# Patient Record
Sex: MALE | URBAN - METROPOLITAN AREA
[De-identification: names, ages, dates, MRNs, and addresses within clinical notes are randomized per-mention and may not be internally consistent; named-entity substitution may affect disease eponyms.]

---

## 2020-09-28 ENCOUNTER — APPOINTMENT (OUTPATIENT)
Dept: URBAN - METROPOLITAN AREA CLINIC 260 | Age: 60
Setting detail: DERMATOLOGY
End: 2020-09-28

## 2020-09-28 VITALS — WEIGHT: 170 LBS | HEIGHT: 68 IN

## 2020-09-28 DIAGNOSIS — L82.1 OTHER SEBORRHEIC KERATOSIS: ICD-10-CM

## 2020-09-28 DIAGNOSIS — L57.0 ACTINIC KERATOSIS: ICD-10-CM

## 2020-09-28 DIAGNOSIS — L82.0 INFLAMED SEBORRHEIC KERATOSIS: ICD-10-CM

## 2020-09-28 PROCEDURE — OTHER EDUCATIONAL RESOURCES PROVIDED: OTHER

## 2020-09-28 PROCEDURE — 99202 OFFICE O/P NEW SF 15 MIN: CPT | Mod: 25

## 2020-09-28 PROCEDURE — OTHER COUNSELING: OTHER

## 2020-09-28 PROCEDURE — 17000 DESTRUCT PREMALG LESION: CPT | Mod: 59

## 2020-09-28 PROCEDURE — 17110 DESTRUCT B9 LESION 1-14: CPT

## 2020-09-28 PROCEDURE — OTHER LIQUID NITROGEN: OTHER

## 2020-09-28 ASSESSMENT — LOCATION ZONE DERM
LOCATION ZONE: NOSE
LOCATION ZONE: FACE

## 2020-09-28 ASSESSMENT — LOCATION DETAILED DESCRIPTION DERM
LOCATION DETAILED: NASAL ROOT
LOCATION DETAILED: LEFT INFERIOR LATERAL FOREHEAD

## 2020-09-28 ASSESSMENT — LOCATION SIMPLE DESCRIPTION DERM
LOCATION SIMPLE: NOSE
LOCATION SIMPLE: LEFT FOREHEAD

## 2020-09-28 NOTE — PROCEDURE: LIQUID NITROGEN
Medical Necessity Information: It is in your best interest to select a reason for this procedure from the list below. All of these items fulfill various CMS LCD requirements except the new and changing color options.
Post-Care Instructions: I reviewed with the patient in detail post-care instructions. Patient is to wear sunprotection, and avoid picking at any of the treated lesions. Pt may apply Vaseline to crusted or scabbing areas.
Render Post-Care Instructions In Note?: yes
Render Post-Care Instructions In Note?: no
Medical Necessity Clause: This procedure was medically necessary because the lesions that were treated were:
Duration Of Freeze Thaw-Cycle (Seconds): 5
Number Of Freeze-Thaw Cycles: 2 freeze-thaw cycles
Detail Level: Simple
Consent: The patient's consent was obtained including but not limited to risks of crusting, scabbing, blistering, scarring, darker or lighter pigmentary change, recurrence, incomplete removal and infection.
Duration Of Freeze Thaw-Cycle (Seconds): 3

## 2021-08-22 ENCOUNTER — HEALTH MAINTENANCE LETTER (OUTPATIENT)
Age: 61
End: 2021-08-22

## 2021-10-16 ENCOUNTER — HEALTH MAINTENANCE LETTER (OUTPATIENT)
Age: 61
End: 2021-10-16

## 2021-12-10 ENCOUNTER — APPOINTMENT (OUTPATIENT)
Dept: URBAN - METROPOLITAN AREA CLINIC 260 | Age: 61
Setting detail: DERMATOLOGY
End: 2021-12-11

## 2021-12-10 VITALS — HEIGHT: 68 IN | WEIGHT: 175 LBS

## 2021-12-10 DIAGNOSIS — D49.2 NEOPLASM OF UNSPECIFIED BEHAVIOR OF BONE, SOFT TISSUE, AND SKIN: ICD-10-CM

## 2021-12-10 DIAGNOSIS — L81.4 OTHER MELANIN HYPERPIGMENTATION: ICD-10-CM

## 2021-12-10 PROCEDURE — OTHER MIPS QUALITY: OTHER

## 2021-12-10 PROCEDURE — 88305 TISSUE EXAM BY PATHOLOGIST: CPT

## 2021-12-10 PROCEDURE — 99212 OFFICE O/P EST SF 10 MIN: CPT | Mod: 25

## 2021-12-10 PROCEDURE — OTHER BIOPSY BY SHAVE METHOD: OTHER

## 2021-12-10 PROCEDURE — 11102 TANGNTL BX SKIN SINGLE LES: CPT

## 2021-12-10 PROCEDURE — OTHER PATHOLOGY BILLING: OTHER

## 2021-12-10 PROCEDURE — OTHER COUNSELING: OTHER

## 2021-12-10 ASSESSMENT — LOCATION SIMPLE DESCRIPTION DERM
LOCATION SIMPLE: LEFT CHEEK
LOCATION SIMPLE: RIGHT FOREHEAD

## 2021-12-10 ASSESSMENT — LOCATION DETAILED DESCRIPTION DERM
LOCATION DETAILED: LEFT CENTRAL MALAR CHEEK
LOCATION DETAILED: RIGHT INFERIOR LATERAL FOREHEAD

## 2021-12-10 ASSESSMENT — LOCATION ZONE DERM: LOCATION ZONE: FACE

## 2021-12-10 NOTE — PROCEDURE: PATHOLOGY BILLING
Immunohistochemistry (32036 and [id number]) billing is not performed here. Please use the Immunohistochemistry Stain Billing plan to accomplish this. Immunohistochemistry (87581 and [id number]) billing is not performed here. Please use the Immunohistochemistry Stain Billing plan to accomplish this.

## 2021-12-10 NOTE — PROCEDURE: BIOPSY BY SHAVE METHOD
Bill 17990 For Specimen Handling/Conveyance To Laboratory?: no
Additional Anesthesia Volume In Cc (Will Not Render If 0): 0
Biopsy Type: H and E
Depth Of Biopsy: dermis
Silver Nitrate Text: The wound bed was treated with silver nitrate after the biopsy was performed.
Hemostasis: Drysol
Electrodesiccation And Curettage Text: The wound bed was treated with electrodesiccation and curettage after the biopsy was performed.
Billing Type: Client Bill
Anesthesia Type: 1% lidocaine with epinephrine
Was A Bandage Applied: Yes
Electrodesiccation Text: The wound bed was treated with electrodesiccation after the biopsy was performed.
Wound Care: Petrolatum
Type Of Destruction Used: Curettage
Biopsy Method: Dermablade
Cryotherapy Text: The wound bed was treated with cryotherapy after the biopsy was performed.
Detail Level: Detailed
Consent: Written consent was obtained and risks were reviewed including but not limited to scarring, infection, bleeding, scabbing, incomplete removal, nerve damage and allergy to anesthesia.
Dressing: bandage
Curettage Text: The wound bed was treated with curettage after the biopsy was performed.
Notification Instructions: Patient will be notified of biopsy results. However, patient instructed to call the office if not contacted within 2 weeks.
Information: Selecting Yes will display possible errors in your note based on the variables you have selected. This validation is only offered as a suggestion for you. PLEASE NOTE THAT THE VALIDATION TEXT WILL BE REMOVED WHEN YOU FINALIZE YOUR NOTE. IF YOU WANT TO FAX A PRELIMINARY NOTE YOU WILL NEED TO TOGGLE THIS TO 'NO' IF YOU DO NOT WANT IT IN YOUR FAXED NOTE.
Anesthesia Volume In Cc (Will Not Render If 0): 0.5
Post-Care Instructions: I reviewed with the patient in detail post-care instructions. Patient is to keep the biopsy site dry overnight, and then apply bacitracin twice daily until healed. Patient may apply hydrogen peroxide soaks to remove any crusting.

## 2021-12-28 ENCOUNTER — APPOINTMENT (OUTPATIENT)
Dept: URBAN - METROPOLITAN AREA CLINIC 255 | Age: 61
Setting detail: DERMATOLOGY
End: 2021-12-29

## 2021-12-28 DIAGNOSIS — Q82.5 CONGENITAL NON-NEOPLASTIC NEVUS: ICD-10-CM

## 2021-12-28 DIAGNOSIS — L82.1 OTHER SEBORRHEIC KERATOSIS: ICD-10-CM

## 2021-12-28 PROBLEM — C44.319 BASAL CELL CARCINOMA OF SKIN OF OTHER PARTS OF FACE: Status: ACTIVE | Noted: 2021-12-28

## 2021-12-28 PROCEDURE — 17311 MOHS 1 STAGE H/N/HF/G: CPT

## 2021-12-28 PROCEDURE — 13132 CMPLX RPR F/C/C/M/N/AX/G/H/F: CPT

## 2021-12-28 PROCEDURE — 99212 OFFICE O/P EST SF 10 MIN: CPT | Mod: 25

## 2021-12-28 PROCEDURE — OTHER MOHS SURGERY: OTHER

## 2021-12-28 PROCEDURE — OTHER COUNSELING: OTHER

## 2021-12-28 PROCEDURE — OTHER MIPS QUALITY: OTHER

## 2021-12-28 ASSESSMENT — LOCATION SIMPLE DESCRIPTION DERM
LOCATION SIMPLE: RIGHT UPPER ARM
LOCATION SIMPLE: RIGHT FOREARM

## 2021-12-28 ASSESSMENT — LOCATION ZONE DERM: LOCATION ZONE: ARM

## 2021-12-28 ASSESSMENT — LOCATION DETAILED DESCRIPTION DERM
LOCATION DETAILED: RIGHT DISTAL POSTERIOR UPPER ARM
LOCATION DETAILED: RIGHT ANTERIOR DISTAL UPPER ARM
LOCATION DETAILED: RIGHT DISTAL RADIAL DORSAL FOREARM

## 2021-12-28 NOTE — PROCEDURE: MIPS QUALITY
Quality 143: Oncology: Medical And Radiation- Pain Intensity Quantified: Pain severity quantified, no pain present
Quality 431: Preventive Care And Screening: Unhealthy Alcohol Use - Screening: Patient identified as an unhealthy alcohol user when screened for unhealthy alcohol use using a systematic screening method and received brief counseling
Detail Level: Detailed
Quality 226: Preventive Care And Screening: Tobacco Use: Screening And Cessation Intervention: Patient screened for tobacco use and is an ex/non-smoker
Quality 130: Documentation Of Current Medications In The Medical Record: Current Medications Documented
Quality 110: Preventive Care And Screening: Influenza Immunization: Influenza Immunization Administered during Influenza season

## 2021-12-28 NOTE — PROCEDURE: MOHS SURGERY
Referring Physician (Optional): Kraie Freeman PA-C Referring Physician (Optional): Karie Freeman PA-C

## 2022-01-04 ENCOUNTER — APPOINTMENT (OUTPATIENT)
Dept: URBAN - METROPOLITAN AREA CLINIC 260 | Age: 62
Setting detail: DERMATOLOGY
End: 2022-01-05

## 2022-01-04 DIAGNOSIS — Z48.02 ENCOUNTER FOR REMOVAL OF SUTURES: ICD-10-CM

## 2022-01-04 PROCEDURE — OTHER PHOTO-DOCUMENTATION: OTHER

## 2022-01-04 PROCEDURE — OTHER SUTURE REMOVAL (GLOBAL PERIOD): OTHER

## 2022-01-04 ASSESSMENT — LOCATION ZONE DERM: LOCATION ZONE: FACE

## 2022-01-04 ASSESSMENT — LOCATION SIMPLE DESCRIPTION DERM: LOCATION SIMPLE: RIGHT FOREHEAD

## 2022-01-04 ASSESSMENT — LOCATION DETAILED DESCRIPTION DERM: LOCATION DETAILED: RIGHT INFERIOR LATERAL FOREHEAD

## 2022-01-04 NOTE — PROCEDURE: PHOTO-DOCUMENTATION
Detail Level: Zone
Photo Preface (Leave Blank If You Do Not Want): Photographs were obtained of affected skin today..

## 2022-01-04 NOTE — PROCEDURE: SUTURE REMOVAL (GLOBAL PERIOD)
Detail Level: Detailed
Add 37741 Cpt? (Important Note: In 2017 The Use Of 80276 Is Being Tracked By Cms To Determine Future Global Period Reimbursement For Global Periods): no

## 2022-01-05 ENCOUNTER — APPOINTMENT (OUTPATIENT)
Dept: URBAN - METROPOLITAN AREA CLINIC 260 | Age: 62
Setting detail: DERMATOLOGY
End: 2022-01-08

## 2022-01-05 VITALS — WEIGHT: 175 LBS | HEIGHT: 68 IN

## 2022-01-05 DIAGNOSIS — L73.8 OTHER SPECIFIED FOLLICULAR DISORDERS: ICD-10-CM

## 2022-01-05 DIAGNOSIS — Z71.89 OTHER SPECIFIED COUNSELING: ICD-10-CM

## 2022-01-05 DIAGNOSIS — L82.1 OTHER SEBORRHEIC KERATOSIS: ICD-10-CM

## 2022-01-05 PROCEDURE — OTHER ADDITIONAL NOTES: OTHER

## 2022-01-05 PROCEDURE — OTHER MIPS QUALITY: OTHER

## 2022-01-05 PROCEDURE — OTHER LIQUID NITROGEN (COSMETIC): OTHER

## 2022-01-05 PROCEDURE — OTHER COUNSELING: OTHER

## 2022-01-05 PROCEDURE — 99212 OFFICE O/P EST SF 10 MIN: CPT | Mod: 24

## 2022-01-05 ASSESSMENT — LOCATION SIMPLE DESCRIPTION DERM
LOCATION SIMPLE: LEFT CHEEK
LOCATION SIMPLE: LEFT FOREHEAD
LOCATION SIMPLE: RIGHT FOREHEAD
LOCATION SIMPLE: RIGHT FOREHEAD

## 2022-01-05 ASSESSMENT — LOCATION DETAILED DESCRIPTION DERM
LOCATION DETAILED: RIGHT FOREHEAD
LOCATION DETAILED: LEFT MEDIAL FOREHEAD
LOCATION DETAILED: RIGHT LATERAL FOREHEAD
LOCATION DETAILED: LEFT SUPERIOR CENTRAL MALAR CHEEK
LOCATION DETAILED: LEFT INFERIOR MEDIAL FOREHEAD
LOCATION DETAILED: RIGHT MEDIAL FOREHEAD
LOCATION DETAILED: RIGHT SUPERIOR MEDIAL FOREHEAD
LOCATION DETAILED: LEFT SUPERIOR FOREHEAD

## 2022-01-05 ASSESSMENT — LOCATION ZONE DERM
LOCATION ZONE: FACE
LOCATION ZONE: FACE

## 2022-01-05 NOTE — PROCEDURE: MIPS QUALITY
Quality 110: Preventive Care And Screening: Influenza Immunization: Influenza Immunization Administered during Influenza season
Quality 130: Documentation Of Current Medications In The Medical Record: Current Medications Documented
Detail Level: Detailed
Quality 143: Oncology: Medical And Radiation- Pain Intensity Quantified: Pain severity quantified, no pain present
Quality 226: Preventive Care And Screening: Tobacco Use: Screening And Cessation Intervention: Patient screened for tobacco use and is an ex/non-smoker
Quality 431: Preventive Care And Screening: Unhealthy Alcohol Use - Screening: Patient identified as an unhealthy alcohol user when screened for unhealthy alcohol use using a systematic screening method and received brief counseling

## 2022-01-05 NOTE — PROCEDURE: ADDITIONAL NOTES
Detail Level: Zone
Render Risk Assessment In Note?: no
Additional Notes: Recommend a skin check due to history of BCC.

## 2022-01-05 NOTE — PROCEDURE: MIPS QUALITY
Quality 143: Oncology: Medical And Radiation- Pain Intensity Quantified: Pain severity quantified, no pain present
Quality 431: Preventive Care And Screening: Unhealthy Alcohol Use - Screening: Patient identified as an unhealthy alcohol user when screened for unhealthy alcohol use using a systematic screening method and received brief counseling
Quality 110: Preventive Care And Screening: Influenza Immunization: Influenza Immunization Administered during Influenza season
Quality 226: Preventive Care And Screening: Tobacco Use: Screening And Cessation Intervention: Patient screened for tobacco use and is an ex/non-smoker
Detail Level: Detailed
Quality 130: Documentation Of Current Medications In The Medical Record: Current Medications Documented

## 2022-01-05 NOTE — PROCEDURE: ADDITIONAL NOTES
Render Risk Assessment In Note?: no
Detail Level: Zone
Additional Notes: Recommend a skin check due to history of BCC.

## 2022-01-05 NOTE — PROCEDURE: LIQUID NITROGEN (COSMETIC)
Detail Level: Detailed
Consent: The patient's consent was obtained including but not limited to risks of crusting, scabbing, blistering, scarring, darker or lighter pigmentary change, recurrence, incomplete removal and infection. The patient understands that the procedure is cosmetic in nature and is not covered by insurance.
Render Post-Care Instructions In Note?: no
Post-Care Instructions: I reviewed with the patient in detail post-care instructions. Patient is to wear sunprotection, and avoid picking at any of the treated lesions. Pt may apply Vaseline to crusted or scabbing areas.
Show Spray Paint Technique Variable?: Yes
Spray Paint Text: The liquid nitrogen was applied to the skin utilizing a spray paint frosting technique.
Billing Information: Bill by Static Price
Price (Use Numbers Only, No Special Characters Or $): 150

## 2022-07-28 ENCOUNTER — TRANSFERRED RECORDS (OUTPATIENT)
Dept: HEALTH INFORMATION MANAGEMENT | Facility: CLINIC | Age: 62
End: 2022-07-28

## 2022-10-01 ENCOUNTER — HEALTH MAINTENANCE LETTER (OUTPATIENT)
Age: 62
End: 2022-10-01

## 2022-10-19 ENCOUNTER — TRANSFERRED RECORDS (OUTPATIENT)
Dept: HEALTH INFORMATION MANAGEMENT | Facility: CLINIC | Age: 62
End: 2022-10-19

## 2022-11-01 ENCOUNTER — MEDICAL CORRESPONDENCE (OUTPATIENT)
Dept: HEALTH INFORMATION MANAGEMENT | Facility: CLINIC | Age: 62
End: 2022-11-01

## 2022-11-10 ENCOUNTER — REFERRAL (OUTPATIENT)
Dept: TRANSPLANT | Facility: CLINIC | Age: 62
End: 2022-11-10

## 2022-11-10 DIAGNOSIS — K70.30 ALCOHOLIC CIRRHOSIS (H): Primary | ICD-10-CM

## 2022-11-10 NOTE — LETTER
Cricket Mane  [address] [place] [facility] [address]                November 15, 2022          MEDICAL RECORDS REQUEST  [facility] liver transplant team is requesting records from Referring Providers Office for patients referred to the Liver Transplant Program                Images Needed to Process Intake of Patient:    CXR Images (most recent)    Chest CT Images (all within last 24 months or most recent)    Abdominal CT Report and Images  (all within last 24 months or most recent)    Abdominal MRI Report and Images  (all within last 24 months or most recent)    Bone Scan Images and Report (No DEXA Scans)    PET Images and Report  Records Needed to Process Intake of Patient:    Cardiac Catheterization Results (most recent on file)    Chest CT Report (all within last 24 months or most recent)    Chest X-Ray Report (all within last 24 months or most recent)    Culture Results (last 2 years on file)    ECHO Results (most recent on file)    Hospital Discharge Summaries (last 2 years on file)    Lab Results (most recent on file)    History and Physical (original on file)    Liver Pathology All Reports     Physicians Notes (last 3 reports on file)    Radiology Reports (not including Chest X-ray, last 2 years on file)    EGD Images and Report     Colonoscopy Report with Pathology    Requested imaging may be electronically sent to the [facility] system via [id number] DICOM connection.   When unable to send imaging electronically, an exported DICOM CD may be sent. Please indicate when images have been sent electronically on a return faxed cover sheet.    Requested pathology slides should be accompanied by the appropriate report from your institution.    When the patient is hand carrying requested records or the requested records are not at your facility, please indicate this information on a return faxed cover sheet.    Please fax all paper records to [phone] within 3-5 business days.     Please send all scans/slides to:   [facility]  Solid Organ Transplant Office  [address]  [place] [address]    Please call our office at [phone] if you have any questions or concerns.

## 2022-11-10 NOTE — LETTER
11/15/2022    Cricket Mane  [address] [facility] [address]          Dear Cricket,    Thank you for your interest in the Transplant Center at [facility]. We look forward to being a part of your care team and assisting you through the transplant process.    As we discussed, your transplant coordinator is Stefany Jean (Liver).  You may call your coordinator at any time with questions or concerns at [phone].    Please complete the following.    1. Fill out and return the enclosed forms    Authorization for Electronic Communication    Authorization to Discuss Protected Health Information    Authorization for Release of Protected Health Information    Authorization for Care Everywhere Release of Information    2. Sign up for:    [employer], access to your electronic medical record (see enclosed pamphlet)    [email or web address], a transplant education website    You can use these tools to learn more about your transplant, communicate with your care team, and track your medical details      Sincerely,      Solid Organ Transplant  [facility]    cc: Referring Physician and PCP

## 2022-11-15 VITALS — BODY MASS INDEX: 24.25 KG/M2 | HEIGHT: 68 IN | WEIGHT: 160 LBS

## 2022-11-15 NOTE — TELEPHONE ENCOUNTER
Patient was asked the following questions during liver intake call.     Referring Provider: Raissa Cardenas NP  Referring Diagnosis: Alcoholic Cirrhosis of the Liver  PCP: Dr. Bang Burnett     1)Do you know why you have liver disease: Yes       If Alcoholic Cirrhosis is present when was your last drink: 7/28/22       Have you ever been through treatment for alcohol: Yes, [facility]  2) Presence of Ascites: Yes Paracentesis: Yes  3) Presence of Hepatic Encephalopathy: Yes Medications: No  4) History of GI Bleeding: No  5) Oxygen Use: No  6) EGD: Yes @ [facility]   7) Colonoscopy: No   8) MELD Score: 23  9) Labs available for review from PCP/GI: Yes  10)HCC Diagnosis: No                11)Insurance information: [facility]                 Policy Low: Self              Subscriber/Policy/ID Number: [id number]             Group Number:     Referral intake process completed.  Patient is aware that after financial approval is received, medical records will be requested.   Patient confirmed for a callback from transplant coordinator on 11/17/22.  Tentative evaluation date TBD.    Confirmed coordinator will discuss evaluation process in more detail at the time of their call.   Patient is aware of the need to arrange age appropriate cancer screening, vaccinations, and dental care.  Reminded patient to complete questionnaire, complete medical records release, and review packet prior to evaluation visit .  Assessed patient for special needs (ie--wheelchair, assistance, guardian, and ):  None  Patient instructed to call [phone] with questions.     Patient gave verbal consent during intake call to obtain medical records and documents outside of [facility]: Yes     MEDARDO Crawley, LPN   Transplant

## 2022-11-17 ENCOUNTER — DOCUMENTATION ONLY (OUTPATIENT)
Dept: TRANSPLANT | Facility: CLINIC | Age: 62
End: 2022-11-17

## 2022-11-17 NOTE — PROGRESS NOTES
LIVER DISEASE ETOH   REFERRING PROVIDER Raissa Cardenas  [facility]  -----------------------------------------------------------------------------------------------------------------------------  MELD 23  ABO: O    HISTORY OF LIVER DISEASE  Started following with Raissa Cardenas after hospitalization in July 2022.  Wasn't feeling well 7/28/22, woke up at night vomiting blood x 2, son called 911- went to [facility] and additional hematemesis, hospitalized for 5 days.      Last ETOH was before that hospitalization, prior was consuming vodka and wine - at least several a day vodka, plus couple glasses of wine.  In IOP with Christy right now.      Told in past to limit etoh intake by MD approx early 2016 by Dr. Eladio Burnett in [place] (Cardiologist) when seen for AFib.  Started on Metoprolol.  On carvedilol now for AFib.      Ascites  Rehan Furosemide was on, took off by Theresa since fluid status improved.      [place] ultrasound approx every 3 weeks, nut not needed tap last few times.      TIPS no  HE no and not on meds  Kidney function no issues  Variceal screening 10/12/22 had EGD, has another scheduled in January 2018  HCC Screening Last imaging and location    Hospitalizations July 2022 per above  ---------------------------------------------------------------------------------------------------------------------------------  PMH  Cardiac- AFib  Pulm- denies, non smoker (never smoked)  Diabetes no   Abdominal surgery no; had hip replacement   CRC Screening- had cologuard? That was negative; will discuss getting done at  locally at    Vax: got flu already this year, had covid doses x 3.      SHX  Currently works from home, travels for work every few months to home office in [place], family is here.  Works in [employer], fund management    Currently lives at home, adult son lives there, daughter is local and is a nurse, another daughter in [place].     ---------------------------------------------------------------------------------------------------------------------------------  LDLT Discussed  Yes, info sent    PLAN  Eval 12/13 or 12/20- will check with daughter and let me know.       Current meds:  (off diuretics at present, also stopped antbx for SBP prophy)  Omeprazole  Carvedilol  MVI  Folic Acid

## 2022-12-08 DIAGNOSIS — K74.60 CIRRHOSIS (H): Primary | ICD-10-CM

## 2022-12-14 NOTE — PROGRESS NOTES
[facility] Hepatology    New Patient Visit    Referring provider:  Raissa Cardenas [facility], [facility]  Chief complaint:  LT Evaluation; ABO O+    Assessment  62 year old male with past medical history of decompensated alcohol-related cirrhosis complicated by ascites and variceal bleeding.  Past medical history also includes alcohol use disorder and atrial fibrillation (not on anticoagulation).    In terms of the patient's liver disease his ascites has resolved and he is no longer requiring any diuretic management.  He has a history of variceal bleeding requiring banding; he is due for repeat endoscopy for variceal screening.  He has no history of hepatic encephalopathy. 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  His liver disease may continue to improve in the setting of sobriety.    His last alcohol use was in July 2022.  Following his last alcohol use he has completed treatment programming at [facility] and has good insight into his alcohol use disorder.  He would benefit from ongoing support to maintain abstinence.    Pending cardiology evaluation he would be a reasonable candidate for transplantation.    Plan  - EGD for variceal surveillance  - Colonoscopy for colon cancer screening  - CT angiogram for coronary assessment + cardiology consultation given history of atrial fibrillation  - HCC surveillance due 6/2023  - Congratulated on alcohol abstinence and completion of treatment    Health Maintenance:  - COVID Vaccination: s/p 5 doses  - Influenza Vaccination: 9/2022  - Dental visits: last ~10/2022  - Colon Cancer Screening: planned for 1/2023    RTC: 3 months    Discussed with attending hepatologist, Dr. Luther Rodriguez MD  Transplant Hepatology Fellow  [phone]    HPI:  Alcohol related Cirrhosis  - dx ~ 7/2022  - no hx HE  - hx ascites  - hx variceal bleed (7/2022)  - last EGD 10/2022: Grade III varices s/p  banding. Mild portal HTN gastropathy. IGV1.   - HCC screening - US 12/2022 without hepatic lesions    He presents with his daughter Kell.    He was first diagnosed with liver disease related to alcohol in July 2022. He was admitted 7/28-8/2/22 for hematemesis. Noted to have decompensated cirrhosis with ascites. He was started on low dose diuretics. Ascites fluid (2601 WBC 25% neurophils, but 1304 RBC). Culture negative.  He was started on antibiotics at that time.    In terms of his ascites he has only undergone a paracentesis once which was at the time of his cirrhosis diagnosis.  1 L was removed at that time.  He previously was on a combination of furosemide and spironolactone but in the recent weeks he was weaned down to Lasix and he no longer takes this daily.  He denies any abdominal swelling or lower extremity edema.    Since his hospital stay in July he has not had any issues with melena, hematochezia or hematemesis.  He denies any history of blood transfusions.  Denies any history of encephalopathy, confusion or lethargy.    In terms of his alcohol use his last use was on July 28, 2022.  He previously drank wine and liquor (gin + vodka).  He started drinking in high school but his alcohol use was pretty minimal until after he finished grad school.  His alcohol use increased at the beginning of the pandemic to up to a bottle of wine per night plus liquor.  Prior to his diagnosis of liver disease he was already in the process of reducing his alcohol use.  He has gone through programming at [facility] for 4 months and has completed programming.    He has been diagnosed with atrial fibrillation in the past. Followed by [facility] cardiologist. Diagnosed around 2017 and status post failed DCCV. He has since been on a rate control strategy with carvedilol. Per notes - CHADS2 Vasc is 0, therefore he has not been on anticoagulation. No aspirin was prescribed given liver disease.    He reports a stable weight  "with good appetite.  He continues to work full-time.  He denies any tobacco use, intranasal drug use or IV drug use.    Medical hx Surgical hx   Past Medical History:   Diagnosis Date     Alcoholic cirrhosis of liver with ascites (H)      Atrial fibrillation (H)      History of alcohol use disorder      History of esophageal varices with bleeding       Past Surgical History:   Procedure Laterality Date     AS PARTIAL HIP REPLACEMENT     Abdominal surgery: denies  * Hip replacement on the left      Medications  Current Outpatient Medications   Medication Sig Dispense Refill     carvedilol (COREG) 3.125 MG tablet TAKE 1 TABLET (3.125 MG) BY MOUTH TWO TIMES A DAY WITH MEALS.       folic acid (FOLVITE) 1 MG tablet Take 1,000 mcg by mouth daily       Multiple Vitamins-Iron TABS Take 1 tablet by mouth daily       omeprazole 20 MG tablet        LORazepam (ATIVAN) 0.5 MG tablet [LORAZEPAM (ATIVAN) 0.5 MG TABLET] Take 1 tablet (0.5 mg total) by mouth every 8 (eight) hours as needed for anxiety. 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   - Lives with adult son. Daughter is local, RN. Another daughter in [place]  - Alcohol: Last drink 7/28/22; other history as above   - Tobacco: Never smoker  - Drugs: Denies ever       Review of systems  A 10-point review of systems was negative.    Examination  /82   Pulse 99   Ht 1.727 m (5' 8\")   Wt 74.8 kg (165 lb)   SpO2 98%   BMI 25.09 kg/m    Body mass index is 25.09 kg/m .    Gen-NAD  Eye- EOMI, conjunctival icterus  ENT- MMM, normal oropharynx  CVS- Irregularly irregular, no murmurs  RS- CTA bilaterally  Abd- soft, umbilical hernia (reducible) non-tender, non-distended  Extr- 2+ radial " pulses bilaterally, no lower extremity edema bilaterally  MS- hands without clubbing  Neuro- A+Ox3, no asterixis  Skin- no rash. + jaundice  Psych- normal mood    Laboratory  BMPRecent Labs   Lab Test 12/20/22  0804      POTASSIUM 4.7   CHLORIDE 101   PEDRO 9.7   CO2 26   BUN 17.8   CR 1.03   *     CBC  Recent Labs   Lab Test 12/20/22  0804   WBC 6.3   RBC 3.99*   HGB 12.8*   HCT 37.8*   MCV 95   MCH 32.1   MCHC 33.9   RDW 16.2*   PLT 79*     Liver Enzymes   Recent Labs   Lab Test 12/20/22  0804   PROTTOTAL 7.9   ALBUMIN 3.1*   BILITOTAL 5.9*   ALKPHOS 250*   AST 91*   ALT 54*      INR   INR   Date Value Ref Range Status   12/20/2022 1.91 (H) 0.85 - 1.15 Final      Hep B s Ag negative 2022  Hep B c Ab negative 2022  Hep B s Ab negative 2022  Hep C Ab negative 2022  PETH < 10 (12/2022)  PETH: 12 (9/2022)  PETH 116 (8/2022)  Ethyl Alcohol: 0.13 (7/2022)  AFP: 3.1 (8/2022)  FIT 9/2022: negative    Radiology  TTE 11/2022  1. The left ventricular size is normal.  Systolic function is difficult to assess.  The estimated ejection fraction is 65%.  Wall thickness is borderline increased.  Left ventricular segmental wall motion is normal.   2. The right ventricular size is normal.  Systolic function is normal.   3. The left atrium is moderately enlarged.   4. The right atrium is mildly enlarged.   5. There is trace aortic valve regurgitation.  6. There is trace to mild mitral valve regurgitation.   7. There is mild tricuspid valve regurgitation.  8. There is trace pulmonic regurgitation.   9. The proximal ascending aorta measures 3.30 cm with an index of 1.72 cm/m2.  10. There is no gross pericardial effusion.     TTE 12/2022  Global and regional left ventricular function is normal with an EF of 60-65%.  Global right ventricular function is normal.  Mild mitral and aortic annular calcification is present.  Pulmonary artery systolic pressure is normal.  The inferior vena cava is normal.  No pericardial effusion is  present.    Abdominal US 12/2022  1. Cirrhotic appearance of the liver without suspicious focal liver  lesion.  2. Simple hepatic cyst in the right lobe of the liver measuring up to  1.1 cm.  3. Cholelithiasis and layering biliary sludge. Asymmetric gallbladder  wall thickening, nonspecific in the setting of liver disease and  partially decompressed gallbladder. No pericholecystic fluid or  positive sonographic Dubon sign to suggest acute cholecystitis.  4. Evidence of portal hypertension including borderline decreased  velocity in the right portal vein and mild splenomegaly.    CXR 12/2022  No acute cardiopulmonary findings.    Endoscopy  EGD 10/2022: Grade III varices s/p banding. Mild portal HTN gastropathy. Non-bleeding gastric erosions. IGV1. Biopsies neg for H Pylori.    EGD 8/2022: Esophageal ulcer. Grade III varices s/p banding. Portal HTN gastropathy. IGV1.

## 2022-12-14 NOTE — TELEPHONE ENCOUNTER
RECORDS RECEIVED FROM:   DATE RECEIVED:   NOTES STATUS DETAILS   OFFICE NOTE from referring provider    Internal    OFFICE NOTE from other cardiologist    N/A    SUMMARY from hospital/ED   Care Everywhere    MEDICATION LIST   Internal    DIAGNOSTIC PROCEDURES     EKG   In process    Monitor Reports   N/A    IMAGING (DISC & REPORT)      Echo   In process 11-18-22   Stress Tests   N/A    Cath   N/A    MRI/MRA   N/A    CT/CTA   N/A      Action 12/14/22 [facility]  Fax #[phone]   Action Taken Requested echo 11-18-22    Resolved images in PACS 12/27     Action 12/14/22   Fax #[phone]   Action Taken Requested all 2022 EKGs

## 2022-12-16 ENCOUNTER — ANCILLARY PROCEDURE (OUTPATIENT)
Dept: GENERAL RADIOLOGY | Facility: CLINIC | Age: 62
End: 2022-12-16
Attending: INTERNAL MEDICINE
Payer: COMMERCIAL

## 2022-12-16 ENCOUNTER — ANCILLARY PROCEDURE (OUTPATIENT)
Dept: ULTRASOUND IMAGING | Facility: CLINIC | Age: 62
End: 2022-12-16
Attending: INTERNAL MEDICINE
Payer: COMMERCIAL

## 2022-12-16 DIAGNOSIS — K74.60 CIRRHOSIS (H): ICD-10-CM

## 2022-12-16 PROCEDURE — 71046 X-RAY EXAM CHEST 2 VIEWS: CPT | Mod: GC | Performed by: RADIOLOGY

## 2022-12-16 PROCEDURE — 93975 VASCULAR STUDY: CPT | Mod: GC | Performed by: RADIOLOGY

## 2022-12-19 ENCOUNTER — TELEPHONE (OUTPATIENT)
Dept: TRANSPLANT | Facility: CLINIC | Age: 62
End: 2022-12-19

## 2022-12-19 DIAGNOSIS — K70.30 ALCOHOLIC CIRRHOSIS (H): Primary | ICD-10-CM

## 2022-12-19 LAB
ABO/RH(D): NORMAL
ANTIBODY SCREEN: NEGATIVE
SPECIMEN EXPIRATION DATE: NORMAL

## 2022-12-19 NOTE — PROGRESS NOTES
[facility] Solid Organ Transplant  Outpatient MNT: Liver Transplant Evaluation    Current BMI: 25 (HT 68 in,  lbs/75 kg)  BMI is within recommendation of <45 for liver transplant    Fried Frailty-- Not Frail (1/5 points)- unintentional wt loss     FraiLT-- Pre frail  [email or web address]     Time Spent: 30 minutes  Visit Type: Initial   Referring Physician: Kavitha   Pt accompanied by: his daughter, Anthony    History of previous txp: none     Nutrition Assessment  H/o etoh liver dz. He reports watching Na content of foods as able.      - Appetite: good   - Food allergies/intolerances: no   - Meal prep & grocery shopping: pt does  - Previous RD education: yes  - Issues chewing or swallowing: no   - N/V/D/C: no   - Food access concerns: no     Vitamins, Supplements, Pertinent Meds: MVI, folic acid, calcium   Herbal Medicines/Supplements: none   Protein supplement: protein drink/bar a few times/week (since July)- Ensure 30 g protein, Bryce bars, Muscle Milk, whey protein powder    Edema: prev ascites, yet reports no current fluid retention    Weight hx:   - fluid fluctuations; prior  lbs   - some muscle loss, but thinks it has improved    Diet Recall  Breakfast Greek yogurt with fruit and/or cereal with skim milk + protein drink    Lunch Fruit, bakery muffin; take out meal- 4x/week (from grocery store or restaurant)   Dinner At home will make- fish/chicken/pork + veggie or will do take out meal per L   Snacks HS- ice cream, popsicle, chocolate covered pretzels    Beverages Water, OJ (0-10 oz/day), Gatorade (20 oz/day), coffee    Dining out 4x/week      Physical Activity  Improving strength; reports no routine activity   Does own ADLs     Anthropometrics   IBW/%IBW: 154/107  Dosing wt: 165 lbs/75 kg    Estimated Nutrition Needs  Protein: 75-90 grams/day (1-1.2 g/kg) for increased needs w/ liver disease    Nutrition Diagnosis  Predicted suboptimal nutrient intake (protein) r/t increased  needs with liver disease.    Nutrition Intervention  Nutrition education provided:  Discussed sodium intake (low sodium foods and drinks, seasoning food without salt and tips for low sodium diet).     Reviewed adequate protein intake. Encouraged receiving protein from both animal and plant based sources.     Reviewed post txp diet guidelines in brief (will review in further detail post txp):  (1) Review of proper food safety measures d/t immunosuppressant therapy post-op and increased risk for food-borne illness    (2) Avoid the following post txp d/t risk for rejection, unknown effects on the organs, and/or potential interactions with immunosuppressants:  - Herbal, Chinese, holistic, chiropractic, natural, alternative medicines and supplements  - Detoxes and cleanses  - Weight loss pills  - Protein powders or other products with extracts or herbs (ie green tea extract)    (3) Med regimen and possible side effects    Patient Understanding: Pt verbalized understanding of education provided.  Expected Engagement: Good  Follow-Up Plans: PRN     Nutrition Goals  Ideal minimum protein intake 75 g/day    Ynes Higgins, RD, LD, CCTD

## 2022-12-19 NOTE — TELEPHONE ENCOUNTER
Spoke with patient, reviewed upcoming eval appts and process    Pateint reported someone from [facility] called him and told him CTA would not be covered at our facility, only would be covered at [place]. Told him should be covered under branch of transplant evaluation that was pre-approved, message to PFR to double check.

## 2022-12-20 ENCOUNTER — OFFICE VISIT (OUTPATIENT)
Dept: TRANSPLANT | Facility: CLINIC | Age: 62
End: 2022-12-20
Attending: INTERNAL MEDICINE
Payer: COMMERCIAL

## 2022-12-20 ENCOUNTER — OFFICE VISIT (OUTPATIENT)
Dept: GASTROENTEROLOGY | Facility: CLINIC | Age: 62
End: 2022-12-20
Attending: INTERNAL MEDICINE
Payer: COMMERCIAL

## 2022-12-20 ENCOUNTER — ANCILLARY PROCEDURE (OUTPATIENT)
Dept: CARDIOLOGY | Facility: CLINIC | Age: 62
End: 2022-12-20
Attending: INTERNAL MEDICINE
Payer: COMMERCIAL

## 2022-12-20 ENCOUNTER — COMMITTEE REVIEW (OUTPATIENT)
Dept: TRANSPLANT | Facility: CLINIC | Age: 62
End: 2022-12-20

## 2022-12-20 ENCOUNTER — LAB (OUTPATIENT)
Dept: LAB | Facility: CLINIC | Age: 62
End: 2022-12-20
Attending: INTERNAL MEDICINE
Payer: COMMERCIAL

## 2022-12-20 VITALS
DIASTOLIC BLOOD PRESSURE: 82 MMHG | BODY MASS INDEX: 25.01 KG/M2 | OXYGEN SATURATION: 98 % | HEIGHT: 68 IN | WEIGHT: 165 LBS | HEART RATE: 99 BPM | SYSTOLIC BLOOD PRESSURE: 119 MMHG

## 2022-12-20 DIAGNOSIS — M81.0 OSTEOPOROSIS: ICD-10-CM

## 2022-12-20 DIAGNOSIS — F10.21 ALCOHOL USE DISORDER, SEVERE, IN EARLY REMISSION (H): ICD-10-CM

## 2022-12-20 DIAGNOSIS — Z12.11 COLON CANCER SCREENING: ICD-10-CM

## 2022-12-20 DIAGNOSIS — I48.91 ATRIAL FIBRILLATION AND FLUTTER (H): ICD-10-CM

## 2022-12-20 DIAGNOSIS — K74.60 CIRRHOSIS (H): ICD-10-CM

## 2022-12-20 DIAGNOSIS — K70.31 ALCOHOLIC CIRRHOSIS OF LIVER WITH ASCITES (H): Primary | ICD-10-CM

## 2022-12-20 DIAGNOSIS — K70.30 ALCOHOLIC CIRRHOSIS (H): Primary | ICD-10-CM

## 2022-12-20 DIAGNOSIS — Z76.82 ORGAN TRANSPLANT CANDIDATE: Primary | ICD-10-CM

## 2022-12-20 DIAGNOSIS — I85.11 SECONDARY ESOPHAGEAL VARICES WITH BLEEDING (H): ICD-10-CM

## 2022-12-20 DIAGNOSIS — I48.92 ATRIAL FIBRILLATION AND FLUTTER (H): ICD-10-CM

## 2022-12-20 LAB
A1 AB TITR SERPL: >256 {TITER}
A1 AB TITR SERPL: >256 {TITER}
ABO/RH(D): NORMAL
ALBUMIN MFR UR ELPH: 11.2 MG/DL
ALBUMIN SERPL BCG-MCNC: 3.1 G/DL (ref 3.5–5.2)
ALBUMIN UR-MCNC: NEGATIVE MG/DL
ALP SERPL-CCNC: 250 U/L (ref 40–129)
ALT SERPL W P-5'-P-CCNC: 54 U/L (ref 10–50)
ANION GAP SERPL CALCULATED.3IONS-SCNC: 10 MMOL/L (ref 7–15)
ANTIBODY TITER IGM SCREEN: NEGATIVE
APPEARANCE UR: CLEAR
AST SERPL W P-5'-P-CCNC: 91 U/L (ref 10–50)
B IGG TITR SERPL: >256 {TITER}
B IGM TITR SERPL: >256 {TITER}
BILIRUB DIRECT SERPL-MCNC: 2.1 MG/DL (ref 0–0.3)
BILIRUB SERPL-MCNC: 5.9 MG/DL
BILIRUB UR QL STRIP: NEGATIVE
BUN SERPL-MCNC: 17.8 MG/DL (ref 8–23)
CALCIUM SERPL-MCNC: 9.7 MG/DL (ref 8.8–10.2)
CHLORIDE SERPL-SCNC: 101 MMOL/L (ref 98–107)
CHOLEST SERPL-MCNC: 311 MG/DL
COLOR UR AUTO: YELLOW
CREAT SERPL-MCNC: 1.03 MG/DL (ref 0.67–1.17)
CREAT UR-MCNC: 130 MG/DL
DEPRECATED CALCIDIOL+CALCIFEROL SERPL-MC: 24 UG/L (ref 20–75)
DEPRECATED HCO3 PLAS-SCNC: 26 MMOL/L (ref 22–29)
ERYTHROCYTE [DISTWIDTH] IN BLOOD BY AUTOMATED COUNT: 16.2 % (ref 10–15)
GFR SERPL CREATININE-BSD FRML MDRD: 83 ML/MIN/1.73M2
GLUCOSE SERPL-MCNC: 106 MG/DL (ref 70–99)
GLUCOSE UR STRIP-MCNC: NEGATIVE MG/DL
HCT VFR BLD AUTO: 37.8 % (ref 40–53)
HDLC SERPL-MCNC: 74 MG/DL
HGB BLD-MCNC: 12.8 G/DL (ref 13.3–17.7)
HGB UR QL STRIP: NEGATIVE
INR PPP: 1.91 (ref 0.85–1.15)
IRON BINDING CAPACITY (ROCHE): ABNORMAL
IRON SATN MFR SERPL: ABNORMAL %
IRON SERPL-MCNC: 225 UG/DL (ref 61–157)
KETONES UR STRIP-MCNC: NEGATIVE MG/DL
LDLC SERPL CALC-MCNC: 218 MG/DL
LEUKOCYTE ESTERASE UR QL STRIP: NEGATIVE
LVEF ECHO: NORMAL
MCH RBC QN AUTO: 32.1 PG (ref 26.5–33)
MCHC RBC AUTO-ENTMCNC: 33.9 G/DL (ref 31.5–36.5)
MCV RBC AUTO: 95 FL (ref 78–100)
NITRATE UR QL: NEGATIVE
NONHDLC SERPL-MCNC: 237 MG/DL
PH UR STRIP: 6 [PH] (ref 5–7)
PHOSPHATE SERPL-MCNC: 3.8 MG/DL (ref 2.5–4.5)
PLATELET # BLD AUTO: 79 10E3/UL (ref 150–450)
POTASSIUM SERPL-SCNC: 4.7 MMOL/L (ref 3.4–5.3)
PROT SERPL-MCNC: 7.9 G/DL (ref 6.4–8.3)
PROT/CREAT 24H UR: 0.09 MG/MG CR (ref 0–0.2)
PSA SERPL-MCNC: 0.09 NG/ML (ref 0–4.5)
RBC # BLD AUTO: 3.99 10E6/UL (ref 4.4–5.9)
SODIUM SERPL-SCNC: 137 MMOL/L (ref 136–145)
SP GR UR STRIP: 1.02 (ref 1–1.03)
SPECIMEN EXPIRATION DATE: NORMAL
SPECIMEN EXPIRATION DATE: NORMAL
TRANSFERRIN SERPL-MCNC: 184 MG/DL (ref 200–360)
TRIGL SERPL-MCNC: 96 MG/DL
TSH SERPL DL<=0.005 MIU/L-ACNC: 3.43 UIU/ML (ref 0.3–4.2)
UROBILINOGEN UR STRIP-MCNC: 4 MG/DL
WBC # BLD AUTO: 6.3 10E3/UL (ref 4–11)

## 2022-12-20 PROCEDURE — 86850 RBC ANTIBODY SCREEN: CPT | Performed by: INTERNAL MEDICINE

## 2022-12-20 PROCEDURE — 80061 LIPID PANEL: CPT | Performed by: PATHOLOGY

## 2022-12-20 PROCEDURE — 83540 ASSAY OF IRON: CPT | Performed by: PATHOLOGY

## 2022-12-20 PROCEDURE — 86901 BLOOD TYPING SEROLOGIC RH(D): CPT | Performed by: INTERNAL MEDICINE

## 2022-12-20 PROCEDURE — 84100 ASSAY OF PHOSPHORUS: CPT | Performed by: PATHOLOGY

## 2022-12-20 PROCEDURE — 85027 COMPLETE CBC AUTOMATED: CPT | Performed by: PATHOLOGY

## 2022-12-20 PROCEDURE — 82306 VITAMIN D 25 HYDROXY: CPT | Performed by: INTERNAL MEDICINE

## 2022-12-20 PROCEDURE — 86780 TREPONEMA PALLIDUM: CPT | Performed by: INTERNAL MEDICINE

## 2022-12-20 PROCEDURE — 82728 ASSAY OF FERRITIN: CPT | Performed by: INTERNAL MEDICINE

## 2022-12-20 PROCEDURE — 99205 OFFICE O/P NEW HI 60 MIN: CPT | Performed by: TRANSPLANT SURGERY

## 2022-12-20 PROCEDURE — 86665 EPSTEIN-BARR CAPSID VCA: CPT | Performed by: INTERNAL MEDICINE

## 2022-12-20 PROCEDURE — G0103 PSA SCREENING: HCPCS | Performed by: PATHOLOGY

## 2022-12-20 PROCEDURE — 80053 COMPREHEN METABOLIC PANEL: CPT | Performed by: PATHOLOGY

## 2022-12-20 PROCEDURE — 86481 TB AG RESPONSE T-CELL SUSP: CPT | Performed by: PATHOLOGY

## 2022-12-20 PROCEDURE — 86886 COOMBS TEST INDIRECT TITER: CPT | Performed by: INTERNAL MEDICINE

## 2022-12-20 PROCEDURE — 36415 COLL VENOUS BLD VENIPUNCTURE: CPT | Performed by: PATHOLOGY

## 2022-12-20 PROCEDURE — 83550 IRON BINDING TEST: CPT | Performed by: PATHOLOGY

## 2022-12-20 PROCEDURE — 80321 ALCOHOLS BIOMARKERS 1OR 2: CPT | Mod: 90 | Performed by: PATHOLOGY

## 2022-12-20 PROCEDURE — 99000 SPECIMEN HANDLING OFFICE-LAB: CPT | Performed by: PATHOLOGY

## 2022-12-20 PROCEDURE — 86644 CMV ANTIBODY: CPT | Performed by: INTERNAL MEDICINE

## 2022-12-20 PROCEDURE — 84443 ASSAY THYROID STIM HORMONE: CPT | Performed by: PATHOLOGY

## 2022-12-20 PROCEDURE — 81003 URINALYSIS AUTO W/O SCOPE: CPT | Performed by: PATHOLOGY

## 2022-12-20 PROCEDURE — 85610 PROTHROMBIN TIME: CPT | Performed by: PATHOLOGY

## 2022-12-20 PROCEDURE — G0463 HOSPITAL OUTPT CLINIC VISIT: HCPCS | Performed by: INTERNAL MEDICINE

## 2022-12-20 PROCEDURE — 84466 ASSAY OF TRANSFERRIN: CPT | Performed by: INTERNAL MEDICINE

## 2022-12-20 PROCEDURE — 99205 OFFICE O/P NEW HI 60 MIN: CPT | Mod: GC | Performed by: INTERNAL MEDICINE

## 2022-12-20 PROCEDURE — 82248 BILIRUBIN DIRECT: CPT | Performed by: PATHOLOGY

## 2022-12-20 PROCEDURE — 93306 TTE W/DOPPLER COMPLETE: CPT | Performed by: INTERNAL MEDICINE

## 2022-12-20 PROCEDURE — 84156 ASSAY OF PROTEIN URINE: CPT | Performed by: PATHOLOGY

## 2022-12-20 PROCEDURE — 80307 DRUG TEST PRSMV CHEM ANLYZR: CPT | Mod: 90 | Performed by: PATHOLOGY

## 2022-12-20 RX ORDER — CARVEDILOL 3.12 MG/1
TABLET ORAL
COMMUNITY
Start: 2022-11-27 | End: 2023-01-31 | Stop reason: ALTCHOICE

## 2022-12-20 RX ORDER — PROMETHAZINE HYDROCHLORIDE 25 MG/1
1 TABLET ORAL DAILY
COMMUNITY
Start: 2022-08-08 | End: 2023-04-13

## 2022-12-20 RX ORDER — NICOTINE POLACRILEX 4 MG/1
20 GUM, CHEWING ORAL 2 TIMES DAILY
Status: ON HOLD | COMMUNITY
Start: 2022-08-01 | End: 2023-03-29

## 2022-12-20 RX ORDER — FOLIC ACID 1 MG/1
1000 TABLET ORAL DAILY
COMMUNITY
Start: 2022-11-03 | End: 2023-04-03

## 2022-12-20 NOTE — COMMITTEE REVIEW
Abdominal Committee Review Note     Evaluation Date: 12/20/2022  Committee Review Date: 12/20/2022    Organ being evaluated for: Liver    Transplant Phase: Evaluation  Transplant Status: Active    Transplant Coordinator: Stefany Jean  Transplant Surgeon:       Referring Physician: Raissa Cardenas    Primary Diagnosis:   Secondary Diagnosis:     Committee Review Members:  Nutrition Ynes Higgins, RD   Pharmacist Gustavo Gayle, [facility]    - Clinical Uday Gong, SRINIVASA, Mya Spicer, [facility]   Transplant Stefany Jean, RN, Jr Delon Johns, RN, Kelly Cagle, APRN CNP, Ar Montesinos, RN, Jose Lynch MD   Transplant Hepatology  Scarlett Michelle, RN, Renita Espinoza, IZABELA, Polly Mancini, RN, Harper Rodriguez MD, Pancho Sloan MD, Tianna Reddy MD, Hilaria Serna, RN, Jeanie Antonio MD, Zo Cowan MD, Gaudencio Ocasio MD, Thomas M. Leventhal, MD   Transplant Surgery Aniket Arzate MD, Esa Medley MD, Ahsan Plummer MD       Transplant Eligibility: Cirrhosis with MELD, ETOH    Committee Review Decision: Needs Re-presentation    Relative Contraindications: Other    Absolute Contraindications: None    Committee Chair Pancho Sloan MD verbally attested to the committee's decision.    Committee Discussion Details:      Will re-discuss pending completion of full evaluation, including but not limited to:    - full cardiac evaluation  - social work to obtain chemical dependency records  - re-discuss after above pending clinical course, if patient shows decline vs improvement

## 2022-12-20 NOTE — LETTER
12/20/2022         RE: Cricket Mane  [address] [place]  [facility] [address]        Dear Colleague,    Thank you for referring your patient, Cricket Mane, to the [facility] [place]. Please see a copy of my visit note below.    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  Past medical history also includes alcohol use disorder and atrial fibrillation (not on anticoagulation).    In terms of the patient's liver disease his ascites has resolved and he is no longer requiring any diuretic management.  He has a history of variceal bleeding requiring banding; he is due for repeat endoscopy for variceal screening.  He has no history of hepatic encephalopathy. 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  His liver disease may continue to improve in the setting of sobriety.    His last alcohol use was in July 2022.  Following his last alcohol use he has completed treatment programming at [facility] and has good insight into his alcohol use disorder.  He would benefit from ongoing support to maintain abstinence.    Pending cardiology evaluation he would be a reasonable candidate for transplantation.    Plan  - EGD for variceal surveillance  - Colonoscopy for colon cancer screening  - CT angiogram for coronary assessment + cardiology consultation given history of atrial fibrillation  - HCC surveillance due 6/2023  - Congratulated on alcohol abstinence and completion of treatment    Health Maintenance:  - COVID Vaccination: s/p 5 doses  - Influenza Vaccination: 9/2022  - Dental visits: last ~10/2022  - Colon Cancer Screening: planned for 1/2023    RTC: 3  months    Discussed with attending hepatologist, Dr. Luther Rodriguez MD  Transplant Hepatology Fellow  [phone]    HPI:  Alcohol related Cirrhosis  - dx ~ 7/2022  - no hx HE  - hx ascites  - hx variceal bleed (7/2022)  - last EGD 10/2022: Grade III varices s/p banding. Mild portal HTN gastropathy. IGV1.   - HCC screening - US 12/2022 without hepatic lesions    He presents with his daughter Kell.    He was first diagnosed with liver disease related to alcohol in July 2022. He was admitted 7/28-8/2/22 for hematemesis. Noted to have decompensated cirrhosis with ascites. He was started on low dose diuretics. Ascites fluid (2601 WBC 25% neurophils, but 1304 RBC). Culture negative.  He was started on antibiotics at that time.    In terms of his ascites he has only undergone a paracentesis once which was at the time of his cirrhosis diagnosis.  1 L was removed at that time.  He previously was on a combination of furosemide and spironolactone but in the recent weeks he was weaned down to Lasix and he no longer takes this daily.  He denies any abdominal swelling or lower extremity edema.    Since his hospital stay in July he has not had any issues with melena, hematochezia or hematemesis.  He denies any history of blood transfusions.  Denies any history of encephalopathy, confusion or lethargy.    In terms of his alcohol use his last use was on July 28, 2022.  He previously drank wine and liquor (gin + vodka).  He started drinking in high school but his alcohol use was pretty minimal until after he finished grad school.  His alcohol use increased at the beginning of the pandemic to up to a bottle of wine per night plus liquor.  Prior to his diagnosis of liver disease he was already in the process of reducing his alcohol use.  He has gone through programming at [employer] for 4 months and has completed programming.    He has been diagnosed with atrial fibrillation in the past. Followed by [facility]  "cardiologist. Diagnosed around 2017 and status post failed DCCV. He has since been on a rate control strategy with carvedilol. Per notes - CHADS2 Vasc is 0, therefore he has not been on anticoagulation. No aspirin was prescribed given liver disease.    He reports a stable weight with good appetite.  He continues to work full-time.  He denies any tobacco use, intranasal drug use or IV drug use.    Medical hx Surgical hx   Past Medical History:   Diagnosis Date     Alcoholic cirrhosis of liver with ascites (H)      Atrial fibrillation (H)      History of alcohol use disorder      History of esophageal varices with bleeding       Past Surgical History:   Procedure Laterality Date     AS PARTIAL HIP REPLACEMENT     Abdominal surgery: denies  * Hip replacement on the left      Medications  Current Outpatient Medications   Medication Sig Dispense Refill     carvedilol (COREG) 3.125 MG tablet TAKE 1 TABLET (3.125 MG) BY MOUTH TWO TIMES A DAY WITH MEALS.       folic acid (FOLVITE) 1 MG tablet Take 1,000 mcg by mouth daily       Multiple Vitamins-Iron TABS Take 1 tablet by mouth daily       omeprazole 20 MG tablet        LORazepam (ATIVAN) 0.5 MG tablet [LORAZEPAM (ATIVAN) 0.5 MG TABLET] Take 1 tablet (0.5 mg total) by mouth every 8 (eight) hours as needed for anxiety. 5 tablet 0     Allergies  No Known Allergies    Family hx Social hx   Family History   Problem Relation Age of Onset     Lung Cancer Mother      Hypertension Father    -Mother had lung cancer in the setting of tobacco use  -No family history of liver disease   - Employment: works in the [employer], bond fund management.   - Lives with adult son. Daughter is local, RN. Another daughter in [place]  - Alcohol: Last drink 7/28/22; other history as above   - Tobacco: Never smoker  - Drugs: Denies ever       Review of systems  A 10-point review of systems was negative.    Examination  /82   Pulse 99   Ht 1.727 m (5' 8\")   Wt 74.8 kg (165 lb)   " SpO2 98%   BMI 25.09 kg/m    Body mass index is 25.09 kg/m .    Gen-NAD  Eye- EOMI, conjunctival icterus  ENT- MMM, normal oropharynx  CVS- Irregularly irregular, no murmurs  RS- CTA bilaterally  Abd- soft, umbilical hernia (reducible) non-tender, non-distended  Extr- 2+ radial pulses bilaterally, no lower extremity edema bilaterally  MS- hands without clubbing  Neuro- A+Ox3, no asterixis  Skin- no rash. + jaundice  Psych- normal mood    Laboratory  BMPRecent Labs   Lab Test 12/20/22  0804      POTASSIUM 4.7   CHLORIDE 101   PEDRO 9.7   CO2 26   BUN 17.8   CR 1.03   *     CBC  Recent Labs   Lab Test 12/20/22  0804   WBC 6.3   RBC 3.99*   HGB 12.8*   HCT 37.8*   MCV 95   MCH 32.1   MCHC 33.9   RDW 16.2*   PLT 79*     Liver Enzymes   Recent Labs   Lab Test 12/20/22  0804   PROTTOTAL 7.9   ALBUMIN 3.1*   BILITOTAL 5.9*   ALKPHOS 250*   AST 91*   ALT 54*      INR   INR   Date Value Ref Range Status   12/20/2022 1.91 (H) 0.85 - 1.15 Final      Hep B s Ag negative 2022  Hep B c Ab negative 2022  Hep B s Ab negative 2022  Hep C Ab negative 2022  PETH < 10 (12/2022)  PETH: 12 (9/2022)  PETH 116 (8/2022)  Ethyl Alcohol: 0.13 (7/2022)  AFP: 3.1 (8/2022)  FIT 9/2022: negative    Radiology  TTE 11/2022  1. The left ventricular size is normal.  Systolic function is difficult to assess.  The estimated ejection fraction is 65%.  Wall thickness is borderline increased.  Left ventricular segmental wall motion is normal.   2. The right ventricular size is normal.  Systolic function is normal.   3. The left atrium is moderately enlarged.   4. The right atrium is mildly enlarged.   5. There is trace aortic valve regurgitation.  6. There is trace to mild mitral valve regurgitation.   7. There is mild tricuspid valve regurgitation.  8. There is trace pulmonic regurgitation.   9. The proximal ascending aorta measures 3.30 cm with an index of 1.72 cm/m2.  10. There is no gross pericardial effusion.     TTE 12/2022  Global and  regional left ventricular function is normal with an EF of 60-65%.  Global right ventricular function is normal.  Mild mitral and aortic annular calcification is present.  Pulmonary artery systolic pressure is normal.  The inferior vena cava is normal.  No pericardial effusion is present.    Abdominal US 12/2022  1. Cirrhotic appearance of the liver without suspicious focal liver  lesion.  2. Simple hepatic cyst in the right lobe of the liver measuring up to  1.1 cm.  3. Cholelithiasis and layering biliary sludge. Asymmetric gallbladder  wall thickening, nonspecific in the setting of liver disease and  partially decompressed gallbladder. No pericholecystic fluid or  positive sonographic Dubon sign to suggest acute cholecystitis.  4. Evidence of portal hypertension including borderline decreased  velocity in the right portal vein and mild splenomegaly.    CXR 12/2022  No acute cardiopulmonary findings.    Endoscopy  EGD 10/2022: Grade III varices s/p banding. Mild portal HTN gastropathy. Non-bleeding gastric erosions. IGV1. Biopsies neg for H Pylori.    EGD 8/2022: Esophageal ulcer. Grade III varices s/p banding. Portal HTN gastropathy. IGV1.           Attestation signed by Pancho Sloan MD at 12/22/2022  9:59 AM:  The patient was seen and examined.  The above assessment and plan was developed jointly with the fellow.       Thank you very much for allowing me to participate in the care of this patient.  If you have any questions regarding recommendations, please do not hesitate to contact me.         Pancho Sloan MD      Professor of Medicine  [employer]      Executive Medical Director, Solid Organ Transplant Program  [facility]

## 2022-12-20 NOTE — PROGRESS NOTES
"\"Much or all of the text in this note was generated through the use of Dragon Dictate voice to text software. Errors in spelling or words which appear to be out of context are unintentional, may be present due having escaped editing\"    Assessment and Plan:  1. liver transplant evaluation - patient is a good candidate overall. Benefits and surgical risks of a liver transplantation were discussed.  2.  End stage liver disease due to Laennec's    Surgical evaluation:  1. Portal Vein:Patent  2. Hepatic Artery: Open  3. TIPS: absent  4. Previous Abdominal Surgery: No  5. Hepatocellular Carcinoma: None  6. Ascites: Present - minimal  7. Costal Angle: narrow  8. Portopulmonary Hypertension: absent  9. Hepatopulmonary Syndrome: absent  10. Cardiac Evaluation: needs stress echocardiogram; has a fib  11. Nutritional Status: Good  12. Diabetes: no  13.Hypertension no  14. Smoker:no  14: Fraility index:no  15. Meets guidelines to receive Living Donor  Yes -  ..  16. Potential Living donors No    Recommendations:   Complete evaluation.  He needs a full cardiac evaluation in view of his atrial fibrillation.      Patients overall evaluation will be discussed at the Liver Transplant selection committee meeting with a final recommendation on the patients suitability for transplant to be made at that time.    Consult Full  Details:  Cricket Mane was seen in consultation at the request of Dr. Raissa Cardenas for evaluation as a potential liver transplant recipient.    Reason for Visit:  Cricket Mane is a 62 year old year old male with Laennec's, who presents for liver transplant evaluation.    HPI:  Presenting complaint: Jaundice    Patient has been diagnosed to have Laënnec cirrhosis.  He has decompensated in the form of ascites as well as esophageal varices and bleeding.  No history of spontaneous bacterial peritonitis.  He does have some easy fatigability.  He says that he is feeling well at the moment.  He he completed " alcohol evaluation program.    Present with him was his daughter who was very supportive.      Past Medical History:   Diagnosis Date     Alcoholic cirrhosis of liver with ascites (H)      Atrial fibrillation (H)      History of alcohol use disorder      History of esophageal varices with bleeding      Past Surgical History:   Procedure Laterality Date     AS PARTIAL HIP REPLACEMENT       Past Surgical History:   Procedure Laterality Date     AS PARTIAL HIP REPLACEMENT       No family history on file.  No Known Allergies  Prior to Admission medications    Medication Sig Start Date End Date Taking? Authorizing Provider   carvedilol (COREG) 3.125 MG tablet TAKE 1 TABLET (3.125 MG) BY MOUTH TWO TIMES A DAY WITH MEALS. 11/27/22   Reported, Patient   folic acid (FOLVITE) 1 MG tablet Take 1,000 mcg by mouth daily 11/3/22   Reported, Patient   LORazepam (ATIVAN) 0.5 MG tablet [LORAZEPAM (ATIVAN) 0.5 MG TABLET] Take 1 tablet (0.5 mg total) by mouth every 8 (eight) hours as needed for anxiety. 12/25/15   Cheri Moreno MD   Multiple Vitamins-Iron TABS Take 1 tablet by mouth daily 8/8/22 8/8/23  Reported, Patient   omeprazole 20 MG tablet  8/1/22   Reported, Patient       Previous Transplant Hx: No    Cardiovascular Hx:       h/o Cardiac Issues: Yes -  Atrial fibrillation       Exercise Tolerance: no chest pain or shortness of breath with exertion.    Potential Donor(s): No    ROS:    REVIEW OF SYSTEMS (check box if normal)  [x]                GENERAL  [x]                  PULMONARY [x]                 GENITOURINARY  [x]                 CNS                 [x]                  CARDIAC  [x]                  ENDOCRINE  [x]                 EARS,NOSE,THROAT [x]                  GASTROINTESTINAL [x]                  NEUROLOGIC    [x]                 MUSCLOSKELTAL  [x]                   HEMATOLOGY    Examination:     Vitals:  There were no vitals taken for this visit.    GENERAL APPEARANCE: alert and no distress  EYES: PERRL  HENT:  mouth without ulcers or lesions  NECK: supple, no adenopathy  RESP: lungs clear to auscultation - no rales, rhonchi or wheezes  CV: regular rhythm, normal rate, no rub   ABDOMEN:  soft, nontender, ascites present.  Narrow costal angle.  MS: extremities normal- no gross deformities noted, no evidence of inflammation in joints, no muscle tenderness  SKIN: no rash  NEURO: Normal strength and tone, sensory exam grossly normal, mentation intact and speech normal  PSYCH: mentation appears normal. and affect normal/bright      Results:   Recent Results (from the past 168 hour(s))   ABO and Rh    Collection Time: 12/20/22  8:00 AM   Result Value Ref Range    ABO/RH(D) O POS     SPECIMEN EXPIRATION DATE [id number]    Lipid Profile    Collection Time: 12/20/22  8:04 AM   Result Value Ref Range    Cholesterol 311 (H) <200 mg/dL    Triglycerides 96 <150 mg/dL    Direct Measure HDL 74 >=40 mg/dL    LDL Cholesterol Calculated 218 (H) <=100 mg/dL    Non HDL Cholesterol 237 (H) <130 mg/dL   Basic metabolic panel    Collection Time: 12/20/22  8:04 AM   Result Value Ref Range    Sodium 137 136 - 145 mmol/L    Potassium 4.7 3.4 - 5.3 mmol/L    Chloride 101 98 - 107 mmol/L    Carbon Dioxide (CO2) 26 22 - 29 mmol/L    Anion Gap 10 7 - 15 mmol/L    Urea Nitrogen 17.8 8.0 - 23.0 mg/dL    Creatinine 1.03 0.67 - 1.17 mg/dL    Calcium 9.7 8.8 - 10.2 mg/dL    Glucose 106 (H) 70 - 99 mg/dL    GFR Estimate 83 >60 mL/min/1.73m2   Hepatic panel    Collection Time: 12/20/22  8:04 AM   Result Value Ref Range    Protein Total 7.9 6.4 - 8.3 g/dL    Albumin 3.1 (L) 3.5 - 5.2 g/dL    Bilirubin Total 5.9 (H) <=1.2 mg/dL    Alkaline Phosphatase 250 (H) 40 - 129 U/L    AST 91 (H) 10 - 50 U/L    ALT 54 (H) 10 - 50 U/L    Bilirubin Direct 2.10 (H) 0.00 - 0.30 mg/dL   Iron and iron binding capacity    Collection Time: 12/20/22  8:04 AM   Result Value Ref Range    Iron 225 (H) 61 - 157 ug/dL    Iron Sat Index      Iron Binding Capacity     Phosphorus     Collection Time: 12/20/22  8:04 AM   Result Value Ref Range    Phosphorus 3.8 2.5 - 4.5 mg/dL   Prostate spec antigen screen    Collection Time: 12/20/22  8:04 AM   Result Value Ref Range    Prostate Specific Antigen Screen 0.09 0.00 - 4.50 ng/mL   TSH with free T4 reflex    Collection Time: 12/20/22  8:04 AM   Result Value Ref Range    TSH 3.43 0.30 - 4.20 uIU/mL   Transferrin    Collection Time: 12/20/22  8:04 AM   Result Value Ref Range    Transferrin 184.0 (L) 200.0 - 360.0 mg/dL   INR    Collection Time: 12/20/22  8:04 AM   Result Value Ref Range    INR 1.91 (H) 0.85 - 1.15   CBC with platelets    Collection Time: 12/20/22  8:04 AM   Result Value Ref Range    WBC Count 6.3 4.0 - 11.0 10e3/uL    RBC Count 3.99 (L) 4.40 - 5.90 10e6/uL    Hemoglobin 12.8 (L) 13.3 - 17.7 g/dL    Hematocrit 37.8 (L) 40.0 - 53.0 %    MCV 95 78 - 100 fL    MCH 32.1 26.5 - 33.0 pg    MCHC 33.9 31.5 - 36.5 g/dL    RDW 16.2 (H) 10.0 - 15.0 %    Platelet Count 79 (L) 150 - 450 10e3/uL   Adult Type and Screen    Collection Time: 12/20/22  8:04 AM   Result Value Ref Range    ABO/RH(D) O POS     Antibody Screen Negative Negative    SPECIMEN EXPIRATION DATE [id number]    Protein  random urine    Collection Time: 12/20/22  9:23 AM   Result Value Ref Range    Total Protein Urine mg/dL 11.2 mg/dL    Total Protein UR MG/MG CR 0.09 0.00 - 0.20 mg/mg Cr    Creatinine Urine mg/dL 130.0 mg/dL   UA reflex to Microscopic and Culture    Collection Time: 12/20/22  9:23 AM    Specimen: Urine, Midstream   Result Value Ref Range    Color Urine Yellow Colorless, Straw, Light Yellow, Yellow    Appearance Urine Clear Clear    Glucose Urine Negative Negative mg/dL    Bilirubin Urine Negative Negative    Ketones Urine Negative Negative mg/dL    Specific Gravity Urine 1.018 1.003 - 1.035    Blood Urine Negative Negative    pH Urine 6.0 5.0 - 7.0    Protein Albumin Urine Negative Negative mg/dL    Urobilinogen Urine 4.0 (A) Normal, 2.0 mg/dL    Nitrite Urine  Negative Negative    Leukocyte Esterase Urine Negative Negative     I had a long discussion with the patient regarding liver transplantation which included but was not limited to  the following points:    1. Liver transplant selection committee process.  2. The federal rules for cadaveric waiting list, the size and blood type matching of the organ. The availability of living-related donor transplantation.  3. The types of donors: brain death donors, non-heart beating donors, partial liver grafts: splits and living donor grafts  4. Extended criteria  Donors (older age, steasosis) and the increased  risk of primary non-function using the extended criteria donors  5. The [facility] high risk donors,  Risk of donor transmitted infections and donor transmitted malignancy  6. The liver transplant operation and the associated risks and technical complications which can include intraoperative death, post operative death,  Primary non-function, bleeding requiring re-operations, arterial and biliary complications, bowel perforations, and intra abdominal abscess. Some of these complicaitons may require a second operation.  7. The postoperative course, the ICU stay and risk of postoperative complications which can include sepsis, MI, stroke, brain injury, pneumonia, pleural effusions, and renal dysfunction.  8. The current 1 year and 5 year graft and patient survivals.  9. The need for life long immunosuppressive therapy and the side effects of these medications, including the possibility of toxicity, opportunistic infections, risk of cancer including lymphoma, and the possibility of rejection even if the patient is taking the medication exactly as prescribed.  10. The need for compliance with medications and follow-up visits in the clinic and thereafter.  11. The patient and family understand these risks and wish to proceed to transplantation       Review of prior external note(s) from - Outside records from epic  60 minutes spent on  the date of the encounter doing chart review, history and exam, documentation and further activities per the note      ROS      Physical Exam

## 2022-12-20 NOTE — NURSING NOTE
"Chief Complaint   Patient presents with     Transplant Evaluation     Liver      Blood pressure 119/82, pulse 99, height 1.727 m (5' 8\"), weight 74.8 kg (165 lb), SpO2 98 %.    Casi Kraus, CL    "

## 2022-12-20 NOTE — PROGRESS NOTES
Psychosocial Assessment  Cricket Mane was seen in the Transplant Center as part of his evaluation as a potential liver transplant recipient.  His daughter, Kell accompanied him to the appointment.   Living Situation: Torin lives in a single family home with his adult son, Rene. He has resided in the home since . No concerns related to his living environment.   Education/Employment:  Torin completes his masters degree. 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 He's worked for the same company since . He is not a .   Financial /Income: Torin receives income from his employment. He does not have any financial concerns.   Health Insurance:  Torin has [employer] through his employer. He believes his out of pocket max is $3,000 and does not have any concerns meeting his out of pocket max. This writer talked with Cricket about the financial risks of transplant, particularly about the high cost of transplant related medications and the importance of maintaining adequate health insurance coverage.  Family/Social Support: Torin is not  and has three adult children, Kell ([place], [place]), Rene ([place], [place]), and Page ([place]). He has four siblings, and is particularly close with Mireya ([place], [place]). His parents are .   His children will be his primary caregivers post transplantation.   This writer stressed the importance of having a stable and involved support network before and after transplant.  Provided Cricket  with education about the relationship between a stable support system and better surgical and post-transplant outcomes compared to patients with a limited support system.    Functional Status: No functional concerns related to transplantation. He continues to drive and manage his own medications.   Chemical Dependency:  Torin reports a history of misuse/overuse of alcohol, with his last use in 2022. Prior to that he consuming alcohol 5-6 days of the  week 2-5 drinks per day. He reports that this level of consumption have been occurring for the past several years. Prior to July 2022, he reports being told once by a provider to cut back on his alcohol use. His daughter also voiced concerns that the family had in the past.   He recently completed [facility] treatment. This is his first treatment program. He does not have any legal history related to transplantation.   Mental Health: No mental health concerns. No history of psychotherapy, psychiatric hospitalizations or suicidal thoughts.   Adjustment to Illness:  This writer provided Cricket  with supportive counseling throughout this interview.  This writer also encouraged Cricket to attend the liver transplant support group for additional support and encouragement.   Impression/Recommendations:   Torin verbalizes understanding the psychosocial risks of transplant and teaching provided during this evaluation.  Torin is about five months sober. Per his report, he completed an intensive outpatient treatment program through [facility]. He signed a release of information for this writer to obtain medical records. I reviewed relapse prevention resources with him and encouraged him to engage in some sort of relapse prevention.   PEth 8/16/2022 - 116  9/1/2022 - 12  9/29/2022 - Negative  10/20/2022 - Negative   12/9/2022 - Negative   I sent FOZIA to [facility] and requested records today.   This writer will confirm the of completion of program (discharge summary). Torin would also benefit from a longer period of sobriety and ongoing substance use support.  Torin has adequate finances and health insurance for transplant, and an intact support system.    This writer will remain available to assist patient throughout the evaluation process and will follow patient through transplant if he is listed.  It was a pleasure to evaluate this patient for liver transplant.   Teaching completed during  assessment:  1.     Housing and relocation needs post transplant.  2.     Caregiver needs post transplant.  3.     Financial issues related to transplant.  4.     Risks of alcohol use post transplant.  5.     Common psychosocial stressors pre/post transplant.        6.     Liver Transplant support group availability.        7.     [employer] Directive - I provided a copy of a health care directive. He indicated he will complete and return to me.              Psychosocial Risks of Transplant Reviewed:  1.     Increased stress related to your emotional, family, social, employment, or   financial situation.  2.     Affect on work and/or disability benefits.  3.     Affect on future health and life insurance.  4.     Transplant outcome expectations may not be met.  5.     Mental Health risks: anxiety, depression, PTSD, guilt, grief and chronic fatigue.     SRINIVASA Wagoner, LICSW

## 2022-12-20 NOTE — LETTER
"    12/20/2022         RE: Cricket Mane  [address] [place] [facility] [address]        Dear Colleague,    Thank you for referring your patient, Cricket Mane, to the [facility]. Please see a copy of my visit note below.    \"Much or all of the text in this note was generated through the use of Dragon Dictate voice to text software. Errors in spelling or words which appear to be out of context are unintentional, may be present due having escaped editing\"    Assessment and Plan:  1. liver transplant evaluation - patient is a good candidate overall. Benefits and surgical risks of a liver transplantation were discussed.  2.  End stage liver disease due to Laennec's    Surgical evaluation:  1. Portal Vein:Patent  2. Hepatic Artery: Open  3. TIPS: absent  4. Previous Abdominal Surgery: No  5. Hepatocellular Carcinoma: None  6. Ascites: Present - minimal  7. Costal Angle: narrow  8. Portopulmonary Hypertension: absent  9. Hepatopulmonary Syndrome: absent  10. Cardiac Evaluation: needs stress echocardiogram; has a fib  11. Nutritional Status: Good  12. Diabetes: no  13.Hypertension no  14. Smoker:no  14: Fraility index:no  15. Meets guidelines to receive Living Donor  Yes -  ..  16. Potential Living donors No    Recommendations:   Complete evaluation.  He needs a full cardiac evaluation in view of his atrial fibrillation.      Patients overall evaluation will be discussed at the Liver Transplant selection committee meeting with a final recommendation on the patients suitability for transplant to be made at that time.    Consult Full  Details:  Cricket Mane was seen in consultation at the request of Dr. Raissa Cardenas for evaluation as a potential liver transplant recipient.    Reason for Visit:  Cricket Mane is a 62 year old year old male with Laennec's, who presents for liver transplant evaluation.    HPI:  Presenting complaint: Jaundice    Patient has been diagnosed to have Laënnec " cirrhosis.  He has decompensated in the form of ascites as well as esophageal varices and bleeding.  No history of spontaneous bacterial peritonitis.  He does have some easy fatigability.  He says that he is feeling well at the moment.  He he completed alcohol evaluation program.    Present with him was his daughter who was very supportive.      Past Medical History:   Diagnosis Date     Alcoholic cirrhosis of liver with ascites (H)      Atrial fibrillation (H)      History of alcohol use disorder      History of esophageal varices with bleeding      Past Surgical History:   Procedure Laterality Date     AS PARTIAL HIP REPLACEMENT       Past Surgical History:   Procedure Laterality Date     AS PARTIAL HIP REPLACEMENT       No family history on file.  No Known Allergies  Prior to Admission medications    Medication Sig Start Date End Date Taking? Authorizing Provider   carvedilol (COREG) 3.125 MG tablet TAKE 1 TABLET (3.125 MG) BY MOUTH TWO TIMES A DAY WITH MEALS. 11/27/22   Reported, Patient   folic acid (FOLVITE) 1 MG tablet Take 1,000 mcg by mouth daily 11/3/22   Reported, Patient   LORazepam (ATIVAN) 0.5 MG tablet [LORAZEPAM (ATIVAN) 0.5 MG TABLET] Take 1 tablet (0.5 mg total) by mouth every 8 (eight) hours as needed for anxiety. 12/25/15   Cheri Moreno MD   Multiple Vitamins-Iron TABS Take 1 tablet by mouth daily 8/8/22 8/8/23  Reported, Patient   omeprazole 20 MG tablet  8/1/22   Reported, Patient       Previous Transplant Hx: No    Cardiovascular Hx:       h/o Cardiac Issues: Yes -  Atrial fibrillation       Exercise Tolerance: no chest pain or shortness of breath with exertion.    Potential Donor(s): No    ROS:    REVIEW OF SYSTEMS (check box if normal)  [x]                GENERAL  [x]                  PULMONARY [x]                 GENITOURINARY  [x]                 CNS                 [x]                  CARDIAC  [x]                  ENDOCRINE  [x]                 EARS,NOSE,THROAT [x]                   GASTROINTESTINAL [x]                  NEUROLOGIC    [x]                 MUSCLOSKELTAL  [x]                   HEMATOLOGY    Examination:     Vitals:  There were no vitals taken for this visit.    GENERAL APPEARANCE: alert and no distress  EYES: PERRL  HENT: mouth without ulcers or lesions  NECK: supple, no adenopathy  RESP: lungs clear to auscultation - no rales, rhonchi or wheezes  CV: regular rhythm, normal rate, no rub   ABDOMEN:  soft, nontender, ascites present.  Narrow costal angle.  MS: extremities normal- no gross deformities noted, no evidence of inflammation in joints, no muscle tenderness  SKIN: no rash  NEURO: Normal strength and tone, sensory exam grossly normal, mentation intact and speech normal  PSYCH: mentation appears normal. and affect normal/bright      Results:   Recent Results (from the past 168 hour(s))   ABO and Rh    Collection Time: 12/20/22  8:00 AM   Result Value Ref Range    ABO/RH(D) O POS     SPECIMEN EXPIRATION DATE [id number]    Lipid Profile    Collection Time: 12/20/22  8:04 AM   Result Value Ref Range    Cholesterol 311 (H) <200 mg/dL    Triglycerides 96 <150 mg/dL    Direct Measure HDL 74 >=40 mg/dL    LDL Cholesterol Calculated 218 (H) <=100 mg/dL    Non HDL Cholesterol 237 (H) <130 mg/dL   Basic metabolic panel    Collection Time: 12/20/22  8:04 AM   Result Value Ref Range    Sodium 137 136 - 145 mmol/L    Potassium 4.7 3.4 - 5.3 mmol/L    Chloride 101 98 - 107 mmol/L    Carbon Dioxide (CO2) 26 22 - 29 mmol/L    Anion Gap 10 7 - 15 mmol/L    Urea Nitrogen 17.8 8.0 - 23.0 mg/dL    Creatinine 1.03 0.67 - 1.17 mg/dL    Calcium 9.7 8.8 - 10.2 mg/dL    Glucose 106 (H) 70 - 99 mg/dL    GFR Estimate 83 >60 mL/min/1.73m2   Hepatic panel    Collection Time: 12/20/22  8:04 AM   Result Value Ref Range    Protein Total 7.9 6.4 - 8.3 g/dL    Albumin 3.1 (L) 3.5 - 5.2 g/dL    Bilirubin Total 5.9 (H) <=1.2 mg/dL    Alkaline Phosphatase 250 (H) 40 - 129 U/L    AST 91 (H) 10 - 50 U/L    ALT  54 (H) 10 - 50 U/L    Bilirubin Direct 2.10 (H) 0.00 - 0.30 mg/dL   Iron and iron binding capacity    Collection Time: 12/20/22  8:04 AM   Result Value Ref Range    Iron 225 (H) 61 - 157 ug/dL    Iron Sat Index      Iron Binding Capacity     Phosphorus    Collection Time: 12/20/22  8:04 AM   Result Value Ref Range    Phosphorus 3.8 2.5 - 4.5 mg/dL   Prostate spec antigen screen    Collection Time: 12/20/22  8:04 AM   Result Value Ref Range    Prostate Specific Antigen Screen 0.09 0.00 - 4.50 ng/mL   TSH with free T4 reflex    Collection Time: 12/20/22  8:04 AM   Result Value Ref Range    TSH 3.43 0.30 - 4.20 uIU/mL   Transferrin    Collection Time: 12/20/22  8:04 AM   Result Value Ref Range    Transferrin 184.0 (L) 200.0 - 360.0 mg/dL   INR    Collection Time: 12/20/22  8:04 AM   Result Value Ref Range    INR 1.91 (H) 0.85 - 1.15   CBC with platelets    Collection Time: 12/20/22  8:04 AM   Result Value Ref Range    WBC Count 6.3 4.0 - 11.0 10e3/uL    RBC Count 3.99 (L) 4.40 - 5.90 10e6/uL    Hemoglobin 12.8 (L) 13.3 - 17.7 g/dL    Hematocrit 37.8 (L) 40.0 - 53.0 %    MCV 95 78 - 100 fL    MCH 32.1 26.5 - 33.0 pg    MCHC 33.9 31.5 - 36.5 g/dL    RDW 16.2 (H) 10.0 - 15.0 %    Platelet Count 79 (L) 150 - 450 10e3/uL   Adult Type and Screen    Collection Time: 12/20/22  8:04 AM   Result Value Ref Range    ABO/RH(D) O POS     Antibody Screen Negative Negative    SPECIMEN EXPIRATION DATE [id number]    Protein  random urine    Collection Time: 12/20/22  9:23 AM   Result Value Ref Range    Total Protein Urine mg/dL 11.2 mg/dL    Total Protein UR MG/MG CR 0.09 0.00 - 0.20 mg/mg Cr    Creatinine Urine mg/dL 130.0 mg/dL   UA reflex to Microscopic and Culture    Collection Time: 12/20/22  9:23 AM    Specimen: Urine, Midstream   Result Value Ref Range    Color Urine Yellow Colorless, Straw, Light Yellow, Yellow    Appearance Urine Clear Clear    Glucose Urine Negative Negative mg/dL    Bilirubin Urine Negative Negative     Ketones Urine Negative Negative mg/dL    Specific Gravity Urine 1.018 1.003 - 1.035    Blood Urine Negative Negative    pH Urine 6.0 5.0 - 7.0    Protein Albumin Urine Negative Negative mg/dL    Urobilinogen Urine 4.0 (A) Normal, 2.0 mg/dL    Nitrite Urine Negative Negative    Leukocyte Esterase Urine Negative Negative     I had a long discussion with the patient regarding liver transplantation which included but was not limited to  the following points:    1. Liver transplant selection committee process.  2. The federal rules for cadaveric waiting list, the size and blood type matching of the organ. The availability of living-related donor transplantation.  3. The types of donors: brain death donors, non-heart beating donors, partial liver grafts: splits and living donor grafts  4. Extended criteria  Donors (older age, steasosis) and the increased  risk of primary non-function using the extended criteria donors  5. The [facility] high risk donors,  Risk of donor transmitted infections and donor transmitted malignancy  6. The liver transplant operation and the associated risks and technical complications which can include intraoperative death, post operative death,  Primary non-function, bleeding requiring re-operations, arterial and biliary complications, bowel perforations, and intra abdominal abscess. Some of these complicaitons may require a second operation.  7. The postoperative course, the ICU stay and risk of postoperative complications which can include sepsis, MI, stroke, brain injury, pneumonia, pleural effusions, and renal dysfunction.  8. The current 1 year and 5 year graft and patient survivals.  9. The need for life long immunosuppressive therapy and the side effects of these medications, including the possibility of toxicity, opportunistic infections, risk of cancer including lymphoma, and the possibility of rejection even if the patient is taking the medication exactly as prescribed.  10. The need for  compliance with medications and follow-up visits in the clinic and thereafter.  11. The patient and family understand these risks and wish to proceed to transplantation       Review of prior external note(s) from - Outside records from epic  60 minutes spent on the date of the encounter doing chart review, history and exam, documentation and further activities per the note            Again, thank you for allowing me to participate in the care of your patient.        Sincerely,        Ahsan Plummer MD

## 2022-12-21 ENCOUNTER — HOSPITAL ENCOUNTER (OUTPATIENT)
Dept: CT IMAGING | Facility: CLINIC | Age: 62
Discharge: HOME | End: 2022-12-21
Attending: INTERNAL MEDICINE
Payer: COMMERCIAL

## 2022-12-21 VITALS — RESPIRATION RATE: 16 BRPM | DIASTOLIC BLOOD PRESSURE: 61 MMHG | HEART RATE: 71 BPM | SYSTOLIC BLOOD PRESSURE: 92 MMHG

## 2022-12-21 DIAGNOSIS — K74.60 CIRRHOSIS (H): ICD-10-CM

## 2022-12-21 LAB
ETHYL GLUCURONIDE UR QL SCN: NEGATIVE NG/ML
FERRITIN SERPL-MCNC: 904 NG/ML (ref 31–409)
GAMMA INTERFERON BACKGROUND BLD IA-ACNC: 0.07 IU/ML
M TB IFN-G BLD-IMP: NEGATIVE
M TB IFN-G CD4+ BCKGRND COR BLD-ACNC: 3.91 IU/ML
MITOGEN IGNF BCKGRD COR BLD-ACNC: 0.01 IU/ML
MITOGEN IGNF BCKGRD COR BLD-ACNC: 0.02 IU/ML
QUANTIFERON MITOGEN: 3.98 IU/ML
QUANTIFERON NIL TUBE: 0.07 IU/ML
QUANTIFERON TB1 TUBE: 0.08 IU/ML
QUANTIFERON TB2 TUBE: 0.09
T PALLIDUM AB SER QL: NONREACTIVE

## 2022-12-21 PROCEDURE — 250N000013 HC RX MED GY IP 250 OP 250 PS 637: Performed by: INTERNAL MEDICINE

## 2022-12-21 PROCEDURE — 75574 CT ANGIO HRT W/3D IMAGE: CPT

## 2022-12-21 PROCEDURE — 75574 CT ANGIO HRT W/3D IMAGE: CPT | Mod: 26 | Performed by: INTERNAL MEDICINE

## 2022-12-21 PROCEDURE — 250N000009 HC RX 250: Performed by: INTERNAL MEDICINE

## 2022-12-21 PROCEDURE — 250N000011 HC RX IP 250 OP 636: Performed by: INTERNAL MEDICINE

## 2022-12-21 RX ORDER — ACYCLOVIR 200 MG/1
0-1 CAPSULE ORAL
Status: DISCONTINUED | OUTPATIENT
Start: 2022-12-21 | End: 2022-12-22 | Stop reason: HOSPADM

## 2022-12-21 RX ORDER — IVABRADINE 5 MG/1
5-15 TABLET, FILM COATED ORAL
Status: COMPLETED | OUTPATIENT
Start: 2022-12-21 | End: 2022-12-21

## 2022-12-21 RX ORDER — NITROGLYCERIN 0.4 MG/1
.4-.8 TABLET SUBLINGUAL
Status: DISCONTINUED | OUTPATIENT
Start: 2022-12-21 | End: 2022-12-22 | Stop reason: HOSPADM

## 2022-12-21 RX ORDER — DIPHENHYDRAMINE HCL 25 MG
25 CAPSULE ORAL
Status: DISCONTINUED | OUTPATIENT
Start: 2022-12-21 | End: 2022-12-22 | Stop reason: HOSPADM

## 2022-12-21 RX ORDER — IOPAMIDOL 755 MG/ML
120 INJECTION, SOLUTION INTRAVASCULAR ONCE
Status: COMPLETED | OUTPATIENT
Start: 2022-12-21 | End: 2022-12-21

## 2022-12-21 RX ORDER — METHYLPREDNISOLONE SODIUM SUCCINATE 125 MG/2ML
125 INJECTION, POWDER, LYOPHILIZED, FOR SOLUTION INTRAMUSCULAR; INTRAVENOUS
Status: DISCONTINUED | OUTPATIENT
Start: 2022-12-21 | End: 2022-12-22 | Stop reason: HOSPADM

## 2022-12-21 RX ORDER — METOPROLOL TARTRATE 25 MG/1
25-100 TABLET, FILM COATED ORAL
Status: COMPLETED | OUTPATIENT
Start: 2022-12-21 | End: 2022-12-21

## 2022-12-21 RX ORDER — DIPHENHYDRAMINE HYDROCHLORIDE 50 MG/ML
25-50 INJECTION INTRAMUSCULAR; INTRAVENOUS
Status: DISCONTINUED | OUTPATIENT
Start: 2022-12-21 | End: 2022-12-22 | Stop reason: HOSPADM

## 2022-12-21 RX ORDER — ONDANSETRON 2 MG/ML
4 INJECTION INTRAMUSCULAR; INTRAVENOUS
Status: DISCONTINUED | OUTPATIENT
Start: 2022-12-21 | End: 2022-12-22 | Stop reason: HOSPADM

## 2022-12-21 RX ORDER — METOPROLOL TARTRATE 1 MG/ML
5-15 INJECTION, SOLUTION INTRAVENOUS
Status: DISCONTINUED | OUTPATIENT
Start: 2022-12-21 | End: 2022-12-22 | Stop reason: HOSPADM

## 2022-12-21 RX ADMIN — METOPROLOL TARTRATE 100 MG: 100 TABLET, FILM COATED ORAL at 07:51

## 2022-12-21 RX ADMIN — IOPAMIDOL 120 ML: 755 INJECTION, SOLUTION INTRAVENOUS at 08:58

## 2022-12-21 RX ADMIN — NITROGLYCERIN 0.8 MG: 0.4 TABLET SUBLINGUAL at 09:02

## 2022-12-21 RX ADMIN — IVABRADINE 15 MG: 5 TABLET, FILM COATED ORAL at 07:51

## 2022-12-21 RX ADMIN — METOPROLOL TARTRATE 10 MG: 5 INJECTION INTRAVENOUS at 09:00

## 2022-12-21 NOTE — PROGRESS NOTES
Pt arrived for Coronary CT angiogram. Test, meds and side effects reviewed with pt.  Resting HR  96 bpm - afib. Given 100 mg PO Metoprolol + 15 mg PO Ivabradine per verbal order. Administered 0.8 mg SL nitro and 10 mg IV metoprolol on CTA table per order. CTA completed.  Patient tolerated procedure well and denies symptoms of allergic reaction.  Post monitoring completed and VSS.  D/C instructions reviewed with pt whom verbalized understanding of need to increase PO fluids today. D/C to [facility] waiting room accompanied by staff.

## 2022-12-22 LAB
AMPHETAMINES SERPL QL SCN: NEGATIVE NG/ML
ANNOTATION COMMENT IMP: NORMAL
BARBITURATES SERPL QL SCN: NEGATIVE NG/ML
BENZODIAZ SERPL QL SCN: NEGATIVE NG/ML
BUPRENORPHINE SERPL-MCNC: NEGATIVE NG/ML
CANNABINOIDS SERPL QL SCN: NEGATIVE NG/ML
CMV IGG SERPL IA-ACNC: >10 U/ML
CMV IGG SERPL IA-ACNC: ABNORMAL
COCAINE SERPL QL SCN: NEGATIVE NG/ML
EBV VCA IGG SER IA-ACNC: >750 U/ML
EBV VCA IGG SER IA-ACNC: POSITIVE
METHADONE SERPL QL SCN: NEGATIVE NG/ML
METHAMPHET SERPL QL: NEGATIVE NG/ML
OPIATES SERPL QL SCN: NEGATIVE NG/ML
OXYCODONE SERPL QL: NEGATIVE NG/ML
PCP SERPL QL SCN: NEGATIVE NG/ML
PLPETH BLD-MCNC: <10 NG/ML
POPETH BLD-MCNC: <10 NG/ML

## 2023-01-04 ENCOUNTER — ANCILLARY PROCEDURE (OUTPATIENT)
Dept: BONE DENSITY | Facility: CLINIC | Age: 63
End: 2023-01-04
Attending: INTERNAL MEDICINE
Payer: COMMERCIAL

## 2023-01-04 DIAGNOSIS — K74.60 CIRRHOSIS (H): ICD-10-CM

## 2023-01-04 PROCEDURE — 77080 DXA BONE DENSITY AXIAL: CPT | Performed by: INTERNAL MEDICINE

## 2023-01-06 ENCOUNTER — TELEPHONE (OUTPATIENT)
Dept: TRANSPLANT | Facility: CLINIC | Age: 63
End: 2023-01-06

## 2023-01-06 ENCOUNTER — DOCUMENTATION ONLY (OUTPATIENT)
Dept: OTHER | Facility: CLINIC | Age: 63
End: 2023-01-06

## 2023-01-06 NOTE — TELEPHONE ENCOUNTER
Transplant Social Work Services Phone Call    Data: Cricket is in evaluation for liver transplant   Intervention: I left a VM with [facility]. Per their message, it takes about 3-5 business days to send medical records. I sent intiial request 12/20/2022. I left message requested a call back re: status of medical record.   I received a call back from Cindy in medical records. I verified fax number for request. I request request today.   Torin sent this writer his HCD. It was forwarded to [facility]. [facility] validated the HCD and uploaded it into patient's chart. I notified patient that this was complete.     Assessment: Patient is an acceptable candidate from a psychosocial perspective. Patient discontinued drinking July 2022. He is about six months sober. He has completed recommended treatment programming through [facility], which is confirmed by his discharge summary. He has adequate social supports and finances for transplantation.   Education provided by SW: HCD and treatment records   Plan: Waiting for records.     SRINIVASA Wagoner, [facility]  Liver Transplant   [facility]  Phone: [phone]  Pager: [phone]    Addendum 1/12/2023 - I received Cricket's discharge summary from [facility], which confirmed his successful completion in treatment. Documentation sent to HIM. No formal treatment recommendations from discharge summary. Patient is encouraged to obtain a sponsor, attend AA and utilize [facility] support groups. I formed Cricket that I received the discharge summaru.   Date of admission: 9/7/2022  Date of discharge 12/14/2022

## 2023-01-25 ENCOUNTER — TRANSFERRED RECORDS (OUTPATIENT)
Dept: MULTI SPECIALTY CLINIC | Facility: CLINIC | Age: 63
End: 2023-01-25

## 2023-01-31 ENCOUNTER — OFFICE VISIT (OUTPATIENT)
Dept: CARDIOLOGY | Facility: CLINIC | Age: 63
End: 2023-01-31
Attending: INTERNAL MEDICINE
Payer: COMMERCIAL

## 2023-01-31 ENCOUNTER — PRE VISIT (OUTPATIENT)
Dept: CARDIOLOGY | Facility: CLINIC | Age: 63
End: 2023-01-31

## 2023-01-31 VITALS
DIASTOLIC BLOOD PRESSURE: 88 MMHG | BODY MASS INDEX: 25.85 KG/M2 | OXYGEN SATURATION: 99 % | HEIGHT: 68 IN | WEIGHT: 170.6 LBS | SYSTOLIC BLOOD PRESSURE: 135 MMHG | HEART RATE: 122 BPM

## 2023-01-31 DIAGNOSIS — Z01.810 PRE-OPERATIVE CARDIOVASCULAR EXAMINATION: ICD-10-CM

## 2023-01-31 DIAGNOSIS — K70.30 ALCOHOLIC CIRRHOSIS OF LIVER WITHOUT ASCITES (H): ICD-10-CM

## 2023-01-31 DIAGNOSIS — D64.9 ANEMIA, UNSPECIFIED TYPE: ICD-10-CM

## 2023-01-31 DIAGNOSIS — K70.30 ALCOHOLIC CIRRHOSIS (H): ICD-10-CM

## 2023-01-31 DIAGNOSIS — E78.2 MIXED HYPERLIPIDEMIA: ICD-10-CM

## 2023-01-31 DIAGNOSIS — D68.9 COAGULOPATHY (H): ICD-10-CM

## 2023-01-31 DIAGNOSIS — D69.6 THROMBOCYTOPENIA (H): ICD-10-CM

## 2023-01-31 DIAGNOSIS — Z94.9 TRANSPLANT: Primary | ICD-10-CM

## 2023-01-31 DIAGNOSIS — I48.20 CHRONIC ATRIAL FIBRILLATION WITH RAPID VENTRICULAR RESPONSE (H): ICD-10-CM

## 2023-01-31 PROBLEM — K74.60 CIRRHOSIS (H): Status: ACTIVE | Noted: 2023-01-31

## 2023-01-31 PROCEDURE — 99204 OFFICE O/P NEW MOD 45 MIN: CPT | Performed by: INTERNAL MEDICINE

## 2023-01-31 PROCEDURE — G0463 HOSPITAL OUTPT CLINIC VISIT: HCPCS | Mod: 25

## 2023-01-31 PROCEDURE — G0463 HOSPITAL OUTPT CLINIC VISIT: HCPCS | Mod: 25 | Performed by: INTERNAL MEDICINE

## 2023-01-31 PROCEDURE — 93005 ELECTROCARDIOGRAM TRACING: CPT

## 2023-01-31 RX ORDER — METOPROLOL TARTRATE 25 MG/1
25 TABLET, FILM COATED ORAL 2 TIMES DAILY
Qty: 120 TABLET | Refills: 3 | Status: ON HOLD | OUTPATIENT
Start: 2023-01-31 | End: 2023-03-29

## 2023-01-31 ASSESSMENT — PAIN SCALES - GENERAL: PAINLEVEL: NO PAIN (0)

## 2023-01-31 NOTE — PATIENT INSTRUCTIONS
You were seen by Dr. Casiano    Medication changes:   Stop taking carvedilol instead take metoprolol 25mg by mouth twice a day       Follow up:   As needed if symptoms don't improve or if they worsen       If you have any questions, call us at [phone].   [facility].   To schedule an appointment or to leave a message for your care team press #1   If you are a physician calling for another physician press #2  For billing press #3  For medical records press #4    We are encouraging the use of [employer] to communicate with your Healthcare provider        If you have any urgent needs after hours (8:30am to 4:30pm) please call [phone] and ask for the cardiology fellow on call

## 2023-01-31 NOTE — LETTER
1/31/2023      RE: Cricket Mane  [address] [place]  [facility] [address]       Dear Colleague,    Thank you for the opportunity to participate in the care of your patient, Cricket Mane, at the [facility] at . Please see a copy of my visit note below.    I am delighted to see Cricket Mane in consultation.The primary encounter diagnosis was Transplant. Diagnoses of Cirrhosis (H), Pre-operative cardiovascular examination, Chronic atrial fibrillation with rapid ventricular response (H), Mixed hyperlipidemia, Thrombocytopenia (H), Coagulopathy (H), and Anemia, unspecified type were also pertinent to this visit.   As you know, the patient is a 62 year old  male. He   has a past medical history of Alcoholic cirrhosis of liver with ascites (H), Atrial fibrillation (H), Coronary artery disease, History of alcohol use disorder, History of esophageal varices with bleeding, and Hyperlipidemia..    On this visit, the patient states that he has good exercise tolerance.  The patient denies chest pressure/discomfort, near-syncope, syncope, orthopnea, paroxysmal nocturnal dyspnea and lower extermity edema.    The patient's cardiovascular risk factors include known cardiac disease and high cholesterol.    The following portions of the patient's history were reviewed and updated as appropriate: allergies, current medications, past family history, past medical history, past social history, past surgical history, and the problem list.    PMH: The patient's past medical history includes:    Past Medical History:   Diagnosis Date     Alcoholic cirrhosis of liver with ascites (H)      Atrial fibrillation (H)      Coronary artery disease      History of alcohol use disorder      History of esophageal varices with bleeding      Hyperlipidemia       Past Surgical History:   Procedure Laterality Date     AS PARTIAL HIP REPLACEMENT          The patient's medications as of the current encounter are:     Current Outpatient Medications   Medication Sig Dispense Refill     folic acid (FOLVITE) 1 MG tablet Take 1,000 mcg by mouth daily       metoprolol tartrate (LOPRESSOR) 25 MG tablet Take 1 tablet (25 mg) by mouth 2 times daily 120 tablet 3     Multiple Vitamins-Iron TABS Take 1 tablet by mouth daily       omeprazole 20 MG tablet          Labs:     Orders Only on 01/31/2023   Component Date Value Ref Range Status     Ventricular Rate 01/31/2023 111  BPM Incomplete     Atrial Rate 01/31/2023 396  BPM Incomplete     QRS Duration 01/31/2023 80  ms Incomplete     QT 01/31/2023 302  ms Incomplete     QTc 01/31/2023 410  ms Incomplete     R AXIS 01/31/2023 75  degrees Incomplete     T Axis 01/31/2023 -9  degrees Incomplete     Interpretation ECG 01/31/2023    Incomplete                    Value:Atrial flutter with variable A-V block  Abnormal QRS-T angle, consider primary T wave abnormality  Abnormal ECG  When compared with ECG of 24-DEC-2015 16:06,  Atrial flutter has replaced Atrial fibrillation         Allergies:  No Known Allergies    Family History:   Family History   Problem Relation Age of Onset     Lung Cancer Mother      Cancer Mother      Hypertension Father      Cancer Brother      No Known Problems Brother      No Known Problems Sister      Cancer Sister      No Known Problems Sister      No Known Problems Sister      Thyroid nodules Son      No Known Problems Daughter      No Known Problems Daughter        Psychosocial history:  reports that he has never smoked. He does not have any smokeless tobacco history on file. He reports that he does not currently use alcohol. He reports that he does not use drugs.    Review of systems: negative for, exertional chest pain or pressure, paroxysmal nocturnal dyspnea, dyspnea on exertion, orthopnea, lower extremity edema, syncope or near-syncope and claudication    In addition,   General: No change in  "weight, sleep or appetite.  Normal energy.  No fever or chills  Eyes: Negative for vision changes or eye problems  ENT: No problems with ears, nose or throat.  No difficulty swallowing.  Resp: No coughing, wheezing or shortness of breath  GI: No nausea, vomiting,  heartburn, abdominal pain, diarrhea, constipation or change in bowel habits, cirrhosis  : No urinary frequency or dysuria, bladder or kidney problems  Musculoskeletal: No significant muscle or joint pains  Neurologic: No headaches, numbness, tingling, weakness, problems with balance or coordination  Psychiatric: No problems with anxiety, depression or mental health  Heme/immune/allergy: anemia, thrombocytopenia, coagulopathy  Endocrine: No history of thyroid disease, diabetes or other endocrine disorders  Skin: No rashes,worrisome lesions or skin problems  Vascular:  No claudication, lifestyle limiting or otherwise; no ischemic rest pain; no non-healing ulcers. No weakness, No loss of sensation        Physical examination  Vitals: /88 (BP Location: Right arm, Patient Position: Sitting, Cuff Size: Adult Regular)   Pulse (!) 122   Ht 1.715 m (5' 7.52\")   Wt 77.4 kg (170 lb 9.6 oz)   SpO2 99%   BMI 26.31 kg/m    BMI= Body mass index is 26.31 kg/m .    In general, the patient is a pleasant male in no apparent distress.    HEENT: Normiocephalic and atraumatic.  PERRLA.  EOMI.  Sclerae icteric, not injected.  Nares clear.  Pharynx without erythema or exudate.  Dentition intact.    Neck: No adenopathy.  No thyromegaly. Carotids +2/2 bilaterally without bruits.  No jugular venous distension.   Heart:  The PMI is in the 5th ICS in the midclavicular line. There is no heave. Irregularly irregular. Normal S1, S2 splits physiologically. No murmur, rub, click, or gallop.    Lungs: Clear to asculation.  No ronchi, wheezes, rales.  No dullness to percussion.   Abdomen: Soft, nontender, nondistended. No organomegaly. No AAA.  No bruits.   Extremities: No " clubbing, cyanosis, or edema. The pulses were intact bilaterally.   Neurological: The neurological examination reveal a patient who was oriented to person, place, and time.  The remainder of the examination was nonfocal.    Cardiac tests include:    Echo - normal EF, no pulmonary HTN  CTA - nonobstructive CAD, high CAC  ECG - atrial fib RVR    Assessment and Plan    1. Preop - Based on the echo and CTA, the patient would appear to be at low risk for cardiovascular events during surgery.  2. CAD - defer ASA, consider starting a statin  3. Chronic atrial fibrillation - change carvedilol to metoprolol for rate control  - CHADSVASC = 1 will defer anticoagulation because of this and coagulopathy/thrombocytopenia  4. HL - consider stating a statin  5. Liver disease - transplant workup pending    The patient is to return  prn. The patient understood the treatment plan as outlined above.  There were no barriers to learning.      Constantin Casiano MD

## 2023-01-31 NOTE — NURSING NOTE
Chief Complaint   Patient presents with     New Patient     cardiac eval for liver transplant         Vitals were taken, medications reconciled and EKG performed.     David Brandt, EMT   9:31 AM\

## 2023-01-31 NOTE — PROGRESS NOTES
I am delighted to see Cricket Mane in consultation.The primary encounter diagnosis was Transplant. Diagnoses of Cirrhosis (H), Pre-operative cardiovascular examination, Chronic atrial fibrillation with rapid ventricular response (H), Mixed hyperlipidemia, Thrombocytopenia (H), Coagulopathy (H), and Anemia, unspecified type were also pertinent to this visit.   As you know, the patient is a 62 year old  male. He   has a past medical history of Alcoholic cirrhosis of liver with ascites (H), Atrial fibrillation (H), Coronary artery disease, History of alcohol use disorder, History of esophageal varices with bleeding, and Hyperlipidemia..    On this visit, the patient states that he has good exercise tolerance.  The patient denies chest pressure/discomfort, near-syncope, syncope, orthopnea, paroxysmal nocturnal dyspnea and lower extermity edema.    The patient's cardiovascular risk factors include known cardiac disease and high cholesterol.    The following portions of the patient's history were reviewed and updated as appropriate: allergies, current medications, past family history, past medical history, past social history, past surgical history, and the problem list.    PMH: The patient's past medical history includes:    Past Medical History:   Diagnosis Date     Alcoholic cirrhosis of liver with ascites (H)      Atrial fibrillation (H)      Coronary artery disease      History of alcohol use disorder      History of esophageal varices with bleeding      Hyperlipidemia       Past Surgical History:   Procedure Laterality Date     AS PARTIAL HIP REPLACEMENT         The patient's medications as of the current encounter are:     Current Outpatient Medications   Medication Sig Dispense Refill     folic acid (FOLVITE) 1 MG tablet Take 1,000 mcg by mouth daily       metoprolol tartrate (LOPRESSOR) 25 MG tablet Take 1 tablet (25 mg) by mouth 2 times daily 120 tablet 3     Multiple Vitamins-Iron TABS Take 1 tablet  by mouth daily       omeprazole 20 MG tablet          Labs:     Orders Only on 01/31/2023   Component Date Value Ref Range Status     Ventricular Rate 01/31/2023 111  BPM Incomplete     Atrial Rate 01/31/2023 396  BPM Incomplete     QRS Duration 01/31/2023 80  ms Incomplete     QT 01/31/2023 302  ms Incomplete     QTc 01/31/2023 410  ms Incomplete     R AXIS 01/31/2023 75  degrees Incomplete     T Axis 01/31/2023 -9  degrees Incomplete     Interpretation ECG 01/31/2023    Incomplete                    Value:Atrial flutter with variable A-V block  Abnormal QRS-T angle, consider primary T wave abnormality  Abnormal ECG  When compared with ECG of 24-DEC-2015 16:06,  Atrial flutter has replaced Atrial fibrillation         Allergies:  No Known Allergies    Family History:   Family History   Problem Relation Age of Onset     Lung Cancer Mother      Cancer Mother      Hypertension Father      Cancer Brother      No Known Problems Brother      No Known Problems Sister      Cancer Sister      No Known Problems Sister      No Known Problems Sister      Thyroid nodules Son      No Known Problems Daughter      No Known Problems Daughter        Psychosocial history:  reports that he has never smoked. He does not have any smokeless tobacco history on file. He reports that he does not currently use alcohol. He reports that he does not use drugs.    Review of systems: negative for, exertional chest pain or pressure, paroxysmal nocturnal dyspnea, dyspnea on exertion, orthopnea, lower extremity edema, syncope or near-syncope and claudication    In addition,   General: No change in weight, sleep or appetite.  Normal energy.  No fever or chills  Eyes: Negative for vision changes or eye problems  ENT: No problems with ears, nose or throat.  No difficulty swallowing.  Resp: No coughing, wheezing or shortness of breath  GI: No nausea, vomiting,  heartburn, abdominal pain, diarrhea, constipation or change in bowel habits, cirrhosis  : No  "urinary frequency or dysuria, bladder or kidney problems  Musculoskeletal: No significant muscle or joint pains  Neurologic: No headaches, numbness, tingling, weakness, problems with balance or coordination  Psychiatric: No problems with anxiety, depression or mental health  Heme/immune/allergy: anemia, thrombocytopenia, coagulopathy  Endocrine: No history of thyroid disease, diabetes or other endocrine disorders  Skin: No rashes,worrisome lesions or skin problems  Vascular:  No claudication, lifestyle limiting or otherwise; no ischemic rest pain; no non-healing ulcers. No weakness, No loss of sensation        Physical examination  Vitals: /88 (BP Location: Right arm, Patient Position: Sitting, Cuff Size: Adult Regular)   Pulse (!) 122   Ht 1.715 m (5' 7.52\")   Wt 77.4 kg (170 lb 9.6 oz)   SpO2 99%   BMI 26.31 kg/m    BMI= Body mass index is 26.31 kg/m .    In general, the patient is a pleasant male in no apparent distress.    HEENT: Normiocephalic and atraumatic.  PERRLA.  EOMI.  Sclerae icteric, not injected.  Nares clear.  Pharynx without erythema or exudate.  Dentition intact.    Neck: No adenopathy.  No thyromegaly. Carotids +2/2 bilaterally without bruits.  No jugular venous distension.   Heart:  The PMI is in the 5th ICS in the midclavicular line. There is no heave. Irregularly irregular. Normal S1, S2 splits physiologically. No murmur, rub, click, or gallop.    Lungs: Clear to asculation.  No ronchi, wheezes, rales.  No dullness to percussion.   Abdomen: Soft, nontender, nondistended. No organomegaly. No AAA.  No bruits.   Extremities: No clubbing, cyanosis, or edema. The pulses were intact bilaterally.   Neurological: The neurological examination reveal a patient who was oriented to person, place, and time.  The remainder of the examination was nonfocal.    Cardiac tests include:    Echo - normal EF, no pulmonary HTN  CTA - nonobstructive CAD, high CAC  ECG - atrial fib RVR    Assessment and " Plan    1. Preop - Based on the echo and CTA, the patient would appear to be at low risk for cardiovascular events during surgery.  2. CAD - defer ASA, consider starting a statin  3. Chronic atrial fibrillation - change carvedilol to metoprolol for rate control  - CHADSVASC = 1 will defer anticoagulation because of this and coagulopathy/thrombocytopenia  4. HL - consider stating a statin  5. Liver disease - transplant workup pending    The patient is to return  prn. The patient understood the treatment plan as outlined above.  There were no barriers to learning.      Constantin Casiano MD

## 2023-02-01 ENCOUNTER — TELEPHONE (OUTPATIENT)
Dept: TRANSPLANT | Facility: CLINIC | Age: 63
End: 2023-02-01
Payer: COMMERCIAL

## 2023-02-01 LAB
ATRIAL RATE - MUSE: 111 BPM
ATRIAL RATE - MUSE: 396 BPM
DIASTOLIC BLOOD PRESSURE - MUSE: NORMAL MMHG
DIASTOLIC BLOOD PRESSURE - MUSE: NORMAL MMHG
INTERPRETATION ECG - MUSE: NORMAL
INTERPRETATION ECG - MUSE: NORMAL
P AXIS - MUSE: NORMAL DEGREES
P AXIS - MUSE: NORMAL DEGREES
PR INTERVAL - MUSE: NORMAL MS
PR INTERVAL - MUSE: NORMAL MS
QRS DURATION - MUSE: 76 MS
QRS DURATION - MUSE: 80 MS
QT - MUSE: 302 MS
QT - MUSE: 306 MS
QTC - MUSE: 410 MS
QTC - MUSE: 438 MS
R AXIS - MUSE: 75 DEGREES
R AXIS - MUSE: 76 DEGREES
SYSTOLIC BLOOD PRESSURE - MUSE: NORMAL MMHG
SYSTOLIC BLOOD PRESSURE - MUSE: NORMAL MMHG
T AXIS - MUSE: -9 DEGREES
T AXIS - MUSE: 0 DEGREES
VENTRICULAR RATE- MUSE: 111 BPM
VENTRICULAR RATE- MUSE: 123 BPM

## 2023-02-01 NOTE — TELEPHONE ENCOUNTER
Spoke with Cricket    MELD up to 28 from labs in [place]    Out of town trip planned  2/13-2/20 for work    Was very surprised labs were worse.    Denied ETOH consumption, PEth at [facility] was negative    Was a little dehydrated from colonoscopy prep last week.      Poor po intake post due to some discomfort after EGD at , now back to baseline.      Seeing Raissa Cardenas and repeat labs today/tomorrow- orders sent to [facility]    Denies any illness sx, bleeding, or HE.  Denies any overt sx of ascites, has ultrasound scheduled with  on 2/20/23 to recheck for ascites.  Does note increased jaundice and scleral icterus per patient report.  Continues to work full time from home    Eval complete , re-discuss at selection next week    Discussed challenges with travel- if listed may miss out on offers, risk of getting hospitalized with higher MELD score if out of town.  Has financial resources to quickly return to [place], but still not w/o risk per above.

## 2023-02-01 NOTE — LETTER
PHYSICIAN ORDERS      DATE & TIME ISSUED: 2023 10:21 AM  PATIENT NAME: Cricket Mane   : 1960     formerly [facility] MR# : [id number]     DIAGNOSIS:  Cirrhosis  ICD-10 CODE: K70.3   Orders  1 year after issue.    Please have th following labs drawn weekly, per patient to schedule. These labs must be drawn all together on the same day when done:     INR     Basic Metabolic Panel     Hepatic Panel  Additionally, please have the following done monthly:     CBC with platelets          Phosphatidyethanol (PEth)      PLEASE FAX RESULTS AS SOON AS POSSIBLE TO [phone], ATTN:LabDE  FOR CLINICAL QUESTIONS, PLEASE CALL Stephanie Jean RN, at [phone]    .

## 2023-02-07 ENCOUNTER — COMMITTEE REVIEW (OUTPATIENT)
Dept: TRANSPLANT | Facility: CLINIC | Age: 63
End: 2023-02-07
Payer: COMMERCIAL

## 2023-02-07 NOTE — COMMITTEE REVIEW
Abdominal Committee Review Note     Evaluation Date: 12/20/2022  Committee Review Date: 2/7/2023    Organ being evaluated for: Liver    Transplant Phase: Evaluation  Transplant Status: Active    Transplant Coordinator: Stefany Jean  Transplant Surgeon:       Referring Physician: Raissa Cardenas    Primary Diagnosis:   Secondary Diagnosis:     Committee Review Members:  Nutrition Ynes Higgins, RD   Pharmacist Gustavo Gayle, [facility]    - Clinical Uday Gong, SRINIVASA, Mya Spicer, [facility]   Transplant Scarlett Michelle, RN, Ree Burleson LPN, Stefany Jean, RN, Renita Espinoza, RN, Polly Mancini, RN, Jr Delon Johns, IZABELA, Hilaria Serna, IZABELA, Kelly Cagle, APRN CNP, Ar Montesinos RN   Transplant Hepatology  Harper Rodriguez MD, Samuel Carrillo, Mook Mendenhall MD, Pancho Sloan MD, Tianna Reddy MD, Jeanie Antonio MD, Zo Cowan MD, Teofilo Hu MD, Gaudencio Ocasio MD, Thomas M. Leventhal, MD   Transplant Surgery Aniket Arzate MD, Vanesa Benoit NP, Esa Medley MD, Antoni Yi MD, Hilaria Arellano NP, Brayan Live MD, Ahsan Plummer MD, Maxi Li MD       Transplant Eligibility: Cirrhosis with MELD, ETOH    Committee Review Decision: Approved    Relative Contraindications: None    Absolute Contraindications: None    Committee Chair Gaudencio Salinas MD verbally attested to the committee's decision.    Committee Discussion Details:      Approved to list

## 2023-02-14 ENCOUNTER — TELEPHONE (OUTPATIENT)
Dept: TRANSPLANT | Facility: CLINIC | Age: 63
End: 2023-02-14
Payer: COMMERCIAL

## 2023-02-16 ENCOUNTER — DOCUMENTATION ONLY (OUTPATIENT)
Dept: TRANSPLANT | Facility: CLINIC | Age: 63
End: 2023-02-16
Payer: COMMERCIAL

## 2023-02-16 DIAGNOSIS — K70.30 ALCOHOLIC CIRRHOSIS (H): Primary | ICD-10-CM

## 2023-02-16 NOTE — TELEPHONE ENCOUNTER
RECORDS RECEIVED FROM: internal /ce    DATE RECEIVED: 5.10.23    NOTES (FOR ALL VISITS) STATUS DETAILS   OFFICE NOTES from referring provider internal  Dr Sloan     MEDICATION LIST internal     IMAGING      DEXASCAN internal  1.4.23      XR (Chest) internal /ce Internal 12.16.22    HP- 2.1.23, 7.30.22,       LABS     DIABETES: HBGA1C, CREATININE, FASTING LIPIDS, MICROALBUMIN URINE, POTASSIUM, TSH, T4    THYROID: TSH, T4, CBC, THYRODLONULIN, TOTAL T3, FREE T4, CALCITONIN, CEA internal /ce

## 2023-02-16 NOTE — PROGRESS NOTES
Pre-Liver Transplant Evaluation/Teaching    TEACHING TOPICS: Evaluation Process, Evaluation Items, Diagnostic Studies, Consultation, Chemical Dependency Policy, CD Eval, Donor Source, Liver Allocation, MELD System, UNOS, Waiting List, Follow up while on transplant list, Follow up after transplantation, Infection and Rejection, Immunosuppression , Medication Teaching, Lab Recording after transplant, Laboratory Frequency after transplant , Consent for evaluation and One year survival rates    INSTRUCTIONAL MATERIAL USED/GIVEN:   Liver Transplant Handbook, MELD Booklet, Donor Booklet, Web Sites Options, Verbal instructions, Multiple Listing Brochure , Consent for evaluation signed, One year survival rates and SRTR (Scientific Registry) Data    Person(s) involved in teaching: Patient   Asks Questions: YES  Eager to Learn: YES  Cooperative: YES  Receptive (willing/able to accept information): YES  Reason for the appointment, diagnosis and treatment plan: YES  Knowledge of proper use of medications and conditions for which they are ordered (with special attention to potential side effects or drug interactions): YES  Which situations necessitate calling provider and whom to contact: YES    Teaching Concerns Addressed   Comments:   Nutritional needs and diet plan: YES  How and/when to access community resources: YES  Patient is aware of and agrees to required commitment to post-op care and long term follow-up: YES    Patient open to all Extended Criteria organ offers (underutilized donors): Yes or no: Yes  Details:     Patient has name and phone number of transplant coordinator.   Time Spent face-to-face teachin minutes.

## 2023-02-17 ENCOUNTER — DOCUMENTATION ONLY (OUTPATIENT)
Dept: TRANSPLANT | Facility: CLINIC | Age: 63
End: 2023-02-17
Payer: COMMERCIAL

## 2023-02-17 NOTE — PROGRESS NOTES
February 17, 2023 4:50 PM - Ar Montesinos RN:     NEW LIVER LISTING    Listing MELD: 22  ABO: O  Diagnosis: alcoholic cirrhosis    Patient is Open to All Extended Criteria    This RN called patient and discussed his listing, no questions at this time.

## 2023-02-17 NOTE — LETTER
February 17, 2023    Cricket Mane  [address] [place] [facility] [address]    Dear Mr. Mane,    This letter is sent to confirm that you have completed your transplant work-up and you are a candidate in the liver transplant program at the [facility].  You were placed on the liver active waitlist on 2/17/2023.      When you are active on the waitlist and an organ becomes available, a coordinator will need to speak to you immediately.  You could be contacted at any time during the day or night as an organ could become available at any time.  Please make certain our office always has your current telephone numbers and address.      You are now listed for transplant. Now that the evaluation process is complete, the general hepatology team will take over the management of your cares. This team also works with your hepatologist Dr. Pancho Sloan, and will be your main contact for symptom management, appointments, medications, and any questions that are not specific to transplant. You can reach this team by calling [phone] or Loaded Pocket message your hepatology provider. The transplant team will continue to monitor MELD labs and notify you when MELD labs are due.     Items we will need from you:      We have received approval from your insurance company for the transplant procedure.  It is critical that you notify us if there is any change in your insurance.  It is also important that you familiarize yourself with the details of your specific insurance policy.  Our patient  is available to assist you if you should have any questions regarding your coverage.      You will need to have labs drawn frequently while you are listed. The frequency is based on your MELD score. Your current listing MELD is 22. Below is a chart to help you understand how often to have labs drawn.  If you are uncertain of your MELD score/how often you need labs, please  contact your Transplant Coordinator.  Additionally, if you would like to have labs drawn outside the [facility] system, please notify me to make arrangements to have labs ordered.  It will be your responsibility to remind your physician to forward your results to the Transplant Office.                 MELD greater than 25 - Weekly labs              MELD 18-24 - Monthly labs        MELD 17 or less- every 3 months      During this waiting period, we may request additional periodic laboratory tests with your primary physician.  It will be your responsibility to remind your physician to forward your results to the Transplant Office.      We need to be kept informed of any changes in your medical condition such as:    o changes in your medications,   o significant changes in your health  o significant infections (such as pneumonia or abscesses)  o blood transfusions  o any condition which requires hospitalization  o any surgery      Remember to complete any routine cancer screening tests required before your transplant.  This includes colonoscopy; prostate screening for men, and mammogram and gynecologic testing for women, as well as dental work.  Your primary care clinic can assist you with arranging for these exams.  Remind your caregivers to forward copies of the records and final reports.      We want you to know that our program has physician and surgeon coverage 24 hours a day, 365 days a year. In addition, our transplant surgeons and physicians will not be on call for two or more transplant programs more than 30 miles apart unless the circumstances have been reviewed and approved by the [employer] Organ Sharing (UNOS) Membership and Professional Standards Committee (MPSC). Finally, our primary physician and primary surgeons are not designated as the primary surgeon or primary physician at more than 1 transplant hospital. If this coverage changes or there are substantial program changes, you will be  notified in writing by letter.     Attached is a letter from UNOS that describes the services and information offered to patients by UNOS and the Organ Procurement and Transplantation Network (OPTN).    We appreciate having had the opportunity to participate in your care.  If you have questions, please feel free to call the Transplant Office at [phone] or [phone].    Sincerely,  Ar Montesinos RN  Pre-Liver Transplant Coordinator  [phone] (direct)  [phone] (fax)    Solid Organ Transplant  [facility], [facility]  Enclosures: OS Letter  cc: Care Team      The Organ Procurement and Transplantation Network  Toll-free patient services line:     Your resource for organ transplant information    If you have a question regarding your own medical care, you always should call your transplant hospital first. However, for general organ transplant-related information, you can call the Organ Procurement and Transplantation Network (OPTN) toll-free patient services line at [phone]. Anyone, including potential transplant candidates, candidates, recipients, family members, friends, living donors, and donor family members, can call this number to:          Talk about organ donation, living donation, the transplant process, the donation process, and transplant policies.    Get a free patient information kit with helpful booklets, waiting list and transplant information, and a list of all transplant hospitals.    Ask questions about the OPTN website (https://optn.transplant.hrsa.gov/), the United Network for Organ Sharing s (UNOS) website (https://unos.org/), or the UNOS website for living donors and transplant recipients. (https://www.transplantliving.org/).    Learn how the OPTN can help you.    Talk about any concerns that you may have with a transplant hospital.    The nation s transplant system, the OPTN,  is managed under federal contract by the [employer] Network for Organ Sharing (UNOS), which is a non-profit charitable organization. The OPTN helps create and define organ sharing policies that make the best use of donated organs. This process continuously evaluating new advances and discoveries so policies can be adapted to best serve patients waiting for transplants. To do so, the OPTN works closely with transplant professionals, transplant patients, transplant candidates, donor families, living donors, and the public. All transplant programs and organ procurement organizations throughout the country are OPTN members and are obligated to follow the policies the OPTN creates for allocating organs.    The OPTN also is responsible for:      Providing educational material for patients, the public, and professionals.    Raising awareness of the need for donated organs and tissue.    Coordinating organ procurement, matching, and placement.    Collecting information about every organ transplant and donation that occurs in the [place].    Remember, you should contact your transplant hospital directly if you have questions or concerns about your own medical care including medical records, work-up progress, and test results.    We are not your transplant hospital, and our staff will not be able to answer questions about your case, so please keep your transplant hospital s phone number handy.    However, while you research your transplant needs and learn as much as you can about transplantation and donation, we welcome your call to our toll-free patient services line at [phone].          Updated 4/1/2019

## 2023-02-23 DIAGNOSIS — I48.20 CHRONIC ATRIAL FIBRILLATION WITH RAPID VENTRICULAR RESPONSE (H): ICD-10-CM

## 2023-02-27 RX ORDER — METOPROLOL TARTRATE 25 MG/1
TABLET, FILM COATED ORAL
Qty: 60 TABLET | Refills: 7 | OUTPATIENT
Start: 2023-02-27

## 2023-03-05 ENCOUNTER — NURSE TRIAGE (OUTPATIENT)
Dept: NURSING | Facility: CLINIC | Age: 63
End: 2023-03-05
Payer: COMMERCIAL

## 2023-03-05 NOTE — TELEPHONE ENCOUNTER
Patient already called and spoke primary care provider and was told to be seen in the ED.    Doesn't have PCP with [facility].    Does has an appointment with [facility] on 3/6/23    99.2 is his current temp    Symptoms started 3/3/23    Home cares reviewed with the patient    Christelle Morrell RN  [place] Nurse Advisor  2:59 PM  3/5/2023        Reason for Disposition    [1] HIGH RISK for severe COVID complications (e.g., weak immune system, age > 64 years, obesity with BMI > 25, pregnant, chronic lung disease or other chronic medical condition) AND [2] COVID symptoms (e.g., cough, fever)  (Exceptions: Already seen by PCP and no new or worsening symptoms.)    Additional Information    Negative: SEVERE difficulty breathing (e.g., struggling for each breath, speaks in single words)    Negative: Difficult to awaken or acting confused (e.g., disoriented, slurred speech)    Negative: Bluish (or gray) lips or face now    Negative: Shock suspected (e.g., cold/pale/clammy skin, too weak to stand, low BP, rapid pulse)    Negative: Sounds like a life-threatening emergency to the triager    Negative: SEVERE or constant chest pain or pressure  (Exception: Mild central chest pain, present only when coughing.)    Negative: MODERATE difficulty breathing (e.g., speaks in phrases, SOB even at rest, pulse 100-120)    Negative: [1] Headache AND [2] stiff neck (can't touch chin to chest)    Negative: Oxygen level (e.g., pulse oximetry) 90 percent or lower    Negative: Chest pain or pressure    Negative: Patient sounds very sick or weak to the triager    Negative: MILD difficulty breathing (e.g., minimal/no SOB at rest, SOB with walking, pulse <100)    Negative: Fever > 103 F (39.4 C)    Negative: [1] Fever > 101 F (38.3 C) AND [2] age > 60 years    Negative: [1] Fever > 100.0 F (37.8 C) AND [2] bedridden (e.g., nursing home patient, CVA, chronic illness, recovering from surgery)    Protocols used: CORONAVIRUS (COVID-19)  DIAGNOSED OR [id number]

## 2023-03-07 ENCOUNTER — NURSE TRIAGE (OUTPATIENT)
Dept: NURSING | Facility: CLINIC | Age: 63
End: 2023-03-07
Payer: COMMERCIAL

## 2023-03-07 NOTE — TELEPHONE ENCOUNTER
Reason for Disposition    RN needs further essential information from caller in order to complete triage    Protocols used: INFORMATION ONLY CALL - NO TRIAGE-A-    Pt is calling asking if his transplant MD would recommend taking Paxlovid.  He has the Rx of Paxlovid from [facility]. He is not sure what the name of his transplant depart is.  He has a card from his transplant clinic and will call them directly.      Sxs started last Friday.      Dalila Stewart RN on 3/7/2023 at 3:38 PM

## 2023-03-22 ENCOUNTER — ORGAN (OUTPATIENT)
Dept: TRANSPLANT | Facility: CLINIC | Age: 63
End: 2023-03-22

## 2023-03-22 ENCOUNTER — APPOINTMENT (OUTPATIENT)
Dept: GENERAL RADIOLOGY | Facility: CLINIC | Age: 63
DRG: 006 | End: 2023-03-22
Attending: TRANSPLANT SURGERY
Payer: COMMERCIAL

## 2023-03-22 ENCOUNTER — HOSPITAL ENCOUNTER (INPATIENT)
Facility: CLINIC | Age: 63
Setting detail: SURGERY ADMIT
DRG: 006 | End: 2023-03-22
Attending: TRANSPLANT SURGERY | Admitting: TRANSPLANT SURGERY
Payer: COMMERCIAL

## 2023-03-22 ENCOUNTER — HOSPITAL ENCOUNTER (INPATIENT)
Facility: CLINIC | Age: 63
LOS: 6 days | Discharge: HOME HEALTH | DRG: 006 | End: 2023-03-29
Attending: TRANSPLANT SURGERY | Admitting: TRANSPLANT SURGERY
Payer: COMMERCIAL

## 2023-03-22 ENCOUNTER — ANESTHESIA EVENT (OUTPATIENT)
Dept: SURGERY | Facility: CLINIC | Age: 63
DRG: 006 | End: 2023-03-22
Payer: COMMERCIAL

## 2023-03-22 ENCOUNTER — APPOINTMENT (OUTPATIENT)
Dept: CT IMAGING | Facility: CLINIC | Age: 63
DRG: 006 | End: 2023-03-22
Attending: TRANSPLANT SURGERY
Payer: COMMERCIAL

## 2023-03-22 DIAGNOSIS — Z94.9 TRANSPLANT: Primary | ICD-10-CM

## 2023-03-22 DIAGNOSIS — Z94.4 S/P LIVER TRANSPLANT (H): ICD-10-CM

## 2023-03-22 DIAGNOSIS — I48.20 CHRONIC ATRIAL FIBRILLATION WITH RAPID VENTRICULAR RESPONSE (H): ICD-10-CM

## 2023-03-22 LAB
ABO/RH(D): NORMAL
ALBUMIN SERPL BCG-MCNC: 2.6 G/DL (ref 3.5–5.2)
ALP SERPL-CCNC: 223 U/L (ref 40–129)
ALT SERPL W P-5'-P-CCNC: 54 U/L (ref 10–50)
AMYLASE SERPL-CCNC: 113 U/L (ref 28–100)
ANION GAP SERPL CALCULATED.3IONS-SCNC: 11 MMOL/L (ref 7–15)
ANTIBODY SCREEN: NEGATIVE
APTT PPP: 41 SECONDS (ref 22–38)
AST SERPL W P-5'-P-CCNC: 92 U/L (ref 10–50)
BASOPHILS # BLD AUTO: 0.1 10E3/UL (ref 0–0.2)
BASOPHILS NFR BLD AUTO: 1 %
BILIRUB SERPL-MCNC: 7.7 MG/DL
BUN SERPL-MCNC: 19.2 MG/DL (ref 8–23)
CALCIUM SERPL-MCNC: 8.6 MG/DL (ref 8.8–10.2)
CHLORIDE SERPL-SCNC: 99 MMOL/L (ref 98–107)
CREAT SERPL-MCNC: 0.83 MG/DL (ref 0.67–1.17)
DEPRECATED HCO3 PLAS-SCNC: 19 MMOL/L (ref 22–29)
EOSINOPHIL # BLD AUTO: 0.3 10E3/UL (ref 0–0.7)
EOSINOPHIL NFR BLD AUTO: 3 %
ERYTHROCYTE [DISTWIDTH] IN BLOOD BY AUTOMATED COUNT: 15.7 % (ref 10–15)
FIBRINOGEN PPP-MCNC: 166 MG/DL (ref 170–490)
GFR SERPL CREATININE-BSD FRML MDRD: >90 ML/MIN/1.73M2
GLUCOSE SERPL-MCNC: 102 MG/DL (ref 70–99)
HCT VFR BLD AUTO: 34.1 % (ref 40–53)
HGB BLD-MCNC: 11.2 G/DL (ref 13.3–17.7)
IMM GRANULOCYTES # BLD: 0.1 10E3/UL
IMM GRANULOCYTES NFR BLD: 1 %
INR PPP: 2.22 (ref 0.85–1.15)
LYMPHOCYTES # BLD AUTO: 1.1 10E3/UL (ref 0.8–5.3)
LYMPHOCYTES NFR BLD AUTO: 11 %
MAGNESIUM SERPL-MCNC: 1.9 MG/DL (ref 1.7–2.3)
MCH RBC QN AUTO: 32.2 PG (ref 26.5–33)
MCHC RBC AUTO-ENTMCNC: 32.8 G/DL (ref 31.5–36.5)
MCV RBC AUTO: 98 FL (ref 78–100)
MONOCYTES # BLD AUTO: 1.5 10E3/UL (ref 0–1.3)
MONOCYTES NFR BLD AUTO: 14 %
NEUTROPHILS # BLD AUTO: 7.3 10E3/UL (ref 1.6–8.3)
NEUTROPHILS NFR BLD AUTO: 70 %
NRBC # BLD AUTO: 0 10E3/UL
NRBC BLD AUTO-RTO: 0 /100
PHOSPHATE SERPL-MCNC: 2.6 MG/DL (ref 2.5–4.5)
PLATELET # BLD AUTO: 56 10E3/UL (ref 150–450)
POTASSIUM SERPL-SCNC: 4.6 MMOL/L (ref 3.4–5.3)
PROT SERPL-MCNC: 7.3 G/DL (ref 6.4–8.3)
RBC # BLD AUTO: 3.48 10E6/UL (ref 4.4–5.9)
SARS-COV-2 RNA RESP QL NAA+PROBE: NEGATIVE
SODIUM SERPL-SCNC: 129 MMOL/L (ref 136–145)
SPECIMEN EXPIRATION DATE: NORMAL
WBC # BLD AUTO: 10.4 10E3/UL (ref 4–11)

## 2023-03-22 PROCEDURE — 85384 FIBRINOGEN ACTIVITY: CPT | Performed by: STUDENT IN AN ORGANIZED HEALTH CARE EDUCATION/TRAINING PROGRAM

## 2023-03-22 PROCEDURE — 86705 HEP B CORE ANTIBODY IGM: CPT | Performed by: TRANSPLANT SURGERY

## 2023-03-22 PROCEDURE — 86923 COMPATIBILITY TEST ELECTRIC: CPT

## 2023-03-22 PROCEDURE — 99223 1ST HOSP IP/OBS HIGH 75: CPT | Mod: GC | Performed by: STUDENT IN AN ORGANIZED HEALTH CARE EDUCATION/TRAINING PROGRAM

## 2023-03-22 PROCEDURE — 93010 ELECTROCARDIOGRAM REPORT: CPT | Mod: 59 | Performed by: INTERNAL MEDICINE

## 2023-03-22 PROCEDURE — 74176 CT ABD & PELVIS W/O CONTRAST: CPT | Mod: 26 | Performed by: RADIOLOGY

## 2023-03-22 PROCEDURE — 86665 EPSTEIN-BARR CAPSID VCA: CPT | Performed by: STUDENT IN AN ORGANIZED HEALTH CARE EDUCATION/TRAINING PROGRAM

## 2023-03-22 PROCEDURE — 85610 PROTHROMBIN TIME: CPT | Performed by: STUDENT IN AN ORGANIZED HEALTH CARE EDUCATION/TRAINING PROGRAM

## 2023-03-22 PROCEDURE — 87340 HEPATITIS B SURFACE AG IA: CPT | Performed by: STUDENT IN AN ORGANIZED HEALTH CARE EDUCATION/TRAINING PROGRAM

## 2023-03-22 PROCEDURE — 87081 CULTURE SCREEN ONLY: CPT | Performed by: STUDENT IN AN ORGANIZED HEALTH CARE EDUCATION/TRAINING PROGRAM

## 2023-03-22 PROCEDURE — 87521 HEPATITIS C PROBE&RVRS TRNSC: CPT | Performed by: STUDENT IN AN ORGANIZED HEALTH CARE EDUCATION/TRAINING PROGRAM

## 2023-03-22 PROCEDURE — 87535 HIV-1 PROBE&REVERSE TRNSCRPJ: CPT | Performed by: STUDENT IN AN ORGANIZED HEALTH CARE EDUCATION/TRAINING PROGRAM

## 2023-03-22 PROCEDURE — U0005 INFEC AGEN DETEC AMPLI PROBE: HCPCS | Performed by: STUDENT IN AN ORGANIZED HEALTH CARE EDUCATION/TRAINING PROGRAM

## 2023-03-22 PROCEDURE — 86850 RBC ANTIBODY SCREEN: CPT | Performed by: STUDENT IN AN ORGANIZED HEALTH CARE EDUCATION/TRAINING PROGRAM

## 2023-03-22 PROCEDURE — 71046 X-RAY EXAM CHEST 2 VIEWS: CPT | Mod: 26 | Performed by: RADIOLOGY

## 2023-03-22 PROCEDURE — 86644 CMV ANTIBODY: CPT | Performed by: STUDENT IN AN ORGANIZED HEALTH CARE EDUCATION/TRAINING PROGRAM

## 2023-03-22 PROCEDURE — 86803 HEPATITIS C AB TEST: CPT | Performed by: STUDENT IN AN ORGANIZED HEALTH CARE EDUCATION/TRAINING PROGRAM

## 2023-03-22 PROCEDURE — 86706 HEP B SURFACE ANTIBODY: CPT | Performed by: STUDENT IN AN ORGANIZED HEALTH CARE EDUCATION/TRAINING PROGRAM

## 2023-03-22 PROCEDURE — 250N000013 HC RX MED GY IP 250 OP 250 PS 637: Performed by: STUDENT IN AN ORGANIZED HEALTH CARE EDUCATION/TRAINING PROGRAM

## 2023-03-22 PROCEDURE — 85730 THROMBOPLASTIN TIME PARTIAL: CPT | Performed by: STUDENT IN AN ORGANIZED HEALTH CARE EDUCATION/TRAINING PROGRAM

## 2023-03-22 PROCEDURE — 74176 CT ABD & PELVIS W/O CONTRAST: CPT

## 2023-03-22 PROCEDURE — 85025 COMPLETE CBC W/AUTO DIFF WBC: CPT | Performed by: STUDENT IN AN ORGANIZED HEALTH CARE EDUCATION/TRAINING PROGRAM

## 2023-03-22 PROCEDURE — 80053 COMPREHEN METABOLIC PANEL: CPT | Performed by: STUDENT IN AN ORGANIZED HEALTH CARE EDUCATION/TRAINING PROGRAM

## 2023-03-22 PROCEDURE — 83735 ASSAY OF MAGNESIUM: CPT | Performed by: STUDENT IN AN ORGANIZED HEALTH CARE EDUCATION/TRAINING PROGRAM

## 2023-03-22 PROCEDURE — 82150 ASSAY OF AMYLASE: CPT | Performed by: STUDENT IN AN ORGANIZED HEALTH CARE EDUCATION/TRAINING PROGRAM

## 2023-03-22 PROCEDURE — 86645 CMV ANTIBODY IGM: CPT | Performed by: STUDENT IN AN ORGANIZED HEALTH CARE EDUCATION/TRAINING PROGRAM

## 2023-03-22 PROCEDURE — 87389 HIV-1 AG W/HIV-1&-2 AB AG IA: CPT | Performed by: STUDENT IN AN ORGANIZED HEALTH CARE EDUCATION/TRAINING PROGRAM

## 2023-03-22 PROCEDURE — 93005 ELECTROCARDIOGRAM TRACING: CPT

## 2023-03-22 PROCEDURE — 84100 ASSAY OF PHOSPHORUS: CPT | Performed by: STUDENT IN AN ORGANIZED HEALTH CARE EDUCATION/TRAINING PROGRAM

## 2023-03-22 PROCEDURE — 71046 X-RAY EXAM CHEST 2 VIEWS: CPT

## 2023-03-22 PROCEDURE — 36415 COLL VENOUS BLD VENIPUNCTURE: CPT | Performed by: STUDENT IN AN ORGANIZED HEALTH CARE EDUCATION/TRAINING PROGRAM

## 2023-03-22 PROCEDURE — 86704 HEP B CORE ANTIBODY TOTAL: CPT | Performed by: STUDENT IN AN ORGANIZED HEALTH CARE EDUCATION/TRAINING PROGRAM

## 2023-03-22 RX ORDER — FIBRINOGEN (HUMAN) 700-1300MG
1000 KIT INTRAVENOUS ONCE
Status: DISCONTINUED | OUTPATIENT
Start: 2023-03-23 | End: 2023-03-23

## 2023-03-22 RX ORDER — NOREPINEPHRINE BITARTRATE 0.06 MG/ML
.01-.6 INJECTION, SOLUTION INTRAVENOUS CONTINUOUS
Status: DISCONTINUED | OUTPATIENT
Start: 2023-03-23 | End: 2023-03-23 | Stop reason: HOSPADM

## 2023-03-22 RX ORDER — FENTANYL CITRATE 50 UG/ML
50-200 INJECTION, SOLUTION INTRAMUSCULAR; INTRAVENOUS CONTINUOUS
Status: DISCONTINUED | OUTPATIENT
Start: 2023-03-23 | End: 2023-03-23 | Stop reason: HOSPADM

## 2023-03-22 RX ORDER — LIDOCAINE 40 MG/G
CREAM TOPICAL
Status: DISCONTINUED | OUTPATIENT
Start: 2023-03-22 | End: 2023-03-23 | Stop reason: HOSPADM

## 2023-03-22 RX ORDER — METOPROLOL TARTRATE 25 MG/1
25 TABLET, FILM COATED ORAL 2 TIMES DAILY
Status: DISCONTINUED | OUTPATIENT
Start: 2023-03-22 | End: 2023-03-23

## 2023-03-22 RX ORDER — PIPERACILLIN SODIUM, TAZOBACTAM SODIUM 3; .375 G/15ML; G/15ML
3.38 INJECTION, POWDER, LYOPHILIZED, FOR SOLUTION INTRAVENOUS ONCE
Status: DISCONTINUED | OUTPATIENT
Start: 2023-03-22 | End: 2023-03-23 | Stop reason: HOSPADM

## 2023-03-22 RX ORDER — PIPERACILLIN SODIUM, TAZOBACTAM SODIUM 3; .375 G/15ML; G/15ML
3.38 INJECTION, POWDER, LYOPHILIZED, FOR SOLUTION INTRAVENOUS
Status: DISCONTINUED | OUTPATIENT
Start: 2023-03-22 | End: 2023-03-23 | Stop reason: HOSPADM

## 2023-03-22 RX ORDER — FLUCONAZOLE 2 MG/ML
400 INJECTION, SOLUTION INTRAVENOUS ONCE
Status: DISCONTINUED | OUTPATIENT
Start: 2023-03-22 | End: 2023-03-23 | Stop reason: HOSPADM

## 2023-03-22 RX ADMIN — METOPROLOL TARTRATE 25 MG: 25 TABLET, FILM COATED ORAL at 21:22

## 2023-03-22 ASSESSMENT — ACTIVITIES OF DAILY LIVING (ADL)
ADLS_ACUITY_SCORE: 35
ADLS_ACUITY_SCORE: 20

## 2023-03-22 ASSESSMENT — ENCOUNTER SYMPTOMS: DYSRHYTHMIAS: 1

## 2023-03-22 NOTE — LETTER
[facility] [address] [place]  [address] [facility] [address]  [phone]    FACSIMILE TRANSMITTAL SHEET    TO:[facility]    _____URGENT _____REVIEW ONLY _____PLEASE COMMENT____PLEASE REPLY    NOTES/COMMENTS: Attached please find final discharge orders for Cricket Mane    Xiomara Rincon, RN BSN, PHN, ACM-RN  7A RN Care Coordinator  Phone: [phone]  Pager [phone]    To contact the weekend RNCC  [place] ([phone]) Saturday and Sunday    Units: 4A, 4C, 4E, 5A and 5B- Pager 1: [phone]    Units: 6A, 6B, 6C, 6D- Pager 2: [phone]    Units: 7A, 7B, 7C, 7D, and 5C-Pager 3: [phone]    [facility] ([phone]) Saturday and Sunday    Units: 5 Ortho, 8A, 10 ICU, & Pediatric Units-Pager 4: [phone]    3/30/2023 9:33 AM                                        IF YOU DID NOT RECEIVE THE CORRECT NUMBER OF PAGES OR THE FAX DID NOT COME THROUGH CLEARLY, PLEASE CALL THE SENDER     CONFIDENTIALITY STATEMENT: Confidential information that may accompany this transmission contains protected health information under state and federal law and is legally privileged. This information is intended only for the use of the individual or entity named above and may be used only for carrying out treatment, payment or other healthcare operations. The recipient or person responsible for delivering this information is prohibited by law from disclosing this information without proper authorization to any other party, unless required to do so by law or regulation. If you are not the intended recipient, you are hereby notified that any review, dissemination, distribution, or copying of this message is strictly prohibited. If you have received this communication in error, please destroy the materials and contact us immediately by calling the number listed above. No response indicates that the information was received by the appropriate authorized party

## 2023-03-23 ENCOUNTER — DOCUMENTATION ONLY (OUTPATIENT)
Dept: TRANSPLANT | Facility: CLINIC | Age: 63
End: 2023-03-23

## 2023-03-23 ENCOUNTER — APPOINTMENT (OUTPATIENT)
Dept: GENERAL RADIOLOGY | Facility: CLINIC | Age: 63
DRG: 006 | End: 2023-03-23
Attending: TRANSPLANT SURGERY
Payer: COMMERCIAL

## 2023-03-23 ENCOUNTER — ANESTHESIA (OUTPATIENT)
Dept: SURGERY | Facility: CLINIC | Age: 63
DRG: 006 | End: 2023-03-23
Payer: COMMERCIAL

## 2023-03-23 ENCOUNTER — APPOINTMENT (OUTPATIENT)
Dept: GENERAL RADIOLOGY | Facility: CLINIC | Age: 63
DRG: 006 | End: 2023-03-23
Attending: SURGERY
Payer: COMMERCIAL

## 2023-03-23 ENCOUNTER — APPOINTMENT (OUTPATIENT)
Dept: ULTRASOUND IMAGING | Facility: CLINIC | Age: 63
DRG: 006 | End: 2023-03-23
Attending: SURGERY
Payer: COMMERCIAL

## 2023-03-23 LAB
ALBUMIN SERPL BCG-MCNC: 2.9 G/DL (ref 3.5–5.2)
ALBUMIN SERPL BCG-MCNC: 3 G/DL (ref 3.5–5.2)
ALBUMIN SERPL BCG-MCNC: 3 G/DL (ref 3.5–5.2)
ALLEN'S TEST: ABNORMAL
ALLEN'S TEST: NORMAL
ALP SERPL-CCNC: 110 U/L (ref 40–129)
ALP SERPL-CCNC: 84 U/L (ref 40–129)
ALP SERPL-CCNC: 95 U/L (ref 40–129)
ALT SERPL W P-5'-P-CCNC: 270 U/L (ref 10–50)
ALT SERPL W P-5'-P-CCNC: 302 U/L (ref 10–50)
ALT SERPL W P-5'-P-CCNC: 309 U/L (ref 10–50)
ANION GAP SERPL CALCULATED.3IONS-SCNC: 13 MMOL/L (ref 7–15)
APTT PPP: 34 SECONDS (ref 22–38)
APTT PPP: 34 SECONDS (ref 22–38)
APTT PPP: 42 SECONDS (ref 22–38)
APTT PPP: 58 SECONDS (ref 22–38)
AST SERPL W P-5'-P-CCNC: 691 U/L (ref 10–50)
AST SERPL W P-5'-P-CCNC: 891 U/L (ref 10–50)
AST SERPL W P-5'-P-CCNC: 926 U/L (ref 10–50)
ATRIAL RATE - MUSE: NORMAL BPM
BASE EXCESS BLDA CALC-SCNC: -0.4 MMOL/L (ref -9.6–2)
BASE EXCESS BLDA CALC-SCNC: -1.4 MMOL/L (ref -9.6–2)
BASE EXCESS BLDA CALC-SCNC: -1.7 MMOL/L (ref -9.6–2)
BASE EXCESS BLDA CALC-SCNC: -2.1 MMOL/L (ref -9.6–2)
BASE EXCESS BLDA CALC-SCNC: -2.5 MMOL/L (ref -9.6–2)
BASE EXCESS BLDA CALC-SCNC: -2.5 MMOL/L (ref -9.6–2)
BASE EXCESS BLDA CALC-SCNC: -4.3 MMOL/L (ref -9.6–2)
BASE EXCESS BLDA CALC-SCNC: 0.1 MMOL/L (ref -9.6–2)
BASE EXCESS BLDA CALC-SCNC: 0.7 MMOL/L (ref -9.6–2)
BASE EXCESS BLDA CALC-SCNC: 0.8 MMOL/L (ref -9–1.8)
BASE EXCESS BLDA CALC-SCNC: 1.3 MMOL/L (ref -9–1.8)
BASE EXCESS BLDA CALC-SCNC: 1.8 MMOL/L (ref -9–1.8)
BASE EXCESS BLDA CALC-SCNC: 2.3 MMOL/L (ref -9–1.8)
BASOPHILS # BLD AUTO: 0 10E3/UL (ref 0–0.2)
BASOPHILS # BLD AUTO: 0 10E3/UL (ref 0–0.2)
BASOPHILS NFR BLD AUTO: 0 %
BASOPHILS NFR BLD AUTO: 0 %
BILIRUB DIRECT SERPL-MCNC: 2.35 MG/DL (ref 0–0.3)
BILIRUB DIRECT SERPL-MCNC: 2.71 MG/DL (ref 0–0.3)
BILIRUB DIRECT SERPL-MCNC: 3.57 MG/DL (ref 0–0.3)
BILIRUB SERPL-MCNC: 4.4 MG/DL
BILIRUB SERPL-MCNC: 5.7 MG/DL
BILIRUB SERPL-MCNC: 7.2 MG/DL
BLD PROD TYP BPU: NORMAL
BLOOD COMPONENT TYPE: NORMAL
BUN SERPL-MCNC: 14.3 MG/DL (ref 8–23)
BUN SERPL-MCNC: 14.3 MG/DL (ref 8–23)
BUN SERPL-MCNC: 14.8 MG/DL (ref 8–23)
CA-I BLD-MCNC: 4.4 MG/DL (ref 4.4–5.2)
CA-I BLD-MCNC: 4.6 MG/DL (ref 4.4–5.2)
CA-I BLD-MCNC: 4.7 MG/DL (ref 4.4–5.2)
CA-I BLD-MCNC: 4.7 MG/DL (ref 4.4–5.2)
CA-I BLD-MCNC: 4.8 MG/DL (ref 4.4–5.2)
CA-I BLD-MCNC: 5.1 MG/DL (ref 4.4–5.2)
CA-I BLD-MCNC: 5.2 MG/DL (ref 4.4–5.2)
CALCIUM SERPL-MCNC: 8.9 MG/DL (ref 8.8–10.2)
CALCIUM SERPL-MCNC: 9.2 MG/DL (ref 8.8–10.2)
CALCIUM SERPL-MCNC: 9.2 MG/DL (ref 8.8–10.2)
CHLORIDE SERPL-SCNC: 98 MMOL/L (ref 98–107)
CHLORIDE SERPL-SCNC: 99 MMOL/L (ref 98–107)
CHLORIDE SERPL-SCNC: 99 MMOL/L (ref 98–107)
CLOT INIT KAOL IND TO POST HEP NEUT TRTO: 1 {RATIO}
CLOT INIT KAOL IND TO POST HEP NEUT TRTO: 1.1 {RATIO}
CLOT INIT KAOL IND TO POST HEP NEUT TRTO: 1.1 {RATIO}
CLOT INIT KAOLIN IND BLD US: 138 SEC (ref 113–166)
CLOT INIT KAOLIN IND BLD US: 151 SEC (ref 113–166)
CLOT INIT KAOLIN IND BLD US: 155 SEC (ref 113–166)
CLOT INIT KAOLIN IND BLD US: 176 SEC (ref 113–166)
CLOT INIT KAOLIN IND BLD US: 182 SEC (ref 113–166)
CLOT INIT KAOLIN IND P HEP NEUT BLD US: 139 SEC (ref 103–153)
CLOT INIT KAOLIN IND P HEP NEUT BLD US: 152 SEC (ref 103–153)
CLOT INIT KAOLIN IND P HEP NEUT BLD US: 152 SEC (ref 103–153)
CLOT INIT KAOLIN IND P HEP NEUT BLD US: 163 SEC (ref 103–153)
CLOT INIT KAOLIN IND P HEP NEUT BLD US: 165 SEC (ref 103–153)
CLOT STIFF PLT CONT BLD CALC: 10.1 HPA (ref 11.9–29.8)
CLOT STIFF PLT CONT BLD CALC: 10.8 HPA (ref 11.9–29.8)
CLOT STIFF PLT CONT BLD CALC: 11.5 HPA (ref 11.9–29.8)
CLOT STIFF PLT CONT BLD CALC: 8.4 HPA (ref 11.9–29.8)
CLOT STIFF PLT CONT BLD CALC: 9.2 HPA (ref 11.9–29.8)
CLOT STIFF TF IND P HEP NEUT BLD US: 10.3 HPA (ref 13–33.2)
CLOT STIFF TF IND P HEP NEUT BLD US: 11.4 HPA (ref 13–33.2)
CLOT STIFF TF IND P HEP NEUT BLD US: 12.1 HPA (ref 13–33.2)
CLOT STIFF TF IND P HEP NEUT BLD US: 12.9 HPA (ref 13–33.2)
CLOT STIFF TF IND P HEP NEUT BLD US: 9.8 HPA (ref 13–33.2)
CLOT STIFF TF IND+IIB-IIIA INH P HEP NEU: 1.1 HPA (ref 1–3.7)
CLOT STIFF TF IND+IIB-IIIA INH P HEP NEU: 1.3 HPA (ref 1–3.7)
CLOT STIFF TF IND+IIB-IIIA INH P HEP NEU: 1.3 HPA (ref 1–3.7)
CLOT STIFF TF IND+IIB-IIIA INH P HEP NEU: 1.4 HPA (ref 1–3.7)
CLOT STIFF TF IND+IIB-IIIA INH P HEP NEU: 1.4 HPA (ref 1–3.7)
CMV IGG SERPL IA-ACNC: >10 U/ML
CMV IGG SERPL IA-ACNC: ABNORMAL
CODING SYSTEM: NORMAL
CREAT SERPL-MCNC: 0.73 MG/DL (ref 0.67–1.17)
CREAT SERPL-MCNC: 0.73 MG/DL (ref 0.67–1.17)
CREAT SERPL-MCNC: 0.75 MG/DL (ref 0.67–1.17)
CROSSMATCH: NORMAL
DEPRECATED HCO3 PLAS-SCNC: 22 MMOL/L (ref 22–29)
DIASTOLIC BLOOD PRESSURE - MUSE: NORMAL MMHG
EBV VCA IGG SER IA-ACNC: >750 U/ML
EBV VCA IGG SER IA-ACNC: POSITIVE
EOSINOPHIL # BLD AUTO: 0 10E3/UL (ref 0–0.7)
EOSINOPHIL # BLD AUTO: 0 10E3/UL (ref 0–0.7)
EOSINOPHIL NFR BLD AUTO: 0 %
EOSINOPHIL NFR BLD AUTO: 0 %
ERYTHROCYTE [DISTWIDTH] IN BLOOD BY AUTOMATED COUNT: 15.8 % (ref 10–15)
ERYTHROCYTE [DISTWIDTH] IN BLOOD BY AUTOMATED COUNT: 15.9 % (ref 10–15)
FIBRINOGEN PPP-MCNC: 138 MG/DL (ref 170–490)
FIBRINOGEN PPP-MCNC: 151 MG/DL (ref 170–490)
FIBRINOGEN PPP-MCNC: 188 MG/DL (ref 170–490)
FIBRINOGEN PPP-MCNC: 214 MG/DL (ref 170–490)
GFR SERPL CREATININE-BSD FRML MDRD: >90 ML/MIN/1.73M2
GLUCOSE BLD-MCNC: 100 MG/DL (ref 70–99)
GLUCOSE BLD-MCNC: 102 MG/DL (ref 70–99)
GLUCOSE BLD-MCNC: 102 MG/DL (ref 70–99)
GLUCOSE BLD-MCNC: 110 MG/DL (ref 70–99)
GLUCOSE BLD-MCNC: 115 MG/DL (ref 70–99)
GLUCOSE BLD-MCNC: 162 MG/DL (ref 70–99)
GLUCOSE BLD-MCNC: 179 MG/DL (ref 70–99)
GLUCOSE BLD-MCNC: 188 MG/DL (ref 70–99)
GLUCOSE BLD-MCNC: 193 MG/DL (ref 70–99)
GLUCOSE BLDC GLUCOMTR-MCNC: 138 MG/DL (ref 70–99)
GLUCOSE BLDC GLUCOMTR-MCNC: 139 MG/DL (ref 70–99)
GLUCOSE BLDC GLUCOMTR-MCNC: 143 MG/DL (ref 70–99)
GLUCOSE BLDC GLUCOMTR-MCNC: 144 MG/DL (ref 70–99)
GLUCOSE BLDC GLUCOMTR-MCNC: 163 MG/DL (ref 70–99)
GLUCOSE BLDC GLUCOMTR-MCNC: 167 MG/DL (ref 70–99)
GLUCOSE BLDC GLUCOMTR-MCNC: 171 MG/DL (ref 70–99)
GLUCOSE BLDC GLUCOMTR-MCNC: 171 MG/DL (ref 70–99)
GLUCOSE BLDC GLUCOMTR-MCNC: 176 MG/DL (ref 70–99)
GLUCOSE BLDC GLUCOMTR-MCNC: 182 MG/DL (ref 70–99)
GLUCOSE BLDC GLUCOMTR-MCNC: 197 MG/DL (ref 70–99)
GLUCOSE SERPL-MCNC: 184 MG/DL (ref 70–99)
GLUCOSE SERPL-MCNC: 193 MG/DL (ref 70–99)
GLUCOSE SERPL-MCNC: 193 MG/DL (ref 70–99)
HBV CORE AB SERPL QL IA: REACTIVE
HBV SURFACE AB SERPL IA-ACNC: 0 M[IU]/ML
HBV SURFACE AB SERPL IA-ACNC: NONREACTIVE M[IU]/ML
HBV SURFACE AG SERPL QL IA: NONREACTIVE
HCO3 BLD-SCNC: 25 MMOL/L (ref 21–28)
HCO3 BLD-SCNC: 26 MMOL/L (ref 21–28)
HCO3 BLDA-SCNC: 21 MMOL/L (ref 21–28)
HCO3 BLDA-SCNC: 22 MMOL/L (ref 21–28)
HCO3 BLDA-SCNC: 22 MMOL/L (ref 21–28)
HCO3 BLDA-SCNC: 23 MMOL/L (ref 21–28)
HCO3 BLDA-SCNC: 24 MMOL/L (ref 21–28)
HCO3 BLDA-SCNC: 25 MMOL/L (ref 21–28)
HCO3 BLDA-SCNC: 25 MMOL/L (ref 21–28)
HCT VFR BLD AUTO: 30.4 % (ref 40–53)
HCT VFR BLD AUTO: 30.7 % (ref 40–53)
HCV AB SERPL QL IA: NONREACTIVE
HGB BLD-MCNC: 10 G/DL (ref 13.3–17.7)
HGB BLD-MCNC: 10.2 G/DL (ref 13.3–17.7)
HGB BLD-MCNC: 10.6 G/DL (ref 13.3–17.7)
HGB BLD-MCNC: 10.8 G/DL (ref 13.3–17.7)
HGB BLD-MCNC: 10.8 G/DL (ref 13.3–17.7)
HGB BLD-MCNC: 10.9 G/DL (ref 13.3–17.7)
HGB BLD-MCNC: 8.1 G/DL (ref 13.3–17.7)
HGB BLD-MCNC: 8.7 G/DL (ref 13.3–17.7)
HGB BLD-MCNC: 9.2 G/DL (ref 13.3–17.7)
HGB BLD-MCNC: 9.3 G/DL (ref 13.3–17.7)
HGB BLD-MCNC: 9.5 G/DL (ref 13.3–17.7)
HGB BLD-MCNC: 9.9 G/DL (ref 13.3–17.7)
HIV 1+2 AB+HIV1 P24 AG SERPL QL IA: NONREACTIVE
IMM GRANULOCYTES # BLD: 0.1 10E3/UL
IMM GRANULOCYTES # BLD: 0.2 10E3/UL
IMM GRANULOCYTES NFR BLD: 1 %
IMM GRANULOCYTES NFR BLD: 2 %
INR PPP: 1.59 (ref 0.85–1.15)
INR PPP: 2.25 (ref 0.85–1.15)
INR PPP: 2.53 (ref 0.85–1.15)
INTERPRETATION ECG - MUSE: NORMAL
ISSUE DATE AND TIME: NORMAL
LACTATE BLD-SCNC: 1.1 MMOL/L
LACTATE BLD-SCNC: 1.6 MMOL/L
LACTATE BLD-SCNC: 1.6 MMOL/L
LACTATE BLD-SCNC: 2.2 MMOL/L
LACTATE BLD-SCNC: 2.5 MMOL/L
LACTATE BLD-SCNC: 2.9 MMOL/L
LACTATE BLD-SCNC: 3 MMOL/L
LACTATE BLD-SCNC: 3.2 MMOL/L
LACTATE BLD-SCNC: 3.9 MMOL/L
LACTATE SERPL-SCNC: 0.6 MMOL/L (ref 0.7–2)
LACTATE SERPL-SCNC: 1.5 MMOL/L (ref 0.7–2)
LACTATE SERPL-SCNC: 1.7 MMOL/L (ref 0.7–2)
LACTATE SERPL-SCNC: 2.2 MMOL/L (ref 0.7–2)
LYMPHOCYTES # BLD AUTO: 0.3 10E3/UL (ref 0.8–5.3)
LYMPHOCYTES # BLD AUTO: 0.5 10E3/UL (ref 0.8–5.3)
LYMPHOCYTES NFR BLD AUTO: 3 %
LYMPHOCYTES NFR BLD AUTO: 5 %
MAGNESIUM SERPL-MCNC: 1.6 MG/DL (ref 1.7–2.3)
MCH RBC QN AUTO: 32 PG (ref 26.5–33)
MCH RBC QN AUTO: 33 PG (ref 26.5–33)
MCHC RBC AUTO-ENTMCNC: 33.6 G/DL (ref 31.5–36.5)
MCHC RBC AUTO-ENTMCNC: 34.5 G/DL (ref 31.5–36.5)
MCV RBC AUTO: 95 FL (ref 78–100)
MCV RBC AUTO: 96 FL (ref 78–100)
MONOCYTES # BLD AUTO: 0.6 10E3/UL (ref 0–1.3)
MONOCYTES # BLD AUTO: 1 10E3/UL (ref 0–1.3)
MONOCYTES NFR BLD AUTO: 10 %
MONOCYTES NFR BLD AUTO: 7 %
MRSA DNA SPEC QL NAA+PROBE: NEGATIVE
NEUTROPHILS # BLD AUTO: 8 10E3/UL (ref 1.6–8.3)
NEUTROPHILS # BLD AUTO: 8.1 10E3/UL (ref 1.6–8.3)
NEUTROPHILS NFR BLD AUTO: 83 %
NEUTROPHILS NFR BLD AUTO: 89 %
NRBC # BLD AUTO: 0 10E3/UL
NRBC # BLD AUTO: 0 10E3/UL
NRBC BLD AUTO-RTO: 0 /100
NRBC BLD AUTO-RTO: 0 /100
O2/TOTAL GAS SETTING VFR VENT: 2 %
O2/TOTAL GAS SETTING VFR VENT: 21 %
O2/TOTAL GAS SETTING VFR VENT: 21 %
O2/TOTAL GAS SETTING VFR VENT: 33 %
O2/TOTAL GAS SETTING VFR VENT: 35 %
O2/TOTAL GAS SETTING VFR VENT: 35 %
O2/TOTAL GAS SETTING VFR VENT: 39 %
O2/TOTAL GAS SETTING VFR VENT: 40 %
O2/TOTAL GAS SETTING VFR VENT: 40 %
O2/TOTAL GAS SETTING VFR VENT: 45 %
O2/TOTAL GAS SETTING VFR VENT: 45 %
O2/TOTAL GAS SETTING VFR VENT: 55 %
O2/TOTAL GAS SETTING VFR VENT: 6 %
OXYHGB MFR BLD: 97 % (ref 92–100)
P AXIS - MUSE: NORMAL DEGREES
PCO2 BLD: 37 MM HG (ref 35–45)
PCO2 BLD: 38 MM HG (ref 35–45)
PCO2 BLD: 39 MM HG (ref 35–45)
PCO2 BLD: 40 MM HG (ref 35–45)
PCO2 BLDA: 31 MM HG (ref 35–45)
PCO2 BLDA: 31 MM HG (ref 35–45)
PCO2 BLDA: 33 MM HG (ref 35–45)
PCO2 BLDA: 37 MM HG (ref 35–45)
PCO2 BLDA: 38 MM HG (ref 35–45)
PCO2 BLDA: 38 MM HG (ref 35–45)
PCO2 BLDA: 40 MM HG (ref 35–45)
PH BLD: 7.41 [PH] (ref 7.35–7.45)
PH BLD: 7.44 [PH] (ref 7.35–7.45)
PH BLD: 7.44 [PH] (ref 7.35–7.45)
PH BLD: 7.45 [PH] (ref 7.35–7.45)
PH BLDA: 7.35 [PH] (ref 7.35–7.45)
PH BLDA: 7.39 [PH] (ref 7.35–7.45)
PH BLDA: 7.4 [PH] (ref 7.35–7.45)
PH BLDA: 7.4 [PH] (ref 7.35–7.45)
PH BLDA: 7.41 [PH] (ref 7.35–7.45)
PH BLDA: 7.43 [PH] (ref 7.35–7.45)
PH BLDA: 7.43 [PH] (ref 7.35–7.45)
PH BLDA: 7.44 [PH] (ref 7.35–7.45)
PH BLDA: 7.45 [PH] (ref 7.35–7.45)
PHOSPHATE SERPL-MCNC: 3.1 MG/DL (ref 2.5–4.5)
PLATELET # BLD AUTO: 60 10E3/UL (ref 150–450)
PLATELET # BLD AUTO: 61 10E3/UL (ref 150–450)
PLATELET # BLD AUTO: 71 10E3/UL (ref 150–450)
PLATELET # BLD AUTO: 73 10E3/UL (ref 150–450)
PLATELET # BLD AUTO: 95 10E3/UL (ref 150–450)
PO2 BLD: 100 MM HG (ref 80–105)
PO2 BLD: 101 MM HG (ref 80–105)
PO2 BLD: 128 MM HG (ref 80–105)
PO2 BLD: 135 MM HG (ref 80–105)
PO2 BLDA: 111 MM HG (ref 80–105)
PO2 BLDA: 115 MM HG (ref 80–105)
PO2 BLDA: 136 MM HG (ref 80–105)
PO2 BLDA: 136 MM HG (ref 80–105)
PO2 BLDA: 144 MM HG (ref 80–105)
PO2 BLDA: 152 MM HG (ref 80–105)
PO2 BLDA: 174 MM HG (ref 80–105)
PO2 BLDA: 196 MM HG (ref 80–105)
PO2 BLDA: 87 MM HG (ref 80–105)
POTASSIUM BLD-SCNC: 3.9 MMOL/L (ref 3.5–5)
POTASSIUM BLD-SCNC: 3.9 MMOL/L (ref 3.5–5)
POTASSIUM BLD-SCNC: 4.1 MMOL/L (ref 3.5–5)
POTASSIUM BLD-SCNC: 4.1 MMOL/L (ref 3.5–5)
POTASSIUM BLD-SCNC: 4.2 MMOL/L (ref 3.5–5)
POTASSIUM BLD-SCNC: 4.4 MMOL/L (ref 3.5–5)
POTASSIUM BLD-SCNC: 4.4 MMOL/L (ref 3.5–5)
POTASSIUM SERPL-SCNC: 3.4 MMOL/L (ref 3.4–5.3)
POTASSIUM SERPL-SCNC: 3.6 MMOL/L (ref 3.4–5.3)
POTASSIUM SERPL-SCNC: 3.6 MMOL/L (ref 3.4–5.3)
PR INTERVAL - MUSE: NORMAL MS
PROT SERPL-MCNC: 5.9 G/DL (ref 6.4–8.3)
PROT SERPL-MCNC: 5.9 G/DL (ref 6.4–8.3)
PROT SERPL-MCNC: 6.1 G/DL (ref 6.4–8.3)
QRS DURATION - MUSE: 82 MS
QT - MUSE: 266 MS
QTC - MUSE: 363 MS
R AXIS - MUSE: 80 DEGREES
RADIOLOGIST FLAGS: ABNORMAL
RBC # BLD AUTO: 3.19 10E6/UL (ref 4.4–5.9)
RBC # BLD AUTO: 3.21 10E6/UL (ref 4.4–5.9)
SA TARGET DNA: POSITIVE
SODIUM BLD-SCNC: 130 MMOL/L (ref 133–144)
SODIUM BLD-SCNC: 131 MMOL/L (ref 133–144)
SODIUM BLD-SCNC: 132 MMOL/L (ref 133–144)
SODIUM BLD-SCNC: 133 MMOL/L (ref 133–144)
SODIUM BLD-SCNC: 134 MMOL/L (ref 133–144)
SODIUM SERPL-SCNC: 133 MMOL/L (ref 136–145)
SODIUM SERPL-SCNC: 134 MMOL/L (ref 136–145)
SODIUM SERPL-SCNC: 134 MMOL/L (ref 136–145)
SYSTOLIC BLOOD PRESSURE - MUSE: NORMAL MMHG
T AXIS - MUSE: 12 DEGREES
UNIT ABO/RH: NORMAL
UNIT NUMBER: NORMAL
UNIT STATUS: NORMAL
UNIT TYPE ISBT: 5100
UNIT TYPE ISBT: 6200
UNIT TYPE ISBT: 6200
UNIT TYPE ISBT: 7300
UNIT TYPE ISBT: 7300
UNIT TYPE ISBT: 9500
VENTRICULAR RATE- MUSE: 112 BPM
WBC # BLD AUTO: 9 10E3/UL (ref 4–11)
WBC # BLD AUTO: 9.8 10E3/UL (ref 4–11)

## 2023-03-23 PROCEDURE — 84155 ASSAY OF PROTEIN SERUM: CPT | Performed by: STUDENT IN AN ORGANIZED HEALTH CARE EDUCATION/TRAINING PROGRAM

## 2023-03-23 PROCEDURE — 85049 AUTOMATED PLATELET COUNT: CPT | Performed by: STUDENT IN AN ORGANIZED HEALTH CARE EDUCATION/TRAINING PROGRAM

## 2023-03-23 PROCEDURE — P9045 ALBUMIN (HUMAN), 5%, 250 ML: HCPCS

## 2023-03-23 PROCEDURE — 84100 ASSAY OF PHOSPHORUS: CPT | Performed by: STUDENT IN AN ORGANIZED HEALTH CARE EDUCATION/TRAINING PROGRAM

## 2023-03-23 PROCEDURE — 93010 ELECTROCARDIOGRAM REPORT: CPT | Performed by: INTERNAL MEDICINE

## 2023-03-23 PROCEDURE — 85396 CLOTTING ASSAY WHOLE BLOOD: CPT

## 2023-03-23 PROCEDURE — 258N000003 HC RX IP 258 OP 636: Performed by: TRANSPLANT SURGERY

## 2023-03-23 PROCEDURE — 360N000078 HC SURGERY LEVEL 5, PER MIN: Performed by: TRANSPLANT SURGERY

## 2023-03-23 PROCEDURE — 250N000012 HC RX MED GY IP 250 OP 636 PS 637: Performed by: SURGERY

## 2023-03-23 PROCEDURE — P9012 CRYOPRECIPITATE EACH UNIT: HCPCS | Performed by: STUDENT IN AN ORGANIZED HEALTH CARE EDUCATION/TRAINING PROGRAM

## 2023-03-23 PROCEDURE — 258N000001 HC RX 258

## 2023-03-23 PROCEDURE — 83735 ASSAY OF MAGNESIUM: CPT | Performed by: STUDENT IN AN ORGANIZED HEALTH CARE EDUCATION/TRAINING PROGRAM

## 2023-03-23 PROCEDURE — 82805 BLOOD GASES W/O2 SATURATION: CPT | Performed by: STUDENT IN AN ORGANIZED HEALTH CARE EDUCATION/TRAINING PROGRAM

## 2023-03-23 PROCEDURE — 0FY00Z0 TRANSPLANTATION OF LIVER, ALLOGENEIC, OPEN APPROACH: ICD-10-PCS | Performed by: TRANSPLANT SURGERY

## 2023-03-23 PROCEDURE — 85610 PROTHROMBIN TIME: CPT | Performed by: STUDENT IN AN ORGANIZED HEALTH CARE EDUCATION/TRAINING PROGRAM

## 2023-03-23 PROCEDURE — 812N000006 HC ACQUISITION LIVER CADAVER DONOR

## 2023-03-23 PROCEDURE — 88304 TISSUE EXAM BY PATHOLOGIST: CPT | Mod: 26 | Performed by: PATHOLOGY

## 2023-03-23 PROCEDURE — 258N000001 HC RX 258: Performed by: SURGERY

## 2023-03-23 PROCEDURE — P9045 ALBUMIN (HUMAN), 5%, 250 ML: HCPCS | Performed by: TRANSPLANT SURGERY

## 2023-03-23 PROCEDURE — 85384 FIBRINOGEN ACTIVITY: CPT | Performed by: SURGERY

## 2023-03-23 PROCEDURE — 85730 THROMBOPLASTIN TIME PARTIAL: CPT | Performed by: SURGERY

## 2023-03-23 PROCEDURE — 250N000011 HC RX IP 250 OP 636: Performed by: STUDENT IN AN ORGANIZED HEALTH CARE EDUCATION/TRAINING PROGRAM

## 2023-03-23 PROCEDURE — 85025 COMPLETE CBC W/AUTO DIFF WBC: CPT | Performed by: SURGERY

## 2023-03-23 PROCEDURE — 0WQF0ZZ REPAIR ABDOMINAL WALL, OPEN APPROACH: ICD-10-PCS | Performed by: TRANSPLANT SURGERY

## 2023-03-23 PROCEDURE — 93975 VASCULAR STUDY: CPT

## 2023-03-23 PROCEDURE — 82248 BILIRUBIN DIRECT: CPT | Performed by: SURGERY

## 2023-03-23 PROCEDURE — 85384 FIBRINOGEN ACTIVITY: CPT | Performed by: STUDENT IN AN ORGANIZED HEALTH CARE EDUCATION/TRAINING PROGRAM

## 2023-03-23 PROCEDURE — 85730 THROMBOPLASTIN TIME PARTIAL: CPT | Performed by: STUDENT IN AN ORGANIZED HEALTH CARE EDUCATION/TRAINING PROGRAM

## 2023-03-23 PROCEDURE — 85384 FIBRINOGEN ACTIVITY: CPT | Performed by: TRANSPLANT SURGERY

## 2023-03-23 PROCEDURE — 74018 RADEX ABDOMEN 1 VIEW: CPT | Mod: 26 | Performed by: RADIOLOGY

## 2023-03-23 PROCEDURE — 84132 ASSAY OF SERUM POTASSIUM: CPT

## 2023-03-23 PROCEDURE — 258N000003 HC RX IP 258 OP 636: Performed by: STUDENT IN AN ORGANIZED HEALTH CARE EDUCATION/TRAINING PROGRAM

## 2023-03-23 PROCEDURE — 85730 THROMBOPLASTIN TIME PARTIAL: CPT | Performed by: TRANSPLANT SURGERY

## 2023-03-23 PROCEDURE — P9059 PLASMA, FRZ BETWEEN 8-24HOUR: HCPCS | Performed by: STUDENT IN AN ORGANIZED HEALTH CARE EDUCATION/TRAINING PROGRAM

## 2023-03-23 PROCEDURE — 250N000011 HC RX IP 250 OP 636: Performed by: SURGERY

## 2023-03-23 PROCEDURE — 250N000009 HC RX 250: Performed by: SURGERY

## 2023-03-23 PROCEDURE — 93005 ELECTROCARDIOGRAM TRACING: CPT

## 2023-03-23 PROCEDURE — 250N000012 HC RX MED GY IP 250 OP 636 PS 637: Performed by: PHYSICIAN ASSISTANT

## 2023-03-23 PROCEDURE — 83036 HEMOGLOBIN GLYCOSYLATED A1C: CPT | Performed by: SURGERY

## 2023-03-23 PROCEDURE — 82248 BILIRUBIN DIRECT: CPT | Performed by: STUDENT IN AN ORGANIZED HEALTH CARE EDUCATION/TRAINING PROGRAM

## 2023-03-23 PROCEDURE — 250N000011 HC RX IP 250 OP 636

## 2023-03-23 PROCEDURE — 88309 TISSUE EXAM BY PATHOLOGIST: CPT | Mod: 26 | Performed by: PATHOLOGY

## 2023-03-23 PROCEDURE — 83605 ASSAY OF LACTIC ACID: CPT | Performed by: SURGERY

## 2023-03-23 PROCEDURE — 999N000127 HC STATISTIC PERIPHERAL IV START W US GUIDANCE

## 2023-03-23 PROCEDURE — 258N000003 HC RX IP 258 OP 636

## 2023-03-23 PROCEDURE — 85610 PROTHROMBIN TIME: CPT | Performed by: SURGERY

## 2023-03-23 PROCEDURE — 250N000011 HC RX IP 250 OP 636: Performed by: TRANSPLANT SURGERY

## 2023-03-23 PROCEDURE — 370N000017 HC ANESTHESIA TECHNICAL FEE, PER MIN: Performed by: TRANSPLANT SURGERY

## 2023-03-23 PROCEDURE — 85610 PROTHROMBIN TIME: CPT | Performed by: TRANSPLANT SURGERY

## 2023-03-23 PROCEDURE — 250N000013 HC RX MED GY IP 250 OP 250 PS 637: Performed by: SURGERY

## 2023-03-23 PROCEDURE — P9059 PLASMA, FRZ BETWEEN 8-24HOUR: HCPCS

## 2023-03-23 PROCEDURE — 999N000065 XR CHEST PORT 1 VIEW

## 2023-03-23 PROCEDURE — 71045 X-RAY EXAM CHEST 1 VIEW: CPT | Mod: 26 | Performed by: RADIOLOGY

## 2023-03-23 PROCEDURE — 85025 COMPLETE CBC W/AUTO DIFF WBC: CPT | Performed by: TRANSPLANT SURGERY

## 2023-03-23 PROCEDURE — 88313 SPECIAL STAINS GROUP 2: CPT | Mod: TC | Performed by: TRANSPLANT SURGERY

## 2023-03-23 PROCEDURE — 84132 ASSAY OF SERUM POTASSIUM: CPT | Performed by: SURGERY

## 2023-03-23 PROCEDURE — 93975 VASCULAR STUDY: CPT | Mod: 26 | Performed by: STUDENT IN AN ORGANIZED HEALTH CARE EDUCATION/TRAINING PROGRAM

## 2023-03-23 PROCEDURE — 272N000001 HC OR GENERAL SUPPLY STERILE: Performed by: TRANSPLANT SURGERY

## 2023-03-23 PROCEDURE — 272N000002 HC OR SUPPLY OTHER OPNP: Performed by: TRANSPLANT SURGERY

## 2023-03-23 PROCEDURE — 250N000009 HC RX 250

## 2023-03-23 PROCEDURE — P9016 RBC LEUKOCYTES REDUCED: HCPCS

## 2023-03-23 PROCEDURE — P9037 PLATE PHERES LEUKOREDU IRRAD: HCPCS

## 2023-03-23 PROCEDURE — 85018 HEMOGLOBIN: CPT | Performed by: SURGERY

## 2023-03-23 PROCEDURE — 84132 ASSAY OF SERUM POTASSIUM: CPT | Performed by: STUDENT IN AN ORGANIZED HEALTH CARE EDUCATION/TRAINING PROGRAM

## 2023-03-23 PROCEDURE — 99291 CRITICAL CARE FIRST HOUR: CPT | Performed by: STUDENT IN AN ORGANIZED HEALTH CARE EDUCATION/TRAINING PROGRAM

## 2023-03-23 PROCEDURE — 87641 MR-STAPH DNA AMP PROBE: CPT | Performed by: SURGERY

## 2023-03-23 PROCEDURE — 200N000002 HC R&B ICU UMMC

## 2023-03-23 PROCEDURE — 83605 ASSAY OF LACTIC ACID: CPT | Performed by: STUDENT IN AN ORGANIZED HEALTH CARE EDUCATION/TRAINING PROGRAM

## 2023-03-23 PROCEDURE — 258N000003 HC RX IP 258 OP 636: Performed by: SURGERY

## 2023-03-23 PROCEDURE — 82803 BLOOD GASES ANY COMBINATION: CPT | Performed by: SURGERY

## 2023-03-23 PROCEDURE — 83605 ASSAY OF LACTIC ACID: CPT

## 2023-03-23 PROCEDURE — 82330 ASSAY OF CALCIUM: CPT | Performed by: STUDENT IN AN ORGANIZED HEALTH CARE EDUCATION/TRAINING PROGRAM

## 2023-03-23 PROCEDURE — 88313 SPECIAL STAINS GROUP 2: CPT | Mod: 26 | Performed by: PATHOLOGY

## 2023-03-23 PROCEDURE — 250N000025 HC SEVOFLURANE, PER MIN: Performed by: TRANSPLANT SURGERY

## 2023-03-23 PROCEDURE — 999N000065 XR ABDOMEN PORT 1 VIEW

## 2023-03-23 RX ORDER — NALOXONE HYDROCHLORIDE 0.4 MG/ML
0.4 INJECTION, SOLUTION INTRAMUSCULAR; INTRAVENOUS; SUBCUTANEOUS
Status: DISCONTINUED | OUTPATIENT
Start: 2023-03-23 | End: 2023-03-29 | Stop reason: HOSPADM

## 2023-03-23 RX ORDER — VALGANCICLOVIR HYDROCHLORIDE 50 MG/ML
450 POWDER, FOR SOLUTION ORAL DAILY
Status: DISCONTINUED | OUTPATIENT
Start: 2023-03-23 | End: 2023-03-26

## 2023-03-23 RX ORDER — ONDANSETRON 2 MG/ML
INJECTION INTRAMUSCULAR; INTRAVENOUS PRN
Status: DISCONTINUED | OUTPATIENT
Start: 2023-03-23 | End: 2023-03-23

## 2023-03-23 RX ORDER — NALOXONE HYDROCHLORIDE 0.4 MG/ML
0.2 INJECTION, SOLUTION INTRAMUSCULAR; INTRAVENOUS; SUBCUTANEOUS
Status: DISCONTINUED | OUTPATIENT
Start: 2023-03-23 | End: 2023-03-29 | Stop reason: HOSPADM

## 2023-03-23 RX ORDER — FUROSEMIDE 10 MG/ML
INJECTION INTRAMUSCULAR; INTRAVENOUS PRN
Status: DISCONTINUED | OUTPATIENT
Start: 2023-03-23 | End: 2023-03-23

## 2023-03-23 RX ORDER — DEXTROSE MONOHYDRATE 25 G/50ML
25-50 INJECTION, SOLUTION INTRAVENOUS
Status: DISCONTINUED | OUTPATIENT
Start: 2023-03-23 | End: 2023-03-29 | Stop reason: HOSPADM

## 2023-03-23 RX ORDER — MYCOPHENOLATE MOFETIL 200 MG/ML
750 POWDER, FOR SUSPENSION ORAL
Status: DISCONTINUED | OUTPATIENT
Start: 2023-03-23 | End: 2023-03-27

## 2023-03-23 RX ORDER — DEXTROSE MONOHYDRATE 100 MG/ML
INJECTION, SOLUTION INTRAVENOUS CONTINUOUS PRN
Status: DISCONTINUED | OUTPATIENT
Start: 2023-03-23 | End: 2023-03-29 | Stop reason: HOSPADM

## 2023-03-23 RX ORDER — SULFAMETHOXAZOLE AND TRIMETHOPRIM 400; 80 MG/1; MG/1
1 TABLET ORAL DAILY
Status: DISCONTINUED | OUTPATIENT
Start: 2023-03-23 | End: 2023-03-29 | Stop reason: HOSPADM

## 2023-03-23 RX ORDER — PANTOPRAZOLE SODIUM 40 MG/1
40 TABLET, DELAYED RELEASE ORAL
Status: DISCONTINUED | OUTPATIENT
Start: 2023-03-24 | End: 2023-03-29 | Stop reason: HOSPADM

## 2023-03-23 RX ORDER — VALGANCICLOVIR 450 MG/1
450 TABLET, FILM COATED ORAL DAILY
Status: DISCONTINUED | OUTPATIENT
Start: 2023-03-23 | End: 2023-03-29 | Stop reason: HOSPADM

## 2023-03-23 RX ORDER — POTASSIUM CHLORIDE 29.8 MG/ML
20 INJECTION INTRAVENOUS ONCE
Status: COMPLETED | OUTPATIENT
Start: 2023-03-23 | End: 2023-03-23

## 2023-03-23 RX ORDER — DILTIAZEM HYDROCHLORIDE 5 MG/ML
INJECTION INTRAVENOUS PRN
Status: DISCONTINUED | OUTPATIENT
Start: 2023-03-23 | End: 2023-03-23

## 2023-03-23 RX ORDER — FENTANYL CITRATE 50 UG/ML
INJECTION, SOLUTION INTRAMUSCULAR; INTRAVENOUS PRN
Status: DISCONTINUED | OUTPATIENT
Start: 2023-03-23 | End: 2023-03-23

## 2023-03-23 RX ORDER — ASPIRIN 81 MG/1
81 TABLET ORAL DAILY
Status: DISCONTINUED | OUTPATIENT
Start: 2023-03-24 | End: 2023-03-23

## 2023-03-23 RX ORDER — METHYLPREDNISOLONE SODIUM SUCCINATE 125 MG/2ML
100 INJECTION, POWDER, LYOPHILIZED, FOR SOLUTION INTRAMUSCULAR; INTRAVENOUS ONCE
Status: COMPLETED | OUTPATIENT
Start: 2023-03-25 | End: 2023-03-25

## 2023-03-23 RX ORDER — OXYCODONE HYDROCHLORIDE 5 MG/1
5-10 TABLET ORAL EVERY 4 HOURS PRN
Status: DISCONTINUED | OUTPATIENT
Start: 2023-03-23 | End: 2023-03-26

## 2023-03-23 RX ORDER — ASPIRIN 81 MG/1
81 TABLET, CHEWABLE ORAL DAILY
Status: DISCONTINUED | OUTPATIENT
Start: 2023-03-24 | End: 2023-03-28

## 2023-03-23 RX ORDER — NICOTINE POLACRILEX 4 MG
15-30 LOZENGE BUCCAL
Status: DISCONTINUED | OUTPATIENT
Start: 2023-03-23 | End: 2023-03-29 | Stop reason: HOSPADM

## 2023-03-23 RX ORDER — MAGNESIUM SULFATE HEPTAHYDRATE 40 MG/ML
2 INJECTION, SOLUTION INTRAVENOUS ONCE
Status: DISCONTINUED | OUTPATIENT
Start: 2023-03-23 | End: 2023-03-23

## 2023-03-23 RX ORDER — METHYLPREDNISOLONE SODIUM SUCCINATE 125 MG/2ML
50 INJECTION, POWDER, LYOPHILIZED, FOR SOLUTION INTRAMUSCULAR; INTRAVENOUS ONCE
Status: COMPLETED | OUTPATIENT
Start: 2023-03-26 | End: 2023-03-26

## 2023-03-23 RX ORDER — MAGNESIUM SULFATE HEPTAHYDRATE 40 MG/ML
2 INJECTION, SOLUTION INTRAVENOUS ONCE
Status: COMPLETED | OUTPATIENT
Start: 2023-03-23 | End: 2023-03-23

## 2023-03-23 RX ORDER — CALCIUM CHLORIDE 100 MG/ML
INJECTION INTRAVENOUS; INTRAVENTRICULAR PRN
Status: DISCONTINUED | OUTPATIENT
Start: 2023-03-23 | End: 2023-03-23

## 2023-03-23 RX ORDER — PROPOFOL 10 MG/ML
INJECTION, EMULSION INTRAVENOUS PRN
Status: DISCONTINUED | OUTPATIENT
Start: 2023-03-23 | End: 2023-03-23

## 2023-03-23 RX ORDER — SODIUM CHLORIDE, SODIUM GLUCONATE, SODIUM ACETATE, POTASSIUM CHLORIDE AND MAGNESIUM CHLORIDE 526; 502; 368; 37; 30 MG/100ML; MG/100ML; MG/100ML; MG/100ML; MG/100ML
INJECTION, SOLUTION INTRAVENOUS CONTINUOUS PRN
Status: DISCONTINUED | OUTPATIENT
Start: 2023-03-23 | End: 2023-03-23

## 2023-03-23 RX ORDER — SODIUM CHLORIDE, SODIUM LACTATE, POTASSIUM CHLORIDE, CALCIUM CHLORIDE 600; 310; 30; 20 MG/100ML; MG/100ML; MG/100ML; MG/100ML
INJECTION, SOLUTION INTRAVENOUS CONTINUOUS
Status: DISCONTINUED | OUTPATIENT
Start: 2023-03-23 | End: 2023-03-24

## 2023-03-23 RX ORDER — VASOPRESSIN IN 0.9 % NACL 2 UNIT/2ML
SYRINGE (ML) INTRAVENOUS PRN
Status: DISCONTINUED | OUTPATIENT
Start: 2023-03-23 | End: 2023-03-23

## 2023-03-23 RX ORDER — HYDRALAZINE HYDROCHLORIDE 20 MG/ML
10 INJECTION INTRAMUSCULAR; INTRAVENOUS EVERY 6 HOURS PRN
Status: DISCONTINUED | OUTPATIENT
Start: 2023-03-23 | End: 2023-03-23

## 2023-03-23 RX ORDER — DEXTROSE MONOHYDRATE 25 G/50ML
INJECTION, SOLUTION INTRAVENOUS PRN
Status: DISCONTINUED | OUTPATIENT
Start: 2023-03-23 | End: 2023-03-23

## 2023-03-23 RX ORDER — HYDRALAZINE HYDROCHLORIDE 20 MG/ML
10 INJECTION INTRAMUSCULAR; INTRAVENOUS EVERY 6 HOURS PRN
Status: DISCONTINUED | OUTPATIENT
Start: 2023-03-23 | End: 2023-03-29 | Stop reason: HOSPADM

## 2023-03-23 RX ORDER — PIPERACILLIN SODIUM, TAZOBACTAM SODIUM 3; .375 G/15ML; G/15ML
3.38 INJECTION, POWDER, LYOPHILIZED, FOR SOLUTION INTRAVENOUS EVERY 6 HOURS
Status: COMPLETED | OUTPATIENT
Start: 2023-03-23 | End: 2023-03-25

## 2023-03-23 RX ORDER — FLUCONAZOLE 2 MG/ML
400 INJECTION, SOLUTION INTRAVENOUS EVERY 24 HOURS
Status: DISCONTINUED | OUTPATIENT
Start: 2023-03-24 | End: 2023-03-26

## 2023-03-23 RX ORDER — PREDNISONE 10 MG/1
10 TABLET ORAL ONCE
Status: COMPLETED | OUTPATIENT
Start: 2023-03-28 | End: 2023-03-28

## 2023-03-23 RX ORDER — ESMOLOL HYDROCHLORIDE 10 MG/ML
INJECTION INTRAVENOUS PRN
Status: DISCONTINUED | OUTPATIENT
Start: 2023-03-23 | End: 2023-03-23

## 2023-03-23 RX ORDER — TACROLIMUS 1 MG/1
3 CAPSULE ORAL
Status: DISCONTINUED | OUTPATIENT
Start: 2023-03-23 | End: 2023-03-23

## 2023-03-23 RX ORDER — HYDROMORPHONE HCL IN WATER/PF 6 MG/30 ML
.2-.4 PATIENT CONTROLLED ANALGESIA SYRINGE INTRAVENOUS
Status: DISCONTINUED | OUTPATIENT
Start: 2023-03-23 | End: 2023-03-25

## 2023-03-23 RX ORDER — MYCOPHENOLATE MOFETIL 250 MG/1
750 CAPSULE ORAL
Status: DISCONTINUED | OUTPATIENT
Start: 2023-03-23 | End: 2023-03-29 | Stop reason: HOSPADM

## 2023-03-23 RX ADMIN — NOREPINEPHRINE BITARTRATE 6.4 MCG: 1 INJECTION, SOLUTION, CONCENTRATE INTRAVENOUS at 10:35

## 2023-03-23 RX ADMIN — Medication 10 MG: at 11:09

## 2023-03-23 RX ADMIN — Medication 1 UNITS: at 09:14

## 2023-03-23 RX ADMIN — VASOPRESSIN 0.5 UNITS/HR: 20 INJECTION, SOLUTION INTRAMUSCULAR; SUBCUTANEOUS at 07:30

## 2023-03-23 RX ADMIN — DILTIAZEM HYDROCHLORIDE 1 MG: 5 INJECTION INTRAVENOUS at 09:03

## 2023-03-23 RX ADMIN — PIPERACILLIN AND TAZOBACTAM 3.38 G: 3; .375 INJECTION, POWDER, FOR SOLUTION INTRAVENOUS at 11:10

## 2023-03-23 RX ADMIN — CALCIUM CHLORIDE 0.5 G: 100 INJECTION INTRAVENOUS; INTRAVENTRICULAR at 11:17

## 2023-03-23 RX ADMIN — Medication 100 MCG/HR: at 07:32

## 2023-03-23 RX ADMIN — MAGNESIUM SULFATE IN WATER 2 G: 40 INJECTION, SOLUTION INTRAVENOUS at 14:45

## 2023-03-23 RX ADMIN — NOREPINEPHRINE BITARTRATE 6.4 MCG: 1 INJECTION, SOLUTION, CONCENTRATE INTRAVENOUS at 10:34

## 2023-03-23 RX ADMIN — HUMAN INSULIN 8 UNITS/HR: 100 INJECTION, SOLUTION SUBCUTANEOUS at 15:09

## 2023-03-23 RX ADMIN — Medication 2 UNITS: at 10:37

## 2023-03-23 RX ADMIN — TACROLIMUS 1.5 MG: 1 CAPSULE ORAL at 18:19

## 2023-03-23 RX ADMIN — NOREPINEPHRINE BITARTRATE 6.4 MCG: 1 INJECTION, SOLUTION, CONCENTRATE INTRAVENOUS at 10:45

## 2023-03-23 RX ADMIN — PIPERACILLIN AND TAZOBACTAM 3.38 G: 3; .375 INJECTION, POWDER, FOR SOLUTION INTRAVENOUS at 07:11

## 2023-03-23 RX ADMIN — NOREPINEPHRINE BITARTRATE 6.4 MCG: 1 INJECTION, SOLUTION, CONCENTRATE INTRAVENOUS at 10:31

## 2023-03-23 RX ADMIN — SULFAMETHOXAZOLE AND TRIMETHOPRIM 1 TABLET: 400; 80 TABLET ORAL at 18:19

## 2023-03-23 RX ADMIN — PHENYLEPHRINE HYDROCHLORIDE 100 MCG: 10 INJECTION INTRAVENOUS at 08:09

## 2023-03-23 RX ADMIN — HUMAN INSULIN 6 UNITS/HR: 100 INJECTION, SOLUTION SUBCUTANEOUS at 22:55

## 2023-03-23 RX ADMIN — SODIUM CHLORIDE, SODIUM GLUCONATE, SODIUM ACETATE, POTASSIUM CHLORIDE AND MAGNESIUM CHLORIDE: 526; 502; 368; 37; 30 INJECTION, SOLUTION INTRAVENOUS at 06:29

## 2023-03-23 RX ADMIN — Medication 1 UNITS: at 08:19

## 2023-03-23 RX ADMIN — MYCOPHENOLATE MOFETIL 750 MG: 250 CAPSULE ORAL at 18:18

## 2023-03-23 RX ADMIN — PROPOFOL 170 MG: 10 INJECTION, EMULSION INTRAVENOUS at 06:41

## 2023-03-23 RX ADMIN — PIPERACILLIN AND TAZOBACTAM 3.38 G: 3; .375 INJECTION, POWDER, FOR SOLUTION INTRAVENOUS at 09:15

## 2023-03-23 RX ADMIN — Medication 100 MG: at 06:44

## 2023-03-23 RX ADMIN — SUGAMMADEX 200 MG: 100 INJECTION, SOLUTION INTRAVENOUS at 12:22

## 2023-03-23 RX ADMIN — FENTANYL CITRATE 100 MCG: 50 INJECTION, SOLUTION INTRAMUSCULAR; INTRAVENOUS at 06:41

## 2023-03-23 RX ADMIN — PHENYLEPHRINE HYDROCHLORIDE 200 MCG: 10 INJECTION INTRAVENOUS at 08:11

## 2023-03-23 RX ADMIN — HUMAN INSULIN 7 UNITS/HR: 100 INJECTION, SOLUTION SUBCUTANEOUS at 14:09

## 2023-03-23 RX ADMIN — HUMAN INSULIN 1 UNITS/HR: 100 INJECTION, SOLUTION SUBCUTANEOUS at 07:45

## 2023-03-23 RX ADMIN — NOREPINEPHRINE BITARTRATE 6.4 MCG: 1 INJECTION, SOLUTION, CONCENTRATE INTRAVENOUS at 10:33

## 2023-03-23 RX ADMIN — AMIODARONE HYDROCHLORIDE 1 MG/MIN: 50 INJECTION, SOLUTION INTRAVENOUS at 19:33

## 2023-03-23 RX ADMIN — HYDROMORPHONE HYDROCHLORIDE 0.5 MG: 1 INJECTION, SOLUTION INTRAMUSCULAR; INTRAVENOUS; SUBCUTANEOUS at 12:27

## 2023-03-23 RX ADMIN — PIPERACILLIN AND TAZOBACTAM 3.38 G: 3; .375 INJECTION, POWDER, LYOPHILIZED, FOR SOLUTION INTRAVENOUS at 17:00

## 2023-03-23 RX ADMIN — AMIODARONE HYDROCHLORIDE 150 MG: 1.5 INJECTION, SOLUTION INTRAVENOUS at 13:24

## 2023-03-23 RX ADMIN — DILTIAZEM HYDROCHLORIDE 1 MG: 5 INJECTION INTRAVENOUS at 09:02

## 2023-03-23 RX ADMIN — VALGANCICLOVIR 450 MG: 450 TABLET, FILM COATED ORAL at 18:19

## 2023-03-23 RX ADMIN — DILTIAZEM HYDROCHLORIDE 1 MG: 5 INJECTION INTRAVENOUS at 12:55

## 2023-03-23 RX ADMIN — CALCIUM CHLORIDE 1 G/HR: 100 INJECTION, SOLUTION INTRAVENOUS at 07:45

## 2023-03-23 RX ADMIN — Medication 1 UNITS: at 08:06

## 2023-03-23 RX ADMIN — AMIODARONE HYDROCHLORIDE 1 MG/MIN: 50 INJECTION, SOLUTION INTRAVENOUS at 14:10

## 2023-03-23 RX ADMIN — POTASSIUM CHLORIDE 20 MEQ: 29.8 INJECTION, SOLUTION INTRAVENOUS at 18:23

## 2023-03-23 RX ADMIN — NOREPINEPHRINE BITARTRATE 6.4 MCG: 1 INJECTION, SOLUTION, CONCENTRATE INTRAVENOUS at 10:40

## 2023-03-23 RX ADMIN — DILTIAZEM HYDROCHLORIDE 1 MG: 5 INJECTION INTRAVENOUS at 12:57

## 2023-03-23 RX ADMIN — DEXTROSE AND SODIUM CHLORIDE: 5; 450 INJECTION, SOLUTION INTRAVENOUS at 13:43

## 2023-03-23 RX ADMIN — ALBUMIN (HUMAN): 12.5 SOLUTION INTRAVENOUS at 09:37

## 2023-03-23 RX ADMIN — HYDROMORPHONE HYDROCHLORIDE 0.2 MG: 0.2 INJECTION, SOLUTION INTRAMUSCULAR; INTRAVENOUS; SUBCUTANEOUS at 15:56

## 2023-03-23 RX ADMIN — DILTIAZEM HYDROCHLORIDE 1 MG: 5 INJECTION INTRAVENOUS at 08:58

## 2023-03-23 RX ADMIN — Medication 1 UNITS: at 08:12

## 2023-03-23 RX ADMIN — NOREPINEPHRINE BITARTRATE 6.4 MCG: 1 INJECTION, SOLUTION, CONCENTRATE INTRAVENOUS at 10:29

## 2023-03-23 RX ADMIN — Medication 0.5 UNITS: at 07:41

## 2023-03-23 RX ADMIN — CALCIUM CHLORIDE 0.5 G: 100 INJECTION INTRAVENOUS; INTRAVENTRICULAR at 10:18

## 2023-03-23 RX ADMIN — Medication 20 MG: at 09:43

## 2023-03-23 RX ADMIN — Medication 0.03 MCG/KG/MIN: at 08:02

## 2023-03-23 RX ADMIN — Medication 30 MG: at 08:31

## 2023-03-23 RX ADMIN — FUROSEMIDE 20 MG: 10 INJECTION, SOLUTION INTRAVENOUS at 12:37

## 2023-03-23 RX ADMIN — NOREPINEPHRINE BITARTRATE 6.4 MCG: 1 INJECTION, SOLUTION, CONCENTRATE INTRAVENOUS at 08:06

## 2023-03-23 RX ADMIN — NOREPINEPHRINE BITARTRATE 6.4 MCG: 1 INJECTION, SOLUTION, CONCENTRATE INTRAVENOUS at 10:36

## 2023-03-23 RX ADMIN — SODIUM CHLORIDE, POTASSIUM CHLORIDE, SODIUM LACTATE AND CALCIUM CHLORIDE: 600; 310; 30; 20 INJECTION, SOLUTION INTRAVENOUS at 14:10

## 2023-03-23 RX ADMIN — NOREPINEPHRINE BITARTRATE 6.4 MCG: 1 INJECTION, SOLUTION, CONCENTRATE INTRAVENOUS at 07:48

## 2023-03-23 RX ADMIN — DILTIAZEM HYDROCHLORIDE 1 MG: 5 INJECTION INTRAVENOUS at 09:12

## 2023-03-23 RX ADMIN — ALBUMIN (HUMAN): 12.5 SOLUTION INTRAVENOUS at 09:40

## 2023-03-23 RX ADMIN — HYDROMORPHONE HYDROCHLORIDE 0.5 MG: 1 INJECTION, SOLUTION INTRAMUSCULAR; INTRAVENOUS; SUBCUTANEOUS at 12:35

## 2023-03-23 RX ADMIN — DEXTROSE 50 % IN WATER (D50W) INTRAVENOUS SYRINGE 12.5 G: at 10:17

## 2023-03-23 RX ADMIN — NOREPINEPHRINE BITARTRATE 6.4 MCG: 1 INJECTION, SOLUTION, CONCENTRATE INTRAVENOUS at 10:38

## 2023-03-23 RX ADMIN — PHENYLEPHRINE HYDROCHLORIDE 200 MCG: 10 INJECTION INTRAVENOUS at 08:16

## 2023-03-23 RX ADMIN — Medication 10 MG: at 11:53

## 2023-03-23 RX ADMIN — PIPERACILLIN AND TAZOBACTAM 3.38 G: 3; .375 INJECTION, POWDER, LYOPHILIZED, FOR SOLUTION INTRAVENOUS at 22:56

## 2023-03-23 RX ADMIN — NOREPINEPHRINE BITARTRATE 6.4 MCG: 1 INJECTION, SOLUTION, CONCENTRATE INTRAVENOUS at 08:09

## 2023-03-23 RX ADMIN — Medication 1 UNITS: at 10:34

## 2023-03-23 RX ADMIN — OXYCODONE HYDROCHLORIDE 5 MG: 5 TABLET ORAL at 19:33

## 2023-03-23 RX ADMIN — ONDANSETRON 4 MG: 2 INJECTION INTRAMUSCULAR; INTRAVENOUS at 12:19

## 2023-03-23 RX ADMIN — FLUCONAZOLE IN SODIUM CHLORIDE 400 MG: 2 INJECTION, SOLUTION INTRAVENOUS at 07:11

## 2023-03-23 RX ADMIN — NOREPINEPHRINE BITARTRATE 6.4 MCG: 1 INJECTION, SOLUTION, CONCENTRATE INTRAVENOUS at 10:37

## 2023-03-23 RX ADMIN — Medication 0.5 UNITS: at 07:49

## 2023-03-23 RX ADMIN — Medication 0.5 UNITS: at 07:58

## 2023-03-23 RX ADMIN — NOREPINEPHRINE BITARTRATE 6.4 MCG: 1 INJECTION, SOLUTION, CONCENTRATE INTRAVENOUS at 08:04

## 2023-03-23 RX ADMIN — SODIUM CHLORIDE 1000 MG: 9 INJECTION, SOLUTION INTRAVENOUS at 11:45

## 2023-03-23 RX ADMIN — EPINEPHRINE 0.03 MCG/KG/MIN: 1 INJECTION INTRAMUSCULAR; INTRAVENOUS; SUBCUTANEOUS at 10:19

## 2023-03-23 RX ADMIN — ESMOLOL HYDROCHLORIDE 20 MG: 10 INJECTION, SOLUTION INTRAVENOUS at 08:07

## 2023-03-23 RX ADMIN — NOREPINEPHRINE BITARTRATE 6.4 MCG: 1 INJECTION, SOLUTION, CONCENTRATE INTRAVENOUS at 10:39

## 2023-03-23 RX ADMIN — Medication 1 UNITS: at 08:22

## 2023-03-23 RX ADMIN — NOREPINEPHRINE BITARTRATE 6.4 MCG: 1 INJECTION, SOLUTION, CONCENTRATE INTRAVENOUS at 10:32

## 2023-03-23 RX ADMIN — Medication 0.5 UNITS: at 08:00

## 2023-03-23 ASSESSMENT — ACTIVITIES OF DAILY LIVING (ADL)
ADLS_ACUITY_SCORE: 28
ADLS_ACUITY_SCORE: 20
ADLS_ACUITY_SCORE: 34
ADLS_ACUITY_SCORE: 20
ADLS_ACUITY_SCORE: 34
ADLS_ACUITY_SCORE: 20
ADLS_ACUITY_SCORE: 34

## 2023-03-23 NOTE — BRIEF OP NOTE
[facility]    Brief Operative Note    Pre-operative diagnosis: End stage liver disease (H) [K72.10]  Post-operative diagnosis Same as pre-operative diagnosis    Procedure: Procedure(s):  Transplant liver recipient  donor  Bench liver  Surgeon: Surgeon(s) and Role:     * Aniket Arzate MD - Primary     * Janene Alex MD - Resident - Assisting     * William Bautista MD - Fellow - Assisting     * Brayan Live MD - Fellow - Assisting  Anesthesia: General   Estimated Blood Loss: 1500 mL    Drains: Sergio-Pugh  Specimens:   ID Type Source Tests Collected by Time Destination   1 : native liver and gallbladder Tissue Liver SURGICAL PATHOLOGY EXAM Aniket Arzate MD 3/23/2023  9:53 AM    2 : donor gallbladder Tissue Gallbladder SURGICAL PATHOLOGY EXAM Aniket Arzate MD 3/23/2023 11:24 AM      Findings:   cirrhotic liver.  Complications: None.  Implants: * No implants in log *

## 2023-03-23 NOTE — PLAN OF CARE
Admitted/transferred from: OR  Reason for admission/transfer: liver transplant  2 RN skin assessment: completed by Emiliana  Result of skin assessment and interventions/actions: clamshell incision, 2 R JUSTINO  Height, weight, drug calc weight: Done  Patient belongings (see Flowsheet)  MDRO education added to care planNo  Neuro: Lethargic but oriented x4. Appears delirious at times. PERRLA. Moves all extremities, generalized weakness. 0.2 dilaudid given for 9/10 abdominal pain.   CV: A fib rvr on arrival. Amiodarone bolus given and currently on gtt @ 1. Currently rate controlled in 90's-110's. BP WDL. CVP 11-12. Afebrile.   Pulm: Weaned to 2L NC. LS clear/diminished.   GI/: Passed speech eval. NPO ex meds, ice chips. Morales in place w/ adequate OP.  Labs: K/mag replaced, recheck tomorrow. 1 cryo and 1 FFP given.

## 2023-03-23 NOTE — CONSULTS
SURGICAL ICU ADMISSION NOTE  3/23/2023    PRIMARY TEAM: Transplant   PRIMARY PHYSICIAN: Aniket Arzate MD     REASON FOR CRITICAL CARE ADMISSION: s/p liver transplant    CONSULTING PHYSICIAN: Ann     ASSESSMENT:  Cricket Mane is a 62 year old male with history of atrial fibrillation on metoprolol, CAD, decompensated cirrhosis secondary to alcohol with hx bleeding esophageal varices now s/p DDLT on 3/23/23 with Dr. Arzate.     Intra-op totals:  EBL--1500 mL  Crystalloid 3.5 L plasmalyte   Albumin 500   RBC 2 units   FFP 5 units   Plt 2 units   Cryo 0 units       PLAN:   Neuro/ pain/ sedation:    # Acute Post Op Pain   -Monitor neurological status. Notify the MD for any acute changes in exam.  -dilaudid/oxycodone for pain.  -no sedation.     Pulmonary care:   No acute needs. Pt extubated prior to coming to floor.   -Supplemental oxygen to keep saturation above 92 %.  -Incentive spirometer every 15- 30 minutes when awake.     Cardiovascular:    #Atrial Fibrillation  #Hx CAD  #Hx hyperlipidemia   Pt takes metoprolol at home. Pt was persistently in a fib throughout operation. Controlled with diltiazem.  Did not take metoprolol evening prior to operation.     Goals-- Maps >60, <85; Systolics >110, <160; CVP >8, <12    -Amiodarone drip   -Hold PTA Metoprolol 25 mg BID   -No pressors  -Monitor hemodynamic status.     GI care:  #Decompensated cirrhosis secondary to alcohol use  S/p DDLT on 3/23/23  #Hx alcohol use disorder   #Hx esophageal varices with bleeding     Goals--Maps >60, <85; Systolics >110, <160; CVP >8 <12    Transfusion goals-- Hg>8, INR<2, Fibrinogen>200, plt>50     -STAT liver ultrasound  -CXR  -q4h Hg, platelets, INR, Fibrinogen, ABG, LFTs, lactates   -q12 hr CMP, CBC     Immunosuppression-- MMF, tacrolimus, prednisone   Prophylaxis-- Valcyte, Micafungin, zosyn x48hr      Fluids/ Electrolytes/ Nutrition:   -dextrose 5% and 0.45% NaCl  for IV fluid hydration  -No indication for parenteral  nutrition.  -Nutrition consulted. Appreciate recs     Renal/ Fluid Balance:    -Urine output is adequate so far.  -Will continue to monitor intake and output.     Endocrine:    #Stress Induced Hyperglycemia  -Insulin gtt     ID/ Antibiotics:  #S/P DDLT on 3/23/23    Prophylaxis-- Valcyte, Micafungin, zosyn x48hr      Heme:     #Acute Blood Loss Anemia   #Acute on Chronic Anemia  #thrombocytopenia   #coagulopathy     Transfusion goals-- Hg>8, INR<2, Fibrinogen>200, plt>50   intraop- s/p pRBC 2 units, FFP 5 units, Plt 2 units   Postop-- s/p 1 unit cryo, 1 unit FFp  -Continue to follow labs       MSK:    -PT and OT consulted. Appreciate recs.    DVT Prophylaxis-- mechanical   GI-- none   Restraints-- none      Lines/ tubes/ drains:  -a line  -central line  -PIVx2   -carrera        Disposition:  -Surgical ICU.     Patient seen, findings and plan discussed with surgical ICU staff.    Ambika Agee MS4      Note taken over for medical student attestation. I personally discussed, managed and evaluated the patient in the ICU today. I personally ordered and reviewed all appropriate labs/imaging/products and spent 45 minutes of critical care time managing this patient outside of procedural time.     Andi Reddy PA-C  03/23/23  3:02 PM      - - - - - - - - - - - - - - - - - - - - - - - - - - - - - - - - - - - - - - - - - - - - - - - - - - - - - - - - - - - - - - - - - - - - - - - -     HISTORY PRESENTING ILLNESS:      Cricket Mane is a 62 year old male with history of atrial fibrillation on metoprolol, CAD, decompensated cirrhosis secondary to alcohol with hx bleeding esophageal varices now s/p DDLT on 3/23/23. Pt was transferred to the ICU extubated and off pressors.    Surgery was without complications. Pt was persistently in atrial fibrillation at beginning and throughout case. HR in 120s. Controlled with diltiazem.       REVIEW OF SYSTEMS: 10 point ROS neg other than the symptoms noted above in the HPI.      PAST  MEDICAL HISTORY:    has a past medical history of Alcoholic cirrhosis of liver with ascites (H), Atrial fibrillation (H), Coronary artery disease, History of alcohol use disorder, History of esophageal varices with bleeding, and Hyperlipidemia.    He has no past medical history of Asymptomatic human immunodeficiency virus (HIV) infection status (H), Cancer (H), Diabetes (H), Hepatitis, History of blood transfusion, Hypertension, Palpitations, or Syncope.    SURGICAL HISTORY:    has a past surgical history that includes PARTIAL HIP REPLACEMENT.    SOCIAL HISTORY:    reports that he has never smoked. He does not have any smokeless tobacco history on file. He reports that he does not currently use alcohol. He reports that he does not use drugs.    FAMILY HISTORY: No bleeding/clotting disorders nor problems with anesthesia.     ALLERGIES:    No Known Allergies    MEDICATIONS:  No current facility-administered medications on file prior to encounter.  folic acid (FOLVITE) 1 MG tablet, Take 1,000 mcg by mouth daily  metoprolol tartrate (LOPRESSOR) 25 MG tablet, Take 1 tablet (25 mg) by mouth 2 times daily  Multiple Vitamins-Iron TABS, Take 1 tablet by mouth daily  omeprazole 20 MG tablet,         PHYSICAL EXAMINATION:  Temp:  [97.6  F (36.4  C)-98.5  F (36.9  C)] 98.2  F (36.8  C)  Pulse:  [] 128  Resp:  [13-23] 23  BP: ()/(56-76) 126/76  MAP:  [89 mmHg-101 mmHg] 95 mmHg  Arterial Line BP: (126-146)/(64-74) 134/69  SpO2:  [98 %-100 %] 98 %      Gen: no acute distress, resting comfortably in bed  HEENT: Atraumatic, normocephalic  Neuro: Alert, awake, follows commands. No gross neurologic deficits   Pulm: nonlabored breathing on room air, no cough  CV: RRR by radial pulse, noncyanotic   ABD:soft, nontender, dressings with moderate strike through, two JUSTINO drains with thin serosanguinous output   MSK:  Normal active range of motion, no edema  Skin: Warm, dry, no rashes or abrasions on exposed skin  Psych:   [facility]      LABS: Reviewed.   Arterial Blood Gases   Recent Labs   Lab 03/23/23  1331 03/23/23  1232 03/23/23  1150 03/23/23  1114   PH 7.41 7.43 7.41 7.39   PCO2 40 37 38 37   PO2 135* 87 115* 144*   HCO3 26 25 24 22     Complete Blood Count   Recent Labs   Lab 03/23/23  1329 03/23/23  1232 03/23/23  1150 03/23/23  1114 03/23/23  1111 03/23/23  0737 03/22/23 2035   WBC 9.8  --   --   --   --   --  10.4   HGB 10.2* 8.7* 9.2* 8.1*  --    < > 11.2*   PLT 73*  --   --   --  95*  --  56*    < > = values in this interval not displayed.     Basic Metabolic Panel  Recent Labs   Lab 03/23/23  1418 03/23/23  1332 03/23/23  1329 03/23/23  1232 03/23/23  1150 03/23/23  1114 03/23/23  0737 03/22/23 2035   NA  --   --  134*  134* 134 132* 133   < > 129*   POTASSIUM  --   --  3.6  3.6 3.9 4.1 3.9   < > 4.6   CHLORIDE  --   --  99  99  --   --   --   --  99   CO2  --   --  22 22  --   --   --   --  19*   BUN  --   --  14.3  14.3  --   --   --   --  19.2   CR  --   --  0.73  0.73  --   --   --   --  0.83   * 176* 193*  193* 162* 179* 188*   < > 102*    < > = values in this interval not displayed.     Liver Function Tests  Recent Labs   Lab 03/23/23  1329 03/23/23  1111 03/22/23 2035   *  --  92*   *  --  54*   ALKPHOS 110  --  223*   BILITOTAL 7.2*  --  7.7*   ALBUMIN 3.0*  --  2.6*   INR 2.25* 2.53* 2.22*     Pancreatic Enzymes  Recent Labs   Lab 03/22/23 2035   AMYLASE 113*     Coagulation Profile  Recent Labs   Lab 03/23/23  1329 03/23/23  1111 03/22/23 2035   INR 2.25* 2.53* 2.22*   PTT 42* 58* 41*     Lactate  Invalid input(s): LACTATE    IMAGING:  Recent Results (from the past 24 hour(s))   XR Chest 2 Views    Narrative    EXAM:  XR CHEST 2 VIEWS    INDICATION: pre liver transplant    COMPARISON:  Chest x-ray 12/16/2022    FINDINGS:  PA and lateral views with repeat lateral of the chest.    Cardiomediastinal silhouette within normal limits.  No focal airspace  opacity.  No pneumothorax.   No pleural effusion.  Unremarkable upper  abdomen. No acute bony lesions.      Impression    IMPRESSION:  No acute airspace opacity.    I have personally reviewed the examination and initial interpretation  and I agree with the findings.    SUPRIYA ABBASI MD         SYSTEM ID:  [id number]   CT Abdomen Pelvis w/o Contrast    Narrative    EXAMINATION: CT ABDOMEN PELVIS W/O CONTRAST, 3/22/2023 9:14 PM    TECHNIQUE:  Helical CT images from the lung bases through the  symphysis pubis were obtained without IV contrast.     COMPARISON: Abdominal ultrasound 12/16/2022. No cross-sectional  abdominal imaging comparison.    HISTORY: Pre op liver    FINDINGS:    Abdomen and pelvis:   Cirrhotic morphology of the liver. Subcentimeter hypodensity in the  left lobe of the liver (series 5 image 37), too small accurately  characterize though likely corresponds to cyst seen on ultrasound  12/16/2022. No suspicious hepatic lesion on noncontrast imaging.  Cholelithiasis. There is mild nonspecific gallbladder wall thickening.  Mild splenomegaly measuring 13.6 cm in craniocaudal dimensions. There  may be some adrenal gland hyperplasia, left greater than right.  Unremarkable noncontrast appearance of the kidneys and pancreas. No  hydronephrosis or hydroureter. Urinary bladder is unremarkable.    The small and large bowel are normal in caliber. Small hiatal hernia.  Colonic diverticulosis. Small bowel containing periumbilical hernia.  No evidence of bowel obstruction. Portosystemic collaterals including  recanalized umbilical vein. Aortoiliac calcific atherosclerosis.  Mesenteric and omental edema. Small volume of abdominopelvic ascites.  No intra-abdominal free air. No lymphadenopathy.     Lung bases: Heart size is normal. No pericardial effusion. Left  basilar atelectasis. No pleural effusion.    Bones and soft tissues: Postoperative changes of left total hip  arthroplasty. Avascular necrosis of the right hip. Degenerative  changes of the  spine. Grade 1 anterolisthesis of L5 on S1.      Impression    IMPRESSION:   1. Cirrhosis with signs of portal hypertension including splenomegaly,  portosystemic collaterals and small volume of abdominopelvic ascites.  2. Cholelithiasis and mild nonspecific gallbladder wall thickening in  the setting of cirrhosis/ascites.  3. Small bowel containing periumbilical hernia. No evidence of bowel  obstruction.  4. Colonic diverticulosis.  5. Avascular necrosis in the right hip.  6. Additional chronic and incidental findings as detailed in the body  of report.    I have personally reviewed the examination and initial interpretation  and I agree with the findings.    KEAGAN PRESTON MD         SYSTEM ID:  [id number]   XR Abdomen Port 1 View    Narrative    Exam: TEMPORARY, 3/23/2023 12:39 PM    Indication: Incorrect count    Comparison: Abdomen/pelvis CT 3/22/2023    Findings:   Supine AP view of the abdomen. Surgical changes of liver  transplantation. Surgical clips project over the right upper quadrant.  No surgical needle is identified. Percutaneous drains project over the  right upper quadrant. No dilated bowel or pneumatosis visualized.  Left total hip arthroplasty. Degenerative changes of the right hip  joint, sacroiliac joints, and lumbar spine.       Impression    Impression:   No retained surgical needle is demonstrated.     Results reported by Dr. Pérez to OR staff on 3/23/2023 at 1243 hours.    I have personally reviewed the examination and initial interpretation  and I agree with the findings.    OCTAVIO LENNON MD         SYSTEM ID:  [id number]   XR Chest Port 1 View    Impression    RESIDENT PRELIMINARY INTERPRETATION  IMPRESSION:  1.  Right IJ central venous catheter terminates over the SVC.  2.  Diffuse interstitial and airspace opacities with low lung volumes,  likely representing edema and/or atelectasis.

## 2023-03-23 NOTE — PROGRESS NOTES
"CLINICAL NUTRITION SERVICES - ASSESSMENT NOTE     Nutrition Prescription    RECOMMENDATIONS FOR MDs/PROVIDERS TO ORDER:  Advance diet as tolerated  If EN planned per transplant, please consult RD for post pyloric feeding tube placement    Malnutrition Status:    Patient does not meet two of the established criteria necessary for diagnosing malnutrition    Recommendations already ordered by Registered Dietitian (RD):  None today    Future/Additional Recommendations:  Monitor plan for TF / need for ppFT.  If EN becomes plan of care:  Osmolite 1.5 George @ goal of  60ml/hr  (1440ml/day) + 2 pkts prosource TF 20  will provide: 2320 kcals (31 kcal/kg), 130 g PRO (1.7gm/kg), 1097 ml free H20, 293 g CHO, and 0 g fiber daily.    Monitor for PO diet advancement with ONS / calorie counts  Post transplant diet education as able     REASON FOR ASSESSMENT  Cricket Mane is a/an 62 year old male assessed by the dietitian for Provider Order - Registered Dietitian to Assess and Order TF per Medical Nutrition Therapy Protocol    Pt has a history of atrial fibrillation on metoprolol, CAD, alcoholic cirrhosis, hx of bleeding esophageal varices here for a liver transplant    NUTRITION HISTORY  Pt is POD 0 DDLT  Extubated and groggy  Per outpatient RD visit 12/20/22, Pt following low Na+ diet with protein supplements a few times per week since July 2022 - Ensure, maggy bars, muscle milk, whey protein powder.   Some fluid fluctuations with prior ascites, UBW of 165 lbs.      CURRENT NUTRITION ORDERS  Diet: NPO    LABS  Labs reviewed    MEDICATIONS  Medications reviewed    ANTHROPOMETRICS  Height:  172.7cm (5' 8\")  Most Recent Weight: 73.7 kg (162 lb 6.4 oz)    IBW: 70 kg  BMI: Overweight BMI 25-29.9  Weight History: Weight stable    Wt Readings from Last 20 Encounters:   03/23/23 73.7 kg (162 lb 6.4 oz)   01/31/23 77.4 kg (170 lb 9.6 oz)   12/20/22 74.8 kg (165 lb)   11/15/22 72.6 kg (160 lb)     Dosing Weight: 74 kg    ASSESSED NUTRITION " NEEDS  Estimated Energy Needs: 2220 - 2590 kcals/day (30 - 35 kcals/kg )  Justification: Increased needs and Post-op SOT  Estimated Protein Needs: 96 - 148 grams protein/day (1.3 - 2 grams of pro/kg)  Justification: Post-op SOT  Estimated Fluid Needs: 1 mL/kcal   Justification: Maintenance    PHYSICAL FINDINGS  See malnutrition section below.  Jaundice, dry flaky skin     MALNUTRITION  % Intake: Unable to assess  % Weight Loss: None noted  Subcutaneous Fat Loss: None observed  Muscle Loss: None observed  Fluid Accumulation/Edema: Does not meet criteria  Malnutrition Diagnosis: Patient does not meet two of the established criteria necessary for diagnosing malnutrition    NUTRITION DIAGNOSIS  Inadequate oral intake related to PO diet not yet initiated as evidenced by pt NPO post op      INTERVENTIONS  Implementation  Collaboration with other providers     Goals  Patient to consume % of nutritionally adequate meal trays TID, or the equivalent with supplements/snacks.     Monitoring/Evaluation  Progress toward goals will be monitored and evaluated per protocol.    Chioma Mayfield, RD, LD, CNSC  [address]A SICU RD pager: [phone]  Ascom: [phone]  Weekend/Holiday RD pager [phone]

## 2023-03-23 NOTE — OP NOTE
Transplant Center  Operative Note     Procedure date:  03/23/23    Preoperative diagnosis:  End Stage Liver Disease due to Laennec's    Postoperative diagnosis:  Same,     Procedure:  1. Donation after Brain Death Piggyback liver transplant   2. End-to-end Choledochocholedochostomy    3. Umbilical hernia repair    Surgeon  Surgeon(s) and Role:     * Aniket Arzate MD - Primary     * Janene Alex MD - Resident - Assisting     * William Bautista MD - Fellow - Assisting     * Brayan Live MD - Fellow - Assisting    Co-Surgeon:      Fellow/Assistant:  Dr William Bautista served as first assistant for the liver transplant as there was no qualified resident available to assist.  Dr Bautista assisted with the hepatectomy and performed the vascular and biliary anastomoses.     Anesthesia:  General    Specimen:  liver, donor gallbladder    Drains:  2    Urine output:  560 mls    Estimated blood loss:  1,500    Fluids administered:    Fluid Amount   Crystalloid (mL) 3,500   RBC (Units) 2   FFP (Units) 5   Platelets (Units) 2   Cell Saver (CCs) 520        Intraoperative Events: as dictated    Complications: None.    Findings: cirrhotic liver      None.     Indication: Cricket Mane with a history of End Stage Liver Disease due to Laennec's who presents for Donation after Brain Death Whole Liver liver transplant. A suitable donor offer has become available. After discussing the risks and benefits of proceeding, the patient agreed to proceed with surgery and provided informed consent.    Final ABO/Crossmatch verification: After the donor organ arrived to the operating room and prior to anastomosis, I participated in the transplant pre-verification upon organ receipt timeout by visually verifying the donor ID, organ and laterality, donor blood type, recipient unique identifier, recipient blood type, and that the donor and recipient are blood type compatible.    Donor Organ Information:   Donor UNOS ID:  [id number]    Donor ABO:   O    Donor arterial clamp on:  3/23/2023  7:14 AM    Preservation fluid:  UW     Vessels with organ:  Yes    Donor organ arrival to recipient room:  3/23/2023  8:18 AM    Total ischemic time:  196    Cold ischemic time:  152    Warm ischemic time:  44    Ex-vivo:  No    Time placed on ex-vivo perfusion:      Total time on ex-vivo perfusion:   min     Back Table Preparation:   Procedure:  Bench preparation of the liver allograft for transplantation    Preoperative diagnosis:  End Stage Liver Disease due to Laennec's    Postoperative diagnosis:  Same,     Surgeon:  Surgeon(s) and Role:     * Aniket Arzate MD - Primary     * Janene Alex MD - Resident - Assisting     * William Bautista MD - Fellow - Assisting     * Brayan Live MD - Fellow - Assisting    Faculty Co-Surgeon:      Fellow/Assistant:  Dr Brayan Live served as first assistant for the back table preparation of the liver allograft as no qualified resident was available .  He performed the back bench preparation under my supervision.     Anesthesia:  None    Graft biopsy:  Yes-      Macroscopic steatosis:  Mild    Back table reconstruction:  No Reconstruction    Intimal flap repair:  no    # of hepatic arteries:  1    # of portal veins:  1    Accessory arteries:  none    # of hepatic veins:  standard    # of bile ducts:  1    Graft weight:       Findings:  Liver laceration: No  Overall quality of liver: Good    Back Table Procedure: The liver allograft was received and inspected and the aforementioned findings were noted. It was flushed with UW. The donor liver was placed in fresh ice-cold preservation solution. The inferior vena cava was identified. Two stay sutures were placed on the supra-hepatic portion of the cava. Two stay sutures were placed on the infra-hepatic portion of the cava. The fibro-fatty tissue and adrenal gland was cleared of inferior vena cava. The phrenic vein was ligated. The adrenal vein was ligated. The IVC was tested for  leaks by using a bulb syringe. All the leaks identified were suture ligated. The portal vein was identified. All the fibro-fatty area or tissue around the portal vein was removed and the portal vein was dissected up to its bifurcation. An 8-Setswana cannula was placed in the portal vein and fixed with a stitch. The portal vein was tested for leaks. All the leaks identified were suture ligated. The cannula was left in place to be used for flushing the liver at the time of implantation. The hepatic artery anatomy was identified. The celiac axis  was traced all the way from the aortic patch to the level of the gastro-duodenal artery. Dissection was stopped at the level of the gastro-duodenal artery. All the leaks in the hepatic artery tributaries were suture ligated. The bile duct was inspected and flushed. No reconstruction was required. The liver was placed back in ice-cold preservation solution until ready for transplantation.     Findings: as dictated    Operative Procedure:   Arterial anastomosis start:  3/23/2023  9:46 AM    Recipient arterial unclamp:  3/23/2023 11:12 AM    Extra vessels used:  no    Extra vessels banked:  Yes    Previous upper abd surgery?  No    Previous cholecystectomy?  No    Portal vein:  Thrombus? No   Specify:    Patent? Yes-      On portal bypass?  no    Arterial flow:  Sufficient? Yes-     Specify:     Bile duct anastomosis:  To bowel? No   Specify:    To duct? Yes-     Specify:        Cricket aMne was brought to the operating room, placed in a supine position, and a time out was performed. Sequential compression devices were placed on both lower extremities and general endotracheal anesthesia was induced. The patient was given IV antibiotics, and  Solumedrol. A Morales catheter was placed. A central line was placed by Anesthesia service. The abdomen was then shaved, prepped, and draped in the usual sterile fashion. The backtable preparation occurred prior to implantation.    The abdomen  was opened through a bilateral subcostal incision. The falciform was taken down. We placed the retractors. The abdomen was examined. There were adhesions to the gallbladder which were taken down with electrocautery. The left lateral segment was mobilized off the diaphragm and the lesser omentum opened. The right lobe was mobilized from the inferior peritoneal reflections.     Retractors were set up. Exposure of the kimberly hepatis was made. The cystic duct was identified, ligated, and divided. The right lateral side of the kimberly hepatis was opened. The cystic artery was identified, ligated, and divided. Proper hepatic artery and right and small left hepatic artery were identified. The remaining portion of the kimberly hepatis was opened. No significant arteries were found to the right of the bile duct. The bile duct was then divided near the bifurcation. Patent portal vein was identified and dissected from below the GDA to the bifurcation. Due to ESLD, coagulopathy and bleeding was encountered.    At this juncture, we mobilized the remaining portion of the right lobe to the midline. The small accessory hepatic veins were isolated, ligated and divided as they entered into the inferior vena cava.     It was elected to devascularize the liver to more easily remove it. The right hepatic artery was ligated. The portal vein was clamped just below the duodenum and divided at the bifurcation. The remaining portion of the liver was taken off the anterior surface of the inferior vena cava. Eventually, the liver was only suspended on the right middle and left hepatic veins. A Kl[place]m clamp was placed across the hepatic veins.    An orifice was made on the cava incorporating these 3. At this juncture, the liver had been brought back, it had been backtabled and the liver was found to be suitable. Of note, on the back table, arterial supply of the liver was reconstructed. Completely replaced right and left hepatic arteries were  reconstructed by sewing right replaced hepatic artery to the splenic stump and ligating the left gastric artery distal to the take off of the replaced left hepatic artery.    The suprahepatic cava of the donor was anastomosed in end-to-end fashion on to the cava out with a running 3-0 Prolene, the liver was then flushed of its preservation solution with cold albumin 1000 cc. We ligated the donor infrahepatic cava. The portal vein of the donor and recipient was anastomosed in an end-to-end fashion with a running 6-0 Prolene with a growth knot the diameter of the recipient vein. Clamps were released. The patient tolerated the unclamping reasonably well.     During the anastomosis, any coagulopathic bleeding was corrected by anesthesia. Common arterial patch was anastomosed to recipient's proper hepatic artery. The flow was measured in the HA-300cc/min and PV 2.7L/min. When hemostasis was secured, the bile ducts were trimmed. The donor gallbladder was removed. The 2 ducts were anastomosed in an end-to-end fashion using running 6-0 PDS. After the posterior wall was run, the anterior wall was closed.     We irrigated the abdomen. Hemostasis again verified as being adequate. The liver had been making a good amount of bile. A liver biopsy was taken. Two Sarabjit drains were placed in RUQ. The umbilical defect was closed with figure of eight 0 PDS suture from below. The abdomen was irrigated again and then the fascia closed with a running looped PDS. The skin approximated with staples. All needle, sponge, and instrument counts were accurate. The patient  tolerated the procedure well without apparent complications and was transferred to the ICU in good condition.     Physician Attestation   I was present for the entire procedure between opening and closing.    Aniket Arzate MD  Date of Service (when I saw the patient): 3/23/2023

## 2023-03-23 NOTE — PROGRESS NOTES
Transplant Surgery  Inpatient Daily Progress Note  03/23/2023    Assessment & Plan: Cricket Mane is a 62 year old male with PMH significant for EtOH cirrhosis (MELD 22) complicated by bleeding esophageal varices, A fib on metoprolol, and CAD. He is now s/p DDOLT (no biliary stent) and umbilical hernia repair on 3/23/23 with Dr. Arzate requiring 2 units PRBC, 5 units FFP, 2 units PLTs.      Graft function:   Liver transplant: POD#1. LFTs trending down appropriately. LA WNL. INR normalizing. Post op US patent with mildly elevated RIs. JUSTINO x2 with serosang output.   - Explant pathology: Pending  - ASA 81 mg daily vascular ppx     Immunosuppression management:  Induction: Steroid taper per protocol.  Maintenance:   -  mg BID  - Tacrolimus 1.5 mg BID. Goal level 8-12. Monitor closely with Fluconazole, Diltiazem, and Amiodarone.      Neurology:  Acute postoperative pain: Continue PRN oxycodone, PRN IV dilaudid.    Hematology:   Acute blood loss/Anemia of chronic disease: Hgb 8.8 this AM from 10-11 pre-op. Monitor.  Thrombocytopenia: due to liver disease. Platelets 66 this AM after 1 pack PLT early morning for PLT <50.   Coagulopathy: INR 1.5.      Cardiorespiratory:   Postop ventilatory support: Extubated post operatively prior to transfer to SICU. Oxygen 2L via NC. Wean O2 as able.   A fib: In A fib throughout operation controlled intra-operatively with Diltiazem. Now on amiodarone gtt, restart PTA Metoprolol 25 mg BID.    CAD: ASA, as above.     GI/Nutrition:   At risk for malnutrition: Nutrition consulted. No plan to place FT at this time, monitor gabrielle counts. ADAT.   -Bowel regimen: miralax qdaily + senna BID     Endocrine:   Steroid induced hyperglycemia: HGBA1C 4.7%. Continue insulin gtt.     Fluid/Electrolytes: Electrolyte replacements per ICU protocol.  Hypovolemia: CVP 4 this AM, received albumin 5% 25g x1.      : Morales in place for strict I & O. Remove POD 1.      Infectious disease: Afebrile. WBC WNL.       Prophylaxis: DVT (SCDs), GI (PPI), fungal (Flucon x 7 days followed by nystatin), PCP (Vactrim), CMV (Valcyte x 3 months), Periop (Zosyn x48hrs).    Dispo: SICU, transfer to  today    AMY/Fellow/Resident Provider: Rachna Louis NP #[id number]    Faculty: Aniket Arzate MD    __________________________________________________________________  Transplant History: Admitted 3/22/2023 for liver transplant.   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    3/23/2023 (Liver), Postoperative day: 0     Interval History: History is obtained from the patient  Patient seen resting comfortably in bed. Pain is controlled. No complaints.     ROS:   A 10-point review of systems was negative except as noted above.    Curent Meds:    [START ON 3/24/2023] aspirin  81 mg Oral or Feeding Tube Daily     fibrinogen concentrate (Human)  1,000 mg Intravenous Once     [START ON 3/24/2023] fluconazole  400 mg Intravenous Q24H     magnesium sulfate  2 g Intravenous Once     [START ON 3/24/2023] methylPREDNISolone  200 mg Intravenous Once    Followed by     [START ON 3/25/2023] methylPREDNISolone  100 mg Intravenous Once    Followed by     [START ON 3/26/2023] methylPREDNISolone  50 mg Intravenous Once    Followed by     [START ON 3/27/2023] predniSONE  25 mg Oral Once    Followed by     [START ON 3/28/2023] predniSONE  10 mg Oral Once     mycophenolate  750 mg Oral BID IS    Or     mycophenolate  750 mg Oral or NG Tube BID IS     piperacillin-tazobactam  3.375 g Intravenous Q6H     sodium chloride (PF)  3 mL Intravenous Q8H     sulfamethoxazole-trimethoprim  1 tablet Oral Daily     tacrolimus  3 mg Oral BID IS    Or     tacrolimus  3 mg Oral or NG Tube BID IS     valGANciclovir  450 mg Oral Daily    Or     valGANciclovir  450 mg Oral or NG Tube Daily       Physical Exam:     Admit Weight: 73.5 kg (162 lb 1.6 oz)    Current Vitals:   /76   Pulse (!) 128   Temp 98.2  F (36.8  C)   Resp 23   Wt 73.7 kg (162 lb 6.4 oz)    SpO2 98%   BMI 25.05 kg/m      Vital sign ranges:    Temp:  [97.6  F (36.4  C)-98.5  F (36.9  C)] 98.2  F (36.8  C)  Pulse:  [] 128  Resp:  [13-23] 23  BP: ()/(56-76) 126/76  MAP:  [89 mmHg-101 mmHg] 95 mmHg  Arterial Line BP: (126-146)/(64-74) 134/69  SpO2:  [98 %-100 %] 98 %  Patient Vitals for the past 24 hrs:   BP Temp Temp src Pulse Resp SpO2 Weight   03/23/23 1500 126/76 98.2  F (36.8  C) -- (!) 128 23 98 % --   03/23/23 1445 -- -- -- 116 13 99 % --   03/23/23 1430 -- -- -- (!) 122 14 99 % --   03/23/23 1415 -- -- -- (!) 142 15 98 % --   03/23/23 1400 -- -- -- (!) 126 14 99 % --   03/23/23 1345 -- -- -- (!) 135 15 99 % --   03/23/23 1330 -- -- -- (!) 138 14 100 % --   03/23/23 1325 -- -- -- (!) 144 -- 100 % --   03/23/23 1320 -- 98.5  F (36.9  C) Oral 114 16 100 % --   03/23/23 0535 115/75 98  F (36.7  C) Oral 92 18 100 % 73.7 kg (162 lb 6.4 oz)   03/23/23 0155 98/56 98.1  F (36.7  C) Oral 108 18 98 % --   03/22/23 2300 -- -- -- 96 -- -- --   03/22/23 1938 118/67 97.6  F (36.4  C) Oral (!) 122 18 100 % 73.5 kg (162 lb 1.6 oz)     General Appearance: in no apparent distress.   Skin: normal, warm, dry, No rashes, induration, or jaundice  Heart: irregular rhythm  Lungs: unlabored on 2L NC  Abdomen: rounded, appropriately tender. Incision covered; C/D/I. JPx2 with serosanguinous output.   : Morales in place with clear yellow UOP.  Extremities: edema: present generalized.   Neurologic: awake, alert and oriented. Tremor absent.    Frailty Scores     Frailty Scores 12/20/2022 12/15/2022 12/15/2022    Final Score Not Frail Not Frail Not Frail    Final Score Number 1 1 1          Data:   CMP  Recent Labs   Lab 03/23/23  1505 03/23/23  1418 03/23/23  1332 03/23/23  1331 03/23/23  1329 03/23/23  1232 03/23/23  0737 03/22/23 2035   NA  --   --   --   --  134*  134* 134   < > 129*   POTASSIUM  --   --   --   --  3.6  3.6 3.9   < > 4.6   CHLORIDE  --   --   --   --  99  99  --   --  99   CO2  --   --   --    --  22 22  --   --  19*   * 197*   < >  --  193*  193* 162*   < > 102*   BUN  --   --   --   --  14.3  14.3  --   --  19.2   CR  --   --   --   --  0.73  0.73  --   --  0.83   GFRESTIMATED  --   --   --   --  >90  >90  --   --  >90   PEDRO  --   --   --   --  9.2  9.2  --   --  8.6*   ICAW  --   --   --  5.1  --  4.7   < >  --    MAG  --   --   --   --  1.6*  --   --  1.9   PHOS  --   --   --   --  3.1  --   --  2.6   AMYLASE  --   --   --   --   --   --   --  113*   ALBUMIN  --   --   --   --  3.0*  --   --  2.6*   BILITOTAL  --   --   --   --  7.2*  --   --  7.7*   ALKPHOS  --   --   --   --  110  --   --  223*   AST  --   --   --   --  891*  --   --  92*   ALT  --   --   --   --  309*  --   --  54*    < > = values in this interval not displayed.     CBC  Recent Labs   Lab 03/23/23  1329 03/23/23  1232 03/23/23  1114 03/23/23  1111 03/23/23  0737 03/22/23  2035   HGB 10.2* 8.7*   < >  --    < > 11.2*   WBC 9.8  --   --   --   --  10.4   PLT 73*  --   --  95*  --  56*    < > = values in this interval not displayed.     COAGS  Recent Labs   Lab 03/23/23  1329 03/23/23  1111   INR 2.25* 2.53*   PTT 42* 58*      Urinalysis  Recent Labs   Lab Test 12/20/22 0923   COLOR Yellow   APPEARANCE Clear   URINEGLC Negative   URINEBILI Negative   URINEKETONE Negative   SG 1.018   UBLD Negative   URINEPH 6.0   PROTEIN Negative   NITRITE Negative   LEUKEST Negative     Virology:  Hepatitis C Antibody   Date Value Ref Range Status   03/22/2023 Nonreactive Nonreactive Final

## 2023-03-23 NOTE — TELEPHONE ENCOUNTER
Organ Offer Encounter Information    Organ Offer Information  Organ offer date & time: 3/22/2023  5:29 PM  Coordinator/Fellow/Attending name: Lisa Chaparro RN   Organ(s):  Organ UNOS ID Match Run ID Comment Organ Laterality   Liver [id number] MNOP       Recent infections?: Yes (Comment: 3 weeks ago, possible covid. tested postive on home test. no symptoms) Recent IV antibiotics?: Neg   New medications?: No Recent pregnancy?: No   Angicoagulation medications?: No Recent vaccinations?: Yes (Comment: herpes zoster January)   Recent blood transfusions?: No Recent hospitalizations?: Yes   Has your insurance changed in the last 6-12 months?: Neg    Discussed organ offer with: Patient  Patient/Caregiver name: Cricket  Discussed risk category with Patient/Other: N/A  Patient/Other asked to speak to a surgeon?: No  Discussed program-specific outcomes: Asked questions regarding SRTR, verbalized understanding  Right to decline organ offer without penalty, Patient/Other: Aware of option to decline without penalty  Organ offer decision status Patient/Other: Accepted Offer  Organ disposition: Transplanted  Additional Comments: 3/22/2023 5:33 PM  Liver: primary local liver offer  MD: Huey  OPO Contact: Dinorah  Donor/Recip HCV Status: neg/neg  (HCV+ Donors - Discuss HCV genotyping/quant testing with MD & send Epic in basket message to [facility] HCV POOL - Include Donor UNOS ID)  Donor Nutritional Status: D5  Plan (NPO, Donor OR): Admit ASAP  - - -   COVID Screening  In the past month, have you:  Or anyone close to you had a positive COVID test or suspected to have COVID: suspected son, did not test  Had any COVID symptoms (Fever, Cough, Short of Breath, Loss of Taste/Smell, Rash): mild intermittent cough 3 weeks ago. Tested positive on home test. No follow up testing done.    Lisa Chaparro, IZABELA    Admissions: 1743, [facility]  Unit:1730, Marii on 7a. Bed available for immediate admit.   Update Provider Entering Orders (XM  Plan & COVID Testing):  1830, page sent to gen sx  Inpatient Lab (COVID Testing [phone], Option 2): RN to confirm STAT run  Book OR: 1902, Manuela. OR booked for 3/23 at 0600  Vessel Storage Confirmation (PA/DANNIELLE/INDERJIT): Yes, OK to bank  Research: ON HOLD  TransNet/ABO Verification: 1901, labels printed  Add Organ: 1904, organ added    Donor OR Time: 3/23, 0500  Procuring MD: Jasmyne  Contact in the OR:   Organs Being Procured: INDERJIT/JILL  Flush Solution: UW  Biopsy: pending visual  Pump: no  Special Requests (Special blood tubes, nodes, waivers): no  MD for Visualization: Huey/Michele  Transportation Details: donor at [facility]  Lisa Chaparro RN     Blood Bank: [id number]  Immunology:  2145, Aneta  Lisa Chaparro RN          Attestation I have discussed all of the above with the Patient/Legal Guardian/Caregiver regarding this organ offer.: Yes  Coordinator/Fellow/Attending name: Lisa Chaparro, IZABELA

## 2023-03-23 NOTE — PROGRESS NOTES
Patient removed from UNOS waitlist after  donor  Liver transplant. UNOS ID [id number]    Donor Has Risk Criteria for Transmission of HIV/HCV/HBV: no  Recipient Notified of Risk Criteria: no

## 2023-03-23 NOTE — ANESTHESIA POSTPROCEDURE EVALUATION
Patient: Cricket Mane    Procedure: Procedure(s):  Transplant liver recipient  donor  Bench liver       Anesthesia Type:  General    Note:  Disposition: ICU            ICU Sign Out: Anesthesiologist/ICU physician sign out WAS performed   Postop Pain Control: Uneventful            Sign Out: Well controlled pain   PONV: No   Neuro/Psych: Uneventful            Sign Out: Acceptable/Baseline neuro status   Airway/Respiratory: Uneventful            Sign Out: Acceptable/Baseline resp. status   CV/Hemodynamics: Uneventful            Sign Out: Acceptable CV status; No obvious hypovolemia; No obvious fluid overload   Other NRE: NONE   DID A NON-ROUTINE EVENT OCCUR? No    Event details/Postop Comments:  Extubated, no pressors. To ICU, report given. Patient still in a-fib but stable. ICU will start amiodarone drip           Last vitals:  Vitals:    23 2300 23 0155 23 0535   BP:  98/56 115/75   Pulse: 96 108 92   Resp:  18 18   Temp:  36.7  C (98.1  F) 36.7  C (98  F)   SpO2:  98% 100%       Electronically Signed By: Surendra Guzman DO  2023  1:32 PM

## 2023-03-23 NOTE — H&P
[facility]    History and Physical - Transplant Service       Date of Admission:  3/22/2023    Assessment & Plan: Surgery   Cricket Mane is a 62 year old male admitted on 3/22/2023. He has a history of atrial fibrillation on metoprolol, CAD, alcoholic cirrhosis, hx of bleeding esophageal varices here for a liver transplant.     -Admit to transplant  -Pre op labs and imaging  -NPO at [facility]      Diet: NPO per Anesthesia Guidelines for Procedure/Surgery Except for: Meds    DVT Prophylaxis: Pneumatic Compression Devices  Morales Catheter: Not present  Lines: None     Drains: None     Cardiac Monitoring: None  Code Status: Full Code      Clinically Significant Risk Factors Present on Admission                               Disposition Plan      Expected Discharge Date: 03/24/2023                 The patient's care was discussed with the Chief Resident/Fellow.    Charles Thrasher MD  [facility]  Non-urgent messages: Securely message with [employer] (more info)  Text page via [employer] Paging/Directory     ______________________________________________________________________    Chief Complaint   Liver Transplant Candidate    History is obtained from the patient    History of Present Illness   Cricket Mane is a 62 year old male who has a history of a fib and alcoholic cirrhosis is being admitted for a liver transplant. He states he has been feeling well over the past few weeks. No issues with appetite, urination, or BMs. No N/V. Complains of some ascites build up but mainly annoyed by his umbilical hernia. Does get fatigued easier than before otherwise no complaints.       Past Medical History    Past Medical History:   Diagnosis Date     Alcoholic cirrhosis of liver with ascites (H)      Atrial fibrillation (H)      Coronary artery disease      History of alcohol use disorder      History of esophageal varices with bleeding       Hyperlipidemia        Past Surgical History   Past Surgical History:   Procedure Laterality Date     AS PARTIAL HIP REPLACEMENT         Prior to Admission Medications   Prior to Admission Medications   Prescriptions Last Dose Informant Patient Reported? Taking?   Multiple Vitamins-Iron TABS   Yes No   Sig: Take 1 tablet by mouth daily   folic acid (FOLVITE) 1 MG tablet   Yes No   Sig: Take 1,000 mcg by mouth daily   metoprolol tartrate (LOPRESSOR) 25 MG tablet   No No   Sig: Take 1 tablet (25 mg) by mouth 2 times daily   omeprazole 20 MG tablet   Yes No      Facility-Administered Medications: None        Review of Systems    The 10 point Review of Systems is negative other than noted in the HPI.  Physical Exam   Vital Signs: Temp: 97.6  F (36.4  C) Temp src: Oral BP: 118/67 Pulse: (!) 122   Resp: 18 SpO2: 100 % O2 Device: None (Room air)    Weight: 162 lbs 1.6 ozNo intake or output data in the 24 hours ending 03/22/23 2118  General Appearance: Jaundiced but NAD and grossly neuro intact   Respiratory: breathing comfortably on RA  Cardiovascular: a fib on arrival  GI: soft, slight distended, non tender with umbilical hernia that is easily reducible  Skin: jaundiced            Data     I have personally reviewed the following data over the past 24 hrs:    10.4  \   11.2 (L)   / 56 (L)     129 (L) 99 19.2 /  102 (H)   4.6 19 (L) 0.83 \       ALT: 54 (H) AST: 92 (H) AP: 223 (H) TBILI: 7.7 (H)   ALB: 2.6 (L) TOT PROTEIN: 7.3 LIPASE: N/A       INR:  2.22 (H) PTT:  41 (H)   D-dimer:  N/A Fibrinogen:  166 (L)       Imaging results reviewed over the past 24 hrs:   Recent Results (from the past 24 hour(s))   XR Chest 2 Views    Narrative    EXAM:  XR CHEST 2 VIEWS    INDICATION: pre liver transplant    COMPARISON:  Chest x-ray 12/16/2022    FINDINGS:  PA and lateral views with repeat lateral of the chest.    Cardiomediastinal silhouette within normal limits.  No focal airspace  opacity.  No pneumothorax.  No pleural effusion.   Unremarkable upper  abdomen. No acute bony lesions.      Impression    IMPRESSION:  No acute airspace opacity.    I have personally reviewed the examination and initial interpretation  and I agree with the findings.    SUPRIYA ABBASI MD         SYSTEM ID:  [id number]

## 2023-03-23 NOTE — PROGRESS NOTES
Admitted/transferred from: home  Time of arrival on unit 7:30 pm  2 RN full  skin assessment completed by Catia TOBAR and Casi NASCIMENTO  Skin assessment finding: issues found generalized dry skin, umbilical hernia thats nonpainful, R upper arm bruising   Interventions/actions: other none at this time     Will continue to monitor.

## 2023-03-23 NOTE — ANESTHESIA CARE TRANSFER NOTE
Patient: Cricket Mane    Procedure: Procedure(s):  Transplant liver recipient  donor  Bench liver       Diagnosis: End stage liver disease (H) [K72.10]  Diagnosis Additional Information: No value filed.    Anesthesia Type:   General     Note:    Oropharynx: oropharynx clear of all foreign objects and spontaneously breathing  Level of Consciousness: awake  Oxygen Supplementation: face mask  Level of Supplemental Oxygen (L/min / FiO2): 6  Independent Airway: airway patency satisfactory and stable  Dentition: dentition unchanged  Vital Signs Stable: post-procedure vital signs reviewed and stable  Report to RN Given: handoff report given  Patient transferred to: ICU    ICU Handoff: Call for PAUSE to initiate/utilize ICU HANDOFF, Identified Patient, Identified Responsible Provider, Reviewed the Pertinent Medical History, Discussed Surgical Course, Reviewed Intra-OP Anesthesia Management and Issues during Anesthesia, Set Expectations for Post Procedure Period and Allowed Opportunity for Questions and Acknowledgement of Understanding      Vitals:  Vitals Value Taken Time   /64    Temp 36.3    Pulse 138 23 1330   Resp 12    SpO2 100 % 23 1330   Vitals shown include unvalidated device data.    Electronically Signed By: Baldo Vera MD  2023  1:31 PM

## 2023-03-23 NOTE — ANESTHESIA PREPROCEDURE EVALUATION
Anesthesia Pre-Procedure Evaluation    Patient: Cricket Mane   MRN: [id number] : 1960        Procedure : Procedure(s):  Transplant liver recipient  donor          Past Medical History:   Diagnosis Date     Alcoholic cirrhosis of liver with ascites (H)      Atrial fibrillation (H)      Coronary artery disease      History of alcohol use disorder      History of esophageal varices with bleeding      Hyperlipidemia       Past Surgical History:   Procedure Laterality Date     AS PARTIAL HIP REPLACEMENT        No Known Allergies   Social History     Tobacco Use     Smoking status: Never     Smokeless tobacco: Not on file   Substance Use Topics     Alcohol use: Not Currently     Comment: last ETOH was 22.      Wt Readings from Last 1 Encounters:   23 73.5 kg (162 lb 1.6 oz)        Anesthesia Evaluation   Pt has had prior anesthetic. Type: General and MAC.        ROS/MED HX  ENT/Pulmonary:  - neg pulmonary ROS     Neurologic:  - neg neurologic ROS     Cardiovascular:     (+) --CAD ( Mild non-obstructive on CTA angiogram coronary) ---dysrhythmias, a-fib, Previous cardiac testing  (-) taking anticoagulants/antiplatelets   METS/Exercise Tolerance:     Hematologic: Comments: Thrombocytopenia    (+) anemia,     Musculoskeletal:  - neg musculoskeletal ROS     GI/Hepatic: Comment: Hx esophageal varices    MELD-Na score: 27 at 3/22/2023  8:35 PM  MELD score: 23 at 3/22/2023  8:35 PM  Calculated from:  Serum Creatinine: 0.83 mg/dL (Using min of 1 mg/dL) at 3/22/2023  8:35 PM  Serum Sodium: 129 mmol/L at 3/22/2023  8:35 PM  Total Bilirubin: 7.7 mg/dL at 3/22/2023  8:35 PM  INR(ratio): 2.22 at 3/22/2023  8:35 PM  Age: 62 years      (+) hepatitis type Alcoholic, liver disease,     Renal/Genitourinary:  - neg Renal ROS     Endo:  - neg endo ROS     Psychiatric/Substance Use:     (+) alcohol abuse     Infectious Disease:  - neg infectious disease ROS     Malignancy:  - neg malignancy ROS     Other:             Physical Exam    Airway        Mallampati: II   TM distance: > 3 FB   Neck ROM: full   Mouth opening: > 3 cm    Respiratory Devices and Support         Dental       (+) Completely normal teeth      Cardiovascular          Rhythm and rate: regular and normal     Pulmonary   pulmonary exam normal        breath sounds clear to auscultation           OUTSIDE LABS:  CBC:   Lab Results   Component Value Date    WBC 6.3 12/20/2022    HGB 12.8 (L) 12/20/2022    HCT 37.8 (L) 12/20/2022    PLT 79 (L) 12/20/2022     BMP:   Lab Results   Component Value Date     12/20/2022    POTASSIUM 4.7 12/20/2022    CHLORIDE 101 12/20/2022    CO2 26 12/20/2022    BUN 17.8 12/20/2022    CR 1.03 12/20/2022     (H) 12/20/2022     COAGS:   Lab Results   Component Value Date    INR 1.91 (H) 12/20/2022     POC: No results found for: BGM, HCG, HCGS  HEPATIC:   Lab Results   Component Value Date    ALBUMIN 3.1 (L) 12/20/2022    PROTTOTAL 7.9 12/20/2022    ALT 54 (H) 12/20/2022    AST 91 (H) 12/20/2022    ALKPHOS 250 (H) 12/20/2022    BILITOTAL 5.9 (H) 12/20/2022     OTHER:   Lab Results   Component Value Date    PEDRO 9.7 12/20/2022    PHOS 3.8 12/20/2022    TSH 3.43 12/20/2022       Anesthesia Plan    ASA Status:  4, emergent    NPO Status:  ELEVATED Aspiration Risk/Unknown    Anesthesia Type: General.     - Airway: ETT   Induction: Intravenous, RSI, Propofol.   Maintenance: Balanced.   Techniques and Equipment:     - Lines/Monitors: 2nd IV, Arterial Line, Central Line, PAC, CVP, BIS, NIRS, KYAW            KYAW Absolute Contra-indication: NONE            KYAW Relative Contra-indication: Coagulopathy, Thrombocytopenia, Esophageal Varices     - Blood: Blood in Room, PRBC, Cell Saver, FFP, PLT     - Drips/Meds: Fentanyl, Norepi, Vasopressin, Epinephrine (insulin gtt, calcium chloride gtt)     Consents    Anesthesia Plan(s) and associated risks, benefits, and realistic alternatives discussed. Questions answered and  patient/representative(s) expressed understanding.     - Discussed: Risks, Benefits and Alternatives for BOTH SEDATION and the PROCEDURE were discussed     - Discussed with:  Patient      - Extended Intubation/Ventilatory Support Discussed: Yes.      - Patient is DNR/DNI Status: No    Use of blood products discussed: Yes.     - Discussed with: Patient.     Postoperative Care    Pain management: IV analgesics.        Comments:                Baldo Vera MD

## 2023-03-23 NOTE — CONSULTS
Transplant Admission Psychosocial Assessment    Patient Name: Cricket Mane  : 1960  Age: 62 year old  MRN: [id number]  Date of Initial Social Work Evaluation: 2022    Patient underwent a  donor liver transplant on 3/23/2023.  Cricket was in the OR and I called his daughter Kell to update psychosocial assessment and provide education about SW role while inpatient, and to begin discussion of expectations/requirements, caregiver needs and follow up needs post-transplant.     Presenting Information   Living Situation: Torin lives in a single family home with his adult son, Rene. No concerns related to the living environment.   If not local, plans for short term stay:  Torin lives local   Previous Functional Status: No functional concerns related to transplantation   Cultural/Language/Spiritual Considerations: No spiritual considerations. His primary language is English    Support System  Primary Support Person Children  Other support:  In-laws  Plan for support in immediate post-transplant period: Harley Castorena.     Health Care Directive  Decision Maker: Pt when able  Alternate Decision Maker: Per health care directive - Harley Castorena   Health Care Directive: Copy in Chart    Mental Health/Coping:   History of Mental Health: No mental health concerns. No history of psychotherapy, psychiatric hospitalizations or suicidal thoughts.   History of Chemical Health: Last alcohol use was 2022. Prior to that he was consuming 5-6 days of the week. 2-5 drinks per day.   Current status: Unable to assess  Coping: Unable to assess  Services Needed/Recommended: liver transplant support group    Financial   Income: Employment  Impact of transplant on income: He is unable to work for a period of time.   Insurance and medication coverage: [employer]  Financial concerns: None at this time  Resources needed: None at this time    Education provided by SW: Social Work role  inpatient setting, availability of support groups, parking information, hospitalization and caregiver expectations.     Assessment and recommendations and plan:    I spoke with Kell via phone as patient was in OR. Kell did not report any changes to his living environment, finances, employment, mental health or chemical health. He continues to have adequate caregiver support for post transplantation.     Transplant  will continue to follow    SRINIVASA Wagoner, [facility]  Liver Transplant   [facility]  Phone: [phone]  Pager: [phone]

## 2023-03-24 ENCOUNTER — APPOINTMENT (OUTPATIENT)
Dept: OCCUPATIONAL THERAPY | Facility: CLINIC | Age: 63
DRG: 006 | End: 2023-03-24
Attending: SURGERY
Payer: COMMERCIAL

## 2023-03-24 ENCOUNTER — APPOINTMENT (OUTPATIENT)
Dept: GENERAL RADIOLOGY | Facility: CLINIC | Age: 63
DRG: 006 | End: 2023-03-24
Attending: TRANSPLANT SURGERY
Payer: COMMERCIAL

## 2023-03-24 DIAGNOSIS — K70.10 ALCOHOLIC HEPATITIS (H): ICD-10-CM

## 2023-03-24 DIAGNOSIS — Z94.4 LIVER REPLACED BY TRANSPLANT (H): Primary | ICD-10-CM

## 2023-03-24 LAB
ALBUMIN SERPL BCG-MCNC: 2.5 G/DL (ref 3.5–5.2)
ALBUMIN SERPL BCG-MCNC: 2.7 G/DL (ref 3.5–5.2)
ALBUMIN SERPL BCG-MCNC: 2.9 G/DL (ref 3.5–5.2)
ALBUMIN SERPL BCG-MCNC: 2.9 G/DL (ref 3.5–5.2)
ALBUMIN SERPL BCG-MCNC: 3.2 G/DL (ref 3.5–5.2)
ALLEN'S TEST: ABNORMAL
ALLEN'S TEST: ABNORMAL
ALP SERPL-CCNC: 63 U/L (ref 40–129)
ALP SERPL-CCNC: 64 U/L (ref 40–129)
ALP SERPL-CCNC: 66 U/L (ref 40–129)
ALP SERPL-CCNC: 68 U/L (ref 40–129)
ALP SERPL-CCNC: 69 U/L (ref 40–129)
ALT SERPL W P-5'-P-CCNC: 203 U/L (ref 10–50)
ALT SERPL W P-5'-P-CCNC: 204 U/L (ref 10–50)
ALT SERPL W P-5'-P-CCNC: 218 U/L (ref 10–50)
ALT SERPL W P-5'-P-CCNC: 222 U/L (ref 10–50)
ALT SERPL W P-5'-P-CCNC: 240 U/L (ref 10–50)
AMYLASE SERPL-CCNC: 379 U/L (ref 28–100)
ANION GAP SERPL CALCULATED.3IONS-SCNC: 15 MMOL/L (ref 7–15)
ANION GAP SERPL CALCULATED.3IONS-SCNC: 8 MMOL/L (ref 7–15)
APTT PPP: 27 SECONDS (ref 22–38)
APTT PPP: 28 SECONDS (ref 22–38)
APTT PPP: 30 SECONDS (ref 22–38)
APTT PPP: 32 SECONDS (ref 22–38)
APTT PPP: 33 SECONDS (ref 22–38)
AST SERPL W P-5'-P-CCNC: 318 U/L (ref 10–50)
AST SERPL W P-5'-P-CCNC: 323 U/L (ref 10–50)
AST SERPL W P-5'-P-CCNC: 348 U/L (ref 10–50)
AST SERPL W P-5'-P-CCNC: 435 U/L (ref 10–50)
AST SERPL W P-5'-P-CCNC: 523 U/L (ref 10–50)
ATRIAL RATE - MUSE: NORMAL BPM
BASE EXCESS BLDA CALC-SCNC: 3.7 MMOL/L (ref -9–1.8)
BASE EXCESS BLDA CALC-SCNC: 4.1 MMOL/L (ref -9–1.8)
BASOPHILS # BLD AUTO: 0 10E3/UL (ref 0–0.2)
BASOPHILS # BLD AUTO: 0 10E3/UL (ref 0–0.2)
BASOPHILS NFR BLD AUTO: 0 %
BASOPHILS NFR BLD AUTO: 0 %
BILIRUB DIRECT SERPL-MCNC: 1.45 MG/DL (ref 0–0.3)
BILIRUB DIRECT SERPL-MCNC: 1.48 MG/DL (ref 0–0.3)
BILIRUB DIRECT SERPL-MCNC: 1.53 MG/DL (ref 0–0.3)
BILIRUB DIRECT SERPL-MCNC: 1.7 MG/DL (ref 0–0.3)
BILIRUB DIRECT SERPL-MCNC: 1.91 MG/DL (ref 0–0.3)
BILIRUB SERPL-MCNC: 2.4 MG/DL
BILIRUB SERPL-MCNC: 2.5 MG/DL
BILIRUB SERPL-MCNC: 2.6 MG/DL
BILIRUB SERPL-MCNC: 2.7 MG/DL
BILIRUB SERPL-MCNC: 3.3 MG/DL
BLD PROD TYP BPU: NORMAL
BLOOD COMPONENT TYPE: NORMAL
BUN SERPL-MCNC: 16.4 MG/DL (ref 8–23)
BUN SERPL-MCNC: 24.8 MG/DL (ref 8–23)
CA-I BLD-MCNC: 4.8 MG/DL (ref 4.4–5.2)
CALCIUM SERPL-MCNC: 8.4 MG/DL (ref 8.8–10.2)
CALCIUM SERPL-MCNC: 8.8 MG/DL (ref 8.8–10.2)
CHLORIDE SERPL-SCNC: 101 MMOL/L (ref 98–107)
CHLORIDE SERPL-SCNC: 103 MMOL/L (ref 98–107)
CMV IGM SERPL IA-ACNC: <8 AU/ML
CMV IGM SERPL IA-ACNC: NEGATIVE
CODING SYSTEM: NORMAL
CREAT SERPL-MCNC: 0.89 MG/DL (ref 0.67–1.17)
CREAT SERPL-MCNC: 1.14 MG/DL (ref 0.67–1.17)
DEPRECATED HCO3 PLAS-SCNC: 20 MMOL/L (ref 22–29)
DEPRECATED HCO3 PLAS-SCNC: 26 MMOL/L (ref 22–29)
DIASTOLIC BLOOD PRESSURE - MUSE: NORMAL MMHG
EBV VCA IGM SER IA-ACNC: 20.8 U/ML
EBV VCA IGM SER IA-ACNC: NORMAL
EOSINOPHIL # BLD AUTO: 0 10E3/UL (ref 0–0.7)
EOSINOPHIL # BLD AUTO: 0 10E3/UL (ref 0–0.7)
EOSINOPHIL NFR BLD AUTO: 0 %
EOSINOPHIL NFR BLD AUTO: 0 %
ERYTHROCYTE [DISTWIDTH] IN BLOOD BY AUTOMATED COUNT: 15.9 % (ref 10–15)
ERYTHROCYTE [DISTWIDTH] IN BLOOD BY AUTOMATED COUNT: 16 % (ref 10–15)
FIBRINOGEN PPP-MCNC: 210 MG/DL (ref 170–490)
FIBRINOGEN PPP-MCNC: 218 MG/DL (ref 170–490)
FIBRINOGEN PPP-MCNC: 221 MG/DL (ref 170–490)
FIBRINOGEN PPP-MCNC: 223 MG/DL (ref 170–490)
FIBRINOGEN PPP-MCNC: 239 MG/DL (ref 170–490)
GFR SERPL CREATININE-BSD FRML MDRD: 74 ML/MIN/1.73M2
GFR SERPL CREATININE-BSD FRML MDRD: >90 ML/MIN/1.73M2
GLUCOSE BLDC GLUCOMTR-MCNC: 110 MG/DL (ref 70–99)
GLUCOSE BLDC GLUCOMTR-MCNC: 117 MG/DL (ref 70–99)
GLUCOSE BLDC GLUCOMTR-MCNC: 119 MG/DL (ref 70–99)
GLUCOSE BLDC GLUCOMTR-MCNC: 121 MG/DL (ref 70–99)
GLUCOSE BLDC GLUCOMTR-MCNC: 122 MG/DL (ref 70–99)
GLUCOSE BLDC GLUCOMTR-MCNC: 125 MG/DL (ref 70–99)
GLUCOSE BLDC GLUCOMTR-MCNC: 128 MG/DL (ref 70–99)
GLUCOSE BLDC GLUCOMTR-MCNC: 131 MG/DL (ref 70–99)
GLUCOSE BLDC GLUCOMTR-MCNC: 136 MG/DL (ref 70–99)
GLUCOSE BLDC GLUCOMTR-MCNC: 140 MG/DL (ref 70–99)
GLUCOSE BLDC GLUCOMTR-MCNC: 140 MG/DL (ref 70–99)
GLUCOSE BLDC GLUCOMTR-MCNC: 150 MG/DL (ref 70–99)
GLUCOSE BLDC GLUCOMTR-MCNC: 155 MG/DL (ref 70–99)
GLUCOSE BLDC GLUCOMTR-MCNC: 158 MG/DL (ref 70–99)
GLUCOSE SERPL-MCNC: 120 MG/DL (ref 70–99)
GLUCOSE SERPL-MCNC: 174 MG/DL (ref 70–99)
HBA1C MFR BLD: 4.7 %
HBV CORE IGM SERPL QL IA: NONREACTIVE
HCO3 BLD-SCNC: 28 MMOL/L (ref 21–28)
HCO3 BLD-SCNC: 29 MMOL/L (ref 21–28)
HCT VFR BLD AUTO: 25.6 % (ref 40–53)
HCT VFR BLD AUTO: 28.4 % (ref 40–53)
HGB BLD-MCNC: 8.8 G/DL (ref 13.3–17.7)
HGB BLD-MCNC: 9.2 G/DL (ref 13.3–17.7)
HGB BLD-MCNC: 9.5 G/DL (ref 13.3–17.7)
IMM GRANULOCYTES # BLD: 0.1 10E3/UL
IMM GRANULOCYTES # BLD: 0.1 10E3/UL
IMM GRANULOCYTES NFR BLD: 1 %
IMM GRANULOCYTES NFR BLD: 1 %
INR PPP: 1.41 (ref 0.85–1.15)
INR PPP: 1.44 (ref 0.85–1.15)
INR PPP: 1.49 (ref 0.85–1.15)
INR PPP: 1.51 (ref 0.85–1.15)
INR PPP: 1.52 (ref 0.85–1.15)
INTERPRETATION ECG - MUSE: NORMAL
ISSUE DATE AND TIME: NORMAL
LACTATE SERPL-SCNC: 0.5 MMOL/L (ref 0.7–2)
LACTATE SERPL-SCNC: 0.7 MMOL/L (ref 0.7–2)
LACTATE SERPL-SCNC: 0.8 MMOL/L (ref 0.7–2)
LACTATE SERPL-SCNC: 1.5 MMOL/L (ref 0.7–2)
LACTATE SERPL-SCNC: 2.3 MMOL/L (ref 0.7–2)
LACTATE SERPL-SCNC: 4 MMOL/L (ref 0.7–2)
LIPASE SERPL-CCNC: 11 U/L (ref 13–60)
LYMPHOCYTES # BLD AUTO: 0.4 10E3/UL (ref 0.8–5.3)
LYMPHOCYTES # BLD AUTO: 0.4 10E3/UL (ref 0.8–5.3)
LYMPHOCYTES NFR BLD AUTO: 4 %
LYMPHOCYTES NFR BLD AUTO: 4 %
MAGNESIUM SERPL-MCNC: 2.7 MG/DL (ref 1.7–2.3)
MCH RBC QN AUTO: 32.1 PG (ref 26.5–33)
MCH RBC QN AUTO: 32.5 PG (ref 26.5–33)
MCHC RBC AUTO-ENTMCNC: 33.5 G/DL (ref 31.5–36.5)
MCHC RBC AUTO-ENTMCNC: 34.4 G/DL (ref 31.5–36.5)
MCV RBC AUTO: 95 FL (ref 78–100)
MCV RBC AUTO: 96 FL (ref 78–100)
MONOCYTES # BLD AUTO: 0.3 10E3/UL (ref 0–1.3)
MONOCYTES # BLD AUTO: 0.4 10E3/UL (ref 0–1.3)
MONOCYTES NFR BLD AUTO: 3 %
MONOCYTES NFR BLD AUTO: 4 %
NEUTROPHILS # BLD AUTO: 11.8 10E3/UL (ref 1.6–8.3)
NEUTROPHILS # BLD AUTO: 8.4 10E3/UL (ref 1.6–8.3)
NEUTROPHILS NFR BLD AUTO: 91 %
NEUTROPHILS NFR BLD AUTO: 92 %
NRBC # BLD AUTO: 0 10E3/UL
NRBC # BLD AUTO: 0 10E3/UL
NRBC BLD AUTO-RTO: 0 /100
NRBC BLD AUTO-RTO: 0 /100
O2/TOTAL GAS SETTING VFR VENT: 28 %
O2/TOTAL GAS SETTING VFR VENT: 28 %
OXYHGB MFR BLD: 95 % (ref 92–100)
OXYHGB MFR BLD: 96 % (ref 92–100)
P AXIS - MUSE: NORMAL DEGREES
PCO2 BLD: 41 MM HG (ref 35–45)
PCO2 BLD: 46 MM HG (ref 35–45)
PH BLD: 7.41 [PH] (ref 7.35–7.45)
PH BLD: 7.44 [PH] (ref 7.35–7.45)
PHOSPHATE SERPL-MCNC: 5 MG/DL (ref 2.5–4.5)
PLATELET # BLD AUTO: 47 10E3/UL (ref 150–450)
PLATELET # BLD AUTO: 49 10E3/UL (ref 150–450)
PLATELET # BLD AUTO: 55 10E3/UL (ref 150–450)
PLATELET # BLD AUTO: 66 10E3/UL (ref 150–450)
PLATELET # BLD AUTO: 83 10E3/UL (ref 150–450)
PO2 BLD: 89 MM HG (ref 80–105)
PO2 BLD: 92 MM HG (ref 80–105)
POTASSIUM SERPL-SCNC: 4 MMOL/L (ref 3.4–5.3)
POTASSIUM SERPL-SCNC: 4.3 MMOL/L (ref 3.4–5.3)
PR INTERVAL - MUSE: NORMAL MS
PROT SERPL-MCNC: 5.4 G/DL (ref 6.4–8.3)
PROT SERPL-MCNC: 5.5 G/DL (ref 6.4–8.3)
PROT SERPL-MCNC: 5.6 G/DL (ref 6.4–8.3)
PROT SERPL-MCNC: 5.7 G/DL (ref 6.4–8.3)
PROT SERPL-MCNC: 6.3 G/DL (ref 6.4–8.3)
QRS DURATION - MUSE: 86 MS
QT - MUSE: 320 MS
QTC - MUSE: 430 MS
R AXIS - MUSE: 66 DEGREES
RBC # BLD AUTO: 2.71 10E6/UL (ref 4.4–5.9)
RBC # BLD AUTO: 2.96 10E6/UL (ref 4.4–5.9)
SODIUM SERPL-SCNC: 136 MMOL/L (ref 136–145)
SODIUM SERPL-SCNC: 137 MMOL/L (ref 136–145)
SYSTOLIC BLOOD PRESSURE - MUSE: NORMAL MMHG
T AXIS - MUSE: -23 DEGREES
TACROLIMUS BLD-MCNC: 4.4 UG/L (ref 5–15)
TME LAST DOSE: ABNORMAL H
TME LAST DOSE: ABNORMAL H
UNIT ABO/RH: NORMAL
UNIT NUMBER: NORMAL
UNIT STATUS: NORMAL
UNIT TYPE ISBT: 5100
VENTRICULAR RATE- MUSE: 109 BPM
WBC # BLD AUTO: 12.7 10E3/UL (ref 4–11)
WBC # BLD AUTO: 9.2 10E3/UL (ref 4–11)

## 2023-03-24 PROCEDURE — 83605 ASSAY OF LACTIC ACID: CPT | Performed by: STUDENT IN AN ORGANIZED HEALTH CARE EDUCATION/TRAINING PROGRAM

## 2023-03-24 PROCEDURE — 85384 FIBRINOGEN ACTIVITY: CPT | Performed by: STUDENT IN AN ORGANIZED HEALTH CARE EDUCATION/TRAINING PROGRAM

## 2023-03-24 PROCEDURE — 250N000012 HC RX MED GY IP 250 OP 636 PS 637: Performed by: PHYSICIAN ASSISTANT

## 2023-03-24 PROCEDURE — 85610 PROTHROMBIN TIME: CPT | Performed by: STUDENT IN AN ORGANIZED HEALTH CARE EDUCATION/TRAINING PROGRAM

## 2023-03-24 PROCEDURE — 250N000011 HC RX IP 250 OP 636: Performed by: STUDENT IN AN ORGANIZED HEALTH CARE EDUCATION/TRAINING PROGRAM

## 2023-03-24 PROCEDURE — 83735 ASSAY OF MAGNESIUM: CPT | Performed by: SURGERY

## 2023-03-24 PROCEDURE — 82805 BLOOD GASES W/O2 SATURATION: CPT | Performed by: STUDENT IN AN ORGANIZED HEALTH CARE EDUCATION/TRAINING PROGRAM

## 2023-03-24 PROCEDURE — 71045 X-RAY EXAM CHEST 1 VIEW: CPT | Mod: 26 | Performed by: RADIOLOGY

## 2023-03-24 PROCEDURE — 258N000003 HC RX IP 258 OP 636: Performed by: STUDENT IN AN ORGANIZED HEALTH CARE EDUCATION/TRAINING PROGRAM

## 2023-03-24 PROCEDURE — 250N000009 HC RX 250: Performed by: SURGERY

## 2023-03-24 PROCEDURE — 250N000013 HC RX MED GY IP 250 OP 250 PS 637: Performed by: TRANSPLANT SURGERY

## 2023-03-24 PROCEDURE — 120N000011 HC R&B TRANSPLANT UMMC

## 2023-03-24 PROCEDURE — 85025 COMPLETE CBC W/AUTO DIFF WBC: CPT | Performed by: STUDENT IN AN ORGANIZED HEALTH CARE EDUCATION/TRAINING PROGRAM

## 2023-03-24 PROCEDURE — 83605 ASSAY OF LACTIC ACID: CPT

## 2023-03-24 PROCEDURE — 83605 ASSAY OF LACTIC ACID: CPT | Performed by: TRANSPLANT SURGERY

## 2023-03-24 PROCEDURE — 82248 BILIRUBIN DIRECT: CPT | Performed by: STUDENT IN AN ORGANIZED HEALTH CARE EDUCATION/TRAINING PROGRAM

## 2023-03-24 PROCEDURE — 80053 COMPREHEN METABOLIC PANEL: CPT | Performed by: SURGERY

## 2023-03-24 PROCEDURE — 80053 COMPREHEN METABOLIC PANEL: CPT | Performed by: STUDENT IN AN ORGANIZED HEALTH CARE EDUCATION/TRAINING PROGRAM

## 2023-03-24 PROCEDURE — 97530 THERAPEUTIC ACTIVITIES: CPT | Mod: GO

## 2023-03-24 PROCEDURE — 84100 ASSAY OF PHOSPHORUS: CPT | Performed by: SURGERY

## 2023-03-24 PROCEDURE — 82310 ASSAY OF CALCIUM: CPT | Performed by: SURGERY

## 2023-03-24 PROCEDURE — 82330 ASSAY OF CALCIUM: CPT | Performed by: SURGERY

## 2023-03-24 PROCEDURE — 80197 ASSAY OF TACROLIMUS: CPT | Performed by: SURGERY

## 2023-03-24 PROCEDURE — 258N000003 HC RX IP 258 OP 636: Performed by: SURGERY

## 2023-03-24 PROCEDURE — 250N000013 HC RX MED GY IP 250 OP 250 PS 637

## 2023-03-24 PROCEDURE — 83690 ASSAY OF LIPASE: CPT | Performed by: SURGERY

## 2023-03-24 PROCEDURE — 250N000013 HC RX MED GY IP 250 OP 250 PS 637: Performed by: SURGERY

## 2023-03-24 PROCEDURE — 99233 SBSQ HOSP IP/OBS HIGH 50: CPT | Performed by: STUDENT IN AN ORGANIZED HEALTH CARE EDUCATION/TRAINING PROGRAM

## 2023-03-24 PROCEDURE — 99232 SBSQ HOSP IP/OBS MODERATE 35: CPT | Mod: FS | Performed by: TRANSPLANT SURGERY

## 2023-03-24 PROCEDURE — 85049 AUTOMATED PLATELET COUNT: CPT | Performed by: SURGERY

## 2023-03-24 PROCEDURE — 82150 ASSAY OF AMYLASE: CPT | Performed by: SURGERY

## 2023-03-24 PROCEDURE — P9045 ALBUMIN (HUMAN), 5%, 250 ML: HCPCS | Performed by: STUDENT IN AN ORGANIZED HEALTH CARE EDUCATION/TRAINING PROGRAM

## 2023-03-24 PROCEDURE — 999N000065 XR CHEST PORT 1 VIEW

## 2023-03-24 PROCEDURE — 250N000011 HC RX IP 250 OP 636: Performed by: SURGERY

## 2023-03-24 PROCEDURE — 97166 OT EVAL MOD COMPLEX 45 MIN: CPT | Mod: GO

## 2023-03-24 PROCEDURE — 85730 THROMBOPLASTIN TIME PARTIAL: CPT | Performed by: STUDENT IN AN ORGANIZED HEALTH CARE EDUCATION/TRAINING PROGRAM

## 2023-03-24 PROCEDURE — P9037 PLATE PHERES LEUKOREDU IRRAD: HCPCS | Performed by: STUDENT IN AN ORGANIZED HEALTH CARE EDUCATION/TRAINING PROGRAM

## 2023-03-24 PROCEDURE — 250N000012 HC RX MED GY IP 250 OP 636 PS 637: Performed by: SURGERY

## 2023-03-24 PROCEDURE — 250N000013 HC RX MED GY IP 250 OP 250 PS 637: Performed by: PHYSICIAN ASSISTANT

## 2023-03-24 RX ORDER — METOPROLOL TARTRATE 25 MG/1
25 TABLET, FILM COATED ORAL 2 TIMES DAILY
Status: DISCONTINUED | OUTPATIENT
Start: 2023-03-24 | End: 2023-03-26

## 2023-03-24 RX ORDER — FUROSEMIDE 20 MG
10 TABLET ORAL DAILY PRN
Status: ON HOLD | COMMUNITY
End: 2023-03-29

## 2023-03-24 RX ADMIN — METHYLPREDNISOLONE SODIUM SUCCINATE 200 MG: 500 INJECTION INTRAMUSCULAR; INTRAVENOUS at 08:21

## 2023-03-24 RX ADMIN — OXYCODONE HYDROCHLORIDE 5 MG: 5 TABLET ORAL at 00:03

## 2023-03-24 RX ADMIN — FLUCONAZOLE IN SODIUM CHLORIDE 400 MG: 2 INJECTION, SOLUTION INTRAVENOUS at 08:21

## 2023-03-24 RX ADMIN — SULFAMETHOXAZOLE AND TRIMETHOPRIM 1 TABLET: 400; 80 TABLET ORAL at 08:20

## 2023-03-24 RX ADMIN — HUMAN INSULIN 4 UNITS/HR: 100 INJECTION, SOLUTION SUBCUTANEOUS at 21:54

## 2023-03-24 RX ADMIN — PIPERACILLIN AND TAZOBACTAM 3.38 G: 3; .375 INJECTION, POWDER, LYOPHILIZED, FOR SOLUTION INTRAVENOUS at 15:26

## 2023-03-24 RX ADMIN — OXYCODONE HYDROCHLORIDE 5 MG: 5 TABLET ORAL at 15:25

## 2023-03-24 RX ADMIN — MYCOPHENOLATE MOFETIL 750 MG: 250 CAPSULE ORAL at 08:19

## 2023-03-24 RX ADMIN — OXYCODONE HYDROCHLORIDE 5 MG: 5 TABLET ORAL at 08:20

## 2023-03-24 RX ADMIN — OXYCODONE HYDROCHLORIDE 5 MG: 5 TABLET ORAL at 13:12

## 2023-03-24 RX ADMIN — OXYCODONE HYDROCHLORIDE 5 MG: 5 TABLET ORAL at 19:47

## 2023-03-24 RX ADMIN — SODIUM CHLORIDE, POTASSIUM CHLORIDE, SODIUM LACTATE AND CALCIUM CHLORIDE 500 ML: 600; 310; 30; 20 INJECTION, SOLUTION INTRAVENOUS at 15:38

## 2023-03-24 RX ADMIN — ASPIRIN 81 MG 81 MG: 81 TABLET ORAL at 08:18

## 2023-03-24 RX ADMIN — PANTOPRAZOLE SODIUM 40 MG: 40 TABLET, DELAYED RELEASE ORAL at 08:21

## 2023-03-24 RX ADMIN — MYCOPHENOLATE MOFETIL 750 MG: 250 CAPSULE ORAL at 19:49

## 2023-03-24 RX ADMIN — SODIUM CHLORIDE, POTASSIUM CHLORIDE, SODIUM LACTATE AND CALCIUM CHLORIDE: 600; 310; 30; 20 INJECTION, SOLUTION INTRAVENOUS at 08:16

## 2023-03-24 RX ADMIN — METOPROLOL TARTRATE 25 MG: 25 TABLET, FILM COATED ORAL at 09:50

## 2023-03-24 RX ADMIN — OXYCODONE HYDROCHLORIDE 5 MG: 5 TABLET ORAL at 17:31

## 2023-03-24 RX ADMIN — SODIUM CHLORIDE, POTASSIUM CHLORIDE, SODIUM LACTATE AND CALCIUM CHLORIDE 500 ML: 600; 310; 30; 20 INJECTION, SOLUTION INTRAVENOUS at 15:08

## 2023-03-24 RX ADMIN — HUMAN INSULIN 3.5 UNITS/HR: 100 INJECTION, SOLUTION SUBCUTANEOUS at 13:27

## 2023-03-24 RX ADMIN — VALGANCICLOVIR 450 MG: 450 TABLET, FILM COATED ORAL at 08:19

## 2023-03-24 RX ADMIN — HUMAN INSULIN 4 UNITS/HR: 100 INJECTION, SOLUTION SUBCUTANEOUS at 00:19

## 2023-03-24 RX ADMIN — OXYCODONE HYDROCHLORIDE 5 MG: 5 TABLET ORAL at 10:28

## 2023-03-24 RX ADMIN — METOPROLOL TARTRATE 25 MG: 25 TABLET, FILM COATED ORAL at 19:49

## 2023-03-24 RX ADMIN — PIPERACILLIN AND TAZOBACTAM 3.38 G: 3; .375 INJECTION, POWDER, LYOPHILIZED, FOR SOLUTION INTRAVENOUS at 09:50

## 2023-03-24 RX ADMIN — PIPERACILLIN AND TAZOBACTAM 3.38 G: 3; .375 INJECTION, POWDER, LYOPHILIZED, FOR SOLUTION INTRAVENOUS at 04:18

## 2023-03-24 RX ADMIN — PIPERACILLIN AND TAZOBACTAM 3.38 G: 3; .375 INJECTION, POWDER, LYOPHILIZED, FOR SOLUTION INTRAVENOUS at 22:18

## 2023-03-24 RX ADMIN — TACROLIMUS 1.5 MG: 1 CAPSULE ORAL at 19:48

## 2023-03-24 RX ADMIN — ALBUMIN HUMAN 25 G: 0.05 INJECTION, SOLUTION INTRAVENOUS at 04:18

## 2023-03-24 RX ADMIN — TACROLIMUS 1.5 MG: 1 CAPSULE ORAL at 08:19

## 2023-03-24 RX ADMIN — OXYCODONE HYDROCHLORIDE 10 MG: 5 TABLET ORAL at 23:52

## 2023-03-24 RX ADMIN — OXYCODONE HYDROCHLORIDE 5 MG: 5 TABLET ORAL at 04:18

## 2023-03-24 ASSESSMENT — ACTIVITIES OF DAILY LIVING (ADL)
ADLS_ACUITY_SCORE: 28

## 2023-03-24 NOTE — PLAN OF CARE
Transferred to: 7A at   [facility]: Sent up with pt  Morales removed? Yes per SICU order  Central line removed? No  Chart and medications sent with patient? Yes  Family notified: Yes, son at bedside      Neuros intact, pt up to chair with pivot. PRN oxy given for pain. A-fib 90-120s, VSS. CVP 12-18. 2L NC. LS clear. Morales pulled at 1700 per order, DTV. Low UO, SICU aware & 1L LR bolus ordered. No BM. Clear, liquid diet. Insulin gtt, checking q2h.

## 2023-03-24 NOTE — PROCEDURES
[facility]    Central line    Date/Time: 3/24/2023 12:17 PM  Performed by: Rio Hatfield MD  Authorized by: Rio Hatfield MD   Indications: vascular access and central pressure monitoring      UNIVERSAL PROTOCOL   Site Marked: NA  Prior Images Obtained and Reviewed:  Yes  Required items: Required blood products, implants, devices and special equipment available    Patient identity confirmed:  Verbally with patient  NA - No sedation, light sedation, or local anesthesia  Confirmation Checklist:  Patient's identity using two indicators, relevant allergies, procedure was appropriate and matched the consent or emergent situation and correct equipment/implants were available  Time out: Immediately prior to the procedure a time out was called    Universal Protocol: the Joint Commission Universal Protocol was followed    Preparation: Patient was prepped and draped in usual sterile fashion    ESBL (mL):  15    SEDATION    Patient Sedated: No      Preparation: skin prepped with ChloraPrep and skin prepped with povidone-iodine  Skin prep agent dried: skin prep agent completely dried prior to procedure  Sterile barriers: all five maximum sterile barriers used - cap, mask, sterile gown, sterile gloves, and large sterile sheet  Hand hygiene: hand hygiene performed prior to central venous catheter insertion  Patient position: flat  Catheter type: triple lumen  Catheter size: 7 Fr  Pre-procedure: landmarks identified  Number of attempts: 1  Successful placement: yes  Post-procedure: line sutured and dressing applied  Assessment: blood return through all ports,  free fluid flow and placement verified by x-ray      PROCEDURE    Patient Tolerance:  Patient tolerated the procedure well with no immediate complications  Length of time physician/provider present for 1:1 monitoring during sedation: 15

## 2023-03-24 NOTE — PROGRESS NOTES
SURGICAL ICU PROGRESS NOTE  03/24/2023      PRIMARY TEAM: Transplant   PRIMARY PHYSICIAN: Aniket Arzate MD      REASON FOR CRITICAL CARE ADMISSION: s/p liver transplant ; close hemodynamic monitoring   CONSULTING PHYSICIAN: Ceci Juarez MD      ASSESSMENT:  Cricket Mane is a 62 year old male with history of atrial fibrillation on metoprolol, CAD, decompensated cirrhosis secondary to alcohol with hx bleeding esophageal varices now s/p DDLT on 3/23/23 with Dr. Arzate.     Overnight-  CVP low at 4; s/p albumin  Platelts<50; s/p 1 unit     Changes Today:  - Restart home metoprolol  -discontinue amiodarone  -advance diet  -pull carrera/ switch central line    -continue insulin gtt   - Transfer to floor     Neurological:  # Acute Post Op Pain   -Monitor neurological status. Notify the MD for any acute changes in exam.  -dilaudid/oxycodone for pain.  -no sedation.     Pulmonary care:   No acute needs. Pt extubated prior to coming to floor.   -Supplemental oxygen to keep saturation above 92 %.  -Incentive spirometer every 15- 30 minutes when awake.      Cardiovascular:    #Atrial Fibrillation  #Hx CAD  #Hx hyperlipidemia   Pt takes metoprolol at home. Pt was persistently in a fib throughout operation. Controlled with diltiazem.  Did not take metoprolol evening prior to operation.      Goals-- Maps >60, <85; Systolics >110, <160; CVP >8, <12     -discontinue Amiodarone drip   -Restart PTA Metoprolol 25 mg BID   -No pressors  -Monitor hemodynamic status.      GI care:  #Decompensated cirrhosis secondary to alcohol use  S/p DDLT on 3/23/23  #Hx alcohol use disorder   #Hx esophageal varices with bleeding    Liver enzymes downtrending. U/S with elevated resistive index with suggestion of diastolic reversal in the hepatic arteries. Transplant aware.     Goals--Maps >60, <85; Systolics >110, <160; CVP >8 <12      Transfusion goals-- Hg>8, INR<2, Fibrinogen>200, plt>50      -trend labs      Immunosuppression-- MMF, tacrolimus,  prednisone   Prophylaxis-- Valcyte, fluconazole, zosyn x48hr       Fluids/ Electrolytes/ Nutrition:   -Regular diet  -discontinue dextrose 5% and 0.45% NaCl  for IV fluid hydration  -No indication for parenteral nutrition.  -Nutrition consulted. Appreciate recs     Renal/ Fluid Balance:    -Urine output is adequate so far.  -Will continue to monitor intake and output.      Endocrine:    #Stress Induced Hyperglycemia  Required 80 units yesterday. Regular diet today  -continue Insulin gtt      ID/ Antibiotics:  #S/P DDLT on 3/23/23     Prophylaxis-- Valcyte, Micafungin, zosyn x48hr       Heme:     #Acute Blood Loss Anemia   #Acute on Chronic Anemia  #thrombocytopenia   #coagulopathy    Stable.     Transfusion goals-- Hg>8, INR<2, Fibrinogen>200, plt>50   intraop- s/p pRBC 2 units, FFP 5 units, Plt 2 units   Postop-- s/p 1 unit cryo, 1 unit FFp, 1 unit platelets   -Continue to follow labs         MSK:    -PT and OT consulted. Appreciate recs.     DVT Prophylaxis-- mechanical   GI-- none   Restraints-- none       Lines/ tubes/ drains:  -a line  -central line  -PIVx2     Disposition:  - transfer to floor     Patient seen and discussed with staff.    Ambika Agee MS4    Note taken over for medical student attestation. I personally discussed, managed and evaluated the patient in the ICU today. I personally ordered and reviewed all appropriate labs/imaging/products and spent 45 minutes of critical care time managing this patient outside of procedural time.     Andi Reddy PA-C  03/24/23  12:33 PM      ====================================    TODAY'S SUBJECTIVE/INTERVAL HISTORY:   Reports he has significant pain this AM. Resolves with oxy. Slept decently. Has not had bowel movement. Morales in place.     OBJECTIVE:     Temp:  [98.1  F (36.7  C)-98.8  F (37.1  C)] 98.2  F (36.8  C)  Pulse:  [] 104  Resp:  [10-27] 12  BP: (126)/(76) 126/76  MAP:  [76 mmHg-272 mmHg] 93 mmHg  Arterial Line BP: (114-301)/()  132/65  SpO2:  [94 %-100 %] 96 %  Resp: 12      I/O last 3 completed shifts:  In: 9324.05 [I.V.:5115.05; Other:520]  Out: 6571 [Urine:3530; Drains:841; Blood:2200]    General/Neuro: awake, alert, orientedx4  CV: tachycardic, a fib  Pulm: nasal cannula. Clear breath sounds   Abd: soft; dressings with moderate strike through. 2xJP drains-- inferior with serosanguinous output, superior with sanguinous   Extremities: no edema, moving all extremities spontaneously   Skin: warm and well-perfused.     LABS:   Arterial Blood Gases   Recent Labs   Lab 03/24/23  0549 03/24/23  0159 03/23/23  2208 03/23/23  1829   PH 7.44 7.41 7.44 7.45   PCO2 41 46* 37 38   PO2 92 89 100 101   HCO3 28 29* 25 26     Complete Blood Count   Recent Labs   Lab 03/24/23  0548 03/24/23  0407 03/24/23  0200 03/23/23  2353 03/23/23  1939 03/23/23  1830 03/23/23  1545 03/23/23  1329 03/23/23  0737 03/22/23 2035   WBC  --  9.2  --   --   --   --  9.0 9.8  --  10.4   HGB  --  8.8*  --  9.2* 10.0*  --  10.6* 10.2*   < > 11.2*   PLT 55* 47* 49*  --  61*   < > 71* 73*   < > 56*    < > = values in this interval not displayed.     Basic Metabolic Panel  Recent Labs   Lab 03/24/23  0547 03/24/23  0407 03/24/23  0158 03/23/23  1550 03/23/23  1545 03/23/23  1332 03/23/23  1329 03/23/23  1232 03/23/23  0737 03/22/23 2035   NA  --  137  --   --  133*  --  134*  134* 134   < > 129*   POTASSIUM  --  4.3  --   --  3.4  --  3.6  3.6 3.9   < > 4.6   CHLORIDE  --  103  --   --  98  --  99  99  --   --  99   CO2  --  26  --   --  22  --  22  22  --   --  19*   BUN  --  16.4  --   --  14.8  --  14.3  14.3  --   --  19.2   CR  --  0.89  --   --  0.75  --  0.73  0.73  --   --  0.83   * 119*  120* 136*   < > 184*   < > 193*  193* 162*   < > 102*    < > = values in this interval not displayed.     Liver Function Tests  Recent Labs   Lab 03/24/23  0407 03/23/23  2353 03/23/23  1939 03/23/23  1545 03/23/23  1329   * 523* 691* 926* 891*   * 240*  270* 302* 309*   ALKPHOS 66 69 84 95 110   BILITOTAL 2.7* 3.3* 4.4* 5.7* 7.2*   ALBUMIN 2.5* 2.7* 2.9* 3.0* 3.0*   INR 1.44* 1.51* 1.59*  --  2.25*     Pancreatic Enzymes  Recent Labs   Lab 23  0407 23  2035   LIPASE 11*  --    AMYLASE 379* 113*     Coagulation Profile  Recent Labs   Lab 23  0407 23  2353 23  1939 23  1545 23  1329   INR 1.44* 1.51* 1.59*  --  2.25*   PTT 32 33 34 34 42*         IMAGING:   Recent Results (from the past 24 hour(s))   XR Abdomen Port 1 View    Narrative    Exam: TEMPORARY, 3/23/2023 12:39 PM    Indication: Incorrect count    Comparison: Abdomen/pelvis CT 3/22/2023    Findings:   Supine AP view of the abdomen. Surgical changes of liver  transplantation. Surgical clips project over the right upper quadrant.  No surgical needle is identified. Percutaneous drains project over the  right upper quadrant. No dilated bowel or pneumatosis visualized.  Left total hip arthroplasty. Degenerative changes of the right hip  joint, sacroiliac joints, and lumbar spine.       Impression    Impression:   No retained surgical needle is demonstrated.     Results reported by Dr. Pérez to OR staff on 3/23/2023 at 1243 hours.    I have personally reviewed the examination and initial interpretation  and I agree with the findings.    OCTAVIO LENNON MD         SYSTEM ID:  [id number]   US Liver Transplant   Result Value    Radiologist flags Elevated hepatic artery resistive indices and (Urgent)    Narrative    EXAMINATION: US LIVER TRANSPLANT, 3/23/2023 2:02 PM     COMPARISON: CT abdomen, 3/22/2023    HISTORY: Transplant liver,  donor transplant performed on  3/23/2023.    TECHNIQUE:  Gray-scale, color Doppler and spectral flow analysis.    FINDINGS:     There is no ascites.    Liver:   The liver demonstrates normal homogeneous echotexture. No  evidence of a focal hepatic mass. No perihepatic fluid collection.     Bile Ducts: No intrahepatic biliary duct  dilatation. The common bile  duct is not visualized on the current study.      Gallbladder: The gallbladder is surgically absent.    Kidneys:   Right kidney:  The right kidney demonstrates normal echotexture with  no evidence of a shadowing stone, focal mass or hydronephrosis.   12.5  cm in long axis dimension.  Left kidney:  The left kidney demonstrates normal echotexture with no  evidence of a shadowing stone, focal mass or hydronephrosis. 10.7 cm  in long axis dimension.    Pancreas: The pancreas is not visualized current examination.    Spleen:  The spleen is unremarkable in appearance, measuring 12.7 cm.    Visualized portions of the aorta are unremarkable.    LIVER DOPPLER:  Splenic vein: The splenic vein is visualized on the current exam  Extrahepatic portal vein:  Patent continuous antegrade flow, 102  cm/sec.  Portal vein at anastomosis: Patent continuous antegrade flow, 113  cm/sec.  Intrahepatic portal vein:  Patent continuous antegrade flow, 100  cm/sec.  Right portal vein flow is antegrade, measuring 101 cm/sec.  Left portal vein flow is antegrade, measuring 62 cm/sec.    Inferior vena cava: patent with flow toward the heart throughout..  IVC above anastomosis:  24 cm/sec.  IVC at anastomosis:  81 cm/sec.  Intrahepatic IVC:  22 cm/sec.  Extrahepatic IVC:  20 cm/sec.    Right, mid, left hepatic veins: Patent with flow towards the inferior  vena cava.    Hepatic artery: Low resistance waveform with flow towards the liver.  74 cm/sec with resistive index 1 with diastolic reversal  Right hepatic artery: 28 cm/sec with resistive index 1.0, with  possible diastolic flow reversal.  Left hepatic artery: 43 cm/sec with resistive index 1.0, with  diastolic flow reversal      Impression    Impression:     1. Patent  transplant hepatic vasculature by Doppler evaluation. Note  made of elevated resistive index with suggestion of diastolic reversal  in the hepatic arteries, possibly secondary to early  postoperative  edema. Recommend attention on short-term follow-up.  2. No perihepatic collection demonstrated.      [Urgent Result: Elevated hepatic artery resistive indices and  diastolic flow reversal ]    Finding was identified on 3/23/2023 2:00 PM.     Dr. William Bautista  was contacted by Dr. Demond Beyer at 3/23/2023 3:09  PM and verbalized understanding of the urgent finding.      I have personally reviewed the examination and initial interpretation  and I agree with the findings.    CHERYL CRONIN MD         SYSTEM ID:  [id number]   XR Chest Port 1 View    Narrative    EXAM:  XR CHEST PORT 1 VIEW    INDICATION: central line    COMPARISON:  Chest x-ray 3/22/2023    FINDINGS:  Single AP view of the chest. Right IJ central venous catheter  terminates over the SVC.    Cardiomediastinal silhouette within normal limits.  Diffuse  interstitial and airspace opacities. Low lung volumes. No  pneumothorax.  No pleural effusion.  Surgical changes in the upper  abdomen. . No acute bony lesions.      Impression    IMPRESSION:  1.  Right IJ central venous catheter terminates over the SVC.  2.  Diffuse interstitial and airspace opacities with low lung volumes,  likely representing edema and/or atelectasis.    I have personally reviewed the examination and initial interpretation  and I agree with the findings.    RACHEL SWAN MD         SYSTEM ID:  [id number]

## 2023-03-24 NOTE — PROGRESS NOTES
Transplant Social Work Services Progress Note    Date of Initial Social Work Evaluation: 2022  Collaborated with: Patient at bedside    Data: Cricket underwent a  donor liver transplant on 3/23/2023  Intervention: I met with Cricket at bedside. Patient is A&Ox4. Torin inquired if I received his HCD and indicated that he has not had a chance to come to our liver transplant support group d/t work, but has not forgotten about it. He reported that his children are getting ready for a few international trips and that his sister from [place] can also assist post transplant. He recalls that he will need someone with him for at least a month. Torin inquired how long until people start working again. I reported that it varies on the person, recovery and line of work.   Assessment: No new assessment   Education provided by SW: Post transplant cares/expectations   Plan:    Discharge Plans in Progress: TBD - Pt will likely go home when med ready     Barriers to d/c plan: Medical stability    Follow up Plan: Transplant  will continue to follow for psychosocial needs. I am off all next week (3/27 - 3/31). Uday Gong [facility] will cover in my absence.     SRINIVASA Wagoner, [facility] CareSW  Liver Transplant   [facility]  Phone: [phone]  Pager: [phone]

## 2023-03-24 NOTE — PHARMACY-ADMISSION MEDICATION HISTORY
Admission Medication History Completed by Pharmacy    See [facility] Admission Navigator for allergy information, preferred outpatient pharmacy, prior to admission medications and immunization status.     Medication History Sources:     Dispense history    Patient interview    Changes made to PTA medication list (reason):    Added: furosemide    Deleted: None    Changed: None    Additional Information:    Cricket recently finished a course of Paxlovid 3/6/23-3/11/23    During his pre-operative evaluation, Cricket mentions he was given some recommendations on future preventative care, including  o Statin therapy (LDL December 2022 was 218 mg/dL)  o Osteoporosis therapy (had held off on starting a bisphosphonate due to varices but may benefit from calcium + vitamin D3 supplementation    Had previously sought medical advice for a sleep aid. Had previously tried melatonin about one year ago, but does not recall it having much benefit. Would be willing to re-trial this or other sleep aid.    Prior to Admission medications    Medication Sig Last Dose Taking? Auth Provider Long Term End Date   folic acid (FOLVITE) 1 MG tablet Take 1,000 mcg by mouth daily Past Week Yes Reported, Patient     furosemide (LASIX) 20 MG tablet Take 10 mg by mouth daily as needed (swelling, ascites) Past Week Yes Unknown, Entered By History     metoprolol tartrate (LOPRESSOR) 25 MG tablet Take 1 tablet (25 mg) by mouth 2 times daily Past Week Yes Constantin Casiano MD Yes    Multiple Vitamins-Iron TABS Take 1 tablet by mouth daily Past Week Yes Reported, Patient  8/8/23   omeprazole 20 MG tablet Take 20 mg by mouth 2 times daily Past Week Yes Reported, Patient         Date completed: 03/24/23    Medication history completed by: Mayra Calderon [facility]

## 2023-03-24 NOTE — PROGRESS NOTES
03/24/23 1513   Appointment Info   Signing Clinician's Name / Credentials (OT) Radha Contreras OTR/L   Rehab Comments (OT) abdominal precautions   Living Environment   People in Home child(addie), adult   Current Living Arrangements house   Home Accessibility stairs within home;stairs to enter home   Number of Stairs, Main Entrance 2   Number of Stairs, Within Home, Primary other (see comments)  (15 to upper level, 15 to lower level)   Stair Railings, Within Home, Primary railings safe and in good condition   Transportation Anticipated car, drives self;family or friend will provide   Living Environment Comments Pt lives with adult son in a house 2 FA[address], bedroom located on upper level. All needs able to be met on main level (aside from shower). Pt has both a walk in and tub shower, no shower chair or grab bars.   Self-Care   Usual Activity Tolerance excellent   Current Activity Tolerance fair   Equipment Currently Used at Home none   Fall history within last six months no   Activity/Exercise/Self-Care Comment Pt reports being IND w/ ADLs at baseline, no AD use   Instrumental Activities of Daily Living (IADL)   IADL Comments Pt reports being IND w/ IADLs at baseline, works full time in finance. Family report ability to assist PRN upon discharge.   General Information   Onset of Illness/Injury or Date of Surgery 03/23/23   Referring Physician William Bautista MD   Patient/Family Therapy Goal Statement (OT) Return to PLOF   Additional Occupational Profile Info/Pertinent History of Current Problem Cricket Mane is a 62 year old male with PMH significant for EtOH cirrhosis (MELD 22) complicated by bleeding esophageal varices, A fib on metoprolol, and CAD. He is now s/p DDOLT (no biliary stent) and umbilical hernia repair on 3/23/23 with Dr. Arzate requiring 2 units PRBC, 5 units FFP, 2 units PLTs.   Existing Precautions/Restrictions fall;abdominal   Left Upper Extremity (Weight-bearing Status) partial weight-bearing  (PWB)  (10#)   Right Upper Extremity (Weight-bearing Status) partial weight-bearing (PWB)  (10#)   Cognitive Status Examination   Orientation Status orientation to person, place and time   Affect/Mental Status (Cognitive) anxious   Cognitive Status Comments Pt appears mildly confused, anxious, requiring increased processing time.   Visual Perception   Visual Impairment/Limitations corrective lenses for reading   Sensory   Sensory Quick Adds sensation intact   Pain Assessment   Patient Currently in Pain Yes, see Vital Sign flowsheet   Posture   Posture not impaired   Range of Motion Comprehensive   General Range of Motion bilateral upper extremity ROM WFL   Strength Comprehensive (MMT)   General Manual Muscle Testing (MMT) Assessment no strength deficits identified   Coordination   Upper Extremity Coordination No deficits were identified   Bed Mobility   Bed Mobility supine-sit   Supine-Sit [place] (Bed Mobility) moderate assist (50% patient effort);2 person assist   Transfers   Transfers bed-chair transfer   Transfer Skill: Bed to Chair/Chair to Bed   Bed-Chair [place] (Transfers) minimum assist (75% patient effort);1 person to manage equipment   Activities of Daily Living   BADL Assessment/Intervention bathing;lower body dressing;grooming;toileting   Bathing Assessment/Intervention   [place] Level (Bathing) moderate assist (50% patient effort)   Comment, (Bathing) Per clinical judgment   Lower Body Dressing Assessment/Training   Comment, (Lower Body Dressing) Per clinical judgment   [place] Level (Lower Body Dressing) moderate assist (50% patient effort)   Grooming Assessment/Training   [place] Level (Grooming) set up;minimum assist (75% patient effort)   Comment, (Grooming) Per clinical judgment   Toileting   Comment, (Toileting) Per clinical judgment   [place] Level (Toileting) moderate assist (50% patient effort)   Clinical Impression   Criteria for Skilled Therapeutic Interventions  Met (OT) Yes, treatment indicated   OT Diagnosis Decreased ADL/IADL I   OT Problem List-Impairments impacting ADL problems related to;activity tolerance impaired;cognition;fear & anxiety;strength;pain;post-surgical precautions;mobility   Assessment of Occupational Performance 5 or more Performance Deficits   Identified Performance Deficits Dressing, bathing, toileting, g/h, home mgmt   Planned Therapy Interventions (OT) cognition;ADL retraining;IADL retraining;strengthening;transfer training;home program guidelines;progressive activity/exercise;risk factor education   Clinical Decision Making Complexity (OT) moderate complexity   Anticipated Equipment Needs Upon Discharge (OT) other (see comments)  (TBD)   Risk & Benefits of therapy have been explained evaluation/treatment results reviewed;care plan/treatment goals reviewed;risks/benefits reviewed;current/potential barriers reviewed;participants included;participants voiced agreement with care plan;patient;daughter;son   Clinical Impression Comments Pt will benefit from skilled OT services to progress IND w/ ADL/IADL and facilitate return to PLOF   OT Total Evaluation Time   OT Eval, Moderate Complexity Minutes (12806) 10   OT Goals   Therapy Frequency (OT) 6 times/wk   OT Predicted Duration/Target Date for Goal Attainment 04/21/23   OT Goals Hygiene/Grooming;Lower Body Dressing;Lower Body Bathing;Toilet Transfer/Toileting;Home Management;Cognition   OT: Hygiene/Grooming modified independent;using adaptive equipment;within precautions;while standing   OT: Lower Body Dressing Modified independent;using adaptive equipment;within precautions;including set-up/clothing retrieval   OT: Lower Body Bathing Modified independent;using adaptive equipment;with precautions   OT: Toilet Transfer/Toileting Supervision/stand-by assist;toilet transfer;cleaning and garment management;using adaptive equipment;within precautions   OT: Home Management Supervision/stand-by assist;with  light demand household tasks;within precautions;ambulatory level;using adaptive equipment   OT: Cognitive Patient/caregiver will verbalize understanding of cognitive assessment results/recommendations as needed for safe discharge planning   Interventions   Interventions Quick Adds Therapeutic Activity   Therapeutic Activities   Therapeutic Activity Minutes (75052) 38   Symptoms noted during/after treatment fatigue;shortness of breath;increased pain   Treatment Detail/Skilled Intervention    OT Discharge Planning   OT Plan OT: Review precautions, commode tx, standing tolerance, seated/standing ADLs, monitor cog   OT Discharge Recommendation (DC Rec) [facility]-Motivated patient will benefit from intensive, interdisciplinary therapy.  Anticipate will be able to tolerate 3 hours of therapy per day   OT Rationale for DC Rec Pt presents significantly below baseline, limited by post-op pain, precautions and deconditioning. Currently would recommend ARU to progress IND w/ ADLs/IADLs and mobility. Pt may progress to home pending LOS, will continue to monitor and update recs.   OT Brief overview of current status Ax1-2 with lines   Total Session Time   Timed Code Treatment Minutes 38   Total Session Time (sum of timed and untimed services) 48

## 2023-03-24 NOTE — PHARMACY-TRANSPLANT NOTE
Adult Liver Transplant Post Operative Note    62 year old male s/p  donor liver transplant on 3/22/2023 for Alcoholic liver disease.      Planned immunosuppression regimen to include:   INDUCTION with: methylprednisolone/prednisone taper through POD #6.  MAINTENANCE to include mycophenolate and tacrolimus with initial goal trough levels of 8-12 (closer to 10) mcg/L for 0-3 months post-transplant.  Patient may continue prednisone at 5 mg PO daily until tacrolimus level is therapeutic.     Surgical prophylaxis includes: piperacillin-tazobactam IV for 48 hours and fluconazole IV/PO for 7 days .      Opportunistic pathogen prophylaxis includes: trimethoprim/sulfamethoxazole, valganciclovir, and nystatin (topical antifungal coverage to begin once systemic antifungal therapy is complete).    Patient is not enrolled in medication study.    Pharmacy will monitor for medication interactions and immunosuppression levels in conjunction with the team. Medication therapy needs for discharge planning will continue to be addressed throughout the current admission via multidisciplinary rounds and order review.  Pharmacy will make recommendations as appropriate.    Mayra Calderon RPH on 3/24/2023 at 2:55 PM

## 2023-03-24 NOTE — PLAN OF CARE
Neuros: Neurologically intact. Lethargic but arouses easily. PERRLA. PRN oxy given q4h. Able to fall asleep between cares.  CV: Afebrile. Rate-controlled a-fib with rates 90s-110s. Amio gtt infusing. 500 ml albumin given for CVP of 4. 1 U platelets also given.  RR: 2 L NC. Can be on RA during the day. Clear dim LS.  GI/: LBM 3/22. Morales patent with adequate UOP. Insulin gtt and LR infusing.  Drains: 2 Rt abd JUSTINO drains    Plan: Place floor orders. Continue with cares as ordered.    For vital signs and complete assessments, please see documentation flowsheets.

## 2023-03-25 ENCOUNTER — APPOINTMENT (OUTPATIENT)
Dept: PHYSICAL THERAPY | Facility: CLINIC | Age: 63
DRG: 006 | End: 2023-03-25
Attending: SURGERY
Payer: COMMERCIAL

## 2023-03-25 ENCOUNTER — APPOINTMENT (OUTPATIENT)
Dept: OCCUPATIONAL THERAPY | Facility: CLINIC | Age: 63
DRG: 006 | End: 2023-03-25
Attending: TRANSPLANT SURGERY
Payer: COMMERCIAL

## 2023-03-25 LAB
ALBUMIN SERPL BCG-MCNC: 2.8 G/DL (ref 3.5–5.2)
ALP SERPL-CCNC: 55 U/L (ref 40–129)
ALT SERPL W P-5'-P-CCNC: 155 U/L (ref 10–50)
ANION GAP SERPL CALCULATED.3IONS-SCNC: 11 MMOL/L (ref 7–15)
AST SERPL W P-5'-P-CCNC: 151 U/L (ref 10–50)
BACTERIA SPEC CULT: NORMAL
BILIRUB DIRECT SERPL-MCNC: 1.16 MG/DL (ref 0–0.3)
BILIRUB SERPL-MCNC: 1.9 MG/DL
BUN SERPL-MCNC: 39.4 MG/DL (ref 8–23)
CALCIUM SERPL-MCNC: 8.4 MG/DL (ref 8.8–10.2)
CHLORIDE SERPL-SCNC: 102 MMOL/L (ref 98–107)
CREAT SERPL-MCNC: 1.55 MG/DL (ref 0.67–1.17)
DEPRECATED HCO3 PLAS-SCNC: 24 MMOL/L (ref 22–29)
ERYTHROCYTE [DISTWIDTH] IN BLOOD BY AUTOMATED COUNT: 16 % (ref 10–15)
GFR SERPL CREATININE-BSD FRML MDRD: 51 ML/MIN/1.73M2
GLUCOSE BLDC GLUCOMTR-MCNC: 117 MG/DL (ref 70–99)
GLUCOSE BLDC GLUCOMTR-MCNC: 119 MG/DL (ref 70–99)
GLUCOSE BLDC GLUCOMTR-MCNC: 122 MG/DL (ref 70–99)
GLUCOSE BLDC GLUCOMTR-MCNC: 128 MG/DL (ref 70–99)
GLUCOSE BLDC GLUCOMTR-MCNC: 129 MG/DL (ref 70–99)
GLUCOSE BLDC GLUCOMTR-MCNC: 131 MG/DL (ref 70–99)
GLUCOSE BLDC GLUCOMTR-MCNC: 133 MG/DL (ref 70–99)
GLUCOSE BLDC GLUCOMTR-MCNC: 134 MG/DL (ref 70–99)
GLUCOSE BLDC GLUCOMTR-MCNC: 134 MG/DL (ref 70–99)
GLUCOSE BLDC GLUCOMTR-MCNC: 135 MG/DL (ref 70–99)
GLUCOSE BLDC GLUCOMTR-MCNC: 136 MG/DL (ref 70–99)
GLUCOSE BLDC GLUCOMTR-MCNC: 136 MG/DL (ref 70–99)
GLUCOSE BLDC GLUCOMTR-MCNC: 139 MG/DL (ref 70–99)
GLUCOSE BLDC GLUCOMTR-MCNC: 141 MG/DL (ref 70–99)
GLUCOSE BLDC GLUCOMTR-MCNC: 143 MG/DL (ref 70–99)
GLUCOSE BLDC GLUCOMTR-MCNC: 154 MG/DL (ref 70–99)
GLUCOSE BLDC GLUCOMTR-MCNC: 157 MG/DL (ref 70–99)
GLUCOSE BLDC GLUCOMTR-MCNC: 93 MG/DL (ref 70–99)
GLUCOSE SERPL-MCNC: 108 MG/DL (ref 70–99)
HCT VFR BLD AUTO: 26.4 % (ref 40–53)
HGB BLD-MCNC: 8.8 G/DL (ref 13.3–17.7)
INR PPP: 1.27 (ref 0.85–1.15)
LACTATE SERPL-SCNC: 0.6 MMOL/L (ref 0.7–2)
LACTATE SERPL-SCNC: 0.7 MMOL/L (ref 0.7–2)
MAGNESIUM SERPL-MCNC: 1.8 MG/DL (ref 1.7–2.3)
MCH RBC QN AUTO: 32.6 PG (ref 26.5–33)
MCHC RBC AUTO-ENTMCNC: 33.3 G/DL (ref 31.5–36.5)
MCV RBC AUTO: 98 FL (ref 78–100)
PHOSPHATE SERPL-MCNC: 5.3 MG/DL (ref 2.5–4.5)
PLATELET # BLD AUTO: 80 10E3/UL (ref 150–450)
POTASSIUM SERPL-SCNC: 4.2 MMOL/L (ref 3.4–5.3)
PROT SERPL-MCNC: 5.5 G/DL (ref 6.4–8.3)
RBC # BLD AUTO: 2.7 10E6/UL (ref 4.4–5.9)
SODIUM SERPL-SCNC: 137 MMOL/L (ref 136–145)
TACROLIMUS BLD-MCNC: 5.7 UG/L (ref 5–15)
TME LAST DOSE: NORMAL H
TME LAST DOSE: NORMAL H
WBC # BLD AUTO: 10 10E3/UL (ref 4–11)

## 2023-03-25 PROCEDURE — 250N000013 HC RX MED GY IP 250 OP 250 PS 637: Performed by: NURSE PRACTITIONER

## 2023-03-25 PROCEDURE — 120N000011 HC R&B TRANSPLANT UMMC

## 2023-03-25 PROCEDURE — 83605 ASSAY OF LACTIC ACID: CPT | Performed by: TRANSPLANT SURGERY

## 2023-03-25 PROCEDURE — 83605 ASSAY OF LACTIC ACID: CPT | Performed by: STUDENT IN AN ORGANIZED HEALTH CARE EDUCATION/TRAINING PROGRAM

## 2023-03-25 PROCEDURE — 250N000012 HC RX MED GY IP 250 OP 636 PS 637: Performed by: PHYSICIAN ASSISTANT

## 2023-03-25 PROCEDURE — 83735 ASSAY OF MAGNESIUM: CPT | Performed by: NURSE PRACTITIONER

## 2023-03-25 PROCEDURE — 80053 COMPREHEN METABOLIC PANEL: CPT | Performed by: NURSE PRACTITIONER

## 2023-03-25 PROCEDURE — 250N000012 HC RX MED GY IP 250 OP 636 PS 637: Performed by: NURSE PRACTITIONER

## 2023-03-25 PROCEDURE — 84100 ASSAY OF PHOSPHORUS: CPT | Performed by: NURSE PRACTITIONER

## 2023-03-25 PROCEDURE — 250N000013 HC RX MED GY IP 250 OP 250 PS 637: Performed by: PHYSICIAN ASSISTANT

## 2023-03-25 PROCEDURE — 250N000012 HC RX MED GY IP 250 OP 636 PS 637: Performed by: SURGERY

## 2023-03-25 PROCEDURE — 250N000009 HC RX 250: Performed by: SURGERY

## 2023-03-25 PROCEDURE — 250N000013 HC RX MED GY IP 250 OP 250 PS 637: Performed by: SURGERY

## 2023-03-25 PROCEDURE — 36415 COLL VENOUS BLD VENIPUNCTURE: CPT | Performed by: NURSE PRACTITIONER

## 2023-03-25 PROCEDURE — 85027 COMPLETE CBC AUTOMATED: CPT | Performed by: NURSE PRACTITIONER

## 2023-03-25 PROCEDURE — 80197 ASSAY OF TACROLIMUS: CPT | Performed by: NURSE PRACTITIONER

## 2023-03-25 PROCEDURE — 82248 BILIRUBIN DIRECT: CPT | Performed by: NURSE PRACTITIONER

## 2023-03-25 PROCEDURE — 99232 SBSQ HOSP IP/OBS MODERATE 35: CPT | Mod: FS | Performed by: TRANSPLANT SURGERY

## 2023-03-25 PROCEDURE — 250N000013 HC RX MED GY IP 250 OP 250 PS 637: Performed by: TRANSPLANT SURGERY

## 2023-03-25 PROCEDURE — 97530 THERAPEUTIC ACTIVITIES: CPT | Mod: GP

## 2023-03-25 PROCEDURE — 36415 COLL VENOUS BLD VENIPUNCTURE: CPT | Performed by: TRANSPLANT SURGERY

## 2023-03-25 PROCEDURE — 97162 PT EVAL MOD COMPLEX 30 MIN: CPT | Mod: GP

## 2023-03-25 PROCEDURE — 250N000013 HC RX MED GY IP 250 OP 250 PS 637

## 2023-03-25 PROCEDURE — 250N000011 HC RX IP 250 OP 636: Performed by: SURGERY

## 2023-03-25 PROCEDURE — 85610 PROTHROMBIN TIME: CPT | Performed by: NURSE PRACTITIONER

## 2023-03-25 PROCEDURE — 97530 THERAPEUTIC ACTIVITIES: CPT | Mod: GO | Performed by: OCCUPATIONAL THERAPIST

## 2023-03-25 RX ORDER — METHOCARBAMOL 500 MG/1
500 TABLET, FILM COATED ORAL 4 TIMES DAILY PRN
Status: DISCONTINUED | OUTPATIENT
Start: 2023-03-25 | End: 2023-03-27

## 2023-03-25 RX ORDER — BISACODYL 10 MG
10 SUPPOSITORY, RECTAL RECTAL ONCE
Status: COMPLETED | OUTPATIENT
Start: 2023-03-25 | End: 2023-03-25

## 2023-03-25 RX ORDER — HYDROXYZINE HYDROCHLORIDE 50 MG/1
50 TABLET, FILM COATED ORAL EVERY 6 HOURS PRN
Status: DISCONTINUED | OUTPATIENT
Start: 2023-03-25 | End: 2023-03-29 | Stop reason: HOSPADM

## 2023-03-25 RX ORDER — HYDROXYZINE HYDROCHLORIDE 25 MG/1
25 TABLET, FILM COATED ORAL EVERY 6 HOURS PRN
Status: DISCONTINUED | OUTPATIENT
Start: 2023-03-25 | End: 2023-03-29 | Stop reason: HOSPADM

## 2023-03-25 RX ADMIN — METHYLPREDNISOLONE SODIUM SUCCINATE 100 MG: 125 INJECTION, POWDER, FOR SOLUTION INTRAMUSCULAR; INTRAVENOUS at 08:35

## 2023-03-25 RX ADMIN — OXYCODONE HYDROCHLORIDE 10 MG: 5 TABLET ORAL at 18:45

## 2023-03-25 RX ADMIN — OXYCODONE HYDROCHLORIDE 10 MG: 5 TABLET ORAL at 09:05

## 2023-03-25 RX ADMIN — HUMAN INSULIN 2 UNITS/HR: 100 INJECTION, SOLUTION SUBCUTANEOUS at 20:09

## 2023-03-25 RX ADMIN — METHOCARBAMOL 500 MG: 500 TABLET ORAL at 22:21

## 2023-03-25 RX ADMIN — HYDROXYZINE HYDROCHLORIDE 25 MG: 25 TABLET, FILM COATED ORAL at 16:03

## 2023-03-25 RX ADMIN — PIPERACILLIN AND TAZOBACTAM 3.38 G: 3; .375 INJECTION, POWDER, LYOPHILIZED, FOR SOLUTION INTRAVENOUS at 11:47

## 2023-03-25 RX ADMIN — TACROLIMUS 1.5 MG: 1 CAPSULE ORAL at 18:45

## 2023-03-25 RX ADMIN — OXYCODONE HYDROCHLORIDE 10 MG: 5 TABLET ORAL at 04:38

## 2023-03-25 RX ADMIN — OXYCODONE HYDROCHLORIDE 10 MG: 5 TABLET ORAL at 13:13

## 2023-03-25 RX ADMIN — TACROLIMUS 1.5 MG: 1 CAPSULE ORAL at 08:33

## 2023-03-25 RX ADMIN — PIPERACILLIN AND TAZOBACTAM 3.38 G: 3; .375 INJECTION, POWDER, LYOPHILIZED, FOR SOLUTION INTRAVENOUS at 04:14

## 2023-03-25 RX ADMIN — VALGANCICLOVIR 450 MG: 450 TABLET, FILM COATED ORAL at 08:34

## 2023-03-25 RX ADMIN — METOPROLOL TARTRATE 25 MG: 25 TABLET, FILM COATED ORAL at 20:09

## 2023-03-25 RX ADMIN — SULFAMETHOXAZOLE AND TRIMETHOPRIM 1 TABLET: 400; 80 TABLET ORAL at 08:33

## 2023-03-25 RX ADMIN — MYCOPHENOLATE MOFETIL 750 MG: 250 CAPSULE ORAL at 08:33

## 2023-03-25 RX ADMIN — HYDROXYZINE HYDROCHLORIDE 25 MG: 25 TABLET, FILM COATED ORAL at 22:21

## 2023-03-25 RX ADMIN — FLUCONAZOLE IN SODIUM CHLORIDE 400 MG: 2 INJECTION, SOLUTION INTRAVENOUS at 08:33

## 2023-03-25 RX ADMIN — METHOCARBAMOL 500 MG: 500 TABLET ORAL at 16:03

## 2023-03-25 RX ADMIN — PANTOPRAZOLE SODIUM 40 MG: 40 TABLET, DELAYED RELEASE ORAL at 08:34

## 2023-03-25 RX ADMIN — MYCOPHENOLATE MOFETIL 750 MG: 250 CAPSULE ORAL at 20:09

## 2023-03-25 RX ADMIN — ASPIRIN 81 MG 81 MG: 81 TABLET ORAL at 08:34

## 2023-03-25 RX ADMIN — METHOCARBAMOL 500 MG: 500 TABLET ORAL at 10:04

## 2023-03-25 RX ADMIN — METOPROLOL TARTRATE 25 MG: 25 TABLET, FILM COATED ORAL at 08:35

## 2023-03-25 ASSESSMENT — ACTIVITIES OF DAILY LIVING (ADL)
ADLS_ACUITY_SCORE: 28

## 2023-03-25 NOTE — PROGRESS NOTES
Transplant Surgery  Inpatient Daily Progress Note  03/25/2023    Assessment & Plan: Cricket Mane is a 62 year old male with PMH significant for EtOH cirrhosis (MELD 22) complicated by bleeding esophageal varices, A fib on metoprolol, and CAD. He is now s/p DDOLT (no biliary stent) and umbilical hernia repair on 3/23/23 with Dr. Arzate.      Graft function:   Liver transplant: POD#2. LFTs trending down appropriately. LA WNL. INR normalizing. Post op US patent with mildly elevated RIs. JUSTINO x2 with serosang output.   - Explant pathology: Pending  - ASA 81 mg daily vascular ppx     Immunosuppression management:  Induction: Steroid taper per protocol.  Maintenance:   -  mg BID  - Tacrolimus 1.5 mg BID. Goal level 8-12. Monitor closely with Fluconazole, Diltiazem, and Amiodarone.      Neurology:  Acute postoperative pain: Continue PRN oxycodone, PRN IV dilaudid.    Hematology:   Acute blood loss/Anemia of chronic disease: Hgb 8-9, stable.   Thrombocytopenia: due to liver disease. Platelets 80, improving.    Coagulopathy: INR 1.17, improving.      Cardiorespiratory:   Hypoxia: Encourage CDB/IS. Wean O2 as able.   A fib: In A fib throughout operation controlled intra-operatively with Diltiazem. Controlled with amiodarone gtt in SICU. Now stable on PTA Metoprolol 25 mg BID.  [id number] Score: 1 point, no need for anticoagulation.   CAD: ASA, as above.     GI/Nutrition:   At risk for malnutrition: Nutrition consulted. No plan to place FT at this time. Advance diet to regular today and add supplements.   -Bowel regimen: miralax qdaily + senna BID     Endocrine:   Steroid induced hyperglycemia: HGBA1C 4.7%. Continue insulin gtt.     Fluid/Electrolytes/Neph:  FERNANDA: likely r/t hypotension intra-op. Cr 1.6 (up from 0.7-0.8 pre-op). Good UOP. Monitor.      : No issues.      Infectious disease: Afebrile. WBC WNL.      Prophylaxis: DVT (SCDs), GI (PPI), fungal (Flucon x 7 days followed by nystatin), PCP (Vactrim), CMV  (Valcyte x 3 months), Periop (Zosyn x48hrs).    Dispo: 7A; PT/OT recommending ARU.    AMY/Fellow/Resident Provider: Rachna Louis, NP #[id number]    Faculty: Aniket Arzate MD    __________________________________________________________________  Transplant History: Admitted 3/22/2023 for liver transplant.   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    3/23/2023 (Liver), Postoperative day: 2     Interval History: History is obtained from the patient  Patient anxious on morning rounds. Not very hungry, no BM since surgery.     ROS:   A 10-point review of systems was negative except as noted above.    Curent Meds:    aspirin  81 mg Oral or Feeding Tube Daily     bisacodyl  10 mg Rectal Once     fluconazole  400 mg Intravenous Q24H     [START ON 3/26/2023] methylPREDNISolone  50 mg Intravenous Once    Followed by     [START ON 3/27/2023] predniSONE  25 mg Oral Once    Followed by     [START ON 3/28/2023] predniSONE  10 mg Oral Once     metoprolol tartrate  25 mg Oral BID     mycophenolate  750 mg Oral BID IS    Or     mycophenolate  750 mg Oral or NG Tube BID IS     pantoprazole  40 mg Oral QAM AC     sodium chloride (PF)  3 mL Intravenous Q8H     sulfamethoxazole-trimethoprim  1 tablet Oral Daily     tacrolimus  1.5 mg Oral BID IS    Or     tacrolimus  1.5 mg Oral or NG Tube BID IS     valGANciclovir  450 mg Oral Daily    Or     valGANciclovir  450 mg Oral or NG Tube Daily       Physical Exam:     Admit Weight: 73.5 kg (162 lb 1.6 oz)    Current Vitals:   /83 (BP Location: Left arm)   Pulse 95   Temp 98.6  F (37  C) (Oral)   Resp 18   Wt 78.2 kg (172 lb 6.4 oz)   SpO2 95%   BMI 26.59 kg/m      Vital sign ranges:    Temp:  [97.4  F (36.3  C)-98.6  F (37  C)] 98.6  F (37  C)  Pulse:  [] 95  Resp:  [15-20] 18  BP: (134-152)/() 137/83  SpO2:  [94 %-98 %] 95 %  Patient Vitals for the past 24 hrs:   BP Temp Temp src Pulse Resp SpO2   03/25/23 1500 137/83 98.6  F (37  C) Oral -- 18 95 %    03/25/23 1155 (!) 145/102 98.1  F (36.7  C) Oral 95 18 95 %   03/25/23 0705 (!) 137/90 97.6  F (36.4  C) Oral 107 18 95 %   03/25/23 0135 (!) 147/91 97.4  F (36.3  C) Oral (!) 121 18 94 %   03/24/23 2352 (!) 152/96 -- -- -- -- --   03/24/23 2205 (!) 149/85 97.5  F (36.4  C) Oral 95 18 97 %   03/24/23 1953 (!) 141/94 97.8  F (36.6  C) Oral 109 18 98 %   03/24/23 1943 -- -- -- 116 -- --   03/24/23 1900 -- -- -- 116 20 96 %   03/24/23 1800 134/89 -- -- 93 15 97 %     General Appearance: in no apparent distress.   Skin: normal, warm, dry, No rashes, induration, or jaundice  Heart: perfused  Lungs: unlabored on 2L NC  Abdomen: rounded, appropriately tender. Incision covered; C/D/I. JPx2 with serosanguinous output.   : Morales removed  Extremities: edema: present generalized.   Neurologic: awake, alert and oriented. Tremor absent.    Frailty Scores     Frailty Scores 12/20/2022 12/15/2022 12/15/2022    Final Score Not Frail Not Frail Not Frail    Final Score Number 1 1 1          Data:   CMP  Recent Labs   Lab 03/25/23  1717 03/25/23  1613 03/25/23  1256 03/25/23  1117 03/24/23  1834 03/24/23  1513 03/24/23  0547 03/24/23  0407 03/23/23  1332 03/23/23  1331 03/23/23  0737 03/22/23 2035   NA  --   --   --  137  --  136  --  137   < >  --    < > 129*   POTASSIUM  --   --   --  4.2  --  4.0  --  4.3   < >  --    < > 4.6   CHLORIDE  --   --   --  102  --  101  --  103   < >  --    < > 99   CO2  --   --   --  24  --  20*  --  26   < >  --    < > 19*   * 131*   < > 108*   < > 158*  174*   < > 119*  120*   < >  --    < > 102*   BUN  --   --   --  39.4*  --  24.8*  --  16.4   < >  --    < > 19.2   CR  --   --   --  1.55*  --  1.14  --  0.89   < >  --    < > 0.83   GFRESTIMATED  --   --   --  51*  --  74  --  >90   < >  --    < > >90   PEDRO  --   --   --  8.4*  --  8.8  --  8.4*   < >  --    < > 8.6*   ICAW  --   --   --   --   --   --   --  4.8  --  5.1   < >  --    MAG  --   --   --  1.8  --   --   --  2.7*  --   --     < > 1.9   PHOS  --   --   --  5.3*  --   --   --  5.0*  --   --    < > 2.6   AMYLASE  --   --   --   --   --   --   --  379*  --   --   --  113*   LIPASE  --   --   --   --   --   --   --  11*  --   --   --   --    ALBUMIN  --   --   --  2.8*  --  3.2*   < > 2.5*   < >  --    < > 2.6*   BILITOTAL  --   --   --  1.9*  --  2.4*   < > 2.7*   < >  --    < > 7.7*   ALKPHOS  --   --   --  55  --  68   < > 66   < >  --    < > 223*   AST  --   --   --  151*  --  323*   < > 435*   < >  --    < > 92*   ALT  --   --   --  155*  --  218*   < > 222*   < >  --    < > 54*    < > = values in this interval not displayed.     CBC  Recent Labs   Lab 03/25/23  1117 03/24/23  1513 03/23/23  1545 03/23/23  1329   HGB 8.8* 9.5*   < > 10.2*   WBC 10.0 12.7*   < > 9.8   PLT 80* 83*   < > 73*   A1C  --   --   --  4.7    < > = values in this interval not displayed.     COAGS  Recent Labs   Lab 03/25/23  1117 03/24/23  1513 03/24/23  1220   INR 1.27* 1.41* 1.49*   PTT  --  27 28      Urinalysis  Recent Labs   Lab Test 12/20/22  0923   COLOR Yellow   APPEARANCE Clear   URINEGLC Negative   URINEBILI Negative   URINEKETONE Negative   SG 1.018   UBLD Negative   URINEPH 6.0   PROTEIN Negative   NITRITE Negative   LEUKEST Negative     Virology:  Hepatitis C Antibody   Date Value Ref Range Status   03/22/2023 Nonreactive Nonreactive Final

## 2023-03-25 NOTE — PROGRESS NOTES
"   03/25/23 1106   Appointment Info   Signing Clinician's Name / Credentials (PT) Domitila Hudson, PT, DPT   Rehab Comments (PT) abd. precautions, anxious, monitor HR/O2, requires increased processing time and simple instructions   Living Environment   People in Home child(addie), adult   Current Living Arrangements house   Home Accessibility stairs to enter home;stairs within home   Number of Stairs, Main Entrance 2   Stair Railings, Main Entrance none   Number of Stairs, Within Home, Primary   (1 flight)   Stair Railings, Within Home, Primary railings safe and in good condition;railings on both sides of stairs   Transportation Anticipated car, drives self;family or friend will provide   Living Environment Comments Pt reports he lives in a three-level home with his adult son who is present to assist. 2 FAITH without handrails. Must negotiate 1 flight with handrails to access bedroom. (+) driving but family can provide.   Self-Care   Usual Activity Tolerance good   Current Activity Tolerance poor   Regular Exercise Yes   Activity/Exercise Type walking   Equipment Currently Used at Home none   Fall history within last six months no   Activity/Exercise/Self-Care Comment Reports IND with ADL's and mobility. No DME. Walk-in shower with chair. Enjoys walking for exercise. No falls reported. No home O2 use.   General Information   Onset of Illness/Injury or Date of Surgery 03/23/23   Referring Physician William Bautista MD   Patient/Family Therapy Goals Statement (PT) none stated   Pertinent History of Current Problem (include personal factors and/or comorbidities that impact the POC) per EMR: \"Cricket Mane is a 62 year old male with PMH significant for EtOH cirrhosis (MELD 22) complicated by bleeding esophageal varices, A fib on metoprolol, and CAD. He is now s/p DDOLT (no biliary stent) and umbilical hernia repair on 3/23/23 with Dr. Arzate requiring 2 units PRBC, 5 units FFP, 2 units PLTs.\"   Existing Precautions/Restrictions " fall;abdominal;oxygen therapy device and L/min   Weight-Bearing Status - LUE partial weight-bearing (% in comments)  (no lifting >10lbs)   Weight-Bearing Status - RUE partial weight-bearing (% in comments)  (no lifting >10lbs)   Weight-Bearing Status - LLE full weight-bearing   Weight-Bearing Status - RLE full weight-bearing   Cognition   Orientation Status (Cognition) oriented x 4   Pain Assessment   Patient Currently in Pain Yes, see Vital Sign flowsheet   Integumentary/Edema   Integumentary/Edema other (describe)   Integumentary/Edema Comments slight edema noted to B LE and hands   Posture    Posture Forward head position;Protracted shoulders   Range of Motion (ROM)   Range of Motion ROM deficits secondary to pain   Strength (Manual Muscle Testing)   Strength (Manual Muscle Testing) Deficits observed during functional mobility   Strength Comments grossly 3/5 in B LE; limited by pain   Bed Mobility   Comment, (Bed Mobility) supine > sit with max A x2   Transfers   Comment, (Transfers) sit > stand with min/mod A x2 and HHA   Gait/Stairs (Locomotion)   Comment, (Gait/Stairs) gait impaired   Balance   Balance other (describe)   Balance Comments fair sitting balance; fair(-)/fair standing balance   Sensory Examination   Sensory Perception patient reports no sensory changes   Coordination   Coordination no deficits were identified   Muscle Tone   Muscle Tone no deficits were identified   Clinical Impression   Criteria for Skilled Therapeutic Intervention Yes, treatment indicated   PT Diagnosis (PT) impaired functional mobility   Influenced by the following impairments decreased balance, strength, and endurance; increased pain   Functional limitations due to impairments difficulty with bed mobility, transfers, walking, and stairs   Clinical Presentation (PT Evaluation Complexity) Stable/Uncomplicated   Clinical Presentation Rationale per clinical judgment   Clinical Decision Making (Complexity) moderate complexity    Planned Therapy Interventions (PT) balance training;bed mobility training;gait training;home exercise program;motor coordination training;neuromuscular re-education;patient/family education;postural re-education;ROM (range of motion);stair training;strengthening;stretching;transfer training;progressive activity/exercise;risk factor education;home program guidelines   Anticipated Equipment Needs at Discharge (PT)   (tbd)   Risk & Benefits of therapy have been explained evaluation/treatment results reviewed;care plan/treatment goals reviewed;risks/benefits reviewed;current/potential barriers reviewed;participants voiced agreement with care plan;participants included;patient   PT Total Evaluation Time   PT Eval, Moderate Complexity Minutes (84185) 10   Physical Therapy Goals   PT Frequency 6x/week   PT Predicted Duration/Target Date for Goal Attainment 04/08/23   PT: Bed Mobility Independent;Supine to/from sit;Rolling;Bridging;Within precautions  (with HOB flat)   PT: Transfers Independent;Sit to/from stand;Bed to/from chair;Within precautions  (with AD vs. without)   PT: Gait Independent;150 feet;Within precautions  (with AD vs. without)   PT: Stairs Modified independent;Greater than 10 stairs;Rail on both sides   PT Discharge Planning   PT Plan flat bed mobility within precautions, transfers, pre-gait as able   PT Discharge Recommendation (DC Rec) [facility] patient will benefit from intensive, interdisciplinary therapy.  Anticipate will be able to tolerate 3 hours of therapy per day   PT Rationale for DC Rec Pt is demonstrating functional mobility significantly below baseline. Previously IND. Currently Ax2 for mobility. Recommend ARU to improve safety and IND prior to returning home. Pt is anticipated to tolerate 3 hours of therapy per day as pain improves. Will continue to assess and update as appropriate.   PT Brief overview of current status Ax2 with gait belt for stand pivot bed <> recliner;  encourage up to chair 3x/day

## 2023-03-25 NOTE — PLAN OF CARE
BP (!) 147/91 (BP Location: Left arm)   Pulse (!) 121   Temp 97.4  F (36.3  C) (Oral)   Resp 18   Wt 78.2 kg (172 lb 6.4 oz)   SpO2 94%   BMI 26.59 kg/m      Shift: 6531-2824  VS: Hypertensive 150s/90s  Neuro: Aox4  BG: Insulin drip Q1; 121-155  Respiratory: 3L NC  Cardiac: Low Tachy . Sinus Tachy and irregular.   Pain/Nausea: Oxycodone x2   Diet: Clear Liquid   IV Access: 2 PIVs. CVC SL  Lines/Drains: JUSTINO small to moderate output. Site leaking. Dressing changed x2.   GI/: Voiding in urinal. No BM.   Skin: Flaky dry skin. Meplix on sacrum. UTV abdominal incision. OP dressing with moderate drainage. Extended from [place].   Mobility: Assist x2.   Plan: Continue with POC.

## 2023-03-25 NOTE — PROVIDER NOTIFICATION
7208-01 Cricket Mane is having low urine output. So far only 130cc throughout shift. Patient was bladder scanned before void with 136cc.

## 2023-03-25 NOTE — PROGRESS NOTES
7208-01 Cricket Mane patient had A-fib before arriving on unit. Is currently low tachy. Requesting an order for tele monitoring.

## 2023-03-25 NOTE — PROGRESS NOTES
Admitted/transferred from: 4A  Time of arrival on unit 1900  2 RN full  skin assessment completed by Aruna LOPEZ RN and Babatunde VALDEZ RN  Skin assessment finding: issues found Dried skin with flakes present. Few bruises present throughout.    Interventions/actions: skin interventions Meplix placed on saccrum.      Will continue to monitor.

## 2023-03-26 ENCOUNTER — APPOINTMENT (OUTPATIENT)
Dept: GENERAL RADIOLOGY | Facility: CLINIC | Age: 63
DRG: 006 | End: 2023-03-26
Attending: NURSE PRACTITIONER
Payer: COMMERCIAL

## 2023-03-26 ENCOUNTER — APPOINTMENT (OUTPATIENT)
Dept: OCCUPATIONAL THERAPY | Facility: CLINIC | Age: 63
DRG: 006 | End: 2023-03-26
Attending: TRANSPLANT SURGERY
Payer: COMMERCIAL

## 2023-03-26 ENCOUNTER — APPOINTMENT (OUTPATIENT)
Dept: PHYSICAL THERAPY | Facility: CLINIC | Age: 63
DRG: 006 | End: 2023-03-26
Attending: TRANSPLANT SURGERY
Payer: COMMERCIAL

## 2023-03-26 LAB
ALBUMIN SERPL BCG-MCNC: 3 G/DL (ref 3.5–5.2)
ALP SERPL-CCNC: 55 U/L (ref 40–129)
ALT SERPL W P-5'-P-CCNC: 152 U/L (ref 10–50)
ANION GAP SERPL CALCULATED.3IONS-SCNC: 10 MMOL/L (ref 7–15)
AST SERPL W P-5'-P-CCNC: 109 U/L (ref 10–50)
BILIRUB DIRECT SERPL-MCNC: 1.26 MG/DL (ref 0–0.3)
BILIRUB SERPL-MCNC: 2 MG/DL
BUN SERPL-MCNC: 50.6 MG/DL (ref 8–23)
CALCIUM SERPL-MCNC: 9.1 MG/DL (ref 8.8–10.2)
CHLORIDE SERPL-SCNC: 97 MMOL/L (ref 98–107)
CREAT SERPL-MCNC: 1.67 MG/DL (ref 0.67–1.17)
DEPRECATED HCO3 PLAS-SCNC: 26 MMOL/L (ref 22–29)
ERYTHROCYTE [DISTWIDTH] IN BLOOD BY AUTOMATED COUNT: 15.9 % (ref 10–15)
GFR SERPL CREATININE-BSD FRML MDRD: 47 ML/MIN/1.73M2
GLUCOSE BLDC GLUCOMTR-MCNC: 111 MG/DL (ref 70–99)
GLUCOSE BLDC GLUCOMTR-MCNC: 113 MG/DL (ref 70–99)
GLUCOSE BLDC GLUCOMTR-MCNC: 116 MG/DL (ref 70–99)
GLUCOSE BLDC GLUCOMTR-MCNC: 121 MG/DL (ref 70–99)
GLUCOSE BLDC GLUCOMTR-MCNC: 122 MG/DL (ref 70–99)
GLUCOSE BLDC GLUCOMTR-MCNC: 123 MG/DL (ref 70–99)
GLUCOSE BLDC GLUCOMTR-MCNC: 127 MG/DL (ref 70–99)
GLUCOSE BLDC GLUCOMTR-MCNC: 128 MG/DL (ref 70–99)
GLUCOSE BLDC GLUCOMTR-MCNC: 130 MG/DL (ref 70–99)
GLUCOSE BLDC GLUCOMTR-MCNC: 133 MG/DL (ref 70–99)
GLUCOSE BLDC GLUCOMTR-MCNC: 161 MG/DL (ref 70–99)
GLUCOSE BLDC GLUCOMTR-MCNC: 184 MG/DL (ref 70–99)
GLUCOSE SERPL-MCNC: 129 MG/DL (ref 70–99)
HCT VFR BLD AUTO: 28.7 % (ref 40–53)
HGB BLD-MCNC: 9.7 G/DL (ref 13.3–17.7)
INR PPP: 1.12 (ref 0.85–1.15)
MAGNESIUM SERPL-MCNC: 2 MG/DL (ref 1.7–2.3)
MCH RBC QN AUTO: 32.2 PG (ref 26.5–33)
MCHC RBC AUTO-ENTMCNC: 33.8 G/DL (ref 31.5–36.5)
MCV RBC AUTO: 95 FL (ref 78–100)
PHOSPHATE SERPL-MCNC: 5.9 MG/DL (ref 2.5–4.5)
PLATELET # BLD AUTO: 94 10E3/UL (ref 150–450)
POTASSIUM SERPL-SCNC: 4.9 MMOL/L (ref 3.4–5.3)
PROT SERPL-MCNC: 6.1 G/DL (ref 6.4–8.3)
RBC # BLD AUTO: 3.01 10E6/UL (ref 4.4–5.9)
SODIUM SERPL-SCNC: 133 MMOL/L (ref 136–145)
TACROLIMUS BLD-MCNC: 5.7 UG/L (ref 5–15)
TME LAST DOSE: NORMAL H
TME LAST DOSE: NORMAL H
WBC # BLD AUTO: 10 10E3/UL (ref 4–11)

## 2023-03-26 PROCEDURE — 250N000013 HC RX MED GY IP 250 OP 250 PS 637: Performed by: SURGERY

## 2023-03-26 PROCEDURE — 36592 COLLECT BLOOD FROM PICC: CPT | Performed by: NURSE PRACTITIONER

## 2023-03-26 PROCEDURE — 250N000011 HC RX IP 250 OP 636: Performed by: NURSE PRACTITIONER

## 2023-03-26 PROCEDURE — 97535 SELF CARE MNGMENT TRAINING: CPT | Mod: GO | Performed by: OCCUPATIONAL THERAPIST

## 2023-03-26 PROCEDURE — 85610 PROTHROMBIN TIME: CPT | Performed by: NURSE PRACTITIONER

## 2023-03-26 PROCEDURE — 82248 BILIRUBIN DIRECT: CPT | Performed by: NURSE PRACTITIONER

## 2023-03-26 PROCEDURE — 250N000013 HC RX MED GY IP 250 OP 250 PS 637: Performed by: NURSE PRACTITIONER

## 2023-03-26 PROCEDURE — 93005 ELECTROCARDIOGRAM TRACING: CPT

## 2023-03-26 PROCEDURE — 83735 ASSAY OF MAGNESIUM: CPT

## 2023-03-26 PROCEDURE — 97116 GAIT TRAINING THERAPY: CPT | Mod: GP

## 2023-03-26 PROCEDURE — 250N000011 HC RX IP 250 OP 636: Performed by: SURGERY

## 2023-03-26 PROCEDURE — 99221 1ST HOSP IP/OBS SF/LOW 40: CPT | Mod: GC | Performed by: INTERNAL MEDICINE

## 2023-03-26 PROCEDURE — 74018 RADEX ABDOMEN 1 VIEW: CPT | Mod: 26 | Performed by: RADIOLOGY

## 2023-03-26 PROCEDURE — 84100 ASSAY OF PHOSPHORUS: CPT

## 2023-03-26 PROCEDURE — 99232 SBSQ HOSP IP/OBS MODERATE 35: CPT | Mod: FS | Performed by: TRANSPLANT SURGERY

## 2023-03-26 PROCEDURE — 80197 ASSAY OF TACROLIMUS: CPT | Performed by: NURSE PRACTITIONER

## 2023-03-26 PROCEDURE — 93010 ELECTROCARDIOGRAM REPORT: CPT | Performed by: INTERNAL MEDICINE

## 2023-03-26 PROCEDURE — 97110 THERAPEUTIC EXERCISES: CPT | Mod: GO | Performed by: OCCUPATIONAL THERAPIST

## 2023-03-26 PROCEDURE — 250N000012 HC RX MED GY IP 250 OP 636 PS 637: Performed by: NURSE PRACTITIONER

## 2023-03-26 PROCEDURE — 250N000012 HC RX MED GY IP 250 OP 636 PS 637: Performed by: SURGERY

## 2023-03-26 PROCEDURE — 250N000013 HC RX MED GY IP 250 OP 250 PS 637: Performed by: PHYSICIAN ASSISTANT

## 2023-03-26 PROCEDURE — 85027 COMPLETE CBC AUTOMATED: CPT | Performed by: NURSE PRACTITIONER

## 2023-03-26 PROCEDURE — 74018 RADEX ABDOMEN 1 VIEW: CPT

## 2023-03-26 PROCEDURE — 250N000013 HC RX MED GY IP 250 OP 250 PS 637: Performed by: TRANSPLANT SURGERY

## 2023-03-26 PROCEDURE — 97530 THERAPEUTIC ACTIVITIES: CPT | Mod: GP

## 2023-03-26 PROCEDURE — 120N000011 HC R&B TRANSPLANT UMMC

## 2023-03-26 PROCEDURE — 250N000013 HC RX MED GY IP 250 OP 250 PS 637

## 2023-03-26 RX ORDER — BISACODYL 10 MG
10 SUPPOSITORY, RECTAL RECTAL ONCE
Status: COMPLETED | OUTPATIENT
Start: 2023-03-26 | End: 2023-03-26

## 2023-03-26 RX ORDER — FUROSEMIDE 10 MG/ML
40 INJECTION INTRAMUSCULAR; INTRAVENOUS ONCE
Status: COMPLETED | OUTPATIENT
Start: 2023-03-26 | End: 2023-03-26

## 2023-03-26 RX ORDER — PROCHLORPERAZINE 25 MG
25 SUPPOSITORY, RECTAL RECTAL EVERY 12 HOURS PRN
Status: DISCONTINUED | OUTPATIENT
Start: 2023-03-26 | End: 2023-03-29 | Stop reason: HOSPADM

## 2023-03-26 RX ORDER — FLUCONAZOLE 200 MG/1
400 TABLET ORAL DAILY
Status: DISCONTINUED | OUTPATIENT
Start: 2023-03-27 | End: 2023-03-29 | Stop reason: HOSPADM

## 2023-03-26 RX ORDER — PROCHLORPERAZINE MALEATE 10 MG
10 TABLET ORAL EVERY 6 HOURS PRN
Status: DISCONTINUED | OUTPATIENT
Start: 2023-03-26 | End: 2023-03-29 | Stop reason: HOSPADM

## 2023-03-26 RX ORDER — LIDOCAINE 4 G/G
1-2 PATCH TOPICAL
Status: DISCONTINUED | OUTPATIENT
Start: 2023-03-26 | End: 2023-03-29 | Stop reason: HOSPADM

## 2023-03-26 RX ORDER — OXYCODONE HYDROCHLORIDE 5 MG/1
5-10 TABLET ORAL EVERY 6 HOURS PRN
Status: DISCONTINUED | OUTPATIENT
Start: 2023-03-26 | End: 2023-03-28

## 2023-03-26 RX ORDER — POLYETHYLENE GLYCOL 3350 17 G/17G
17 POWDER, FOR SOLUTION ORAL DAILY
Status: DISCONTINUED | OUTPATIENT
Start: 2023-03-26 | End: 2023-03-29 | Stop reason: HOSPADM

## 2023-03-26 RX ORDER — ONDANSETRON 4 MG/1
4 TABLET, ORALLY DISINTEGRATING ORAL EVERY 6 HOURS PRN
Status: DISCONTINUED | OUTPATIENT
Start: 2023-03-26 | End: 2023-03-29 | Stop reason: HOSPADM

## 2023-03-26 RX ORDER — ONDANSETRON 2 MG/ML
4 INJECTION INTRAMUSCULAR; INTRAVENOUS EVERY 6 HOURS PRN
Status: DISCONTINUED | OUTPATIENT
Start: 2023-03-26 | End: 2023-03-29 | Stop reason: HOSPADM

## 2023-03-26 RX ORDER — METOPROLOL TARTRATE 1 MG/ML
5 INJECTION, SOLUTION INTRAVENOUS EVERY 5 MIN PRN
Status: DISCONTINUED | OUTPATIENT
Start: 2023-03-26 | End: 2023-03-27

## 2023-03-26 RX ORDER — METOPROLOL TARTRATE 50 MG
50 TABLET ORAL 2 TIMES DAILY
Status: DISCONTINUED | OUTPATIENT
Start: 2023-03-26 | End: 2023-03-29 | Stop reason: HOSPADM

## 2023-03-26 RX ORDER — AMOXICILLIN 250 MG
1-2 CAPSULE ORAL 2 TIMES DAILY
Status: DISCONTINUED | OUTPATIENT
Start: 2023-03-26 | End: 2023-03-29 | Stop reason: HOSPADM

## 2023-03-26 RX ADMIN — TACROLIMUS 1.5 MG: 1 CAPSULE ORAL at 08:25

## 2023-03-26 RX ADMIN — ASPIRIN 81 MG 81 MG: 81 TABLET ORAL at 08:25

## 2023-03-26 RX ADMIN — SENNOSIDES AND DOCUSATE SODIUM 2 TABLET: 8.6; 5 TABLET ORAL at 09:23

## 2023-03-26 RX ADMIN — HYDROXYZINE HYDROCHLORIDE 25 MG: 25 TABLET, FILM COATED ORAL at 15:36

## 2023-03-26 RX ADMIN — POLYETHYLENE GLYCOL 3350 17 G: 17 POWDER, FOR SOLUTION ORAL at 09:23

## 2023-03-26 RX ADMIN — MYCOPHENOLATE MOFETIL 750 MG: 250 CAPSULE ORAL at 08:24

## 2023-03-26 RX ADMIN — METHYLPREDNISOLONE SODIUM SUCCINATE 50 MG: 125 INJECTION, POWDER, FOR SOLUTION INTRAMUSCULAR; INTRAVENOUS at 08:24

## 2023-03-26 RX ADMIN — FUROSEMIDE 40 MG: 10 INJECTION, SOLUTION INTRAVENOUS at 09:23

## 2023-03-26 RX ADMIN — METOPROLOL TARTRATE 50 MG: 50 TABLET, FILM COATED ORAL at 20:12

## 2023-03-26 RX ADMIN — METHOCARBAMOL 500 MG: 500 TABLET ORAL at 11:23

## 2023-03-26 RX ADMIN — LIDOCAINE PATCH 4% 2 PATCH: 40 PATCH TOPICAL at 08:24

## 2023-03-26 RX ADMIN — OXYCODONE HYDROCHLORIDE 5 MG: 5 TABLET ORAL at 15:35

## 2023-03-26 RX ADMIN — PANTOPRAZOLE SODIUM 40 MG: 40 TABLET, DELAYED RELEASE ORAL at 08:25

## 2023-03-26 RX ADMIN — FLUCONAZOLE IN SODIUM CHLORIDE 400 MG: 2 INJECTION, SOLUTION INTRAVENOUS at 08:24

## 2023-03-26 RX ADMIN — HYDROXYZINE HYDROCHLORIDE 50 MG: 50 TABLET ORAL at 21:59

## 2023-03-26 RX ADMIN — OXYCODONE HYDROCHLORIDE 10 MG: 5 TABLET ORAL at 01:11

## 2023-03-26 RX ADMIN — OXYCODONE HYDROCHLORIDE 5 MG: 5 TABLET ORAL at 21:59

## 2023-03-26 RX ADMIN — MYCOPHENOLATE MOFETIL 750 MG: 250 CAPSULE ORAL at 20:12

## 2023-03-26 RX ADMIN — OXYCODONE HYDROCHLORIDE 10 MG: 5 TABLET ORAL at 07:33

## 2023-03-26 RX ADMIN — SENNOSIDES AND DOCUSATE SODIUM 2 TABLET: 8.6; 5 TABLET ORAL at 20:12

## 2023-03-26 RX ADMIN — ONDANSETRON 4 MG: 2 INJECTION INTRAMUSCULAR; INTRAVENOUS at 09:23

## 2023-03-26 RX ADMIN — METOPROLOL TARTRATE 25 MG: 25 TABLET, FILM COATED ORAL at 08:25

## 2023-03-26 RX ADMIN — SULFAMETHOXAZOLE AND TRIMETHOPRIM 1 TABLET: 400; 80 TABLET ORAL at 08:25

## 2023-03-26 RX ADMIN — HYDROXYZINE HYDROCHLORIDE 25 MG: 25 TABLET, FILM COATED ORAL at 08:43

## 2023-03-26 RX ADMIN — TACROLIMUS 1.5 MG: 1 CAPSULE ORAL at 18:05

## 2023-03-26 RX ADMIN — VALGANCICLOVIR 450 MG: 450 TABLET, FILM COATED ORAL at 08:25

## 2023-03-26 RX ADMIN — INSULIN ASPART 1 UNITS: 100 INJECTION, SOLUTION INTRAVENOUS; SUBCUTANEOUS at 18:05

## 2023-03-26 ASSESSMENT — ACTIVITIES OF DAILY LIVING (ADL)
ADLS_ACUITY_SCORE: 28

## 2023-03-26 NOTE — CODE/RAPID RESPONSE
Rapid Response Team Note    Assessment   A rapid response was called on Cricket Mane due to possible new arrythmia. Tele strip showing flat line and monitor read V-fib. Patient awaken by nurse and he denies new symptoms.   Denies chest pain or shortness of breath. EKG reads atrial fibrillation with rate of 110.   Rhythm was evaluated in smart disclosure and appears that leads were off. Tele tech confirms leads off at the time.    Primary team/cross cover ordered metoprolol and hydralazine PRN      Plan   -  No additional interventions by RRT    -  Disposition: Remain on 7A  -  Reassessment and plan follow-up will be performed by the primary team      TRENA Quintana CNP  [facility] RRT [facility] Job Code Contact #[phone]  [facility] Paging/Directory    Hospital Course   Brief Summary of events leading to rapid response:   S/p DDOLT and umbilical hernia repair on 3/23/23     Admission Diagnosis:   Liver transplant candidate [Z76.82]  Transplant [Z94.9]    Physical Exam   Temp: 97  F (36.1  C) Temp  Min: 97  F (36.1  C)  Max: 98.6  F (37  C)  Resp: 18 Resp  Min: 16  Max: 18  SpO2: 95 % SpO2  Min: 95 %  Max: 97 %  Pulse: 101 Pulse  Min: 95  Max: 107    No data recorded  BP: (!) 144/104 Systolic (24hrs), Av , Min:137 , Max:151   Diastolic (24hrs), Av, Min:83, Max:107     I/Os: I/O last 3 completed shifts:  In: 600 [P.O.:600]  Out: 1525 [Urine:920; Drains:605]     Exam:   General: chronically ill appearing  Mental Status: AAOx4.  CV: Rate of 110, irregular     Significant Results and Procedures   Lactic Acid:   Recent Labs   Lab Test 23  1715 23  1117 23  1737   LACT 0.7 0.6* 1.5     CBC:   Recent Labs   Lab Test 23  1117 23  1513 23  0833 23  0548 23  0407   WBC 10.0 12.7*  --   --  9.2   HGB 8.8* 9.5*  --   --  8.8*   HCT 26.4* 28.4*  --   --  25.6*   PLT 80* 83* 66*   < > 47*    < > = values in this interval not displayed.

## 2023-03-26 NOTE — PROGRESS NOTES
Cross-cover note: 2:38 AM 3/26/2023     03/26/2023    General Surgery Cross Cover Note    Rapid response called. Initially, pt tele showed a flatline and then was read as vfib w/ HR in 160s. EKG obtained which showed afib.     Per patient feels well. Denies any palpitations, chest pain, SOB, nausea, or emesis. Says that he takes metoprolol at home and has been told before that he has afib.     B/P: 144/104, T: 97.3, P: 107, R: 18    PE:  Laying in bed, no acute distress   Breathing non-labored on 3 L NC   HR in 100-110s on exam, irregularly irregular.     Plan:  Pt appears to have briefly developed afib w/ RVR and now has ongoing afib but no longer has RVR as HR is in the 100-110s. EKG notable for absent p waves. Similar changes are apparent on prior EKGfrom 3/23. No new ST changes on this EKG. Ordered 5 mg IV metoprolol for sustained HR above 130. Hydralazine remains available for control of HTN, but should not be given while the pt is tachycardic. This was communicated to bedside RN, hold parameters added to hydralazine order. Please notify resident on call if IV metoprolol is given.      Please page if questions,     Stephan Lira MD  Surgical Resident  #[id number]

## 2023-03-26 NOTE — PLAN OF CARE
Goal Outcome Evaluation:  9623-3403  BP (!) 144/104 (BP Location: Left arm)   Pulse 101   Temp 97  F (36.1  C) (Oral)   Resp 18   Wt 78.2 kg (172 lb 6.4 oz)   SpO2 95%   BMI 26.59 kg/m      Pt. A&O x4, stable on 3 L NC, afebrile. c/o abd pain managed with PRN Oxy. Up with assist x2. Tolerating reg without nausea, calorie counts starting today.  No BM this shift, voiding via bedside urinal. PIV infusing insulin gtt/tko. On q2h bg checked (116,128,121), Alg 2. TL CVC, SL. (R) marjorie with serosanguinous output. Abd incision - staple, dressing on L side c/d/i   Plan: Pt. Monitor shows v fib, Rapid response team called and all protocol followed. Continue w/poc

## 2023-03-26 NOTE — PROGRESS NOTES
Cross-cover note: 7:19 PM 3/25/2023     03/25/2023    General Surgery Cross Cover Note    Asked by daytime team to pull most lateral drain on pt R side.     Per patient feels well and has no concerns.     B/P: 141/97, T: 97.7, P: 107, R: 16    PE:  Two drains visible, lateral most drain removed off of suction after sutures were cut.     Plan:  Continue current plan of care.     Please page if questions,     Stephan Lira MD  Surgical Resident  #[id number]

## 2023-03-26 NOTE — PLAN OF CARE
VS: BP (!) 151/107 (BP Location: Left arm)   Pulse 107   Temp 97.3  F (36.3  C) (Oral)   Resp 18   Wt 78.2 kg (172 lb 6.4 oz)   SpO2 95%   BMI 26.59 kg/m      Cares: 8514-4559    Current condition: Stable on 3 L O2  Neuro: A&O x4 - slow to respond but clear and logical  Cardio: Hypertensive (151/107), tachy (107), on tele (A fib - team aware)  Respiratory: Lung sounds diminished at bases on 3 L O2  GI/: Voids in urinal (team aware of output), no BM - pt requested suppository be given tomorrow AM  Skin: Clamshell incision stapled - dressing on L side, 1 R JUSTINO leaky - dressing changed  Diet: Regular - fair appetite    Labs: Creatinine 1.55, ,   BG: On insulin drip - Q2 - Alg 2 (143, 128)  LDA: 1 JUSTINO pulled, 1 JUSTINO leaky (put out 50), internal jugular SL, PIV infusing TKO with insulin  Mobility: A+2   Pain: Moderate to severe managed with PRN meds  PRN medications: Atarax x1, robaxin x1  Plan of Care: Continue to monitor

## 2023-03-26 NOTE — PROVIDER NOTIFICATION
03/26/23 0200   Call Information   Date of Call 03/26/23   Time of Call 0211   Name of person requesting the team Arelis   Title of person requesting team RN   RRT Arrival time 0213   Time RRT ended 0250   Reason for call   Type of RRT Adult   Primary reason for call Other  (Monitor showed a straight line & said vfib)   Other reason   (Vfib)   Was patient transferred from the ED, ICU, or PACU within last 24 hours prior to RRT call? No   SBAR   Situation Vfib on monitor   Background a 62 year old male admitted on 3/22/2023. He has a history of atrial fibrillation on metoprolol, CAD, alcoholic cirrhosis, hx of bleeding esophageal varices here for a liver transplant.  per MD H/P note   Notable History/Conditions Cardiac;Organ failure;Transplant   Assessment Alert, confused at time,  (afib), denies shortness of breath or chest pain, HR 100s-130s, BP high 151/107 & 144/104   Interventions ECG;Labs  (Metoprolol 5 mg IV prn to be given if rate consistently over 130s)   Patient Outcome   Patient Outcome Stabilized on unit   RRT Team   Attending/Primary/Covering Physician Stephan Lira MD   Physician(s) Ana Brandt, ERNESTINE   Lead RN Gwen Infante   RT Devora   Post RRT Intervention Assessment   Post RRT Assessment Stable/Improved   Date Follow Up Done 03/26/23   Time Follow Up Done 0450   Comments Continues in afib with rate in the 110s

## 2023-03-26 NOTE — PLAN OF CARE
BP (!) 141/97 (BP Location: Right arm)   Pulse 107   Temp 97.7  F (36.5  C) (Oral)   Resp 16   Wt 78.2 kg (172 lb 6.4 oz)   SpO2 97%   BMI 26.59 kg/m       Patient alert and oriented. Slow to respond when speaking. Tachycardic. Intermittently hypertensive. All other VS stable. Patient on 3 L NC. Patient complains of pain. PRN oxy given (See MAR). PRN Robaxin given (See MAR). Patient denies nausea. PRN Atarax given for anxiety (See MAR). BG - insulin gtt. Algorithm 2. Urine Output - Urinal at bedside. Bowel Function - No BM during shift. Patient passing gas. Nutrition - Regular diet. PIV - TKO with insulin gtt. CVC - saline locked. Clamshell incision - approximated, stapled. JUSTINO Drain - leaking at site. Serosanguinous output. Activity - Assist of 2 with gait belt and IV pole. Patient ambulated around unit x 3 during shift. Up to chair for meals. Education - Lab book up to date. Plan of Care - Will continue to monitor and notify care team of any changes.

## 2023-03-26 NOTE — PLAN OF CARE
BP (!) 133/95 (BP Location: Right arm)   Pulse 97   Temp 98.3  F (36.8  C) (Oral)   Resp 18   Wt 76.7 kg (169 lb 1.6 oz)   SpO2 91%   BMI 26.08 kg/m       Patient alert and oriented. Slow to respond when speaking. Tachycardic. Intermittently hypertensive. All other VS stable. Patient on 3 L NC. Patient complains of pain. PRN oxy given (See MAR). PRN Robaxin given (See MAR). Patient complains of intermittent nausea. PRN Zofran given (See MAR). PRN Atarax given for anxiety (See MAR). BG - ACHS. Urine Output - Urinal at bedside. Bowel Function - No BM during shift. Nutrition - Regular diet. PIV - saline . CVC - saline locked. Clamshell incision - approximated, stapled. JUSTINO Drain - leaking at site. Serous output with clots. Activity - Assist of 1 with gait belt and IV pole. Patient ambulated around 7th floor. Up to chair for meals. Education - Lab book up to date. Med card up to date. Plan of Care - Will continue to monitor and notify care team of any changes.

## 2023-03-26 NOTE — CONSULTS
Cardiology Inpatient Consultation  March 26, 2023    Reason for Consult:  A cardiology consult was requested by Dr. Arzate from the Transplant service to provide clinical guidance regarding Afib.    Assessment and Recommendation:  Cricket Mane is a 62 year old male with PMH significant for EtOH cirrhosis (MELD 22) complicated by bleeding esophageal varices, A fib on metoprolol, and non-obstructive CAD. He is now s/p DDOLT (no biliary stent) and umbilical hernia repair on 3/23/23.     #Permanent Afib (CHADSVASC 1) on rate control prior to admission  #Non-obstructive CAD  Pt with known afib went into RVR which improved without any medical intervention. Currently on Metoprolol tartrate 25mg BID. Not on anticoagulation given low CHADSVASC score. Can increase scheduled Metoprolol for better rate control. If patient goes back into RVR can try IV Metoprolol pushes (5mg IV).  -Increase Metoprolol tartrate to 50mg BID  -Can try IV Metoprolol 5mg PRN for sustained heart rate >130s    Plan of care discussed with Dr. Golden, who agrees with above plan.    Cardiology will sign off. Please do not hesitate to contact us with any questions.    Ruben Root MD  Cardiology Fellow        HPI:   Cricket Mane is a 62 year old male with PMH significant for EtOH cirrhosis (MELD 22) complicated by bleeding esophageal varices, A fib on metoprolol, and non-obstructive CAD. He is now s/p DDOLT (no biliary stent) and umbilical hernia repair on 3/23/23. Pt went into afib with RVR last night with rates up to the 160s. Interestingly tele actually just showed a flatline and read it as Vfib. RN was immediately able to wake up the patient who was asymptomatic. ECG revealed afib. Rates improved to 110s without any medications. He was in afib throughout his transplant operation and was treated intraop with Diltiazem. He was on an amio drip post op up until 3/23.  He has had afib for around 5-6 years and has never had any symptoms from it.    At  the time of interview, the patient denies chest pain, dyspnea at rest or with exertion, orthopnea, PND, palpitations, lightheadedness, or syncope.     Review of Systems:    Complete review of systems was performed and negative except per HPI.    PMH:  Past Medical History:   Diagnosis Date    Alcoholic cirrhosis of liver with ascites (H)     Atrial fibrillation (H)     Coronary artery disease     History of alcohol use disorder     History of esophageal varices with bleeding     Hyperlipidemia      Active Problems:  Patient Active Problem List    Diagnosis Date Noted    Cirrhosis (H) 01/31/2023     Priority: Medium    Transplant 01/31/2023     Priority: Medium    Pre-operative cardiovascular examination 01/31/2023     Priority: Medium    Mixed hyperlipidemia 01/31/2023     Priority: Medium    Thrombocytopenia (H) 01/31/2023     Priority: Medium    Coagulopathy (H) 01/31/2023     Priority: Medium    Anemia, unspecified type 01/31/2023     Priority: Medium    New onset a-fib (H) 12/25/2015     Priority: Medium    Atrial fibrillation and flutter (H) 12/24/2015     Priority: Medium    Alcoholic hepatitis 12/24/2015     Priority: Medium     Social History:  Social History     Tobacco Use    Smoking status: Never   Substance Use Topics    Alcohol use: Not Currently     Comment: last ETOH was 7/28/22.    Drug use: No     Family History:  Family History   Problem Relation Age of Onset    Lung Cancer Mother     Cancer Mother     Hypertension Father     Cancer Brother     No Known Problems Brother     No Known Problems Sister     Cancer Sister     No Known Problems Sister     No Known Problems Sister     Thyroid nodules Son     No Known Problems Daughter     No Known Problems Daughter        Medications:   aspirin  81 mg Oral or Feeding Tube Daily    bisacodyl  10 mg Rectal Once    fluconazole  400 mg Intravenous Q24H    furosemide  40 mg Intravenous Once    lidocaine  1-2 patch Transdermal Q24H    lidocaine   Transdermal Q8H  TALIB    metoprolol tartrate  25 mg Oral BID    mycophenolate  750 mg Oral BID IS    Or    mycophenolate  750 mg Oral or NG Tube BID IS    pantoprazole  40 mg Oral QAM AC    polyethylene glycol  17 g Oral Daily    [START ON 3/27/2023] predniSONE  25 mg Oral Once    Followed by    [START ON 3/28/2023] predniSONE  10 mg Oral Once    senna-docusate  1-2 tablet Oral BID    sodium chloride (PF)  3 mL Intravenous Q8H    sulfamethoxazole-trimethoprim  1 tablet Oral Daily    tacrolimus  1.5 mg Oral BID IS    Or    tacrolimus  1.5 mg Oral or NG Tube BID IS    valGANciclovir  450 mg Oral Daily    Or    valGANciclovir  450 mg Oral or NG Tube Daily        dextrose      insulin regular 2 Units/hr (03/26/23 0906)       Physical Exam:  Temp:  [97  F (36.1  C)-98.6  F (37  C)] 98.3  F (36.8  C)  Pulse:  [] 96  Resp:  [16-18] 18  BP: (137-151)/() 149/93  SpO2:  [95 %-97 %] 97 %    Intake/Output Summary (Last 24 hours) at 3/26/2023 0910  Last data filed at 3/26/2023 0735  Gross per 24 hour   Intake 370 ml   Output 1720 ml   Net -1350 ml     GEN: pleasant, no acute distress  HEENT: No discharge  EYES: no icterus  CV: Irregularly irregular rhythm, normal s1/s2, no murmurs/rubs/s3/s4, no heave.  CHEST: clear to ausculation bilaterally, no rales or wheezing  ABD: soft, non-tender, normal active bowel sounds  : no flank/suprapubic tenderness  EXTR: No clubbing, cyanosis or edema.   NEURO: alert oriented, speech fluent/appropriate, motor grossly nonfocal  PSYCH: cooperative, affect appropriate, pleasant      Diagnostics:  All labs and imaging were reviewed, of note:    CMP  Recent Labs   Lab 03/26/23  0802 03/26/23  0654 03/26/23  0511 03/26/23  0338 03/25/23  1256 03/25/23  1117 03/24/23  1834 03/24/23  1513 03/24/23  1232 03/24/23  1220 03/24/23  0547 03/24/23  0407 03/23/23  1332 03/23/23  1329   NA  --   --   --  133*  --  137  --  136  --   --   --  137   < > 134*  134*   POTASSIUM  --   --   --  4.9  --  4.2  --  4.0  --    --   --  4.3   < > 3.6  3.6   CHLORIDE  --   --   --  97*  --  102  --  101  --   --   --  103   < > 99  99   CO2  --   --   --  26  --  24  --  20*  --   --   --  26   < > 22  22   ANIONGAP  --   --   --  10  --  11  --  15  --   --   --  8   < > 13  13   * 123* 121* 129*   < > 108*   < > 158*  174*   < >  --    < > 119*  120*   < > 193*  193*   BUN  --   --   --  50.6*  --  39.4*  --  24.8*  --   --   --  16.4   < > 14.3  14.3   CR  --   --   --  1.67*  --  1.55*  --  1.14  --   --   --  0.89   < > 0.73  0.73   GFRESTIMATED  --   --   --  47*  --  51*  --  74  --   --   --  >90   < > >90  >90   PEDRO  --   --   --  9.1  --  8.4*  --  8.8  --   --   --  8.4*   < > 9.2  9.2   MAG  --   --   --  2.0  --  1.8  --   --   --   --   --  2.7*  --  1.6*   PHOS  --   --   --  5.9*  --  5.3*  --   --   --   --   --  5.0*  --  3.1   PROTTOTAL  --   --   --  6.1*  --  5.5*  --  6.3*  --  5.7*   < > 5.4*   < > 5.9*   ALBUMIN  --   --   --  3.0*  --  2.8*  --  3.2*  --  2.9*   < > 2.5*   < > 3.0*   BILITOTAL  --   --   --  2.0*  --  1.9*  --  2.4*  --  2.5*   < > 2.7*   < > 7.2*   ALKPHOS  --   --   --  55  --  55  --  68  --  64   < > 66   < > 110   AST  --   --   --  109*  --  151*  --  323*  --  318*   < > 435*   < > 891*   ALT  --   --   --  152*  --  155*  --  218*  --  203*   < > 222*   < > 309*    < > = values in this interval not displayed.     CBC  Recent Labs   Lab 03/26/23 0338 03/25/23 1117 03/24/23  1513 03/24/23  0833 03/24/23  0548 03/24/23  0407   WBC 10.0 10.0 12.7*  --   --  9.2   RBC 3.01* 2.70* 2.96*  --   --  2.71*   HGB 9.7* 8.8* 9.5*  --   --  8.8*   HCT 28.7* 26.4* 28.4*  --   --  25.6*   MCV 95 98 96  --   --  95   MCH 32.2 32.6 32.1  --   --  32.5   MCHC 33.8 33.3 33.5  --   --  34.4   RDW 15.9* 16.0* 16.0*  --   --  15.9*   PLT 94* 80* 83* 66*   < > 47*    < > = values in this interval not displayed.     INR  Recent Labs   Lab 03/26/23 0338 03/25/23 1117 03/24/23  1513  03/24/23  1220   INR 1.12 1.27* 1.41* 1.49*     Arterial Blood Gas  Recent Labs   Lab 03/24/23  0549 03/24/23  0159 03/23/23  2208 03/23/23  1829   PH 7.44 7.41 7.44 7.45   PCO2 41 46* 37 38   PO2 92 89 100 101   HCO3 28 29* 25 26   O2PER 28 28 21 2       No results found for: TROPI, TROPONIN, TROPR, TROPN

## 2023-03-27 ENCOUNTER — APPOINTMENT (OUTPATIENT)
Dept: GENERAL RADIOLOGY | Facility: CLINIC | Age: 63
DRG: 006 | End: 2023-03-27
Attending: INTERNAL MEDICINE
Payer: COMMERCIAL

## 2023-03-27 ENCOUNTER — APPOINTMENT (OUTPATIENT)
Dept: PHYSICAL THERAPY | Facility: CLINIC | Age: 63
DRG: 006 | End: 2023-03-27
Attending: TRANSPLANT SURGERY
Payer: COMMERCIAL

## 2023-03-27 PROBLEM — D62 ANEMIA DUE TO BLOOD LOSS, ACUTE: Status: ACTIVE | Noted: 2023-03-27

## 2023-03-27 PROBLEM — Z94.4 S/P LIVER TRANSPLANT (H): Status: ACTIVE | Noted: 2023-03-27

## 2023-03-27 PROBLEM — N17.9 AKI (ACUTE KIDNEY INJURY) (H): Status: ACTIVE | Noted: 2023-03-27

## 2023-03-27 PROBLEM — Z91.89 AT RISK FOR MALNUTRITION: Status: ACTIVE | Noted: 2023-03-27

## 2023-03-27 PROBLEM — R73.9 STEROID-INDUCED HYPERGLYCEMIA: Status: ACTIVE | Noted: 2023-03-27

## 2023-03-27 PROBLEM — D84.9 IMMUNOSUPPRESSION (H): Status: ACTIVE | Noted: 2023-03-27

## 2023-03-27 PROBLEM — F41.9 ANXIETY: Status: ACTIVE | Noted: 2023-03-27

## 2023-03-27 PROBLEM — T38.0X5A STEROID-INDUCED HYPERGLYCEMIA: Status: ACTIVE | Noted: 2023-03-27

## 2023-03-27 PROBLEM — G89.18 ACUTE POST-OPERATIVE PAIN: Status: ACTIVE | Noted: 2023-03-27

## 2023-03-27 LAB
ALBUMIN SERPL BCG-MCNC: 2.7 G/DL (ref 3.5–5.2)
ALP SERPL-CCNC: 56 U/L (ref 40–129)
ALT SERPL W P-5'-P-CCNC: 129 U/L (ref 10–50)
ANION GAP SERPL CALCULATED.3IONS-SCNC: 9 MMOL/L (ref 7–15)
AST SERPL W P-5'-P-CCNC: 75 U/L (ref 10–50)
BILIRUB DIRECT SERPL-MCNC: 1.04 MG/DL (ref 0–0.3)
BILIRUB SERPL-MCNC: 1.6 MG/DL
BUN SERPL-MCNC: 53.6 MG/DL (ref 8–23)
CALCIUM SERPL-MCNC: 8.5 MG/DL (ref 8.8–10.2)
CHLORIDE SERPL-SCNC: 99 MMOL/L (ref 98–107)
CREAT SERPL-MCNC: 1.57 MG/DL (ref 0.67–1.17)
DEPRECATED HCO3 PLAS-SCNC: 25 MMOL/L (ref 22–29)
ERYTHROCYTE [DISTWIDTH] IN BLOOD BY AUTOMATED COUNT: 15.4 % (ref 10–15)
GFR SERPL CREATININE-BSD FRML MDRD: 50 ML/MIN/1.73M2
GLUCOSE BLDC GLUCOMTR-MCNC: 115 MG/DL (ref 70–99)
GLUCOSE BLDC GLUCOMTR-MCNC: 131 MG/DL (ref 70–99)
GLUCOSE BLDC GLUCOMTR-MCNC: 141 MG/DL (ref 70–99)
GLUCOSE BLDC GLUCOMTR-MCNC: 146 MG/DL (ref 70–99)
GLUCOSE BLDC GLUCOMTR-MCNC: 172 MG/DL (ref 70–99)
GLUCOSE SERPL-MCNC: 135 MG/DL (ref 70–99)
HBV DNA SERPL QL NAA+PROBE: NORMAL
HCT VFR BLD AUTO: 27.2 % (ref 40–53)
HCV RNA SERPL QL NAA+PROBE: NORMAL
HGB BLD-MCNC: 9.1 G/DL (ref 13.3–17.7)
HIV1+2 RNA SERPL QL NAA+PROBE: NORMAL
MAGNESIUM SERPL-MCNC: 1.7 MG/DL (ref 1.7–2.3)
MCH RBC QN AUTO: 32.3 PG (ref 26.5–33)
MCHC RBC AUTO-ENTMCNC: 33.5 G/DL (ref 31.5–36.5)
MCV RBC AUTO: 97 FL (ref 78–100)
NT-PROBNP SERPL-MCNC: 2691 PG/ML (ref 0–900)
PHOSPHATE SERPL-MCNC: 4.1 MG/DL (ref 2.5–4.5)
PLATELET # BLD AUTO: 81 10E3/UL (ref 150–450)
POTASSIUM SERPL-SCNC: 5.1 MMOL/L (ref 3.4–5.3)
PROT SERPL-MCNC: 5.4 G/DL (ref 6.4–8.3)
RBC # BLD AUTO: 2.82 10E6/UL (ref 4.4–5.9)
SODIUM SERPL-SCNC: 133 MMOL/L (ref 136–145)
TACROLIMUS BLD-MCNC: 7.8 UG/L (ref 5–15)
TME LAST DOSE: NORMAL H
TME LAST DOSE: NORMAL H
WBC # BLD AUTO: 7.5 10E3/UL (ref 4–11)

## 2023-03-27 PROCEDURE — 36592 COLLECT BLOOD FROM PICC: CPT | Performed by: NURSE PRACTITIONER

## 2023-03-27 PROCEDURE — 250N000012 HC RX MED GY IP 250 OP 636 PS 637: Performed by: SURGERY

## 2023-03-27 PROCEDURE — 250N000012 HC RX MED GY IP 250 OP 636 PS 637: Performed by: NURSE PRACTITIONER

## 2023-03-27 PROCEDURE — 71045 X-RAY EXAM CHEST 1 VIEW: CPT

## 2023-03-27 PROCEDURE — 250N000013 HC RX MED GY IP 250 OP 250 PS 637: Performed by: SURGERY

## 2023-03-27 PROCEDURE — 250N000011 HC RX IP 250 OP 636: Performed by: INTERNAL MEDICINE

## 2023-03-27 PROCEDURE — 99232 SBSQ HOSP IP/OBS MODERATE 35: CPT | Mod: GC | Performed by: INTERNAL MEDICINE

## 2023-03-27 PROCEDURE — 84100 ASSAY OF PHOSPHORUS: CPT | Performed by: NURSE PRACTITIONER

## 2023-03-27 PROCEDURE — 71045 X-RAY EXAM CHEST 1 VIEW: CPT | Mod: 26 | Performed by: RADIOLOGY

## 2023-03-27 PROCEDURE — 80053 COMPREHEN METABOLIC PANEL: CPT | Performed by: NURSE PRACTITIONER

## 2023-03-27 PROCEDURE — 82248 BILIRUBIN DIRECT: CPT | Performed by: NURSE PRACTITIONER

## 2023-03-27 PROCEDURE — 97530 THERAPEUTIC ACTIVITIES: CPT | Mod: GP

## 2023-03-27 PROCEDURE — 97116 GAIT TRAINING THERAPY: CPT | Mod: GP

## 2023-03-27 PROCEDURE — 120N000011 HC R&B TRANSPLANT UMMC

## 2023-03-27 PROCEDURE — 80197 ASSAY OF TACROLIMUS: CPT | Performed by: NURSE PRACTITIONER

## 2023-03-27 PROCEDURE — 250N000013 HC RX MED GY IP 250 OP 250 PS 637: Performed by: PHYSICIAN ASSISTANT

## 2023-03-27 PROCEDURE — 250N000013 HC RX MED GY IP 250 OP 250 PS 637: Performed by: NURSE PRACTITIONER

## 2023-03-27 PROCEDURE — 250N000013 HC RX MED GY IP 250 OP 250 PS 637: Performed by: INTERNAL MEDICINE

## 2023-03-27 PROCEDURE — 85014 HEMATOCRIT: CPT | Performed by: NURSE PRACTITIONER

## 2023-03-27 PROCEDURE — 83735 ASSAY OF MAGNESIUM: CPT | Performed by: NURSE PRACTITIONER

## 2023-03-27 PROCEDURE — 250N000013 HC RX MED GY IP 250 OP 250 PS 637: Performed by: TRANSPLANT SURGERY

## 2023-03-27 PROCEDURE — 83880 ASSAY OF NATRIURETIC PEPTIDE: CPT | Performed by: INTERNAL MEDICINE

## 2023-03-27 RX ORDER — FUROSEMIDE 10 MG/ML
40 INJECTION INTRAMUSCULAR; INTRAVENOUS ONCE
Status: COMPLETED | OUTPATIENT
Start: 2023-03-27 | End: 2023-03-27

## 2023-03-27 RX ORDER — BISACODYL 10 MG
10 SUPPOSITORY, RECTAL RECTAL ONCE
Status: COMPLETED | OUTPATIENT
Start: 2023-03-27 | End: 2023-03-27

## 2023-03-27 RX ORDER — METOPROLOL TARTRATE 1 MG/ML
5 INJECTION, SOLUTION INTRAVENOUS EVERY 5 MIN PRN
Status: DISCONTINUED | OUTPATIENT
Start: 2023-03-27 | End: 2023-03-29 | Stop reason: HOSPADM

## 2023-03-27 RX ORDER — FUROSEMIDE 10 MG/ML
20 INJECTION INTRAMUSCULAR; INTRAVENOUS ONCE
Status: DISCONTINUED | OUTPATIENT
Start: 2023-03-27 | End: 2023-03-27

## 2023-03-27 RX ORDER — METHOCARBAMOL 500 MG/1
500 TABLET, FILM COATED ORAL 3 TIMES DAILY
Status: DISCONTINUED | OUTPATIENT
Start: 2023-03-27 | End: 2023-03-29 | Stop reason: HOSPADM

## 2023-03-27 RX ADMIN — METHOCARBAMOL 500 MG: 500 TABLET ORAL at 16:00

## 2023-03-27 RX ADMIN — MYCOPHENOLATE MOFETIL 750 MG: 250 CAPSULE ORAL at 08:53

## 2023-03-27 RX ADMIN — OXYCODONE HYDROCHLORIDE 5 MG: 5 TABLET ORAL at 09:27

## 2023-03-27 RX ADMIN — METHOCARBAMOL 500 MG: 500 TABLET ORAL at 20:29

## 2023-03-27 RX ADMIN — FUROSEMIDE 40 MG: 10 INJECTION, SOLUTION INTRAVENOUS at 16:37

## 2023-03-27 RX ADMIN — POLYETHYLENE GLYCOL 3350 17 G: 17 POWDER, FOR SOLUTION ORAL at 08:55

## 2023-03-27 RX ADMIN — METOPROLOL TARTRATE 50 MG: 50 TABLET, FILM COATED ORAL at 08:53

## 2023-03-27 RX ADMIN — ASPIRIN 81 MG 81 MG: 81 TABLET ORAL at 08:53

## 2023-03-27 RX ADMIN — SENNOSIDES AND DOCUSATE SODIUM 1 TABLET: 8.6; 5 TABLET ORAL at 08:51

## 2023-03-27 RX ADMIN — BISACODYL 10 MG RECTAL SUPPOSITORY 10 MG: at 20:54

## 2023-03-27 RX ADMIN — TACROLIMUS 1.5 MG: 1 CAPSULE ORAL at 08:57

## 2023-03-27 RX ADMIN — TACROLIMUS 1.5 MG: 1 CAPSULE ORAL at 17:52

## 2023-03-27 RX ADMIN — SULFAMETHOXAZOLE AND TRIMETHOPRIM 1 TABLET: 400; 80 TABLET ORAL at 08:53

## 2023-03-27 RX ADMIN — PREDNISONE 25 MG: 20 TABLET ORAL at 08:52

## 2023-03-27 RX ADMIN — FLUCONAZOLE 400 MG: 200 TABLET ORAL at 08:59

## 2023-03-27 RX ADMIN — HYDROXYZINE HYDROCHLORIDE 50 MG: 50 TABLET ORAL at 22:12

## 2023-03-27 RX ADMIN — VALGANCICLOVIR 450 MG: 450 TABLET, FILM COATED ORAL at 08:51

## 2023-03-27 RX ADMIN — METHOCARBAMOL 500 MG: 500 TABLET ORAL at 09:28

## 2023-03-27 RX ADMIN — PANTOPRAZOLE SODIUM 40 MG: 40 TABLET, DELAYED RELEASE ORAL at 08:54

## 2023-03-27 RX ADMIN — HYDROXYZINE HYDROCHLORIDE 50 MG: 50 TABLET ORAL at 16:00

## 2023-03-27 RX ADMIN — METOPROLOL TARTRATE 50 MG: 50 TABLET, FILM COATED ORAL at 20:29

## 2023-03-27 RX ADMIN — MYCOPHENOLATE MOFETIL 750 MG: 250 CAPSULE ORAL at 20:28

## 2023-03-27 RX ADMIN — LIDOCAINE PATCH 4% 2 PATCH: 40 PATCH TOPICAL at 09:14

## 2023-03-27 RX ADMIN — SENNOSIDES AND DOCUSATE SODIUM 2 TABLET: 8.6; 5 TABLET ORAL at 20:29

## 2023-03-27 RX ADMIN — OXYCODONE HYDROCHLORIDE 5 MG: 5 TABLET ORAL at 20:29

## 2023-03-27 ASSESSMENT — ACTIVITIES OF DAILY LIVING (ADL)
ADLS_ACUITY_SCORE: 28
ADLS_ACUITY_SCORE: 28
ADLS_ACUITY_SCORE: 30
ADLS_ACUITY_SCORE: 28

## 2023-03-27 NOTE — PROGRESS NOTES
Calorie Count  Intake recorded for: 3/26  Total Kcals: 1172 Total Protein: 59g  Kcals from Hospital Food: 382   Protein: 12g  Kcals from Outside Food (average):790 Protein: 47g  # Meals Ordered from Kitchen: 3 meals ordered  # Meals Recorded: 3 meals recorded (First- 100% [employer] strawberry yogurt*, oatmeal*)       (Second- 100% dinner roll, chicken noodle soup)       (Third- 100% orange, orange sherbert, 75% mac & cheese*)  # Supplements Recorded: 0 supplements recorded  *from outside hospital

## 2023-03-27 NOTE — PROVIDER NOTIFICATION
[id number] AlhajiCricket has blood coming out of his penis (small amount of blood). It's not causing the patient discomfort or pain. However, the patient states it's a recent development.

## 2023-03-27 NOTE — PROGRESS NOTES
Transplant Surgery  Inpatient Daily Progress Note  03/27/2023    Assessment & Plan: 62 year old male with PMHx significant for alcohol related cirrhosis (MELD 22) complicated by bleeding esophageal varices, A fib on metoprolol, and CAD.     s/p DBD DDLT (no biliary stent) and umbilical hernia repair on 3/23/23 with Dr. Arzate.      Graft function:   Liver transplant: POD#4. Liver studies continue to downtrend - total bilirubin down to 1.6, AST and ALT 3-4x ULN but improving. Alk phos wnl.     - Abdominal US w/doppler 3/23/2023: patent hepatic vasculature, elevated resistive indices   - JUSTINO drain x 1: 650 ml in the last 24 hours  - Continue ASA 81 mg daily for vascular ppx.      Immunosuppression management:  Induction: Steroid taper per protocol.  Maintenance:   -  mg BID  - Tacrolimus 1.5 mg BID. Goal level 8-12. Monitor closely with Fluconazole, Diltiazem, and Amiodarone.      Neurology/Psych:  Acute postoperative pain:   * Switch to methocarbamol 500mg TID standing   * Oxycodone 5-10mg q6h PRN  * Lidocaine patches  Anxiety: PRN Hydoxyzine. Health psychology consulted.   Alcohol use disorder: Recommend ongoing engagement in alcohol support programming.    Hematology:   Acute blood loss/Anemia of chronic disease: Hgb 9-10, stable.   Thrombocytopenia: due to liver disease. Platelets 94, improving.       Cardiorespiratory:   Hypoxia: Likely related to volume overload. CXR today with improving aeration. Ongoing 2L NC requirement. BNP elevated to 2,691 (3/27). Plan for lasix 40mg IV again. Encourage CDB/IS. Wean O2 as able.  A fib: A fib throughout operation controlled intra-operatively with Diltiazem --> controlled with amiodarone gtt in SICU. [id number] Score: 1 point, no need for anticoagulation. Cardiology consulted; metoprolol increased from 25mg to 50 mg BID.   CAD: ASA 81mg, as above.     GI/Nutrition:   At risk for malnutrition: Nutrition consulted. No plan to place FT at this time. Advance diet to  regular today and add supplements. Calorie counts  - Bowel regimen: miralax qdaily + senna BID. Bisacodyl suppositories PRN.      Endocrine:   Steroid induced hyperglycemia: HGBA1C 4.7%. Sliding scale novolog.     Fluid/Electrolytes/Neph:  FERNANDA: likely related to hypotension intra-op. Serum creatinine slowly improving, down to 1.57 today (up from 0.7-0.8 pre-op).   Hyponatremia: Stable 133    : No issues.      Infectious disease: Afebrile. WBC WNL.      Prophylaxis: DVT (SCDs), GI (PPI), fungal (Flucon x 7 days followed by nystatin), PCP (Vactrim), CMV (Valcyte x 3 months), Periop (s/p Zosyn x48hrs).    Dispo: 7A; PT/OT recommending ARU.    AMY/Fellow/Resident Provider: Harper Rodriguez    Faculty: Aniket Arzate MD    __________________________________________________________________  Transplant History: Admitted 3/22/2023 for liver transplant.   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 Afebrile    Continues to require 3L NC. Intake in the last 24 hours: 1172 kcal. Pain medications in the last 24 hours: Oxycodone 10mg x 4. Walking with assist of one w/gait belt + walker. No bowel movement in the last 24 hours.     ROS:   A 10-point review of systems was negative except as noted above.    Curent Meds:    aspirin  81 mg Oral or Feeding Tube Daily     bisacodyl  10 mg Rectal Once     fluconazole  400 mg Oral Daily     insulin aspart  1-10 Units Subcutaneous TID AC     insulin aspart  1-7 Units Subcutaneous At Bedtime     lidocaine  1-2 patch Transdermal Q24H     lidocaine   Transdermal Q8H TALIB     metoprolol tartrate  50 mg Oral BID     mycophenolate  750 mg Oral BID IS     pantoprazole  40 mg Oral QAM AC     polyethylene glycol  17 g Oral Daily     [START ON 3/28/2023] predniSONE  10 mg Oral Once     senna-docusate  1-2 tablet Oral BID     sodium chloride (PF)  3  mL Intravenous Q8H     sulfamethoxazole-trimethoprim  1 tablet Oral Daily     tacrolimus  1.5 mg Oral BID IS     valGANciclovir  450 mg Oral Daily       Physical Exam:     Admit Weight: 73.5 kg (162 lb 1.6 oz)    Current Vitals:   /89 (BP Location: Left arm)   Pulse 89   Temp 98.5  F (36.9  C) (Oral)   Resp 16   Wt 76.7 kg (169 lb 1.6 oz)   SpO2 99%   BMI 26.08 kg/m      Vital sign ranges:    Temp:  [97.4  F (36.3  C)-98.6  F (37  C)] 98.5  F (36.9  C)  Pulse:  [] 89  Resp:  [16-20] 16  BP: (118-148)/(71-99) 123/89  SpO2:  [90 %-99 %] 99 %  Patient Vitals for the past 24 hrs:   BP Temp Temp src Pulse Resp SpO2   03/27/23 1032 123/89 98.5  F (36.9  C) Oral 89 16 99 %   03/27/23 0536 124/89 98.6  F (37  C) Oral 97 16 99 %   03/27/23 0136 (!) 133/90 98.2  F (36.8  C) Oral 103 16 97 %   03/26/23 2228 118/71 97.7  F (36.5  C) Oral 100 16 90 %   03/26/23 1900 (!) 145/99 97.4  F (36.3  C) Oral 99 20 --   03/26/23 1738 (!) 133/95 98.3  F (36.8  C) Oral 97 18 91 %   03/26/23 1456 (!) 148/90 98.5  F (36.9  C) Axillary 99 18 98 %     General Appearance: in no apparent distress.   Skin: normal, warm, dry, No rashes, induration, or jaundice  Heart: Tachycardic, 1+ Carmela edema  Lungs: requires 3L NC  Abdomen:appropriately tender. Incision C/D/I. JUSTINO with serosanguinous output.   Extremities: RIJ line in place  Neurologic: awake, alert and oriented.     Data:   CMP  Recent Labs   Lab 03/27/23  1233 03/27/23  0921 03/27/23  0635 03/26/23  0511 03/26/23  0338 03/24/23  0547 03/24/23  0407 03/23/23  1332 03/23/23  1331 03/23/23  0737 03/22/23 2035   NA  --   --  133*  --  133*   < > 137   < >  --    < > 129*   POTASSIUM  --   --  5.1  --  4.9   < > 4.3   < >  --    < > 4.6   CHLORIDE  --   --  99  --  97*   < > 103   < >  --    < > 99   CO2  --   --  25  --  26   < > 26   < >  --    < > 19*   * 115* 135*   < > 129*   < > 119*  120*   < >  --    < > 102*   BUN  --   --  53.6*  --  50.6*   < > 16.4   < >  --     < > 19.2   CR  --   --  1.57*  --  1.67*   < > 0.89   < >  --    < > 0.83   GFRESTIMATED  --   --  50*  --  47*   < > >90   < >  --    < > >90   PEDRO  --   --  8.5*  --  9.1   < > 8.4*   < >  --    < > 8.6*   ICAW  --   --   --   --   --   --  4.8  --  5.1   < >  --    MAG  --   --  1.7  --  2.0   < > 2.7*  --   --    < > 1.9   PHOS  --   --  4.1  --  5.9*   < > 5.0*  --   --    < > 2.6   AMYLASE  --   --   --   --   --   --  379*  --   --   --  113*   LIPASE  --   --   --   --   --   --  11*  --   --   --   --    ALBUMIN  --   --  2.7*  --  3.0*   < > 2.5*   < >  --    < > 2.6*   BILITOTAL  --   --  1.6*  --  2.0*   < > 2.7*   < >  --    < > 7.7*   ALKPHOS  --   --  56  --  55   < > 66   < >  --    < > 223*   AST  --   --  75*  --  109*   < > 435*   < >  --    < > 92*   ALT  --   --  129*  --  152*   < > 222*   < >  --    < > 54*    < > = values in this interval not displayed.     CBC  Recent Labs   Lab 03/27/23  0635 03/26/23  0338 03/23/23  1545 03/23/23  1329   HGB 9.1* 9.7*   < > 10.2*   WBC 7.5 10.0   < > 9.8   PLT 81* 94*   < > 73*   A1C  --   --   --  4.7    < > = values in this interval not displayed.     COAGS  Recent Labs   Lab 03/26/23  0338 03/25/23  1117 03/24/23  1513 03/24/23  1220   INR 1.12 1.27* 1.41* 1.49*   PTT  --   --  27 28      CXR 3/27/2023  Overall improved aeration of the lungs with perhaps more confluent atelectasis or consolidation in the right midlung. Recommend follow-up to clearing.

## 2023-03-27 NOTE — DISCHARGE SUMMARY
[facility]    Discharge Summary  Transplant Surgery    Date of Admission:  3/22/2023  Date of Discharge:  3/29/2023  Discharging Provider: Esmer Bhakta PA-C / Dr. Li    Discharge Diagnoses   Principal Problem:    S/P liver transplant (H)  Active Problems:    Atrial fibrillation with RVR (H)    Transplant    Thrombocytopenia (H)    Immunosuppression (H)    Acute post-operative pain    Anxiety    Anemia due to blood loss, acute    At risk for malnutrition    FERNANDA (acute kidney injury) (H)    Steroid-induced hyperglycemia    Procedure/Surgery Information    Procedure date:  03/23/23    Preoperative diagnosis:  End Stage Liver Disease due to Laennec's    Postoperative diagnosis:  Same,     Procedure:  1. Donation after Brain Death Piggyback liver transplant   2. End-to-end Choledochocholedochostomy    3. Umbilical hernia repair    Surgeon  Surgeon(s) and Role:     * Aniket Arzate MD - Primary     * Janene Alex MD - Resident - Assisting     * William Bautista MD - Fellow - Assisting     * Brayan Live MD - Fellow - Assisting    Co-Surgeon:      Fellow/Assistant:  Dr William Bautista served as first assistant for the liver transplant as there was no qualified resident available to assist.  Dr Bautista assisted with the hepatectomy and performed the vascular and biliary anastomoses.     Anesthesia:  General    Specimen:  liver, donor gallbladder    Drains:  2     Non-operative procedures 3/24/23 CVC Placement     History of Present Illness   Cricket Mane is a 62 year old male PMH significant for EtOH cirrhosis (MELD 22) complicated by bleeding esophageal varices, A fib on metoprolol, and CAD. He is now s/p DDOLT (no biliary stent) and umbilical hernia repair on 3/23/23 with Dr. Arzate.     Hospital Course   s/p DDLT 3/23/23: LFTs trended down as expected after transplant. Post op US patent with mildly elevated RIs. Continue ASA 81 mg daily for vascular  ppx.     Immunosuppression:    Induction: Steroid taper per protocol.  Maintenance:   -  mg BID  - Tacrolimus, will increase to 4 mg BID on discharge due to impending discontinuation of fluconazole. Goal level 8-12. Monitor closely with Fluconazole, Diltiazem, and Amiodarone.   Infection prophylaxis: viral (Valcyte x 3 months), PCP (Bactrim x 6 months)    Transplant coordinator: Polly Mancini [phone].  Biliary stent:  No  Donor status:  DBD  CMV D + / R +  EBV D + / R +  Anticoagulation plan: ASA 81 mg x 6 months    Neurology/Psych:  Acute postoperative pain: Continue PRN tramadol on discharge, Lidoderm patches, Robaxin PRN. Continue bowel regimen with opioids and recent surgery.   Anxiety: PRN hydroxyzine as needed    Hematology:   Acute blood loss/Anemia of chronic disease: Hgb 9-10, stable.   Thrombocytopenia: due to liver disease. Platelets 101, improving.       Cardiorespiratory:   Non-obstructive CAD; A fib: In A fib throughout operation controlled intra-operatively with Diltiazem. Controlled with amiodarone gtt in SICU. Cardiology consulted d/t multiple episodes of AF with RVR on floor. Plan: Metoprolol increased to 50 mg BID for longer term management.   -[id number] Score: 1 point, no need for anticoagulation.      GI/Nutrition:   Moderate malnutrition in the context of acute on chronic illness: Nutrition consulted. Advance diet to regular today and add supplements. Calorie counts demonstrating good PO intake.      Endocrine:   Steroid induced hyperglycemia: HGBA1C 4.7%. Required insulin post-operatively with high dose steroids. Now resolved.     Fluid/Electrolytes/Neph:  FERNANDA; non-oliguric: likely r/t hypotension intra-op. Improving prior to discharge, Cr 1.2 (up from 0.7-0.8 pre-op).     Discharge Disposition   Discharged to home   Condition at discharge: Stable    Pending Results   These results will be followed up by transplant team  Unresulted Labs Ordered in the Past 30 Days of this  Admission       Date and Time Order Name Status Description    3/23/2023  5:23 AM Prepare plasma (unit) Preliminary     3/23/2023  5:23 AM Prepare plasma (unit) Preliminary     3/23/2023  5:23 AM Prepare plasma (unit) Preliminary     3/23/2023  5:23 AM Prepare plasma (unit) Preliminary     3/23/2023  5:23 AM Prepare plasma (unit) Preliminary     3/23/2023  3:25 AM Prepare red blood cells (unit) Preliminary     3/23/2023  3:25 AM Prepare red blood cells (unit) Preliminary     3/23/2023  3:25 AM Prepare red blood cells (unit) Preliminary     3/23/2023  3:25 AM Prepare red blood cells (unit) Preliminary     3/23/2023  3:25 AM Prepare red blood cells (unit) Preliminary     3/23/2023  3:25 AM Prepare red blood cells (unit) Preliminary     3/23/2023  3:25 AM Prepare red blood cells (unit) Preliminary           Final pathology results:   A. LIVER (1154 gm)/ GALLBLADDER; EXPLANTED AT TRANSPLANTATION:  Cirrhosis, advanced (Laennec fibrosis stage 4C); inactive:   -Compatible with alcohol etiology now quiescent/inactive   -Negative for hepatocellular or other type of neoplasm   -The iron stain is positive (2+ iron on a scale of 0-4)   -PAS and PAS-D stains show no abnormal cytoplasmic accumulations  Gallbladder: cholelithiases; otherwise no significant morphologic abnormality     B. GALLBLADDER, DONOR CHOLECYSTECTOMY:   Cholesterolosis, otherwise no significant morphologic abnormality   -Piece of unremarkable hepatic parenchyma and capsule   -No lymph node identified    Primary Care Physician   CALLUM ORANTES    Physical Exam   Temp: 97.7  F (36.5  C) Temp src: Oral BP: 118/75 Pulse: 74   Resp: 18 SpO2: 97 % O2 Device: None (Room air)    Vitals:    03/26/23 0754 03/28/23 0607 03/29/23 0156   Weight: 76.7 kg (169 lb 1.6 oz) 70 kg (154 lb 6.4 oz) 68.8 kg (151 lb 9.6 oz)     Vital Signs with Ranges  Temp:  [97  F (36.1  C)-99  F (37.2  C)] 97.7  F (36.5  C)  Pulse:  [] 74  Resp:  [16-18] 18  BP: (104-131)/(70-78)  118/75  SpO2:  [97 %-99 %] 97 %  I/O last 3 completed shifts:  In: 480 [P.O.:480]  Out: 300 [Urine:300]    Constitutional: Awake, alert, cooperative, no apparent distress, and appears stated age.  Eyes: Lids and lashes normal, pupils equal, round, extra ocular muscles intact, sclera clear, conjunctiva normal.  ENT: Normocephalic, without obvious abnormality, atraumatic  Respiratory: Nonlabored resps on RA  Cardiovascular: RRR  GI: Soft, non-distended, non-tender, incision stapled, c/d/i, drain removed  Genitourinary:  voiding  Skin: No bruising or bleeding, normal skin color, texture  Musculoskeletal: There is no redness, warmth, or swelling of the joints.  Full range of motion noted.  Neurologic: Awake, alert, oriented to name, place and time.   Neuropsychiatric: Calm, normal eye contact, alert, normal affect, oriented to self, place, time and situation, memory for past and recent events intact and thought process normal.    Consultations This Hospital Stay   SOCIAL WORK IP CONSULT  NUTRITION SERVICES ADULT IP CONSULT  PHYSICAL THERAPY ADULT IP CONSULT  OCCUPATIONAL THERAPY ADULT IP CONSULT  NURSING TO CONSULT FOR VASCULAR ACCESS CARE IP CONSULT  CARE MANAGEMENT / SOCIAL WORK IP CONSULT  NUTRITION SERVICES ADULT IP CONSULT  OCCUPATIONAL THERAPY ADULT IP CONSULT  PHYSICAL THERAPY ADULT IP CONSULT  PHARMACY IP CONSULT  SOT MEDICATION HISTORY IP PHARMACY CONSULT  CARE MANAGEMENT / SOCIAL WORK IP CONSULT  CARDIOLOGY GENERAL ADULT IP CONSULT  PSYCHOLOGY ADULT IP CONSULT  LYMPHEDEMA THERAPY IP CONSULT    Time Spent on this Encounter   I have spent greater than 30 minutes on this discharge.    Discharge Orders   Discharge Medications   Current Discharge Medication List        START taking these medications    Details   aspirin (ASA) 325 MG tablet Take 1 tablet (325 mg) by mouth daily  Qty: 30 tablet, Refills: 11    Associated Diagnoses: S/P liver transplant (H)      calcium carbonate-vitamin D (CALTRATE) 600-10 MG-MCG per  tablet Take 1 tablet by mouth 2 times daily (with meals)  Qty: 60 tablet, Refills: 11    Associated Diagnoses: S/P liver transplant (H)      fluconazole (DIFLUCAN) 200 MG tablet Take 2 tablets (400 mg) by mouth daily (For one dose on 3/30)  Qty: 2 tablet, Refills: 0    Associated Diagnoses: S/P liver transplant (H)      hydrOXYzine (ATARAX) 25 MG tablet Take 1-2 tablets (25-50 mg) by mouth every 6 hours as needed for other or anxiety (adjuvant pain)  Qty: 30 tablet, Refills: 0    Associated Diagnoses: S/P liver transplant (H)      Lidocaine (LIDOCARE) 4 % Patch Place 1-2 patches onto the skin every 24 hours To prevent lidocaine toxicity, patient should be patch free for 12 hrs daily.  Qty: 10 patch, Refills: 0    Associated Diagnoses: S/P liver transplant (H)      magnesium oxide (MAG-OX) 400 MG tablet Take 1 tablet (400 mg) by mouth daily  Qty: 30 tablet, Refills: 11    Associated Diagnoses: S/P liver transplant (H)      methocarbamol (ROBAXIN) 500 MG tablet Take 1 tablet (500 mg) by mouth 3 times daily  Qty: 90 tablet, Refills: 11    Associated Diagnoses: S/P liver transplant (H)      mycophenolate (GENERIC EQUIVALENT) 250 MG capsule Take 3 capsules (750 mg) by mouth 2 times daily  Qty: 180 capsule, Refills: 6    Associated Diagnoses: S/P liver transplant (H)      phosphorus tablet 250 mg (PHOSPHA 250 NEUTRAL) 250 MG per tablet Take 1 tablet (250 mg) by mouth 2 times daily  Qty: 30 tablet, Refills: 0    Associated Diagnoses: S/P liver transplant (H)      predniSONE (DELTASONE) 5 MG tablet Take 1 tablet (5 mg) by mouth daily  Qty: 30 tablet, Refills: 0    Associated Diagnoses: S/P liver transplant (H)      senna-docusate (SENOKOT-S/PERICOLACE) 8.6-50 MG tablet Take 1 tablet by mouth 2 times daily as needed for constipation  Qty: 100 tablet, Refills: 0    Associated Diagnoses: S/P liver transplant (H)      sulfamethoxazole-trimethoprim (BACTRIM) 400-80 MG tablet Take 1 tablet by mouth daily  Qty: 30 tablet, Refills:  11    Associated Diagnoses: S/P liver transplant (H)      tacrolimus (GENERIC EQUIVALENT) 0.5 MG capsule Take 1 capsule (0.5 mg) by mouth 2 times daily as needed (For dose titration) As directed by your transplant team  Qty: 60 capsule, Refills: 1    Associated Diagnoses: S/P liver transplant (H)      tacrolimus (GENERIC EQUIVALENT) 1 MG capsule Take 4 capsules (4 mg) by mouth 2 times daily  Qty: 240 capsule, Refills: 11    Associated Diagnoses: S/P liver transplant (H)      traMADol (ULTRAM) 50 MG tablet Take 1 tablet (50 mg) by mouth every 6 hours as needed for severe pain (7-10)  Qty: 10 tablet, Refills: 0    Associated Diagnoses: S/P liver transplant (H)      valGANciclovir (VALCYTE) 450 MG tablet Take 1 tablet (450 mg) by mouth daily  Qty: 30 tablet, Refills: 2    Associated Diagnoses: S/P liver transplant (H)           CONTINUE these medications which have CHANGED    Details   metoprolol tartrate (LOPRESSOR) 25 MG tablet Take 2 tablets (50 mg) by mouth 2 times daily  Qty: 120 tablet, Refills: 11    Associated Diagnoses: Chronic atrial fibrillation with rapid ventricular response (H)      omeprazole 20 MG tablet Take 1 tablet (20 mg) by mouth 2 times daily  Qty: 60 tablet, Refills: 11    Associated Diagnoses: S/P liver transplant (H)           CONTINUE these medications which have NOT CHANGED    Details   folic acid (FOLVITE) 1 MG tablet Take 1,000 mcg by mouth daily      Multiple Vitamins-Iron TABS Take 1 tablet by mouth daily           STOP taking these medications       furosemide (LASIX) 20 MG tablet Comments:   Reason for Stopping:                  PAC Visit Referral (For [facility] Only)      Home Care Referral      Reason for your hospital stay    Patient underwent a  donor liver transplant on 3/23/23 with Dr. Arzate.     Activity    Your activity upon discharge: Walk at least four times a day, lift no greater than 10 pounds for 8 weeks from the time of surgery.  No driving while taking narcotics or 3  weeks after surgery.     Adult [facility] Follow-up and recommended labs and tests    [facility] FOLLOW UP:     1. Follow up in Transplant Clinic weekly for the next 5 weeks with Dr. Arzate (or any available liver transplant surgeon).     2. Follow up with Transplant Hepatology in 3 months.     3. NO biliary stent was placed.    Call your Transplant Coordinator ([phone]) with questions about Transplant Center appointment scheduling.     LABS:     CBC, BMP, magnesium, phosphorus, hepatic panel, tacrolimus level every Monday and Thursday by home health care nurse if arranged, or at an outpatient lab.     When to contact your care team    WHEN TO CONTACT YOUR  COORDINATOR:     Transplant Coordinator [phone]     Notify your coordinator if you have pain over your liver, increased redness or drainage from your incision, fever greater than 100F, or yellowing of skin or eyes.     Notify your coordinator immediately if you are ever unable to take your immunosuppressive medications for any reason.     If you have URGENT concerns after office hours, please call the hospital switchboard at [phone] and ask to have the organ transplant nurse on-call paged. If you have a life-threatening emergency, go to the nearest emergency room.     Wound care and dressings    Instructions to care for your wound at home: If you have staples in place, they will be removed in 3 weeks after operation. Wash incision daily with soap and water. Do not soak or scrub.     Diet    Follow this diet upon discharge: Diet recommendations post-transplant: Heart healthy dietary habits long term (low saturated/trans fat, low sodium). High protein diet x 8 weeks. Practice food safety precautions.         Data   Most Recent 2 LFT's:  Recent Labs   Lab Test 03/29/23  0700 03/28/23  0603   AST 48 73*   * 144*   ALKPHOS 63 67   BILITOTAL 1.8* 1.9*     Attestation:    The patient has been seen with team and evaluated by me.  <30 min  planning discharge.    Vital signs, labs, medications and orders were reviewed.   When obtained, diagnostic images were reviewed by me and interpreted as above.    The care plan was discussed with the multidisciplinary team and I agree with the findings and plan in this note, with any differences recorded in blue.    Immunosuppressive medication management was reviewed and adjusted as reflected in the note and orders.     .

## 2023-03-27 NOTE — PLAN OF CARE
/71 (BP Location: Right arm)   Pulse 100   Temp 97.7  F (36.5  C) (Oral)   Resp 16   Wt 76.7 kg (169 lb 1.6 oz)   SpO2 90%   BMI 26.08 kg/m      Shift: 9498-0223  VS: VSS  Neuro: Aox4  BG: ACHS; 161, 131  Respiratory: 3L NC   Cardiac: Tachy   Pain/Nausea: PRN Oxy x1   PRN: Atarax x1.   Diet: Regular   IV Access: PIV SL. CVC SL.   Lines/Drains: JUSTINO 150cc serosanguinous output.    GI/: Voiding using bedside urinal. No BM.   Skin: Clamshell incision stapled and VILLA.   Mobility: Assist x1 with gait belt and walker.   Plan: Continue with POC.

## 2023-03-27 NOTE — PROGRESS NOTES
CLINICAL NUTRITION SERVICES - BRIEF NOTE     Nutrition Prescription    Malnutrition Status:    Moderate malnutrition in the context of acute on chronic illness    Recommendations already ordered by Registered Dietitian (RD):  Nepro/Glucerna supplements TID + special K bar for HS snack    Calorie counts (3/27-3/29)    Future/Additional Recommendations:  Ideally patient will consistently onsume at least 1450 kcal and 60 grams protein daily (~65% of assessed needs) daily.      EVALUATION OF THE PROGRESS TOWARD GOALS   Diet: Regular + Glucerna (he hasn't received)  Intake: Pt reports his appetite is down, compared to his baseline.       NEW FINDINGS   Nutrition Hx: Pt reports he had a good appetite/intake on low sodium and high protein diet pre-operatively.  He was utilizing protein bars and high protein food sources.     MALNUTRITION  % Intake: </= 50% for >/= 5 days (severe)  % Weight Loss: None noted  Subcutaneous Fat Loss: Facial region:  mild and Thoracic/intercostal: mild  Muscle Loss: Thoracic region (clavicle, acromium bone, deltoid, trapezius, pectoral):  mild, Upper arm (bicep, tricep): mild and Dorsal hand: mild  Fluid Accumulation/Edema: None noted  Malnutrition Diagnosis: Moderate malnutrition in the context of acute on chronic illness    INTERVENTIONS  Discussed high protein needs, oral supplements and calorie counts.  Pt seemed to be slightly confused in conversation making non-sensical replies to questions at times.  Not an appropriate time for education, provided handouts to bedside for now.     Monitoring/Evaluation  Progress toward goals will be monitored and evaluated per protocol.     Rachna Keene, MS, RD, LD, CCTD, CNSC  7A/Obs unit pager [phone]  Weekend pager [phone]

## 2023-03-28 ENCOUNTER — APPOINTMENT (OUTPATIENT)
Dept: OCCUPATIONAL THERAPY | Facility: CLINIC | Age: 63
DRG: 006 | End: 2023-03-28
Attending: TRANSPLANT SURGERY
Payer: COMMERCIAL

## 2023-03-28 ENCOUNTER — APPOINTMENT (OUTPATIENT)
Dept: PHYSICAL THERAPY | Facility: CLINIC | Age: 63
DRG: 006 | End: 2023-03-28
Attending: TRANSPLANT SURGERY
Payer: COMMERCIAL

## 2023-03-28 ENCOUNTER — TELEPHONE (OUTPATIENT)
Dept: TRANSPLANT | Facility: CLINIC | Age: 63
End: 2023-03-28
Payer: COMMERCIAL

## 2023-03-28 ENCOUNTER — APPOINTMENT (OUTPATIENT)
Dept: OCCUPATIONAL THERAPY | Facility: CLINIC | Age: 63
DRG: 006 | End: 2023-03-28
Attending: INTERNAL MEDICINE
Payer: COMMERCIAL

## 2023-03-28 LAB
ALBUMIN SERPL BCG-MCNC: 3.2 G/DL (ref 3.5–5.2)
ALP SERPL-CCNC: 67 U/L (ref 40–129)
ALT SERPL W P-5'-P-CCNC: 144 U/L (ref 10–50)
ANION GAP SERPL CALCULATED.3IONS-SCNC: 13 MMOL/L (ref 7–15)
AST SERPL W P-5'-P-CCNC: 73 U/L (ref 10–50)
ATRIAL RATE - MUSE: 396 BPM
BILIRUB DIRECT SERPL-MCNC: 1.21 MG/DL (ref 0–0.3)
BILIRUB SERPL-MCNC: 1.9 MG/DL
BUN SERPL-MCNC: 55.6 MG/DL (ref 8–23)
CALCIUM SERPL-MCNC: 8.8 MG/DL (ref 8.8–10.2)
CHLORIDE SERPL-SCNC: 97 MMOL/L (ref 98–107)
CREAT SERPL-MCNC: 1.67 MG/DL (ref 0.67–1.17)
DEPRECATED HCO3 PLAS-SCNC: 23 MMOL/L (ref 22–29)
DIASTOLIC BLOOD PRESSURE - MUSE: NORMAL MMHG
ERYTHROCYTE [DISTWIDTH] IN BLOOD BY AUTOMATED COUNT: 15.4 % (ref 10–15)
GFR SERPL CREATININE-BSD FRML MDRD: 47 ML/MIN/1.73M2
GLUCOSE BLDC GLUCOMTR-MCNC: 113 MG/DL (ref 70–99)
GLUCOSE BLDC GLUCOMTR-MCNC: 115 MG/DL (ref 70–99)
GLUCOSE BLDC GLUCOMTR-MCNC: 123 MG/DL (ref 70–99)
GLUCOSE BLDC GLUCOMTR-MCNC: 129 MG/DL (ref 70–99)
GLUCOSE BLDC GLUCOMTR-MCNC: 133 MG/DL (ref 70–99)
GLUCOSE BLDC GLUCOMTR-MCNC: 143 MG/DL (ref 70–99)
GLUCOSE SERPL-MCNC: 115 MG/DL (ref 70–99)
HCT VFR BLD AUTO: 31.3 % (ref 40–53)
HGB BLD-MCNC: 10.9 G/DL (ref 13.3–17.7)
INTERPRETATION ECG - MUSE: NORMAL
MAGNESIUM SERPL-MCNC: 2.1 MG/DL (ref 1.7–2.3)
MCH RBC QN AUTO: 32.8 PG (ref 26.5–33)
MCHC RBC AUTO-ENTMCNC: 34.8 G/DL (ref 31.5–36.5)
MCV RBC AUTO: 94 FL (ref 78–100)
P AXIS - MUSE: NORMAL DEGREES
PATH REPORT.COMMENTS IMP SPEC: NORMAL
PATH REPORT.FINAL DX SPEC: NORMAL
PATH REPORT.GROSS SPEC: NORMAL
PATH REPORT.MICROSCOPIC SPEC OTHER STN: NORMAL
PATH REPORT.RELEVANT HX SPEC: NORMAL
PHOSPHATE SERPL-MCNC: 2.4 MG/DL (ref 2.5–4.5)
PHOTO IMAGE: NORMAL
PLATELET # BLD AUTO: 103 10E3/UL (ref 150–450)
POTASSIUM SERPL-SCNC: 4.5 MMOL/L (ref 3.4–5.3)
PR INTERVAL - MUSE: NORMAL MS
PROT SERPL-MCNC: 6.4 G/DL (ref 6.4–8.3)
QRS DURATION - MUSE: 82 MS
QT - MUSE: 266 MS
QTC - MUSE: 359 MS
R AXIS - MUSE: 37 DEGREES
RBC # BLD AUTO: 3.32 10E6/UL (ref 4.4–5.9)
SODIUM SERPL-SCNC: 133 MMOL/L (ref 136–145)
SYSTOLIC BLOOD PRESSURE - MUSE: NORMAL MMHG
T AXIS - MUSE: -11 DEGREES
TACROLIMUS BLD-MCNC: 4.7 UG/L (ref 5–15)
TME LAST DOSE: ABNORMAL H
TME LAST DOSE: ABNORMAL H
VENTRICULAR RATE- MUSE: 110 BPM
WBC # BLD AUTO: 9.5 10E3/UL (ref 4–11)

## 2023-03-28 PROCEDURE — 250N000013 HC RX MED GY IP 250 OP 250 PS 637: Performed by: PHYSICIAN ASSISTANT

## 2023-03-28 PROCEDURE — 250N000012 HC RX MED GY IP 250 OP 636 PS 637: Performed by: NURSE PRACTITIONER

## 2023-03-28 PROCEDURE — 84100 ASSAY OF PHOSPHORUS: CPT | Performed by: NURSE PRACTITIONER

## 2023-03-28 PROCEDURE — 99232 SBSQ HOSP IP/OBS MODERATE 35: CPT | Mod: GC | Performed by: TRANSPLANT SURGERY

## 2023-03-28 PROCEDURE — 36415 COLL VENOUS BLD VENIPUNCTURE: CPT | Performed by: NURSE PRACTITIONER

## 2023-03-28 PROCEDURE — 250N000013 HC RX MED GY IP 250 OP 250 PS 637: Performed by: INTERNAL MEDICINE

## 2023-03-28 PROCEDURE — 97530 THERAPEUTIC ACTIVITIES: CPT | Mod: GP

## 2023-03-28 PROCEDURE — 250N000012 HC RX MED GY IP 250 OP 636 PS 637: Performed by: SURGERY

## 2023-03-28 PROCEDURE — 97140 MANUAL THERAPY 1/> REGIONS: CPT | Mod: GO | Performed by: OCCUPATIONAL THERAPIST

## 2023-03-28 PROCEDURE — 83735 ASSAY OF MAGNESIUM: CPT | Performed by: NURSE PRACTITIONER

## 2023-03-28 PROCEDURE — 97116 GAIT TRAINING THERAPY: CPT | Mod: GP

## 2023-03-28 PROCEDURE — 250N000013 HC RX MED GY IP 250 OP 250 PS 637: Performed by: NURSE PRACTITIONER

## 2023-03-28 PROCEDURE — 120N000011 HC R&B TRANSPLANT UMMC

## 2023-03-28 PROCEDURE — 250N000013 HC RX MED GY IP 250 OP 250 PS 637: Performed by: TRANSPLANT SURGERY

## 2023-03-28 PROCEDURE — 80197 ASSAY OF TACROLIMUS: CPT | Performed by: NURSE PRACTITIONER

## 2023-03-28 PROCEDURE — 250N000013 HC RX MED GY IP 250 OP 250 PS 637: Performed by: SURGERY

## 2023-03-28 PROCEDURE — 82248 BILIRUBIN DIRECT: CPT | Performed by: NURSE PRACTITIONER

## 2023-03-28 PROCEDURE — 85027 COMPLETE CBC AUTOMATED: CPT | Performed by: NURSE PRACTITIONER

## 2023-03-28 PROCEDURE — 80053 COMPREHEN METABOLIC PANEL: CPT | Performed by: NURSE PRACTITIONER

## 2023-03-28 PROCEDURE — 250N000012 HC RX MED GY IP 250 OP 636 PS 637: Performed by: INTERNAL MEDICINE

## 2023-03-28 PROCEDURE — 97535 SELF CARE MNGMENT TRAINING: CPT | Mod: GO

## 2023-03-28 RX ORDER — OXYCODONE HYDROCHLORIDE 5 MG/1
5 TABLET ORAL EVERY 6 HOURS PRN
Status: DISCONTINUED | OUTPATIENT
Start: 2023-03-28 | End: 2023-03-29 | Stop reason: HOSPADM

## 2023-03-28 RX ORDER — ASPIRIN 325 MG
325 TABLET ORAL DAILY
Status: DISCONTINUED | OUTPATIENT
Start: 2023-03-29 | End: 2023-03-29 | Stop reason: HOSPADM

## 2023-03-28 RX ORDER — CALCIUM CARBONATE/VITAMIN D3 600 MG-10
1 TABLET ORAL 2 TIMES DAILY WITH MEALS
Status: DISCONTINUED | OUTPATIENT
Start: 2023-03-28 | End: 2023-03-29 | Stop reason: HOSPADM

## 2023-03-28 RX ADMIN — METHOCARBAMOL 500 MG: 500 TABLET ORAL at 09:10

## 2023-03-28 RX ADMIN — TACROLIMUS 1.5 MG: 1 CAPSULE ORAL at 09:10

## 2023-03-28 RX ADMIN — METHOCARBAMOL 500 MG: 500 TABLET ORAL at 13:32

## 2023-03-28 RX ADMIN — HYDROXYZINE HYDROCHLORIDE 25 MG: 25 TABLET, FILM COATED ORAL at 22:23

## 2023-03-28 RX ADMIN — OXYCODONE HYDROCHLORIDE 5 MG: 5 TABLET ORAL at 22:23

## 2023-03-28 RX ADMIN — LIDOCAINE PATCH 4% 2 PATCH: 40 PATCH TOPICAL at 09:17

## 2023-03-28 RX ADMIN — VALGANCICLOVIR 450 MG: 450 TABLET, FILM COATED ORAL at 09:09

## 2023-03-28 RX ADMIN — PREDNISONE 10 MG: 10 TABLET ORAL at 09:10

## 2023-03-28 RX ADMIN — METOPROLOL TARTRATE 50 MG: 50 TABLET, FILM COATED ORAL at 09:10

## 2023-03-28 RX ADMIN — CALCIUM CARBONATE 600 MG (1,500 MG)-VITAMIN D3 400 UNIT TABLET 1 TABLET: at 17:04

## 2023-03-28 RX ADMIN — ASPIRIN 81 MG 81 MG: 81 TABLET ORAL at 09:11

## 2023-03-28 RX ADMIN — SULFAMETHOXAZOLE AND TRIMETHOPRIM 1 TABLET: 400; 80 TABLET ORAL at 09:09

## 2023-03-28 RX ADMIN — MYCOPHENOLATE MOFETIL 750 MG: 250 CAPSULE ORAL at 09:09

## 2023-03-28 RX ADMIN — MYCOPHENOLATE MOFETIL 750 MG: 250 CAPSULE ORAL at 20:47

## 2023-03-28 RX ADMIN — PANTOPRAZOLE SODIUM 40 MG: 40 TABLET, DELAYED RELEASE ORAL at 09:10

## 2023-03-28 RX ADMIN — METHOCARBAMOL 500 MG: 500 TABLET ORAL at 20:47

## 2023-03-28 RX ADMIN — FLUCONAZOLE 400 MG: 200 TABLET ORAL at 09:10

## 2023-03-28 RX ADMIN — METOPROLOL TARTRATE 50 MG: 50 TABLET, FILM COATED ORAL at 20:47

## 2023-03-28 RX ADMIN — TACROLIMUS 2.5 MG: 1 CAPSULE ORAL at 17:04

## 2023-03-28 ASSESSMENT — ACTIVITIES OF DAILY LIVING (ADL)
ADLS_ACUITY_SCORE: 27
ADLS_ACUITY_SCORE: 29
ADLS_ACUITY_SCORE: 27
ADLS_ACUITY_SCORE: 27
ADLS_ACUITY_SCORE: 29
ADLS_ACUITY_SCORE: 27
ADLS_ACUITY_SCORE: 29
ADLS_ACUITY_SCORE: 27
ADLS_ACUITY_SCORE: 29
ADLS_ACUITY_SCORE: 27

## 2023-03-28 NOTE — PROGRESS NOTES
Calorie Count  Intake recorded for: 3/27  Total Kcals: 444 Total Protein: 27g  Kcals from Hospital Food: 444  Protein: 27g  Kcals from Outside Food (average):0 Protein: 0g  # Meals Ordered from Kitchen: 4 meals + food from nursing unit   # Meals Recorded: 2 meals (First - 100% banana, orange, Greek yogurt, 50% skim milk)      (Second - 100% cheerios w/ skim milk)   # Supplements Recorded: 0

## 2023-03-28 NOTE — PLAN OF CARE
VS: /77 (BP Location: Left arm)   Pulse 101   Temp 97.8  F (36.6  C) (Oral)   Resp 16   Wt 70 kg (154 lb 6.4 oz)   SpO2 100%   BMI 23.81 kg/m      Cares: 1900 - 0730     Neuro: Aox4   Cardio: intermittently tachycardic 100-110s - more elevated while moving around the room as high as 150s overnight.   Respiratory: WDL on RA, denies SOB.   GI/: voiding good amounts overnight, multiple stools overnight after suppository was given (incontinent)  Skin: clamshell abdominal incision stapled VILLA   Diet: Regular (Calorie count)  Labs: waiting on morning lab results   BG: ACHS (141, 123)  LDA: Left PIV SL, Right JUSTINO (leaky) - 50mL  Mobility: SBA w/ walker   Pain/Nausea: abdominal pain, denies nausea   PRN medications: oxy x1, atarax x1 for pain/anxiety  Plan of Care: continue with current POC and update MD with any changes

## 2023-03-28 NOTE — PROGRESS NOTES
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     s/p DBD DDLT (no biliary stent) and umbilical hernia repair on 3/23/23 with Dr. Arzate.      Graft function:   Liver transplant: POD#5. Total bilirubin remains elevated at 1.9 (from 1.6). AST and ALT remain elevated at 73 and 144 respectively; stable. Alk phos wnl.     - Abdominal US w/doppler 3/23/2023: patent hepatic vasculature, elevated resistive indices   - JUSTINO drain x 1: removed today  - ASA increased to 325 mg daily for vascular ppx (and atrial fibrillation as below)     Immunosuppression management:  Induction: Steroid taper per protocol.  Maintenance:   -  mg BID  - Tacrolimus: increase to 2.5 mg BID. Goal level 8-12. Monitor closely with Fluconazole. Received Diltiazem (last 3/23), and Amiodarone (last 3/24).   - If tacrolimus < 8, start prednisone 5mg on 3/29/2023     Neurology/Psych:  Acute postoperative pain:   * Methocarbamol 500mg TID standing   * Oxycodone 5mg q6h PRN  * Lidocaine patches  Anxiety: PRN Hydoxyzine. Health psychology consulted; pending visit.   Alcohol use disorder: Recommend ongoing engagement in alcohol support programming post discharge.    Hematology:   Acute blood loss/Anemia of chronic disease: Hgb 9-10, stable.   Thrombocytopenia: due to liver disease. Platelets 103, improving.       Cardiorespiratory:   A fib: A fib throughout operation controlled intra-operatively with Diltiazem --> controlled with amiodarone gtt in SICU. [id number] Score: 1 point, no need for anticoagulation. Cardiology consulted; metoprolol 50 mg BID.   CAD: ASA as above.     GI/Nutrition:   At risk for malnutrition: Nutrition consulted. Regular diet + supplements. Calorie counts  - Bowel regimen: miralax qdaily + senna BID. Bisacodyl suppositories PRN.       Endocrine:   Steroid induced hyperglycemia: HGBA1C 4.7%. Sliding scale novolog.     Fluid/Electrolytes/Neph:  FERNANDA: likely related to hypotension intra-op. Serum creatinine increased slightly to 1.67 today in the setting of diuresis; hold diuresis today. (up from 0.7-0.8 pre-op).   Hyponatremia: Stable 133.     Infectious disease: Afebrile. WBC wnl.     Prophylaxis: DVT (SCDs), GI (PPI), fungal (Flucon x 7 days followed by nystatin), PCP (Vactrim), CMV (Valcyte x 3 months), Periop (s/p Zosyn x48hrs).    Dispo: 7A    AMY/Fellow/Resident Provider: Harper Rodriguez    Faculty: Aniket Arzate MD    __________________________________________________________________  Transplant History: Admitted 3/22/2023 for liver transplant.   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 Breathing comfortably on ambient air. Afebrile.     Tolerating an oral diet, but low appetite. 444kcal in the last 24 hours. Pain medications in the last 24 hours: oxycodone 5mg x 2. 4 bowel movements in the last 24 hours. Stand by assist with walker. Denies any nausea. Atarax PRN for pain and anxiety.     ROS:   A 10-point review of systems was negative except as noted above.    Curent Meds:    aspirin  81 mg Oral or Feeding Tube Daily     fluconazole  400 mg Oral Daily     insulin aspart  1-10 Units Subcutaneous TID AC     insulin aspart  1-7 Units Subcutaneous At Bedtime     lidocaine  1-2 patch Transdermal Q24H     lidocaine   Transdermal Q8H TALIB     methocarbamol  500 mg Oral TID     metoprolol tartrate  50 mg Oral BID     mycophenolate  750 mg Oral BID IS     pantoprazole  40 mg Oral QAM AC     polyethylene glycol  17 g Oral Daily     senna-docusate  1-2 tablet Oral BID     sodium chloride (PF)  3 mL Intravenous Q8H     sulfamethoxazole-trimethoprim  1 tablet Oral Daily     tacrolimus   1.5 mg Oral BID IS     valGANciclovir  450 mg Oral Daily       Physical Exam:     Admit Weight: 73.5 kg (162 lb 1.6 oz)    Current Vitals:   BP (!) 127/92 (BP Location: Left arm)   Pulse 97   Temp 99.1  F (37.3  C) (Oral)   Resp 16   Wt 70 kg (154 lb 6.4 oz)   SpO2 99%   BMI 23.81 kg/m      Vital sign ranges:    Temp:  [97.5  F (36.4  C)-99.1  F (37.3  C)] 99.1  F (37.3  C)  Pulse:  [] 97  Resp:  [16] 16  BP: (111-137)/() 127/92  SpO2:  [95 %-100 %] 99 %  Patient Vitals for the past 24 hrs:   BP Temp Temp src Pulse Resp SpO2 Weight   03/28/23 0946 (!) 127/92 99.1  F (37.3  C) Oral 97 16 99 % --   03/28/23 0607 111/77 97.8  F (36.6  C) Oral 101 16 100 % 70 kg (154 lb 6.4 oz)   03/28/23 0139 131/86 97.6  F (36.4  C) Oral 97 16 100 % --   03/27/23 2140 134/85 97.5  F (36.4  C) Oral 112 16 95 % --   03/27/23 2032 (!) 136/100 -- -- 95 -- -- --   03/27/23 1738 137/83 97.7  F (36.5  C) Oral 81 16 99 % --   03/27/23 1448 (!) 124/90 98.6  F (37  C) Oral 99 16 100 % --     General Appearance: in no apparent distress. Sitting up in a chair.  Skin: no jaundice  Heart: Tachycardic, trace Carmela edema  Lungs: Breathing comfortably on ambient air   Abdomen:appropriately tender. Incision C/D/I. JUSTINO with serosanguinous output.   Neurologic: awake, alert and oriented.     Data:   CMP  Recent Labs   Lab 03/28/23  0932 03/28/23  0606 03/28/23  0603 03/27/23  0921 03/27/23  0635 03/24/23  0547 03/24/23  0407 03/23/23  1332 03/23/23  1331 03/23/23  0737 03/22/23 2035   NA  --   --  133*  --  133*   < > 137   < >  --    < > 129*   POTASSIUM  --   --  4.5  --  5.1   < > 4.3   < >  --    < > 4.6   CHLORIDE  --   --  97*  --  99   < > 103   < >  --    < > 99   CO2  --   --  23  --  25   < > 26   < >  --    < > 19*   * 113* 115*   < > 135*   < > 119*  120*   < >  --    < > 102*   BUN  --   --  55.6*  --  53.6*   < > 16.4   < >  --    < > 19.2   CR  --   --  1.67*  --  1.57*   < > 0.89   < >  --    < > 0.83    GFRESTIMATED  --   --  47*  --  50*   < > >90   < >  --    < > >90   PEDRO  --   --  8.8  --  8.5*   < > 8.4*   < >  --    < > 8.6*   ICAW  --   --   --   --   --   --  4.8  --  5.1   < >  --    MAG  --   --  2.1  --  1.7   < > 2.7*  --   --    < > 1.9   PHOS  --   --  2.4*  --  4.1   < > 5.0*  --   --    < > 2.6   AMYLASE  --   --   --   --   --   --  379*  --   --   --  113*   LIPASE  --   --   --   --   --   --  11*  --   --   --   --    ALBUMIN  --   --  3.2*  --  2.7*   < > 2.5*   < >  --    < > 2.6*   BILITOTAL  --   --  1.9*  --  1.6*   < > 2.7*   < >  --    < > 7.7*   ALKPHOS  --   --  67  --  56   < > 66   < >  --    < > 223*   AST  --   --  73*  --  75*   < > 435*   < >  --    < > 92*   ALT  --   --  144*  --  129*   < > 222*   < >  --    < > 54*    < > = values in this interval not displayed.     CBC  Recent Labs   Lab 03/28/23  0603 03/27/23  0635 03/23/23  1545 03/23/23  1329   HGB 10.9* 9.1*   < > 10.2*   WBC 9.5 7.5   < > 9.8   * 81*   < > 73*   A1C  --   --   --  4.7    < > = values in this interval not displayed.     COAGS  Recent Labs   Lab 03/26/23  0338 03/25/23  1117 03/24/23  1513 03/24/23  1220   INR 1.12 1.27* 1.41* 1.49*   PTT  --   --  27 28

## 2023-03-28 NOTE — TELEPHONE ENCOUNTER
A pharmacist spent 35 minutes providing medication teaching with Cricket Mane for discharge with a focus on new medications/dose changes.  The discharge medication list was reviewed with the patient/family and the following points were discussed, as applicable: Name, description, purpose, dose/strength, duration of medications, common side effects, food/medications to avoid, action to be taken if dose is missed and how to obtain refills.  Patient says his son and daughter will be able to help manage his medications. Additionally, the following transplant related education was covered: Purpose of medication card, Timing of medications and day of lab draw considerations , Emesis or missed dose, Prescription Insurance  and Discharge process for receiving meds   Patient will  transplant supplies including 7 day pill organizer, thermometer, and BP monitor at the discharge pharmacy along with medications.  Patient chooses to receive medications from  specialty pharmacy.   Clinical Pharmacy Consult:                                                      Transplant Specific:   Date of Transplant: 03/23/2023  Type of Transplant: liver  First Transplant: yes  History of rejection: no    Immunosuppression Regimen   TAC 1.5 mg qAM & 1.5 mg qPM and  mg qAM & 750 mg qPM and prednisone 5mg daily until tac therapeutic  Patient specific goal: 8 to 12  Most recent level: 4.7, date 03/28  Immunosuppressant Levels:  Subtherapeutic  Pt adherent to lab draws: yes  Scr:   Lab Results   Component Value Date    CR 1.67 03/28/2023     Side effects: no side effects    Prophylactic Medications  Antibacterial:  Bactrim 400/80 mg once daily  Scheduled Discontinue Date: 6 months    Antifungal: Fluconazole 400 mg daily  Scheduled Discontinue Date: 03/30    Antiviral: Max dose in Liver transplant is Valcyte 450mg once daily   Scheduled Discontinue Date: 3 months    Acid Reducer: Protonix (pantoprazole) 40 mg once daily  Scheduled  Reviewed Date: review in clinic    Thrombosis Prevention: Aspirin 325 mg once daily  Scheduled Discontinue Date: review in clinic    Blood Pressure Management  Frequency of home Blood Pressure checks: once daily  Most recent home BP: 127/92  Patient Blood pressure goal:  Patient blood pressure at goal:   Hospitalizations/ER visits since last assessment: 0      Med rec/DUR performed: yes  Med Rec Discrepancies: no    Reminders:    1. Bring to first clinic appt: med box, med card, bp monitor, all medications being taken, and lab book.  2.   MTM pharmacist visit on first clinic appt and if ok, again in 3 to 4 months during follow up appt.  3.   Avoid Grapefruit and Grapefruit juice.   4.   Avoid herbal supplements. If wish to take other medications or supplements, call your coordinator.   5.   Keep lab appts.   6.   Can use apps on phone like [employer] to help manage medication lists and reminders.   7.   Make sure you are protecting your skin by wearing long sleeves and applying sunscreen to exposed skin, for any significant time in the sun.     Transplant Coordinator is Polly Gayle, Pharm.D.  [facility]  [phone]

## 2023-03-28 NOTE — PROGRESS NOTES
/75 (BP Location: Right arm)   Pulse 96   Temp 97  F (36.1  C) (Axillary)   Resp 16   Wt 70 kg (154 lb 6.4 oz)   SpO2 99%   BMI 23.81 kg/m      Shift note (0700-1930): VSS. Afib. On RA. Up w/ SBA and walker. BG achs, following BGs throughout shift were 133, 129, and 142. L PIV, saline-locked. JUSTINO removed, site dressing changed due to saturation. Had lymphedema eval at 1300, legs are wrapped. PA came to remove 2 leftover IJ sutures from previous day; one suture was unsuccessful, will attempt sometime tomorrow.

## 2023-03-29 ENCOUNTER — APPOINTMENT (OUTPATIENT)
Dept: PHYSICAL THERAPY | Facility: CLINIC | Age: 63
DRG: 006 | End: 2023-03-29
Attending: TRANSPLANT SURGERY
Payer: COMMERCIAL

## 2023-03-29 ENCOUNTER — TELEPHONE (OUTPATIENT)
Dept: TRANSPLANT | Facility: CLINIC | Age: 63
End: 2023-03-29
Payer: COMMERCIAL

## 2023-03-29 ENCOUNTER — APPOINTMENT (OUTPATIENT)
Dept: OCCUPATIONAL THERAPY | Facility: CLINIC | Age: 63
DRG: 006 | End: 2023-03-29
Attending: TRANSPLANT SURGERY
Payer: COMMERCIAL

## 2023-03-29 VITALS
SYSTOLIC BLOOD PRESSURE: 118 MMHG | DIASTOLIC BLOOD PRESSURE: 75 MMHG | TEMPERATURE: 97.7 F | OXYGEN SATURATION: 97 % | WEIGHT: 151.6 LBS | RESPIRATION RATE: 18 BRPM | BODY MASS INDEX: 23.38 KG/M2 | HEART RATE: 74 BPM

## 2023-03-29 DIAGNOSIS — Z94.4 LIVER REPLACED BY TRANSPLANT (H): Primary | ICD-10-CM

## 2023-03-29 LAB
ALBUMIN SERPL BCG-MCNC: 2.6 G/DL (ref 3.5–5.2)
ALP SERPL-CCNC: 63 U/L (ref 40–129)
ALT SERPL W P-5'-P-CCNC: 103 U/L (ref 10–50)
ANION GAP SERPL CALCULATED.3IONS-SCNC: 11 MMOL/L (ref 7–15)
AST SERPL W P-5'-P-CCNC: 48 U/L (ref 10–50)
BILIRUB DIRECT SERPL-MCNC: 1.04 MG/DL (ref 0–0.3)
BILIRUB SERPL-MCNC: 1.8 MG/DL
BUN SERPL-MCNC: 32.8 MG/DL (ref 8–23)
CALCIUM SERPL-MCNC: 8.3 MG/DL (ref 8.8–10.2)
CHLORIDE SERPL-SCNC: 97 MMOL/L (ref 98–107)
CREAT SERPL-MCNC: 1.2 MG/DL (ref 0.67–1.17)
DEPRECATED HCO3 PLAS-SCNC: 21 MMOL/L (ref 22–29)
ERYTHROCYTE [DISTWIDTH] IN BLOOD BY AUTOMATED COUNT: 15.3 % (ref 10–15)
GFR SERPL CREATININE-BSD FRML MDRD: 69 ML/MIN/1.73M2
GLUCOSE BLDC GLUCOMTR-MCNC: 108 MG/DL (ref 70–99)
GLUCOSE BLDC GLUCOMTR-MCNC: 153 MG/DL (ref 70–99)
GLUCOSE SERPL-MCNC: 116 MG/DL (ref 70–99)
HCT VFR BLD AUTO: 28.5 % (ref 40–53)
HGB BLD-MCNC: 9.9 G/DL (ref 13.3–17.7)
MAGNESIUM SERPL-MCNC: 1.4 MG/DL (ref 1.7–2.3)
MCH RBC QN AUTO: 32.7 PG (ref 26.5–33)
MCHC RBC AUTO-ENTMCNC: 34.7 G/DL (ref 31.5–36.5)
MCV RBC AUTO: 94 FL (ref 78–100)
PHOSPHATE SERPL-MCNC: 1.9 MG/DL (ref 2.5–4.5)
PLATELET # BLD AUTO: 101 10E3/UL (ref 150–450)
POTASSIUM SERPL-SCNC: 4.2 MMOL/L (ref 3.4–5.3)
PROT SERPL-MCNC: 5.5 G/DL (ref 6.4–8.3)
RBC # BLD AUTO: 3.03 10E6/UL (ref 4.4–5.9)
SODIUM SERPL-SCNC: 129 MMOL/L (ref 136–145)
TACROLIMUS BLD-MCNC: 3.7 UG/L (ref 5–15)
TME LAST DOSE: ABNORMAL H
TME LAST DOSE: ABNORMAL H
WBC # BLD AUTO: 7.4 10E3/UL (ref 4–11)

## 2023-03-29 PROCEDURE — 250N000013 HC RX MED GY IP 250 OP 250 PS 637: Performed by: INTERNAL MEDICINE

## 2023-03-29 PROCEDURE — 250N000012 HC RX MED GY IP 250 OP 636 PS 637: Performed by: PHYSICIAN ASSISTANT

## 2023-03-29 PROCEDURE — 250N000013 HC RX MED GY IP 250 OP 250 PS 637: Performed by: PHYSICIAN ASSISTANT

## 2023-03-29 PROCEDURE — 250N000011 HC RX IP 250 OP 636: Performed by: PHYSICIAN ASSISTANT

## 2023-03-29 PROCEDURE — 84100 ASSAY OF PHOSPHORUS: CPT | Performed by: NURSE PRACTITIONER

## 2023-03-29 PROCEDURE — 83735 ASSAY OF MAGNESIUM: CPT | Performed by: NURSE PRACTITIONER

## 2023-03-29 PROCEDURE — 250N000013 HC RX MED GY IP 250 OP 250 PS 637: Performed by: NURSE PRACTITIONER

## 2023-03-29 PROCEDURE — 250N000012 HC RX MED GY IP 250 OP 636 PS 637: Performed by: INTERNAL MEDICINE

## 2023-03-29 PROCEDURE — 97535 SELF CARE MNGMENT TRAINING: CPT | Mod: GO

## 2023-03-29 PROCEDURE — 80053 COMPREHEN METABOLIC PANEL: CPT | Performed by: NURSE PRACTITIONER

## 2023-03-29 PROCEDURE — 250N000013 HC RX MED GY IP 250 OP 250 PS 637: Performed by: SURGERY

## 2023-03-29 PROCEDURE — 97530 THERAPEUTIC ACTIVITIES: CPT | Mod: GP

## 2023-03-29 PROCEDURE — 36415 COLL VENOUS BLD VENIPUNCTURE: CPT | Performed by: NURSE PRACTITIONER

## 2023-03-29 PROCEDURE — 97116 GAIT TRAINING THERAPY: CPT | Mod: GP

## 2023-03-29 PROCEDURE — 250N000012 HC RX MED GY IP 250 OP 636 PS 637: Performed by: SURGERY

## 2023-03-29 PROCEDURE — 80197 ASSAY OF TACROLIMUS: CPT | Performed by: NURSE PRACTITIONER

## 2023-03-29 PROCEDURE — 82248 BILIRUBIN DIRECT: CPT | Performed by: NURSE PRACTITIONER

## 2023-03-29 PROCEDURE — 85027 COMPLETE CBC AUTOMATED: CPT | Performed by: NURSE PRACTITIONER

## 2023-03-29 RX ORDER — TACROLIMUS 1 MG/1
4 CAPSULE ORAL
Status: DISCONTINUED | OUTPATIENT
Start: 2023-03-29 | End: 2023-03-29 | Stop reason: HOSPADM

## 2023-03-29 RX ORDER — TACROLIMUS 0.5 MG/1
0.5 CAPSULE ORAL 2 TIMES DAILY
Qty: 60 CAPSULE | Refills: 1 | Status: SHIPPED | OUTPATIENT
Start: 2023-03-29 | End: 2023-03-29

## 2023-03-29 RX ORDER — TACROLIMUS 1 MG/1
4 CAPSULE ORAL 2 TIMES DAILY
Qty: 240 CAPSULE | Refills: 11 | Status: SHIPPED | OUTPATIENT
Start: 2023-03-29 | End: 2023-04-03

## 2023-03-29 RX ORDER — ASPIRIN 325 MG
325 TABLET ORAL DAILY
Qty: 30 TABLET | Refills: 11 | Status: SHIPPED | OUTPATIENT
Start: 2023-03-30 | End: 2023-04-03

## 2023-03-29 RX ORDER — MAGNESIUM OXIDE 400 MG/1
400 TABLET ORAL DAILY
Status: DISCONTINUED | OUTPATIENT
Start: 2023-03-29 | End: 2023-03-29 | Stop reason: HOSPADM

## 2023-03-29 RX ORDER — CALCIUM CARBONATE/VITAMIN D3 600 MG-10
1 TABLET ORAL 2 TIMES DAILY WITH MEALS
Qty: 60 TABLET | Refills: 11 | Status: SHIPPED | OUTPATIENT
Start: 2023-03-29 | End: 2023-04-03

## 2023-03-29 RX ORDER — PREDNISONE 5 MG/1
5 TABLET ORAL DAILY
Status: DISCONTINUED | OUTPATIENT
Start: 2023-03-29 | End: 2023-03-29 | Stop reason: HOSPADM

## 2023-03-29 RX ORDER — VALGANCICLOVIR 450 MG/1
450 TABLET, FILM COATED ORAL DAILY
Qty: 30 TABLET | Refills: 2 | Status: SHIPPED | OUTPATIENT
Start: 2023-03-30 | End: 2023-04-03

## 2023-03-29 RX ORDER — SULFAMETHOXAZOLE AND TRIMETHOPRIM 400; 80 MG/1; MG/1
1 TABLET ORAL DAILY
Qty: 30 TABLET | Refills: 11 | Status: SHIPPED | OUTPATIENT
Start: 2023-03-30 | End: 2023-04-03

## 2023-03-29 RX ORDER — NICOTINE POLACRILEX 4 MG/1
20 GUM, CHEWING ORAL 2 TIMES DAILY
Qty: 60 TABLET | Refills: 11 | Status: SHIPPED | OUTPATIENT
Start: 2023-03-29 | End: 2023-04-03

## 2023-03-29 RX ORDER — HYDROXYZINE HYDROCHLORIDE 25 MG/1
25-50 TABLET, FILM COATED ORAL EVERY 6 HOURS PRN
Qty: 30 TABLET | Refills: 0 | Status: SHIPPED | OUTPATIENT
Start: 2023-03-29 | End: 2023-05-04

## 2023-03-29 RX ORDER — TRAMADOL HYDROCHLORIDE 50 MG/1
50 TABLET ORAL EVERY 6 HOURS PRN
Qty: 10 TABLET | Refills: 0 | Status: SHIPPED | OUTPATIENT
Start: 2023-03-29 | End: 2023-04-01

## 2023-03-29 RX ORDER — FLUCONAZOLE 200 MG/1
400 TABLET ORAL DAILY
Qty: 2 TABLET | Refills: 0 | Status: SHIPPED | OUTPATIENT
Start: 2023-03-30 | End: 2023-04-03

## 2023-03-29 RX ORDER — METOPROLOL TARTRATE 25 MG/1
50 TABLET, FILM COATED ORAL 2 TIMES DAILY
Qty: 120 TABLET | Refills: 11 | Status: SHIPPED | OUTPATIENT
Start: 2023-03-29 | End: 2023-04-03

## 2023-03-29 RX ORDER — METHOCARBAMOL 500 MG/1
500 TABLET, FILM COATED ORAL 3 TIMES DAILY
Qty: 90 TABLET | Refills: 11 | Status: SHIPPED | OUTPATIENT
Start: 2023-03-29 | End: 2023-05-04

## 2023-03-29 RX ORDER — LIDOCAINE 4 G/G
1-2 PATCH TOPICAL EVERY 24 HOURS
Qty: 10 PATCH | Refills: 0 | Status: SHIPPED | OUTPATIENT
Start: 2023-03-29 | End: 2023-04-05

## 2023-03-29 RX ORDER — PREDNISONE 5 MG/1
5 TABLET ORAL DAILY
Qty: 30 TABLET | Refills: 0 | Status: SHIPPED | OUTPATIENT
Start: 2023-03-29 | End: 2023-04-03

## 2023-03-29 RX ORDER — MAGNESIUM SULFATE HEPTAHYDRATE 40 MG/ML
2 INJECTION, SOLUTION INTRAVENOUS ONCE
Status: COMPLETED | OUTPATIENT
Start: 2023-03-29 | End: 2023-03-29

## 2023-03-29 RX ORDER — AMOXICILLIN 250 MG
1 CAPSULE ORAL 2 TIMES DAILY PRN
Qty: 100 TABLET | Refills: 0 | Status: SHIPPED | OUTPATIENT
Start: 2023-03-29 | End: 2023-04-13

## 2023-03-29 RX ORDER — MYCOPHENOLATE MOFETIL 250 MG/1
750 CAPSULE ORAL 2 TIMES DAILY
Qty: 180 CAPSULE | Refills: 6 | Status: SHIPPED | OUTPATIENT
Start: 2023-03-29 | End: 2023-04-03

## 2023-03-29 RX ORDER — MAGNESIUM OXIDE 400 MG/1
400 TABLET ORAL DAILY
Qty: 30 TABLET | Refills: 11 | Status: SHIPPED | OUTPATIENT
Start: 2023-03-30 | End: 2023-04-03

## 2023-03-29 RX ORDER — TACROLIMUS 0.5 MG/1
0.5 CAPSULE ORAL 2 TIMES DAILY PRN
Qty: 60 CAPSULE | Refills: 1 | Status: SHIPPED | OUTPATIENT
Start: 2023-03-29 | End: 2023-04-13

## 2023-03-29 RX ADMIN — PANTOPRAZOLE SODIUM 40 MG: 40 TABLET, DELAYED RELEASE ORAL at 08:15

## 2023-03-29 RX ADMIN — SULFAMETHOXAZOLE AND TRIMETHOPRIM 1 TABLET: 400; 80 TABLET ORAL at 08:15

## 2023-03-29 RX ADMIN — MYCOPHENOLATE MOFETIL 750 MG: 250 CAPSULE ORAL at 08:15

## 2023-03-29 RX ADMIN — CALCIUM CARBONATE 600 MG (1,500 MG)-VITAMIN D3 400 UNIT TABLET 1 TABLET: at 08:15

## 2023-03-29 RX ADMIN — TACROLIMUS 2.5 MG: 1 CAPSULE ORAL at 08:14

## 2023-03-29 RX ADMIN — FLUCONAZOLE 400 MG: 200 TABLET ORAL at 08:15

## 2023-03-29 RX ADMIN — METHOCARBAMOL 500 MG: 500 TABLET ORAL at 08:15

## 2023-03-29 RX ADMIN — SODIUM PHOSPHATE, DIBASIC, ANHYDROUS, POTASSIUM PHOSPHATE, MONOBASIC, AND SODIUM PHOSPHATE, MONOBASIC, MONOHYDRATE 250 MG: 852; 155; 130 TABLET, COATED ORAL at 10:10

## 2023-03-29 RX ADMIN — MAGNESIUM OXIDE TAB 400 MG (241.3 MG ELEMENTAL MG) 400 MG: 400 (241.3 MG) TAB at 09:15

## 2023-03-29 RX ADMIN — METHOCARBAMOL 500 MG: 500 TABLET ORAL at 13:33

## 2023-03-29 RX ADMIN — MAGNESIUM SULFATE IN WATER 2 G: 40 INJECTION, SOLUTION INTRAVENOUS at 09:15

## 2023-03-29 RX ADMIN — PREDNISONE 5 MG: 5 TABLET ORAL at 12:18

## 2023-03-29 RX ADMIN — VALGANCICLOVIR 450 MG: 450 TABLET, FILM COATED ORAL at 08:14

## 2023-03-29 RX ADMIN — ASPIRIN 325 MG ORAL TABLET 325 MG: 325 PILL ORAL at 08:15

## 2023-03-29 RX ADMIN — METOPROLOL TARTRATE 50 MG: 50 TABLET, FILM COATED ORAL at 08:15

## 2023-03-29 ASSESSMENT — ACTIVITIES OF DAILY LIVING (ADL)
ADLS_ACUITY_SCORE: 27
ADLS_ACUITY_SCORE: 28
ADLS_ACUITY_SCORE: 28
ADLS_ACUITY_SCORE: 27
ADLS_ACUITY_SCORE: 27
ADLS_ACUITY_SCORE: 28
ADLS_ACUITY_SCORE: 27
ADLS_ACUITY_SCORE: 28
ADLS_ACUITY_SCORE: 27

## 2023-03-29 NOTE — DISCHARGE INSTRUCTIONS
Diet recommendations post-transplant: High protein diet (110 grams/day) x 8 weeks.  Heart healthy dietary habits long term (low saturated/trans fat, low sodium). Practice food safety precautions. See nutrition handout and food safety booklet for more information.

## 2023-03-29 NOTE — CONSULTS
Health Psychology                                                                                                                          Sade Marr, Ph.D., L.P [phone]  Raiza Torres, Ph.D., L.P. [phone]  Lamar Epstein, Ph.D, L..P. [phone]  Mayra Alston, Ph.D., L.P. [phone]  Roni Song, Ph.D., A.B.P.P., L.P. [phone]         Inés Jernigan, Ph.D., L.P. [phone]       Pau Alvarez, Ph.D., A.B.P.P., LP [phone]           [facility] [place], 3rd Floor  [address] [place] [address]       Inpatient Health Psychology Consultation      Date of Service:  03/29/23    Supportive visit with Cricket today to introduce Health Psychology services and discuss nature of referral. Cricket shared that he feels he is coping well post-transplant, somewhat surprised by the consult. Reviewed experiences with surgery and recovery up until this point. Assessed anxiety symptoms, he and his daughter agreed he has some anxiety, can hyperfocus on things outside of his control. Both thought it would be helpful to meet for a visit when he is outpatient. Set appointment for 5/3/23 at 2:00.    Lamar Epstein, PhD,   Clinical Health Psychologist  Phone: [phone]  Pager: [phone]

## 2023-03-29 NOTE — PLAN OF CARE
VS: /76 (BP Location: Left arm, Patient Position: Semi-Davis's, Cuff Size: Adult Regular)   Pulse 108   Temp 98.8  F (37.1  C) (Oral)   Resp 18   Wt 68.8 kg (151 lb 9.6 oz)   SpO2 97%   BMI 23.38 kg/m      Cares: 1900 - 0730     Neuro: Aox4   Cardio: WDL   Respiratory: WDL on RA, denies SOB.   GI/: voiding adequately w/out issues, LBM 3-28  Skin: clamshell abdominal incision stapled VILLA, old Right JUSTINO site - dressing covered (leaky overnight)   Diet: Regular (calorie count)  Labs: waiting on morning lab results   BG: ACHS + 2am (115, 108)  LDA: Left PIV SL   Mobility: SBA w/ walker   Pain/Nausea: abdominal pain, denies nausea   PRN medications: oxy 5mg x1, atarax 25mg x1  Plan of Care: continue with current POC and update MD with any changes

## 2023-03-29 NOTE — PROGRESS NOTES
Care Management Discharge Note    Discharge Date: 03/29/2023       Discharge Disposition: Home Care, Home    Discharge Services: None    Discharge DME: None    Discharge Transportation: family or friend will provide      Patient/family educated on Medicare website which has current facility and service quality ratings: yes    Education Provided on the Discharge Plan:  yes  Persons Notified of Discharge Plans: Patient, VM left for son  Patient/Family in Agreement with the Plan: yes    Handoff Referral Completed: Yes    Additional Information:  Pt status reviewed/discussed during care team rounds.  Pt medically cleared to discharge.  Pt/OT now recommending home with home care.     Met with pt.  Introduced RNCC role. Discussed recommendation for home care.  Pt notes his son Rene, is primary contact but his other children are available to assist him at home along with his sister.  Reviewed anticipated plan for transplant labs M/TH.  Provided pt with a list of home care agencies.  Pt open to having home care services.  Agreed to f/u with pt son.    VM left for pt son.    1400: Spoke with [facility] agency is able to accept patient with start of care on Monday 4/3.  Reviewed with [facility].  OP Transplant lab appointment confirmed for Thursday 3/30 at 7:15 a.m. at the [facility].  Met with pt and reviewed above information.  Pt confirmed he has been communicating with his son via text regarding discharge today.  Per pt his daughter will be the one transporting him home.  Pt notes no concerns or questions.      ACCEPTING HOME CARE AGENCY:  [facility]  [phone]  Fax [phone]      Initiated home care referrals:  [facility]  [facility]  [facility]  [facility]  [facility]    Xiomara Rincon RN BSN, PHN, ACM-RN  7A RN Care Coordinator  Phone: [phone]  Pager [phone]    To contact the weekend RNCC  [place] (0800 - 1630) Saturday  and Sunday    Units: 4A, 4C, 4E, 5A and 5B- Pager 1: [phone]    Units: 6A, 6B, 6C, 6D- Pager 2: [phone]    Units: 7A, 7B, 7C, 7D, and 5C-Pager 3: [phone]    [facility] ([phone]) Saturday and Sunday    Units: 5 Ortho, 8A, 10 ICU, & Pediatric Units-Pager 4: [phone]    3/29/2023 11:11 AM

## 2023-03-29 NOTE — PROGRESS NOTES
Calorie Count  Intake recorded for: 3/28  Total Kcals: 1236 Total Protein: 54g  Kcals from Hospital Food: 710  Protein: 26g  Kcals from Outside Food (average):0 Protein: 0g  # Meals Ordered from Kitchen: 4 meals   # Meals Recorded: 2 meals (First - 100% ice cream, 50% grilled pollock w/ lemon & tom sauce, rice, wheat dinner roll)       (Second - 100% skim milk, wheat dinner roll, banana, orange)   # Supplements Recorded: 0    Addendum 14:05 - Pt reported also consuming 50% of a deli sandwich, 100% sugar cookie, 50% pot roast and 100% of a special K protein bar yesterday.  Updated above totals to reflect this information.

## 2023-03-29 NOTE — TELEPHONE ENCOUNTER
Called Torin to introduce myself and review post transplant education/plan. Nurse had just walked in the room to discuss his discharge orders so he will call me back. Confirmed he has my contact information.            Smart set for clinic visits placed.

## 2023-03-29 NOTE — PROGRESS NOTES
CLINICAL NUTRITION SERVICES - DISCHARGE NOTE    Patient s discharge needs assessed and discharge planning has been conducted with the multidisciplinary transplant care team including physicians, pharmacy, social work and transplant coordinator.    Follow up/Monitoring:  Once discharged, place outpatient nutrition consult via the transplant team if nutrition concerns arise.    Rachna Keene, MS, RD, LD, CCTD, CNSC  7A/Obs unit pager [phone]  Weekend pager [phone]

## 2023-03-29 NOTE — PROGRESS NOTES
Reason for Assessment:  Nutrition education regarding post transplant diet.   Nutrition Diagnosis:  Food- and nutrition-related knowledge deficit r/t no previous knowledge of post transplant diet guidelines AEB patient verbal report.   Interventions:   Provided instruction on post-transplant diet with patient and patient's daughter, with discussion regarding protein sources and high protein needs in acute post-tx phase.   -- Reviewed recommendations to follow low fat/low sodium diet long term and discussed heart healthy diet tips.    -- Reviewed need for food safety precautions to prevent food borne illness.  Provided handouts: Post Transplant Diet Guidelines and USDA food safety booklet  Discussed calorie counts and the need for increased PO intake.  Discussed/encourage oral supplement use to help achieve this.  Patient plans to use Ensure or smoothies with protein powder to increase his intake.  His K+ trends/Cre look improved, so the standard Ensures should be okay.    Pt verbalized understanding of diet following education and denied further nutritional questions.    Goals:   Patient will verbalize understanding of education provided.  Follow-up:    Patient to ask any further nutrition-related questions before discharge.  In addition, pt may request outpatient RD appointment.    Rachna Keene, MS, RD, LD, CCTD, CNSC  7A/Obs unit pager [phone]  Weekend pager [phone]

## 2023-03-29 NOTE — PROGRESS NOTES
Care Management Discharge Note    Discharge Date: 03/29/2023     Discharge Disposition: Home Care    Discharge Services: None    Discharge DME: None    Discharge Transportation: family or friend will provide    Private pay costs discussed: Not applicable    PAS Confirmation Code:  N/A  Patient/family educated on Medicare website which has current facility and service quality ratings: yes    Education Provided on the Discharge Plan:  Yes  Persons Notified of Discharge Plans: Patient at the bedside  Patient/Family in Agreement with the Plan: yes    Handoff Referral Completed: No    Discharge Disposition:   Home Care:  RNCC working on referrals.    Plan for 24 hour care for immediate post transplant period: Discharge home with son (Rene), daughter (Kell), and sister as caregiver support.     If not local, plans for short term stay:  N/A    Additional Services/Equipment Arranged:  Home care services     Persons notified of above discharge plan: Patient, transplant team, RNCC    Patient / Family response to discharge plan:  Agreeable to the plan and feels well supported at home.     Education and resources provided by SW at discharge: role of transplant  in out patient setting and provided contact info for .     Discussed anticipated pharmacy out of pocket costs: YES    Provided [employer] Donor Letter Writing packet : YES    Plan: Pt is anticipated to discharge to his home in [place] on this date with his son Rene, who also lives with him.     Monet Gonzalez, LGDANNY  SOT/BMT/CF Float      Primary SW:  SRINIVASA Wagoner, [facility]  Liver Transplant   [facility]  Phone: [phone]  Pager: [phone]

## 2023-03-29 NOTE — PLAN OF CARE
Blood pressure 118/75, pulse 74, temperature 97.7  F (36.5  C), temperature source Oral, resp. rate 18, weight 68.8 kg (151 lb 9.6 oz), SpO2 97 %.    Goal Outcome Evaluation:VSS,A&Ox4,up independently,mild incisional pain, declined pain medication ,denied nausea,fair appetite,LS clear diminished at bases,BS+,had x2 small loose Bm's voided adequately not measured.abd incision with staples intact VILLA,below incision there is a quarter size broken  blistered skin,old JUSTINO site with saturated dressing drainage was serous color.Site cleaned with wound cleanser and new dressing applied.BLE trace edema.Lymphedema wraps removed by OT.Pt. will be discharge home this evening with his daughter.

## 2023-03-29 NOTE — PLAN OF CARE
DISCHARGE:  D: Patient with orders to discharge to home.   I: Discharge instructions, medications, & follow ups reviewed with patient and daughter. Copy of discharge summary given to patient.  PIV removed by previous nurse. All belongings packed & sent with patient. Medications filled & picked up at discharge pharmacy.   A: Patient in stable condition. AVSS. Patient and daughter had no further questions regarding discharge instructions and medications. Patient transferred out by self/walking & left with daughter.   P: Plan for patient to return home with [facility].

## 2023-03-30 ENCOUNTER — TELEPHONE (OUTPATIENT)
Dept: TRANSPLANT | Facility: CLINIC | Age: 63
End: 2023-03-30

## 2023-03-30 ENCOUNTER — LAB (OUTPATIENT)
Dept: LAB | Facility: CLINIC | Age: 63
End: 2023-03-30
Payer: COMMERCIAL

## 2023-03-30 DIAGNOSIS — Z94.4 LIVER REPLACED BY TRANSPLANT (H): ICD-10-CM

## 2023-03-30 LAB
ALBUMIN SERPL BCG-MCNC: 3.1 G/DL (ref 3.5–5.2)
ALP SERPL-CCNC: 81 U/L (ref 40–129)
ALT SERPL W P-5'-P-CCNC: 90 U/L (ref 10–50)
ANION GAP SERPL CALCULATED.3IONS-SCNC: 9 MMOL/L (ref 7–15)
AST SERPL W P-5'-P-CCNC: 29 U/L (ref 10–50)
BILIRUB DIRECT SERPL-MCNC: 1.12 MG/DL (ref 0–0.3)
BILIRUB SERPL-MCNC: 1.7 MG/DL
BUN SERPL-MCNC: 25.4 MG/DL (ref 8–23)
CALCIUM SERPL-MCNC: 8.5 MG/DL (ref 8.8–10.2)
CHLORIDE SERPL-SCNC: 100 MMOL/L (ref 98–107)
CREAT SERPL-MCNC: 1.12 MG/DL (ref 0.67–1.17)
DEPRECATED HCO3 PLAS-SCNC: 25 MMOL/L (ref 22–29)
ERYTHROCYTE [DISTWIDTH] IN BLOOD BY AUTOMATED COUNT: 15.8 % (ref 10–15)
GFR SERPL CREATININE-BSD FRML MDRD: 75 ML/MIN/1.73M2
GLUCOSE SERPL-MCNC: 126 MG/DL (ref 70–99)
HCT VFR BLD AUTO: 30.2 % (ref 40–53)
HGB BLD-MCNC: 10.3 G/DL (ref 13.3–17.7)
MAGNESIUM SERPL-MCNC: 2 MG/DL (ref 1.7–2.3)
MCH RBC QN AUTO: 32.1 PG (ref 26.5–33)
MCHC RBC AUTO-ENTMCNC: 34.1 G/DL (ref 31.5–36.5)
MCV RBC AUTO: 94 FL (ref 78–100)
PHOSPHATE SERPL-MCNC: 2.5 MG/DL (ref 2.5–4.5)
PLATELET # BLD AUTO: 119 10E3/UL (ref 150–450)
POTASSIUM SERPL-SCNC: 4.3 MMOL/L (ref 3.4–5.3)
PROT SERPL-MCNC: 6.1 G/DL (ref 6.4–8.3)
RBC # BLD AUTO: 3.21 10E6/UL (ref 4.4–5.9)
SODIUM SERPL-SCNC: 134 MMOL/L (ref 136–145)
TACROLIMUS BLD-MCNC: 8.9 UG/L (ref 5–15)
TME LAST DOSE: NORMAL H
TME LAST DOSE: NORMAL H
WBC # BLD AUTO: 9 10E3/UL (ref 4–11)

## 2023-03-30 PROCEDURE — 82248 BILIRUBIN DIRECT: CPT | Performed by: PATHOLOGY

## 2023-03-30 PROCEDURE — 83735 ASSAY OF MAGNESIUM: CPT | Performed by: PATHOLOGY

## 2023-03-30 PROCEDURE — 85027 COMPLETE CBC AUTOMATED: CPT | Performed by: PATHOLOGY

## 2023-03-30 PROCEDURE — 36415 COLL VENOUS BLD VENIPUNCTURE: CPT | Performed by: PATHOLOGY

## 2023-03-30 PROCEDURE — 84100 ASSAY OF PHOSPHORUS: CPT | Performed by: PATHOLOGY

## 2023-03-30 PROCEDURE — 80053 COMPREHEN METABOLIC PANEL: CPT | Performed by: PATHOLOGY

## 2023-03-30 PROCEDURE — 80197 ASSAY OF TACROLIMUS: CPT | Performed by: TRANSPLANT SURGERY

## 2023-03-30 NOTE — TELEPHONE ENCOUNTER
Called Cricket to discuss post transplant education, follow up. No answer, left a message to call me and main number provided. Called Torin's daughter Kell, no answer, left a message to call me and provided number.

## 2023-03-30 NOTE — PLAN OF CARE
Physical Therapy Discharge Summary    Reason for therapy discharge:    Discharged to home with home therapy.    Progress towards therapy goal(s). See goals on Care Plan in [facility] electronic health record for goal details.  Goals met    Therapy recommendation(s):    Continued therapy is recommended.  Rationale/Recommendations:   PT to promote functional mobility and strengthening.

## 2023-03-30 NOTE — PLAN OF CARE
Occupational Therapy Discharge Summary    Reason for therapy discharge:    Discharged to home with home therapy.    Progress towards therapy goal(s). See goals on Care Plan in [facility] electronic health record for goal details.  Goals partially met.  Barriers to achieving goals:   discharge from facility.    Therapy recommendation(s):    Continued therapy is recommended.  Rationale/Recommendations:  home safety eval and conditioning.

## 2023-03-30 NOTE — TELEPHONE ENCOUNTER
Cricket is a recent liver transplant who discharged on 03/29/2023.     Patient says his son and daughter will be able to help manage his medications.     Patient was given transplant supplies including 7 day pill organizer, thermometer, and BP monitor at the discharge pharmacy along with medications.      Patient chooses to receive medications from  specialty pharmacy.         **CAN PATIENT FILL AT [facility] FOR MEDICATIONS LISTED? YES     PHARMACY BENEFIT: ([facility] COMMERCIAL)  PROCESSING INFO: ID# [id number] [facility] PCN# [id number] BIN# [id number] (EFFECTIVE (DATE: 10/1/2022) ).   DEDUCTIBLE (0) & MAX OUT-OF-POCKET (1500)  COPAY STRUCTURE:  $ (15) FOR TIER 1  $ (30) FOR TIER 2  $ (50) FOR TIER 3      TEST CLAIM SPECIALTY #28  MYCOPHENOLATE 250mg (#240/30DS)=$15  PROGRAF 1mg (#180/30DS)=$50 (NON FORMULARY)  TACROLIMUS 1mg (#60/30DS)=$12.86  CYCLOSPORINE 100mg (#60/30DS)=$15  VALGANCICLOVIR 450mg (#60/30DS)=$15        Gustavo Gayle, Pharm.D.  [facility]  [phone]

## 2023-03-31 ENCOUNTER — TELEPHONE (OUTPATIENT)
Dept: TRANSPLANT | Facility: CLINIC | Age: 63
End: 2023-03-31
Payer: COMMERCIAL

## 2023-03-31 NOTE — TELEPHONE ENCOUNTER
"Called Torin to review post transplant information. No answer, left a message to call me back and will try again later today. Post op visits have been scheduled.     Notified of discharge and received brief report from 7A AMY.  Discussed pertinent health issues related to liver transplant and discharge planning.  Upon discharge, pt will be going to home .     Spoke with Cricket and his daughter Kell over the phone.  Introduced myself and explained the role of the post liver transplant coordinator and support team.      Briefly discussed the following topics:  1.) immunosuppression and the importance of taking regularly as directed.  2.) pertinent labs and their interpretation  3.) rejection signs / symptoms and treatment and 4.) Importance of long term follow-up with the transplant center and primary care physician 5.) No biliary stent present but we did discuss indications for if one would be needed.   Has lab book and understands responsibility of getting and recording results. Education in process.  (See inpatient education teaching flowsheet). Is aware that they will have clinic visit 1 week and then again weekly x 3 after discharge from hospital or transitional care / rehab.  Is aware that he needs a follow-up appointment with pre transplant primary care within 1-2 weeks of returning home. Is aware that he will need follow-up with the transplant team for the rest of his  life.  Initial follow-up will be with the transplant surgeon, then move to pre-transplant hepatologist.  Patient is aware that he will need lab testing, initially every Monday and Thursday to monitor liver function on a regular basis for the rest of his life.     Gave patient handouts entitled \"Things to Remember\" and \"Your Lab Results\" as well as a copy of contact numbers for myself, social work, transplant LPN and after hours/ weekend / holiday phone numbers.  Denies further questions at this time.      Organ specific education completed, " and discharge planning has been conducted with multidisciplinary transplant care team including physicians, pharmacy, nutrition, and social work.     Old JUSTINO drain area leaking. They will continue to keep site covered, change as needed, when wet and have assessed by surgeon next week. They have purchased supplied and will call me Monday with an update.     Spoke to [facility] and informed them that Cricket will need labs drawn earlier than 10:30 am and asked that they bring them to a [facility] lab for timely results. Verified that home PT eval and treat ordered as well. Unable to get AM labs on Monday 4/3, scheduled at [facility] for that day and will wait to hear from [facility] if additional visits can be scheduled at necessary time.

## 2023-04-03 ENCOUNTER — TELEPHONE (OUTPATIENT)
Dept: TRANSPLANT | Facility: CLINIC | Age: 63
End: 2023-04-03

## 2023-04-03 ENCOUNTER — LAB (OUTPATIENT)
Dept: LAB | Facility: CLINIC | Age: 63
End: 2023-04-03
Payer: COMMERCIAL

## 2023-04-03 DIAGNOSIS — Z94.4 LIVER REPLACED BY TRANSPLANT (H): ICD-10-CM

## 2023-04-03 DIAGNOSIS — Z94.4 S/P LIVER TRANSPLANT (H): ICD-10-CM

## 2023-04-03 DIAGNOSIS — I48.20 CHRONIC ATRIAL FIBRILLATION WITH RAPID VENTRICULAR RESPONSE (H): ICD-10-CM

## 2023-04-03 LAB
ALBUMIN SERPL BCG-MCNC: 3.3 G/DL (ref 3.5–5.2)
ALP SERPL-CCNC: 78 U/L (ref 40–129)
ALT SERPL W P-5'-P-CCNC: 50 U/L (ref 10–50)
ANION GAP SERPL CALCULATED.3IONS-SCNC: 11 MMOL/L (ref 7–15)
AST SERPL W P-5'-P-CCNC: 22 U/L (ref 10–50)
BILIRUB DIRECT SERPL-MCNC: 0.6 MG/DL (ref 0–0.3)
BILIRUB SERPL-MCNC: 1 MG/DL
BUN SERPL-MCNC: 43.5 MG/DL (ref 8–23)
CALCIUM SERPL-MCNC: 8.9 MG/DL (ref 8.8–10.2)
CHLORIDE SERPL-SCNC: 105 MMOL/L (ref 98–107)
CREAT SERPL-MCNC: 1.51 MG/DL (ref 0.67–1.17)
DEPRECATED HCO3 PLAS-SCNC: 22 MMOL/L (ref 22–29)
ERYTHROCYTE [DISTWIDTH] IN BLOOD BY AUTOMATED COUNT: 16.7 % (ref 10–15)
GFR SERPL CREATININE-BSD FRML MDRD: 53 ML/MIN/1.73M2
GLUCOSE SERPL-MCNC: 131 MG/DL (ref 70–99)
HCT VFR BLD AUTO: 31.1 % (ref 40–53)
HGB BLD-MCNC: 10.4 G/DL (ref 13.3–17.7)
MAGNESIUM SERPL-MCNC: 1.5 MG/DL (ref 1.7–2.3)
MCH RBC QN AUTO: 32 PG (ref 26.5–33)
MCHC RBC AUTO-ENTMCNC: 33.4 G/DL (ref 31.5–36.5)
MCV RBC AUTO: 96 FL (ref 78–100)
PHOSPHATE SERPL-MCNC: 4.1 MG/DL (ref 2.5–4.5)
PLATELET # BLD AUTO: 227 10E3/UL (ref 150–450)
POTASSIUM SERPL-SCNC: 5.2 MMOL/L (ref 3.4–5.3)
PROT SERPL-MCNC: 6.4 G/DL (ref 6.4–8.3)
RBC # BLD AUTO: 3.25 10E6/UL (ref 4.4–5.9)
SODIUM SERPL-SCNC: 138 MMOL/L (ref 136–145)
TACROLIMUS BLD-MCNC: 12.5 UG/L (ref 5–15)
TME LAST DOSE: NORMAL H
TME LAST DOSE: NORMAL H
WBC # BLD AUTO: 10.9 10E3/UL (ref 4–11)

## 2023-04-03 PROCEDURE — 99000 SPECIMEN HANDLING OFFICE-LAB: CPT | Performed by: PATHOLOGY

## 2023-04-03 PROCEDURE — 85027 COMPLETE CBC AUTOMATED: CPT | Performed by: PATHOLOGY

## 2023-04-03 PROCEDURE — 36415 COLL VENOUS BLD VENIPUNCTURE: CPT | Performed by: PATHOLOGY

## 2023-04-03 PROCEDURE — 80053 COMPREHEN METABOLIC PANEL: CPT | Performed by: PATHOLOGY

## 2023-04-03 PROCEDURE — 80197 ASSAY OF TACROLIMUS: CPT | Performed by: TRANSPLANT SURGERY

## 2023-04-03 PROCEDURE — 82248 BILIRUBIN DIRECT: CPT | Performed by: PATHOLOGY

## 2023-04-03 PROCEDURE — 83735 ASSAY OF MAGNESIUM: CPT | Performed by: PATHOLOGY

## 2023-04-03 PROCEDURE — 84100 ASSAY OF PHOSPHORUS: CPT | Performed by: PATHOLOGY

## 2023-04-03 PROCEDURE — 80321 ALCOHOLS BIOMARKERS 1OR 2: CPT | Mod: 90 | Performed by: PATHOLOGY

## 2023-04-03 RX ORDER — SULFAMETHOXAZOLE AND TRIMETHOPRIM 400; 80 MG/1; MG/1
1 TABLET ORAL DAILY
Qty: 90 TABLET | Refills: 3 | Status: SHIPPED | OUTPATIENT
Start: 2023-04-03 | End: 2023-07-19

## 2023-04-03 RX ORDER — CALCIUM CARBONATE/VITAMIN D3 600 MG-10
1 TABLET ORAL 2 TIMES DAILY WITH MEALS
Qty: 180 TABLET | Refills: 3 | Status: SHIPPED | OUTPATIENT
Start: 2023-04-03 | End: 2024-03-15

## 2023-04-03 RX ORDER — MAGNESIUM OXIDE 400 MG/1
400 TABLET ORAL 2 TIMES DAILY
Qty: 60 TABLET | Refills: 11 | Status: SHIPPED | OUTPATIENT
Start: 2023-04-03 | End: 2023-04-03

## 2023-04-03 RX ORDER — TACROLIMUS 1 MG/1
CAPSULE ORAL
Qty: 630 CAPSULE | Refills: 3 | Status: SHIPPED | OUTPATIENT
Start: 2023-04-03 | End: 2023-04-11

## 2023-04-03 RX ORDER — ASPIRIN 325 MG
325 TABLET ORAL DAILY
Qty: 90 TABLET | Refills: 3 | Status: SHIPPED | OUTPATIENT
Start: 2023-04-03

## 2023-04-03 RX ORDER — METOPROLOL TARTRATE 25 MG/1
50 TABLET, FILM COATED ORAL 2 TIMES DAILY
Qty: 120 TABLET | Refills: 1 | Status: SHIPPED | OUTPATIENT
Start: 2023-04-03 | End: 2023-05-03

## 2023-04-03 RX ORDER — MYCOPHENOLATE MOFETIL 250 MG/1
750 CAPSULE ORAL 2 TIMES DAILY
Qty: 540 CAPSULE | Refills: 3 | Status: SHIPPED | OUTPATIENT
Start: 2023-04-03 | End: 2023-05-11

## 2023-04-03 RX ORDER — PREDNISONE 5 MG/1
5 TABLET ORAL DAILY
Qty: 30 TABLET | Refills: 3 | Status: SHIPPED | OUTPATIENT
Start: 2023-04-03 | End: 2023-04-05

## 2023-04-03 RX ORDER — FOLIC ACID 1 MG/1
1000 TABLET ORAL DAILY
Qty: 90 TABLET | Refills: 1 | Status: SHIPPED | OUTPATIENT
Start: 2023-04-03

## 2023-04-03 RX ORDER — TACROLIMUS 1 MG/1
4 CAPSULE ORAL 2 TIMES DAILY
Qty: 720 CAPSULE | Refills: 3 | Status: SHIPPED | OUTPATIENT
Start: 2023-04-03 | End: 2023-04-03

## 2023-04-03 RX ORDER — MAGNESIUM OXIDE 400 MG/1
400 TABLET ORAL 2 TIMES DAILY
Qty: 180 TABLET | Refills: 3 | Status: SHIPPED | OUTPATIENT
Start: 2023-04-03 | End: 2023-04-11 | Stop reason: DRUGHIGH

## 2023-04-03 RX ORDER — NICOTINE POLACRILEX 4 MG/1
20 GUM, CHEWING ORAL 2 TIMES DAILY
Qty: 180 TABLET | Refills: 3 | Status: SHIPPED | OUTPATIENT
Start: 2023-04-03 | End: 2023-07-19

## 2023-04-03 RX ORDER — VALGANCICLOVIR 450 MG/1
450 TABLET, FILM COATED ORAL DAILY
Qty: 90 TABLET | Refills: 3 | Status: SHIPPED | OUTPATIENT
Start: 2023-04-03 | End: 2023-06-23

## 2023-04-03 NOTE — TELEPHONE ENCOUNTER
Post Discharge Liver Transplant Phone Call (within 1-3 business days post discharge)      Reviewed with the patient the Transplant Center contact information:  Best phone contact is [phone] Monday-Friday from 8am-5pm.   *You may have to leave a message and get a call back.    Good alternative communication method is via [employer] message.    Coordinators are available 8am-5pm M-F, otherwise there will be an on-call RN available 24/7  *If calling after hours, please call [phone] and ask to speak with the on-call Transplant Coordinator    When to contact the Transplant Center:  - Fever > 100.5F  - Dizziness, lightheadedness  - Severe pain  - If you are unable to take your immunosuppression medications.  - Unable to obtain refill on immunosuppressive medications    Medications:  Reviewed medications with patient.   - confirmed pt has supply of meds  - MAR is up to date   - Verify preferred pharmacy in [place] [facility]  - Reminded patient to always contact the pharmacy first for refills    Confirmed the patient has an up to date medication card at home and is knowledgeable in updating their med card (or has someone to assist in this).   - Reinforced patient always bring med card to appointment      Labs:  Labs are expected to be drawn on Monday and Thursday until you are told differently by your transplant team.   - confirmed patient's preferred lab and updated EPIC   - Take a copy of your lab letter with to each lab draw   - Lab order sent directly to patient via [email or web address] or mail.    - Confirmed patient has a copy of lab letter  - Review how to review lab results on [employer] or obtaining and recording lab values in handbook    Vitals:  Encouraged the patient to record the following:  - BP - daily unless light headed  - Temperature - daily  - Weight - daily    Follow Up:    Role of the Transplant Coordinator vs LPN was reviewed. Contact information provided for both.     Reviewed current scheduled follow up appointments with  surgeon.      Reminded the patient to follow up with PCP within 1 -2 weeks for general follow-up and management of diabetes, pain, and blood pressure    STENT REMOVAL - reminded patient that if stent was placed at time of transplant (this is indicated on the check list) this will need to come out 2-3 mos post transplant.  Asked patient to notify transplant coordinator if sees stent in stool.    Has the patient been contacted with initial visit from HOME CARE? Yes  o If not, Transplant Coordinator to be notified to follow up with Home Care  o Pt will use the  Specialty pharmacy      Ree Burleson LPN

## 2023-04-03 NOTE — TELEPHONE ENCOUNTER
Spoke to Khanh and she will always have staff call pt prior to the lab day. The plan is to have the HHN arrive between 8 and 8:30. Casi will be pt's nurse.

## 2023-04-03 NOTE — TELEPHONE ENCOUNTER
ISSUE:   Tacrolimus IR level 12.5 on 4/3/2023, goal 8-12, dose 4 mg BID.    PLAN:   Please call patient and confirm this was an accurate 12-hour trough. Verify Tacrolimus IR dose 4 mg BID. Confirm no new medications or illness. Confirm no missed doses. If accurate trough and accurate dose, decrease Tacrolimus IR dose to 4 mg every AM and 3 mg every PM and repeat all labs as scheduled.      Polly Mancini RN      OUTCOME:   Spoke with patient, they confirm accurate trough level and current dose 4 mg BID. Patient confirmed dose change to 4 mg am, 3 mg pm and to repeat labs in 3 days. Orders sent to preferred pharmacy for dose change and lab for repeat labs. Patient voiced understanding of plan.     Ree Burleson LPN

## 2023-04-03 NOTE — TELEPHONE ENCOUNTER
Called Cricket to review today's lab results. Liver tests continue to improve. Creatinine is elevated and Cricket said he is likely not drinking enough, encouraged increased water intake and reminded him to avoid caffeine. Will also await TAC level which might need to be adjusted. Magnesium level is a little low. Current mag supplement is only 400 mg once daily. Asked Cricket to increase to 400 mg BID and reminded him to separate from MMF by 2 hours.     Cricket states his BM's are a little loose already. He is currently having 2-3 per day.  Encouraged use of fiber daily. Metamucil recommended. Asked Cricket to let me know if increase in frequency or become watery.     Denies any nausea or vomiting this weekend. Appetite is OK, seems to be increasing even. Surgical pain is tolerable and he is not currently using anything for control. We discussed the proper use of Tylenol.. VS stable over the weekend. Weight is down a couple pounds, he will continue to monitor and report if he continues to lose weight.     Cricket is experiencing tremors and will have assessed by MD at his upcoming visit this week. We'll assess his TAC level and adjust if high.     Home care is coming out today for the initial visit.

## 2023-04-03 NOTE — LETTER
OUTPATIENT LABORATORY TEST ORDER   Home care  Fax#[phone]     Patient Name: Cricket Mane     YOB: 1960       [facility] MR# [if applicable]: [id number]   Date & Time: April 3,  2023    Expiration Date: 1 year after date issued         Diagnosis:      Liver Transplant (ICD-10 Z94.4)   Aftercare following organ transplant (ICD-10 Z48.288)   Long term use of medications (ICD-10 Z79.899)   Contact with and (suspected) exposure to other viral communicable                       diseases (Z20.828)      We ask your assistance in obtaining the following laboratory tests, which are part of our routine surveillance program for Solid Organ Transplant patients.      Please fax each result to [phone], same day as resulted/available    Critical lab results page [phone]  Monday - Friday 8 am to 5 pm  Evening/Weekend/Holiday communicate Critical labs results [phone]     First 8 weeks post-transplant  Labs 2x/week (Monday/Thursday)  CBC with Platelets   Basic Metabolic Panel   Phosphorous  Magnesium  Hepatic panel   Tacrolimus drug level - 12-hour trough, please document time of last dose      At 1-month post-transplant (4/23/2023 or after)  HBV, HCV, HIV by ANAM testing  If unable to conduct ANAM testing, please substitute the following:   Hepatitis B DNA Quantitative, Real-Time PCR   Hepatitis C RNA, Quantitation   HIV 1 RNA, Quantitation   HIV 2 RNA, Quantitation      Monthly     Phoshatidylethanol (PeTH)          Aniket Arzate MD/PhD  Professor of Surgery  Chief, Division of Transplantation    Executive Medical Director  Solid Organ Transplant Service [facility]

## 2023-04-03 NOTE — TELEPHONE ENCOUNTER
Clover from home care called confirming she received my message that labs can be brought to [facility] for quicker results. Orders already in place.     Message sent to Ree AVELAR to fax orders to home care as well.     Fax [phone]

## 2023-04-05 ENCOUNTER — VIRTUAL VISIT (OUTPATIENT)
Dept: PHARMACY | Facility: CLINIC | Age: 63
End: 2023-04-05
Payer: COMMERCIAL

## 2023-04-05 ENCOUNTER — VIRTUAL VISIT (OUTPATIENT)
Dept: TRANSPLANT | Facility: CLINIC | Age: 63
End: 2023-04-05
Attending: TRANSPLANT SURGERY
Payer: COMMERCIAL

## 2023-04-05 DIAGNOSIS — R52 PAIN: ICD-10-CM

## 2023-04-05 DIAGNOSIS — I48.91 ATRIAL FIBRILLATION WITH RVR (H): ICD-10-CM

## 2023-04-05 DIAGNOSIS — Z94.4 LIVER REPLACED BY TRANSPLANT (H): ICD-10-CM

## 2023-04-05 DIAGNOSIS — Z94.4 S/P LIVER TRANSPLANT (H): Primary | ICD-10-CM

## 2023-04-05 DIAGNOSIS — Z94.4 LIVER REPLACED BY TRANSPLANT (H): Primary | ICD-10-CM

## 2023-04-05 LAB
PLPETH BLD-MCNC: <10 NG/ML
POPETH BLD-MCNC: <10 NG/ML

## 2023-04-05 PROCEDURE — 99207 PR NO CHARGE LOS: CPT | Performed by: PHARMACIST

## 2023-04-05 RX ORDER — TRAMADOL HYDROCHLORIDE 50 MG/1
50 TABLET ORAL EVERY 6 HOURS PRN
COMMUNITY
End: 2023-05-04

## 2023-04-05 RX ORDER — LANOLIN ALCOHOL/MO/W.PET/CERES
3 CREAM (GRAM) TOPICAL
COMMUNITY
End: 2023-05-04

## 2023-04-05 RX ORDER — LIDOCAINE 4 G/G
1-2 PATCH TOPICAL EVERY 24 HOURS
Qty: 10 PATCH | Refills: 0 | Status: SHIPPED | OUTPATIENT
Start: 2023-04-05 | End: 2023-04-13

## 2023-04-05 NOTE — PROGRESS NOTES
"Medication Therapy Management (MTM) Encounter    ASSESSMENT:                            Medication Adherence/Access: No issues identified    Liver Transplant: Tacrolimus levels now over 8 and at therapeutic or supratherapeutic currently.  Per EMR and discussion with Polly Mancini we can hold prednisone at this point.  His phosphorus is also normal, we will stop phosphorus as well.    Pain: Sent refills for lidocaine patches.  Patient was running out of these.  No other changes today.    AFIB/ Hypertension: Stable.     PLAN:                            1. Tremors are a common side effect of Tacrolimus. They tend to get better over time. If they remain severe, we can consider changing to Envarsus XR in the future. For now, just ocntinue to monitor.   2. Stop Prednisone.  3. Stop Phosphorus  4. [facility] mail order will call you three weeks post discharge, but if your dose changes, call the number on the back of the pill box to talk to them earlier, [phone]. If your doctor sends over a new prescription to the mail order pharmacy you will have to call them to set up the order. They have an Manny called \"My [facility]\" as well.   5. I sent Lidocaine patches over to the pharmacy.     Follow-up: 3 months    SUBJECTIVE/OBJECTIVE:                          Cricket Mane is a 62 year old male called for a transitions of care visit. He was discharged from [facility] on 3/29 for liver transplant.      Reason for visit: initial post transplant med review.    Allergies/ADRs: Reviewed in chart  Past Medical History: Reviewed in chart  Tobacco: He reports that he has never smoked. He does not have any smokeless tobacco history on file.  Alcohol: not currently using  THC/CBD: none    Medication Adherence/Access: Patient uses pill box(es) and has assistance with medication administration: family member, Daughter.  Patient takes medications 4 time(s) per day. 8, 12, 6, 8  Per patient, misses medication 0 times per week.   The patient fills " medications at [place]: YES.    Liver Transplant:  Current immunosuppressants include Tacrolimus 4mg every morning & 3mg every evening, Prednisone 5mg every morning, and Mycophenolate Mofetil 750mg twice daily. Pt reports Tremors make basic tasks and hand writing more difficult.   Transplant date: 3/23/23  Estimated Creatinine Clearance: 50.6 mL/min (A) (based on SCr of 1.51 mg/dL (H)).  CMV prophylaxis: Valcyte 450 mg once daily Treat 3 months post tx   PJP prophylaxis: Bactrim S S daily for 6 months post txp  PPI use: Omeprazole 20mg twice daily. Not really having sx of GERD currently. Had varices pretransplant  Supplements: Mag Oxide 400mg twice daily ( 2 hours from MMF), Calcium/D twice daily, Phosphorus 250mg twice daily, Folic acid 1mg daily, MVI daily  Tx Coordinator: Polly Mancini Tx MD: Dr. Thomas, Dr. Sloan Using Med Card: Yes  Immunizations: annual flu shot 2022; Prevnar 20: 2023; TDaP:  2023; Shingrix: x2, HBV: no immunity, core positive. , COVID: Pfizer x4  Lab Results   Component Value Date    MAG 1.5 (L) 04/03/2023    MAG 2.0 03/30/2023    MAG 1.4 (L) 03/29/2023    PHOS 4.1 04/03/2023    PHOS 2.5 03/30/2023    PHOS 1.9 (L) 03/29/2023    PHOS 2.4 (L) 03/28/2023    PHOS 4.1 03/27/2023     Pain: Pt is taking Methocarbamol 500mg 3 TIMES DAILY, Lidocaine 4% patches as needed, Tramadol 50mg prn.   Patient having incisional pain. Max 7-8/10 changing position. At rest 4/10. Using hydroxyzine 25mg prn anxiety as well..     AFIB/ Hypertension: Current medications include Metoprolol 50mg twice daily. Pt is taking Aspirin 325mg daily, denies gastrointestinal bleed sx. Patient does self-monitor blood pressure. 105/67, pulse75.  Patient reports no current medication side effects.  BP Readings from Last 3 Encounters:   03/29/23 118/75   01/31/23 135/88   12/21/22 92/61     Today's Vitals: There were no vitals taken for this visit.  ----------------  Post Discharge Medication Reconciliation Status: discharge  medications reconciled and changed, per note/orders.    I spent 34 minutes with this patient today. All changes were made via collaborative practice agreement with Dr. Arzate. A copy of the visit note was provided to the patient's provider(s).    A summary of these recommendations was sent via [employer].    Herb Roberts, PharmD  [facility] Pharmacist    Phone: [phone]     Telemedicine Visit Details  Type of service:  Telephone visit  Start Time: 1:37 PM  End Time: 2:11 PM     Medication Therapy Recommendations  S/P liver transplant (H)    Current Medication: phosphorus tablet 250 mg (PHOSPHA 250 NEUTRAL) 250 MG per tablet (Discontinued)   Rationale: No medical indication at this time - Unnecessary medication therapy - Indication   Recommendation: Discontinue Medication   Status: Accepted per CPA          Current Medication: predniSONE (DELTASONE) 5 MG tablet (Discontinued)   Rationale: No medical indication at this time - Unnecessary medication therapy - Indication   Recommendation: Discontinue Medication   Status: Accepted per CPA          Current Medication: tacrolimus (GENERIC EQUIVALENT) 1 MG capsule   Rationale: Undesirable effect - Adverse medication event - Safety   Recommendation: Provide Education   Status: Patient Agreed - Adherence/Education

## 2023-04-05 NOTE — LETTER
2023         RE: Cricket Mane  [address] [place] [facility] [address]        Dear Colleague,    Thank you for referring your patient, Cricket Mane, to the [facility]. Please see a copy of my visit note below.    Transplant Social Work Services Progress Note        Collaborated with: Liver Transplant Team     Data: Cricket is s/p  donor Liver Transplant and this writer contacted Cricket via phone for a routine follow up. Cricket is 13 days post liver transplant       Intervention/Education: I spoke with Torin  and we reviewed the followin. Writing to the Donor Family process. He confirmed that he has the information about writing to the donor family. I will send him the link to write via electronic correspondence.         2. Liver Transplant Support Group and social events-  I sent him the link to our liver transplant support group        3. Immunosuppression copays-no current concerns.  I reminded Cricket to contact me if they have future concerns and we can evaluate for financial assistance programs, grants, etc. They did not report any surprises or concerns when picking up their medications.         4. Adjustment to illness - Cricket overall feels good about his progress post transplant. He indicated that he still continues to have discomfort, but understands it comes with the nature of the surgery. He is also experiencing tremors and it is hard for him to log on his computer.         5. Importance of maintaining abstinence from all non-prescribed drugs and alcohol. He voiced understanding and appreciated support if needed.     Assessment: Cricket voiced understanding to the topics reviewed. They decline any concerns and continue to have adequate support.      Plan: I will remain available for the psychosocial needs of this patient.     SRINIVASA Wagoner, [facility]  Liver Transplant   [facility]  Phone: [phone]  Pager:  [phone]        Again, thank you for allowing me to participate in the care of your patient.        Sincerely,        LAST ConnorsSW

## 2023-04-05 NOTE — PROGRESS NOTES
Transplant Social Work Services Progress Note        Collaborated with: Liver Transplant Team     Data: Cricket is s/p  donor Liver Transplant and this writer contacted Cricket via phone for a routine follow up. Cricket is 13 days post liver transplant       Intervention/Education: I spoke with Torin  and we reviewed the followin. Writing to the Donor Family process. He confirmed that he has the information about writing to the donor family. I will send him the link to write via electronic correspondence.         2. Liver Transplant Support Group and social events-  I sent him the link to our liver transplant support group        3. Immunosuppression copays-no current concerns.  I reminded Cricket to contact me if they have future concerns and we can evaluate for financial assistance programs, grants, etc. They did not report any surprises or concerns when picking up their medications.         4. Adjustment to illness - Cricket overall feels good about his progress post transplant. He indicated that he still continues to have discomfort, but understands it comes with the nature of the surgery. He is also experiencing tremors and it is hard for him to log on his computer.         5. Importance of maintaining abstinence from all non-prescribed drugs and alcohol. He voiced understanding and appreciated support if needed.     Assessment: Cricket voiced understanding to the topics reviewed. They decline any concerns and continue to have adequate support.      Plan: I will remain available for the psychosocial needs of this patient.     SRINIVASA Wagoner, [facility]  Liver Transplant   [facility]  Phone: [phone]  Pager: [phone]

## 2023-04-05 NOTE — PATIENT INSTRUCTIONS
"Recommendations from today's MTM visit:                                                       1. Tremors are a common side effect of Tacrolimus. They tend to get better over time. If they remain severe, we can consider changing to Envarsus XR in the future. For now, just ocntinue to monitor.   2. Stop Prednisone.  3. Stop Phosphorus  4. [facility] mail order will call you three weeks post discharge, but if your dose changes, call the number on the back of the pill box to talk to them earlier, [phone]. If your doctor sends over a new prescription to the mail order pharmacy you will have to call them to set up the order. They have an Manny called \"My [facility] RX\" as well.   5. I sent Lidocaine patches over to the pharmacy.     Follow-up: 3 months    It was great speaking with you today.  I value your experience and would be very thankful for your time in providing feedback in our clinic survey. In the next few days, you may receive an email or text message from [employer] with a link to a survey related to your  clinical pharmacist.\"     To schedule another MTM appointment, please call the clinic directly or you may call the MTM scheduling line at [phone] or toll-free at [phone].     My Clinical Pharmacist's contact information:                                                      Please feel free to contact me with any questions or concerns you have.      Herb Roberts, PharmD  MTM Pharmacist    Phone: [phone]     "

## 2023-04-06 ENCOUNTER — LAB (OUTPATIENT)
Dept: LAB | Facility: CLINIC | Age: 63
End: 2023-04-06
Attending: TRANSPLANT SURGERY
Payer: COMMERCIAL

## 2023-04-06 ENCOUNTER — OFFICE VISIT (OUTPATIENT)
Dept: TRANSPLANT | Facility: CLINIC | Age: 63
End: 2023-04-06
Attending: TRANSPLANT SURGERY
Payer: COMMERCIAL

## 2023-04-06 VITALS
TEMPERATURE: 97.6 F | OXYGEN SATURATION: 99 % | HEART RATE: 102 BPM | SYSTOLIC BLOOD PRESSURE: 111 MMHG | WEIGHT: 144.1 LBS | DIASTOLIC BLOOD PRESSURE: 76 MMHG | RESPIRATION RATE: 16 BRPM | BODY MASS INDEX: 22.22 KG/M2

## 2023-04-06 DIAGNOSIS — Z94.4 LIVER REPLACED BY TRANSPLANT (H): ICD-10-CM

## 2023-04-06 LAB
ALBUMIN SERPL BCG-MCNC: 3.5 G/DL (ref 3.5–5.2)
ALP SERPL-CCNC: 83 U/L (ref 40–129)
ALT SERPL W P-5'-P-CCNC: 39 U/L (ref 10–50)
ANION GAP SERPL CALCULATED.3IONS-SCNC: 9 MMOL/L (ref 7–15)
AST SERPL W P-5'-P-CCNC: 16 U/L (ref 10–50)
BILIRUB DIRECT SERPL-MCNC: 0.49 MG/DL (ref 0–0.3)
BILIRUB SERPL-MCNC: 1 MG/DL
BUN SERPL-MCNC: 32.5 MG/DL (ref 8–23)
CALCIUM SERPL-MCNC: 9.1 MG/DL (ref 8.8–10.2)
CHLORIDE SERPL-SCNC: 105 MMOL/L (ref 98–107)
CREAT SERPL-MCNC: 1.34 MG/DL (ref 0.67–1.17)
DEPRECATED HCO3 PLAS-SCNC: 23 MMOL/L (ref 22–29)
ERYTHROCYTE [DISTWIDTH] IN BLOOD BY AUTOMATED COUNT: 16.5 % (ref 10–15)
GFR SERPL CREATININE-BSD FRML MDRD: 61 ML/MIN/1.73M2
GLUCOSE SERPL-MCNC: 129 MG/DL (ref 70–99)
HCT VFR BLD AUTO: 29.9 % (ref 40–53)
HGB BLD-MCNC: 10 G/DL (ref 13.3–17.7)
MAGNESIUM SERPL-MCNC: 1.6 MG/DL (ref 1.7–2.3)
MCH RBC QN AUTO: 31.5 PG (ref 26.5–33)
MCHC RBC AUTO-ENTMCNC: 33.4 G/DL (ref 31.5–36.5)
MCV RBC AUTO: 94 FL (ref 78–100)
PHOSPHATE SERPL-MCNC: 3.7 MG/DL (ref 2.5–4.5)
PLATELET # BLD AUTO: 284 10E3/UL (ref 150–450)
POTASSIUM SERPL-SCNC: 4.8 MMOL/L (ref 3.4–5.3)
PROT SERPL-MCNC: 6.8 G/DL (ref 6.4–8.3)
RBC # BLD AUTO: 3.17 10E6/UL (ref 4.4–5.9)
SODIUM SERPL-SCNC: 137 MMOL/L (ref 136–145)
TACROLIMUS BLD-MCNC: 7.2 UG/L (ref 5–15)
TME LAST DOSE: NORMAL H
TME LAST DOSE: NORMAL H
WBC # BLD AUTO: 8.7 10E3/UL (ref 4–11)

## 2023-04-06 PROCEDURE — 36415 COLL VENOUS BLD VENIPUNCTURE: CPT | Performed by: PATHOLOGY

## 2023-04-06 PROCEDURE — 83735 ASSAY OF MAGNESIUM: CPT | Performed by: PATHOLOGY

## 2023-04-06 PROCEDURE — 99213 OFFICE O/P EST LOW 20 MIN: CPT | Mod: 24 | Performed by: TRANSPLANT SURGERY

## 2023-04-06 PROCEDURE — 80197 ASSAY OF TACROLIMUS: CPT | Performed by: TRANSPLANT SURGERY

## 2023-04-06 PROCEDURE — G0463 HOSPITAL OUTPT CLINIC VISIT: HCPCS | Performed by: TRANSPLANT SURGERY

## 2023-04-06 PROCEDURE — 84100 ASSAY OF PHOSPHORUS: CPT | Performed by: PATHOLOGY

## 2023-04-06 PROCEDURE — 85027 COMPLETE CBC AUTOMATED: CPT | Performed by: PATHOLOGY

## 2023-04-06 PROCEDURE — 80053 COMPREHEN METABOLIC PANEL: CPT | Performed by: PATHOLOGY

## 2023-04-06 PROCEDURE — 82248 BILIRUBIN DIRECT: CPT | Performed by: PATHOLOGY

## 2023-04-06 ASSESSMENT — PAIN SCALES - GENERAL: PAINLEVEL: NO PAIN (0)

## 2023-04-06 NOTE — LETTER
4/6/2023         RE: Cricket Mane  [address] [place]  [facility] [address]        Dear Colleague,    Thank you for referring your patient, Cricket Mane, to the [facility]. Please see a copy of my visit note below.    Transplant Surgery Progress Note    Transplants:  3/23/2023 (Liver); Postoperative day:  14  S: overall doing well, strength and activity slowly improving, here with son today    Transplant History:    Transplant Type:  Liver Tx  Donor Type:    Transplant Date:  3/23/2023 (Liver)   Biliary Stent:  No    Acute Rejection Hx:  No    Present Maintenance Immunosuppression:  Tacrolimus and Mycophenolate mofetil    CMV IgG Ab Discordance:  No  EBV IgG Ab Discordance:  No    Transplant Coordinator: Polly Mancini     Transplant Office Phone Number: [phone]     Immunosuppressant Medications       Immunosuppressive Agents Disp Start End     mycophenolate (GENERIC EQUIVALENT) 250 MG capsule    540 capsule 4/3/2023     Sig - Route: Take 3 capsules (750 mg) by mouth 2 times daily - Oral    Class: E-Prescribe    Notes to Pharmacy: TXP DT 3/23/2023 (Liver) TXP Dischg DT 3/29/2023 DX Liver replaced by transplant Z94.4 TX [facility] ([place], [place])     tacrolimus (GENERIC EQUIVALENT) 0.5 MG capsule    60 capsule 3/29/2023     Sig - Route: Take 1 capsule (0.5 mg) by mouth 2 times daily as needed (For dose titration) As directed by your transplant team - Oral    Patient not taking: Reported on 4/5/2023       Class: E-Prescribe     tacrolimus (GENERIC EQUIVALENT) 1 MG capsule    720 capsule 4/11/2023     Sig - Route: Take 4 capsules (4 mg) by mouth every 12 hours - Oral    Class: E-Prescribe    Notes to Pharmacy: TXP DT 3/23/2023 (Liver) TXP Dischg DT 3/29/2023 DX Liver replaced by transplant Z94.4 [facility] ([place], [place])            Possible Immunosuppression-related side effects:    []             headache  []             vivid dreams  []             irritability  []             cognitive difficuties  []             fine tremor  []             nausea  []             diarrhea  []             neuropathy      []             edema  []             renal calcineurin toxicity  []             hyperkalemia  []             post-transplant diabetes  []             decreased appetite  []             increased appetite  []             other:  []             none    Prescription Medications as of 4/13/2023         Rx Number Disp Refills Start End Last Dispensed Date Next Fill Date Owning Pharmacy    aspirin (ASA) 325 MG tablet  90 tablet 3 4/3/2023    [facility]/[place] [address] [address]    Sig: Take 1 tablet (325 mg) by mouth daily    Class: E-Prescribe    Route: Oral    calcium carbonate-vitamin D (CALTRATE) 600-10 MG-MCG per tablet  180 tablet 3 4/3/2023    [facility]/[place] [address] [address]    Sig: Take 1 tablet by mouth 2 times daily (with meals)    Class: E-Prescribe    Route: Oral    folic acid (FOLVITE) 1 MG tablet  90 tablet 1 4/3/2023    [facility]/[place] [address] [address]    Sig: Take 1 tablet (1,000 mcg) by mouth daily    Class: E-Prescribe    Route: Oral    hydrOXYzine (ATARAX) 25 MG tablet  30 tablet 0 3/29/2023    [place], [place] - [address] [facility] [address]    Sig: Take 1-2 tablets (25-50 mg) by mouth every 6 hours as needed for other or anxiety (adjuvant pain)    Class: E-Prescribe    Route: Oral    Lidocaine (LIDOCARE) 4 % Patch  10 patch 0 4/5/2023    [facility]/[place] [address] [address]    Sig: Place 1-2 patches onto the skin every 24 hours To prevent lidocaine toxicity, patient should be patch free for 12 hrs daily.    Class: E-Prescribe    Route: Transdermal    magnesium oxide (MAG-OX) 400 MG tablet  360 tablet 3 4/11/2023     [facility] [address] [address]     Sig: Take 2 tablets (800 mg) by mouth 2 times daily    Class: E-Prescribe    Route: Oral    melatonin 3 MG tablet            Sig: Take 3 mg by mouth nightly as needed for sleep    Class: Historical    Route: Oral    methocarbamol (ROBAXIN) 500 MG tablet  90 tablet 11 3/29/2023    [place], [place] - [address] [place] [address] [address]    Sig: Take 1 tablet (500 mg) by mouth 3 times daily    Class: E-Prescribe    Route: Oral    metoprolol tartrate (LOPRESSOR) 25 MG tablet  120 tablet 1 4/3/2023    [facility] [address] [facility]    Sig: Take 2 tablets (50 mg) by mouth 2 times daily    Class: E-Prescribe    Route: Oral    Multiple Vitamins-Iron TABS    8/8/2022 8/8/2023       Sig: Take 1 tablet by mouth daily    Class: Historical    Route: Oral    mycophenolate (GENERIC EQUIVALENT) 250 MG capsule  540 capsule 3 4/3/2023    [facility] [facility]    Sig: Take 3 capsules (750 mg) by mouth 2 times daily    Class: E-Prescribe    Notes to Pharmacy: TXP DT 3/23/2023 (Liver) TXP Dischg DT 3/29/2023 DX Liver replaced by transplant Z94.4 TX [facility] ([place], [place])    Route: Oral    omeprazole 20 MG tablet  180 tablet 3 4/3/2023    [facility]/[place] [address] [address]     Sig: Take 1 tablet (20 mg) by mouth 2 times daily    Class: E-Prescribe    Route: Oral    psyllium (METAMUCIL/KONSYL) 58.6 % powder            Sig: Take 1 teaspoonful by mouth daily as needed for constipation    Class: Historical    Route: Oral    senna-docusate (SENOKOT-S/PERICOLACE) 8.6-50 MG tablet  100 tablet 0 3/29/2023    [place], [place] - [facility] [address]    Sig: Take 1 tablet by mouth 2 times daily as needed for constipation    Class: E-Prescribe    Route:  Oral    sulfamethoxazole-trimethoprim (BACTRIM) 400-80 MG tablet  90 tablet 3 4/3/2023    [facility] [place], [place] - [address]    Sig: Take 1 tablet by mouth daily    Class: E-Prescribe    Route: Oral    tacrolimus (GENERIC EQUIVALENT) 0.5 MG capsule  60 capsule 1 3/29/2023    [facility] - [place], [place] - [address]    Sig: Take 1 capsule (0.5 mg) by mouth 2 times daily as needed (For dose titration) As directed by your transplant team    Class: E-Prescribe    Route: Oral    tacrolimus (GENERIC EQUIVALENT) 1 MG capsule  720 capsule 3 4/11/2023    [facility] - [place], [place] - [address]    Sig: Take 4 capsules (4 mg) by mouth every 12 hours    Class: E-Prescribe    Notes to Pharmacy: TXP DT 3/23/2023 (Liver) TXP Dischg DT 3/29/2023 DX Liver replaced by transplant Z94.4 TX [facility] ([place], [place])    Route: Oral    traMADol (ULTRAM) 50 MG tablet            Sig: Take 50 mg by mouth every 6 hours as needed for severe pain or breakthrough pain    Class: Historical    Route: Oral    valGANciclovir (VALCYTE) 450 MG tablet  90 tablet 3 4/3/2023    [facility] - [place], [place] - [address]    Sig: Take 1 tablet (450 mg) by mouth daily    Class: E-Prescribe    Route: Oral          Clinic-Administered Medications as of 4/13/2023         Dose Frequency Start End    magnesium sulfate 3 g in 100 mL NS intermittent infusion (Completed) 3 g ONCE 4/12/2023 4/12/2023    Class: E-Prescribe    Route: Intravenous            O:   [unfilled]        Latest Ref Rng & Units 4/13/2023     9:00 AM 4/10/2023    10:00 AM 4/6/2023     9:33 AM 4/3/2023     8:09 AM 3/30/2023     7:39 AM   Transplant Immunosuppression Labs   Creat 0.70 - 1.30 mg/dL  1.21   1.34   1.51   1.12     Urea Nitrogen 8 - 22 mg/dL  36        Urea Nitrogen 8.0 - 23.0 mg/dL   32.5   43.5   25.4     WBC  4.0 - 11.0 10e3/uL 6.3   6.9   8.7   10.9   9.0     Neutrophil % 69   66            Chemistries:   Recent Labs   Lab Test 04/10/23  1000   BUN 36*   CR 1.21   GFRESTIMATED 69   *     Lab Results   Component Value Date    A1C 4.7 03/23/2023     Recent Labs   Lab Test 04/10/23  1000   ALBUMIN 2.9*   BILITOTAL 1.0   ALKPHOS 85   AST 12   ALT 27     Urine Studies:  Recent Labs   Lab Test 12/20/22  0923   COLOR Yellow   APPEARANCE Clear   URINEGLC Negative   URINEBILI Negative   URINEKETONE Negative   SG 1.018   UBLD Negative   URINEPH 6.0   PROTEIN Negative   NITRITE Negative   LEUKEST Negative     No lab results found.  Hematology:   Recent Labs   Lab Test 04/13/23  0900 04/10/23  1000 04/06/23  0933   HGB 8.9* 9.6* 10.0*    282 284   WBC 6.3 6.9 8.7     Coags:   Recent Labs   Lab Test 03/26/23  0338 03/25/23  1117   INR 1.12 1.27*     HLA antibodies:   No results found for: [id number], [id number], WE6SHQNSB, MC8FWYWYHL    Assessment: Cricket Mane is doing well s/p Liver Tx:  Issues we addressed during his visit include:    Plan:    1. Graft function: LFTs normalizing  2. Immunosuppression Management: No change tac 8-12 .  Complexity of management:Low.  Contributing factors: mild renal dysfunction    Followup: 1 week            Aniket Arzate MD/PhD          Again, thank you for allowing me to participate in the care of your patient.        Sincerely,        Aniket Arzate MD

## 2023-04-06 NOTE — NURSING NOTE
Chief Complaint   Patient presents with     Follow Up     Liver Return Post Op      /76 (BP Location: Right arm, Patient Position: Sitting, Cuff Size: Adult Regular)   Pulse 102   Temp 97.6  F (36.4  C) (Oral)   Resp 16   Wt 65.4 kg (144 lb 1.6 oz)   SpO2 99%   BMI 22.22 kg/m      Fernando Sidhu RN on 4/6/2023 at 9:57 AM

## 2023-04-10 ENCOUNTER — TELEPHONE (OUTPATIENT)
Dept: TRANSPLANT | Facility: CLINIC | Age: 63
End: 2023-04-10

## 2023-04-10 ENCOUNTER — LAB REQUISITION (OUTPATIENT)
Dept: LAB | Facility: CLINIC | Age: 63
End: 2023-04-10
Payer: COMMERCIAL

## 2023-04-10 LAB
ALBUMIN SERPL-MCNC: 2.9 G/DL (ref 3.5–5)
ALP SERPL-CCNC: 85 U/L (ref 45–120)
ALT SERPL W P-5'-P-CCNC: 27 U/L (ref 0–45)
ANION GAP SERPL CALCULATED.3IONS-SCNC: 10 MMOL/L (ref 5–18)
AST SERPL W P-5'-P-CCNC: 12 U/L (ref 0–40)
BASOPHILS # BLD AUTO: 0.2 10E3/UL (ref 0–0.2)
BASOPHILS NFR BLD AUTO: 3 %
BILIRUB DIRECT SERPL-MCNC: 0.5 MG/DL
BILIRUB SERPL-MCNC: 1 MG/DL (ref 0–1)
BUN SERPL-MCNC: 36 MG/DL (ref 8–22)
CALCIUM SERPL-MCNC: 9.2 MG/DL (ref 8.5–10.5)
CHLORIDE BLD-SCNC: 108 MMOL/L (ref 98–107)
CO2 SERPL-SCNC: 19 MMOL/L (ref 22–31)
CREAT SERPL-MCNC: 1.21 MG/DL (ref 0.7–1.3)
EOSINOPHIL # BLD AUTO: 0.3 10E3/UL (ref 0–0.7)
EOSINOPHIL NFR BLD AUTO: 5 %
ERYTHROCYTE [DISTWIDTH] IN BLOOD BY AUTOMATED COUNT: 15.9 % (ref 10–15)
GFR SERPL CREATININE-BSD FRML MDRD: 69 ML/MIN/1.73M2
GLUCOSE BLD-MCNC: 179 MG/DL (ref 70–125)
HCT VFR BLD AUTO: 29 % (ref 40–53)
HGB BLD-MCNC: 9.6 G/DL (ref 13.3–17.7)
IMM GRANULOCYTES # BLD: 0.1 10E3/UL
IMM GRANULOCYTES NFR BLD: 2 %
LYMPHOCYTES # BLD AUTO: 1.1 10E3/UL (ref 0.8–5.3)
LYMPHOCYTES NFR BLD AUTO: 16 %
MAGNESIUM SERPL-MCNC: 1.1 MG/DL (ref 1.8–2.6)
MCH RBC QN AUTO: 32.2 PG (ref 26.5–33)
MCHC RBC AUTO-ENTMCNC: 33.1 G/DL (ref 31.5–36.5)
MCV RBC AUTO: 97 FL (ref 78–100)
MONOCYTES # BLD AUTO: 0.5 10E3/UL (ref 0–1.3)
MONOCYTES NFR BLD AUTO: 8 %
NEUTROPHILS # BLD AUTO: 4.5 10E3/UL (ref 1.6–8.3)
NEUTROPHILS NFR BLD AUTO: 66 %
NRBC # BLD AUTO: 0 10E3/UL
NRBC BLD AUTO-RTO: 0 /100
PHOSPHATE SERPL-MCNC: 3.4 MG/DL (ref 2.5–4.5)
PLATELET # BLD AUTO: 282 10E3/UL (ref 150–450)
POTASSIUM BLD-SCNC: 4.6 MMOL/L (ref 3.5–5)
PROT SERPL-MCNC: 6.9 G/DL (ref 6–8)
RBC # BLD AUTO: 2.98 10E6/UL (ref 4.4–5.9)
SODIUM SERPL-SCNC: 137 MMOL/L (ref 136–145)
TACROLIMUS BLD-MCNC: 6.7 UG/L (ref 5–15)
TME LAST DOSE: NORMAL H
TME LAST DOSE: NORMAL H
WBC # BLD AUTO: 6.9 10E3/UL (ref 4–11)

## 2023-04-10 PROCEDURE — 80197 ASSAY OF TACROLIMUS: CPT | Mod: ORL | Performed by: TRANSPLANT SURGERY

## 2023-04-10 PROCEDURE — 80053 COMPREHEN METABOLIC PANEL: CPT | Mod: ORL | Performed by: TRANSPLANT SURGERY

## 2023-04-10 PROCEDURE — 85025 COMPLETE CBC W/AUTO DIFF WBC: CPT | Mod: ORL | Performed by: TRANSPLANT SURGERY

## 2023-04-10 PROCEDURE — 84100 ASSAY OF PHOSPHORUS: CPT | Mod: ORL | Performed by: TRANSPLANT SURGERY

## 2023-04-10 PROCEDURE — 83735 ASSAY OF MAGNESIUM: CPT | Mod: ORL | Performed by: TRANSPLANT SURGERY

## 2023-04-10 PROCEDURE — 82248 BILIRUBIN DIRECT: CPT | Mod: ORL | Performed by: TRANSPLANT SURGERY

## 2023-04-10 NOTE — TELEPHONE ENCOUNTER
Called patient because Magnesium level is 1.1, wanted to ensure patient is taking his magnesium supplement and ask if he has any concerns for his transplant team, no answer, left message

## 2023-04-11 ENCOUNTER — OFFICE VISIT (OUTPATIENT)
Dept: TRANSPLANT | Facility: CLINIC | Age: 63
End: 2023-04-11
Attending: TRANSPLANT SURGERY
Payer: COMMERCIAL

## 2023-04-11 ENCOUNTER — TELEPHONE (OUTPATIENT)
Dept: TRANSPLANT | Facility: CLINIC | Age: 63
End: 2023-04-11

## 2023-04-11 VITALS
DIASTOLIC BLOOD PRESSURE: 78 MMHG | OXYGEN SATURATION: 100 % | TEMPERATURE: 97.7 F | RESPIRATION RATE: 16 BRPM | HEART RATE: 79 BPM | SYSTOLIC BLOOD PRESSURE: 112 MMHG

## 2023-04-11 DIAGNOSIS — Z94.4 S/P LIVER TRANSPLANT (H): ICD-10-CM

## 2023-04-11 DIAGNOSIS — Z94.4 LIVER REPLACED BY TRANSPLANT (H): ICD-10-CM

## 2023-04-11 DIAGNOSIS — E83.42 HYPOMAGNESEMIA: ICD-10-CM

## 2023-04-11 PROCEDURE — G0463 HOSPITAL OUTPT CLINIC VISIT: HCPCS | Performed by: NURSE PRACTITIONER

## 2023-04-11 PROCEDURE — 99214 OFFICE O/P EST MOD 30 MIN: CPT | Mod: 24 | Performed by: NURSE PRACTITIONER

## 2023-04-11 RX ORDER — DIPHENHYDRAMINE HYDROCHLORIDE 50 MG/ML
50 INJECTION INTRAMUSCULAR; INTRAVENOUS
Status: CANCELLED
Start: 2023-04-11

## 2023-04-11 RX ORDER — EPINEPHRINE 1 MG/ML
0.3 INJECTION, SOLUTION, CONCENTRATE INTRAVENOUS EVERY 5 MIN PRN
Status: CANCELLED | OUTPATIENT
Start: 2023-04-11

## 2023-04-11 RX ORDER — ALBUTEROL SULFATE 90 UG/1
1-2 AEROSOL, METERED RESPIRATORY (INHALATION)
Status: CANCELLED
Start: 2023-04-11

## 2023-04-11 RX ORDER — MAGNESIUM OXIDE 400 MG/1
800 TABLET ORAL 2 TIMES DAILY
Qty: 360 TABLET | Refills: 3 | Status: SHIPPED | OUTPATIENT
Start: 2023-04-11 | End: 2024-03-28

## 2023-04-11 RX ORDER — HEPARIN SODIUM,PORCINE 10 UNIT/ML
5 VIAL (ML) INTRAVENOUS
Status: CANCELLED | OUTPATIENT
Start: 2023-04-11

## 2023-04-11 RX ORDER — TACROLIMUS 1 MG/1
4 CAPSULE ORAL EVERY 12 HOURS
Qty: 720 CAPSULE | Refills: 3 | Status: SHIPPED | OUTPATIENT
Start: 2023-04-11 | End: 2023-04-14

## 2023-04-11 RX ORDER — ALBUTEROL SULFATE 0.83 MG/ML
2.5 SOLUTION RESPIRATORY (INHALATION)
Status: CANCELLED | OUTPATIENT
Start: 2023-04-11

## 2023-04-11 RX ORDER — HEPARIN SODIUM (PORCINE) LOCK FLUSH IV SOLN 100 UNIT/ML 100 UNIT/ML
5 SOLUTION INTRAVENOUS
Status: CANCELLED | OUTPATIENT
Start: 2023-04-11

## 2023-04-11 RX ORDER — MEPERIDINE HYDROCHLORIDE 25 MG/ML
25 INJECTION INTRAMUSCULAR; INTRAVENOUS; SUBCUTANEOUS EVERY 30 MIN PRN
Status: CANCELLED | OUTPATIENT
Start: 2023-04-11

## 2023-04-11 RX ORDER — METHYLPREDNISOLONE SODIUM SUCCINATE 125 MG/2ML
125 INJECTION, POWDER, LYOPHILIZED, FOR SOLUTION INTRAMUSCULAR; INTRAVENOUS
Status: CANCELLED
Start: 2023-04-11

## 2023-04-11 ASSESSMENT — PAIN SCALES - GENERAL: PAINLEVEL: NO PAIN (0)

## 2023-04-11 NOTE — TELEPHONE ENCOUNTER
Magnesium yesterday 1.1.  Per Dr. Bautista, give Mag sulfate 3 gm iv x 1 today if possbile.  Also increase oral mag oxide  to 800 mg po bid.   Ree AVELAR to arrange for infusion.

## 2023-04-11 NOTE — TELEPHONE ENCOUNTER
ISSUE:   Tacrolimus IR level 6.7 on 4/10, goal 8-12, dose 4 mg in the morning and 3 mg in the evening.       PLAN:   Please call patient and confirm this was an accurate 12-hour trough. Verify Tacrolimus IR dose. Confirm no new medications or illness. Confirm no missed doses. If accurate trough and accurate dose, increase Tacrolimus IR dose to 4 mg BID and repeat labs on Monday. Scarlett Michelle RN  OUTCOME:   Spoke with patient, they confirm accurate trough level and current dose 4 mg am, 3 mg pm. Patient confirmed dose change to 3 mg BID and to repeat labs in 2 days. Orders sent to preferred pharmacy for dose change and lab for repeat labs. Patient voiced understanding of plan.    Ree Burleson LPN

## 2023-04-11 NOTE — NURSING NOTE
Chief Complaint   Patient presents with     Follow Up     Liver      /78 (BP Location: Right arm, Patient Position: Sitting, Cuff Size: Adult Regular)   Pulse 79   Temp 97.7  F (36.5  C) (Oral)   Resp 16   SpO2 100%     Fernando Sidhu, RN on 4/11/2023 at 1:14 PM

## 2023-04-11 NOTE — TELEPHONE ENCOUNTER
Instructed pt of the need to receive IV magnesium and increase oral magnesium.    ask pt to increase oral mag ox today to 800 mg po tid (if not having diarrhea), then go to 800 mg po bid after infusion tomorrow thx    IV magnesium is scheduled at the [facility] for 8am, Pt is aware.

## 2023-04-11 NOTE — PROGRESS NOTES
Transplant Surgery -OUTPATIENT IMMUNOSUPPRESSION PROGRESS NOTE    Date of Visit: 04/11/2023    Transplants:  3/23/2023 (Liver); Postoperative day:  19  ASSESMENT AND PLAN:  1.Graft Function: stable  2.Immunosuppression Management: continue IS  3.Hypertension: stable  4.Renal Function: cr is stable  5.Lab frequency: twice weekly  6.Other: staples out Friday  Hypomagnesia: mag TID today 800mg    Infusion tomorrow cannot stay today     Date: April 11, 2023    Transplant:  [x]                             Liver [x]                              Kidney []                             Pancreas []                              Other:             Chief Complaint:Follow Up (Liver )    History of Present Illness:Cricket Mane is a 62 year old male PMH significant for EtOH cirrhosis (MELD 22) complicated by bleeding esophageal varices, A fib on metoprolol, and CAD. He is now s/p DDOLT (no biliary stent) and umbilical hernia repair on 3/23/23 with Dr. Arzate.     No fevers, nausea or vomiting.      No urinary staples.  Seeing PCP today.      Is shaking from tac.  Low mag   Patient Active Problem List   Diagnosis     Atrial fibrillation and flutter (H)     Alcoholic hepatitis     Atrial fibrillation with RVR (H)     Cirrhosis (H)     Transplant     Pre-operative cardiovascular examination     Mixed hyperlipidemia     Thrombocytopenia (H)     Coagulopathy (H)     Anemia, unspecified type     S/P liver transplant (H)     Immunosuppression (H)     Acute post-operative pain     Anxiety     Anemia due to blood loss, acute     At risk for malnutrition     FERNANDA (acute kidney injury) (H)     Steroid-induced hyperglycemia     Hypomagnesemia     SOCIAL /FAMILY HISTORY: [x]                  No recent change    Past Medical History:   Diagnosis Date     Alcoholic cirrhosis of liver with ascites (H)      Atrial fibrillation (H)      Coronary artery disease      History of alcohol use disorder      History of esophageal varices with bleeding       Hyperlipidemia      Past Surgical History:   Procedure Laterality Date     AS PARTIAL HIP REPLACEMENT       BENCH LIVER N/A 3/23/2023    Procedure: Bench liver;  Surgeon: Aniket Arzate MD;  Location: [facility] OR     TRANSPLANT LIVER RECIPIENT  DONOR N/A 3/23/2023    Procedure: Transplant liver recipient  donor;  Surgeon: Aniket Arzate MD;  Location:  OR     Social History     Socioeconomic History     Marital status: Single     Spouse name: Not on file     Number of children: Not on file     Years of education: Not on file     Highest education level: Not on file   Occupational History     Not on file   Tobacco Use     Smoking status: Never     Smokeless tobacco: Not on file   Vaping Use     Vaping status: Not on file   Substance and Sexual Activity     Alcohol use: Not Currently     Comment: last ETOH was 22.     Drug use: No     Sexual activity: Not on file   Other Topics Concern     Not on file   Social History Narrative    Lives with family.      Social Determinants of Health     Financial Resource Strain: Not on file   Food Insecurity: Not on file   Transportation Needs: Not on file   Physical Activity: Not on file   Stress: Not on file   Social Connections: Not on file   Intimate Partner Violence: Not on file   Housing Stability: Not on file     Prescription Medications as of 2023       Rx Number Disp Refills Start End Last Dispensed Date Next Fill Date Owning Pharmacy    aspirin (ASA) 325 MG tablet  90 tablet 3 4/3/2023    [facility] [place] [address] [address]    Sig: Take 1 tablet (325 mg) by mouth daily    Class: E-Prescribe    Route: Oral    calcium carbonate-vitamin D (CALTRATE) 600-10 MG-MCG per tablet  180 tablet 3 4/3/2023    [facility] [place] [address] [address]    Sig: Take 1 tablet by mouth 2 times daily (with meals)    Class: E-Prescribe    Route: Oral    folic acid (FOLVITE) 1 MG tablet  90 tablet 1 4/3/2023     [facility] [facility]    Sig: Take 1 tablet (1,000 mcg) by mouth daily    Class: E-Prescribe    Route: Oral    hydrOXYzine (ATARAX) 25 MG tablet  30 tablet 0 3/29/2023    [address]    Sig: Take 1-2 tablets (25-50 mg) by mouth every 6 hours as needed for other or anxiety (adjuvant pain)    Class: E-Prescribe    Route: Oral    Lidocaine (LIDOCARE) 4 % Patch  10 patch 0 4/5/2023    [facility] [facility]    Sig: Place 1-2 patches onto the skin every 24 hours To prevent lidocaine toxicity, patient should be patch free for 12 hrs daily.    Class: E-Prescribe    Route: Transdermal    magnesium oxide (MAG-OX) 400 MG tablet  180 tablet 3 4/3/2023    [facility]/[place] [address] [address]     Sig: Take 1 tablet (400 mg) by mouth 2 times daily    Class: E-Prescribe    Route: Oral    melatonin 3 MG tablet            Sig: Take 3 mg by mouth nightly as needed for sleep    Class: Historical    Route: Oral    methocarbamol (ROBAXIN) 500 MG tablet  90 tablet 11 3/29/2023    [address]    Sig: Take 1 tablet (500 mg) by mouth 3 times daily    Class: E-Prescribe    Route: Oral    metoprolol tartrate (LOPRESSOR) 25 MG tablet  120 tablet 1 4/3/2023    [facility] [facility]    Sig: Take 2 tablets (50 mg) by mouth 2 times daily    Class: E-Prescribe    Route: Oral    Multiple Vitamins-Iron TABS    8/8/2022 8/8/2023       Sig: Take 1 tablet by mouth daily    Class: Historical    Route: Oral    mycophenolate (GENERIC EQUIVALENT) 250 MG capsule  540 capsule 3 4/3/2023    [facility]/[place] [address] [address]     Sig: Take 3 capsules (750 mg) by mouth 2 times daily    Class: E-Prescribe    Notes to Pharmacy: BEATRICE LEONARD  3/23/2023 (Liver) TXP Dischg DT 3/29/2023 DX Liver replaced by transplant Z94.4 TX [facility] ([place], [place])    Route: Oral    omeprazole 20 MG tablet  180 tablet 3 4/3/2023    [facility] [place], [place] - [address]    Sig: Take 1 tablet (20 mg) by mouth 2 times daily    Class: E-Prescribe    Route: Oral    psyllium (METAMUCIL/KONSYL) 58.6 % powder            Sig: Take 1 teaspoonful by mouth daily as needed for constipation    Class: Historical    Route: Oral    senna-docusate (SENOKOT-S/PERICOLACE) 8.6-50 MG tablet  100 tablet 0 3/29/2023    [facility] [place], [place] - [address] [facility] [address]    Sig: Take 1 tablet by mouth 2 times daily as needed for constipation    Class: E-Prescribe    Route: Oral    sulfamethoxazole-trimethoprim (BACTRIM) 400-80 MG tablet  90 tablet 3 4/3/2023    [facility] [place], [place] - [address]    Sig: Take 1 tablet by mouth daily    Class: E-Prescribe    Route: Oral    tacrolimus (GENERIC EQUIVALENT) 1 MG capsule  720 capsule 3 4/11/2023    [facility] [place], [place] - [address]    Sig: Take 4 capsules (4 mg) by mouth every 12 hours    Class: E-Prescribe    Notes to Pharmacy: TXP DT 3/23/2023 (Liver) TXP Dischg DT 3/29/2023 DX Liver replaced by transplant Z94.4 TX [facility] ([place], [place])    Route: Oral    traMADol (ULTRAM) 50 MG tablet            Sig: Take 50 mg by mouth every 6 hours as needed for severe pain or breakthrough pain    Class: Historical    Route: Oral    valGANciclovir (VALCYTE) 450 MG tablet  90 tablet 3 4/3/2023    [facility] [place], [place] - [address]    Sig: Take 1 tablet (450 mg) by mouth daily    Class: E-Prescribe    Route: Oral    tacrolimus (GENERIC EQUIVALENT) 0.5 MG capsule  60 capsule 1 3/29/2023    [facility] [place], [place] - [address] [facility]    Sig: Take 1 capsule (0.5 mg) by mouth 2 times daily as needed (For dose titration) As directed by your transplant team    Class: E-Prescribe    Route: Oral        Patient has no known allergies.   REVIEW OF SYSTEMS (check box if normal)  [x]               GENERAL  [x]                 PULMONARY [x]                GENITOURINARY  [x]                CNS                 [x]                 CARDIAC  [x]                 ENDOCRINE  [x]                EARS,NOSE,THROAT [x]                 GASTROINTESTINAL [x]                 NEUROLOGIC    [x]                MUSCLOSKELTAL  [x]                  HEMATOLOGY      PHYSICAL EXAM (check box if normal)/78 (BP Location: Right arm, Patient Position: Sitting, Cuff Size: Adult Regular)   Pulse 79   Temp 97.7  F (36.5  C) (Oral)   Resp 16   SpO2 100%         [x]            GENERAL:    [x]       EYES:  ICTERIC   []        YES  []                    NO  [x]           EXTREMITIES: Clubbing []       Y     [x]           N    [x]           EARS, NOSE, THROAT: Membranes Moist    YES   [x]                   NO []                  [x]           LUNGS:  CLEAR    YES       [x]                  NO    []                                [x]           SKIN: Jaundice           YES       []                  NO    [x]                   Rash: YES       []                  NO    [x]                                     [x]             HEART: Regular Rate          YES       [x]                  NO    []                   Incision Clean:  YES       [x]                  NO    []                                [x]                    ABDOMEN: Organomegaly YES       []                  NO    [x]                       [x]                    NEUROLOGICAL:  Nonfocal  YES       [x]                  NO    []                       [x]                    Hernia YES       []                  NO    [x]                   PSYCHIATRIC:  Appropriate  YES       [x]                   NO    []                       OTHER:                                                                                                   PAIN SCALE:: 1

## 2023-04-11 NOTE — TELEPHONE ENCOUNTER
Per Ree AVELAR, pt has appts today which make it impossible for him to come for IV mag today but he can get in tomorrow for this.   Asked her to direct him to take 800 mg magnesium oxide tid today, then go to 800 mg po bid after infusion tomorrow

## 2023-04-11 NOTE — LETTER
4/11/2023         RE: Cricket Mane  [address] [place] [place] [facility] [address]        Dear Colleague,    Thank you for referring your patient, Cricket Mane, to the [facility] TRANSPLANT [facility]. Please see a copy of my visit note below.    Transplant Surgery -OUTPATIENT IMMUNOSUPPRESSION PROGRESS NOTE    Date of Visit: 04/11/2023    Transplants:  3/23/2023 (Liver); Postoperative day:  19  ASSESMENT AND PLAN:  1.Graft Function: stable  2.Immunosuppression Management: continue IS  3.Hypertension: stable  4.Renal Function: cr is stable  5.Lab frequency: twice weekly  6.Other: staples out Friday  Hypomagnesia: mag TID today 800mg    Infusion tomorrow cannot stay today     Date: April 11, 2023    Transplant:  [x]                             Liver [x]                              Kidney []                             Pancreas []                              Other:             Chief Complaint:Follow Up (Liver )    History of Present Illness:Cricket Mane is a 62 year old male PMH significant for EtOH cirrhosis (MELD 22) complicated by bleeding esophageal varices, A fib on metoprolol, and CAD. He is now s/p DDOLT (no biliary stent) and umbilical hernia repair on 3/23/23 with Dr. Arzate.     No fevers, nausea or vomiting.      No urinary staples.  Seeing PCP today.      Is shaking from tac.  Low mag   Patient Active Problem List   Diagnosis    Atrial fibrillation and flutter (H)    Alcoholic hepatitis    Atrial fibrillation with RVR (H)    Cirrhosis (H)    Transplant    Pre-operative cardiovascular examination    Mixed hyperlipidemia    Thrombocytopenia (H)    Coagulopathy (H)    Anemia, unspecified type    S/P liver transplant (H)    Immunosuppression (H)    Acute post-operative pain    Anxiety    Anemia due to blood loss, acute    At risk for malnutrition    FERNANDA (acute kidney injury) (H)    Steroid-induced hyperglycemia    Hypomagnesemia     SOCIAL /FAMILY HISTORY: [x]                  No recent change     Past Medical History:   Diagnosis Date    Alcoholic cirrhosis of liver with ascites (H)     Atrial fibrillation (H)     Coronary artery disease     History of alcohol use disorder     History of esophageal varices with bleeding     Hyperlipidemia      Past Surgical History:   Procedure Laterality Date    AS PARTIAL HIP REPLACEMENT      BENCH LIVER N/A 3/23/2023    Procedure: Bench liver;  Surgeon: Aniket Arzate MD;  Location:  OR    TRANSPLANT LIVER RECIPIENT  DONOR N/A 3/23/2023    Procedure: Transplant liver recipient  donor;  Surgeon: Aniket Arzate MD;  Location:  OR     Social History     Socioeconomic History    Marital status: Single     Spouse name: Not on file    Number of children: Not on file    Years of education: Not on file    Highest education level: Not on file   Occupational History    Not on file   Tobacco Use    Smoking status: Never    Smokeless tobacco: Not on file   Vaping Use    Vaping status: Not on file   Substance and Sexual Activity    Alcohol use: Not Currently     Comment: last ETOH was 22.    Drug use: No    Sexual activity: Not on file   Other Topics Concern    Not on file   Social History Narrative    Lives with family.      Social Determinants of Health     Financial Resource Strain: Not on file   Food Insecurity: Not on file   Transportation Needs: Not on file   Physical Activity: Not on file   Stress: Not on file   Social Connections: Not on file   Intimate Partner Violence: Not on file   Housing Stability: Not on file     Prescription Medications as of 2023         Rx Number Disp Refills Start End Last Dispensed Date Next Fill Date Owning Pharmacy    aspirin (ASA) 325 MG tablet  90 tablet 3 4/3/2023    [facility] - [place], [place] - [address]    Sig: Take 1 tablet (325 mg) by mouth daily    Class: E-Prescribe    Route: Oral    calcium carbonate-vitamin D (CALTRATE) 600-10 MG-MCG per tablet  180 tablet 3 4/3/2023     [facility]/[address]    Sig: Take 1 tablet by mouth 2 times daily (with meals)    Class: E-Prescribe    Route: Oral    folic acid (FOLVITE) 1 MG tablet  90 tablet 1 4/3/2023    [facility]/[address]    Sig: Take 1 tablet (1,000 mcg) by mouth daily    Class: E-Prescribe    Route: Oral    hydrOXYzine (ATARAX) 25 MG tablet  30 tablet 0 3/29/2023    [address]    Sig: Take 1-2 tablets (25-50 mg) by mouth every 6 hours as needed for other or anxiety (adjuvant pain)    Class: E-Prescribe    Route: Oral    Lidocaine (LIDOCARE) 4 % Patch  10 patch 0 4/5/2023    [facility]/[address]    Sig: Place 1-2 patches onto the skin every 24 hours To prevent lidocaine toxicity, patient should be patch free for 12 hrs daily.    Class: E-Prescribe    Route: Transdermal    magnesium oxide (MAG-OX) 400 MG tablet  180 tablet 3 4/3/2023    [facility]/[address]    Sig: Take 1 tablet (400 mg) by mouth 2 times daily    Class: E-Prescribe    Route: Oral    melatonin 3 MG tablet            Sig: Take 3 mg by mouth nightly as needed for sleep    Class: Historical    Route: Oral    methocarbamol (ROBAXIN) 500 MG tablet  90 tablet 11 3/29/2023    [address]    Sig: Take 1 tablet (500 mg) by mouth 3 times daily    Class: E-Prescribe    Route: Oral    metoprolol tartrate (LOPRESSOR) 25 MG tablet  120 tablet 1 4/3/2023    [facility]/[address]    Sig: Take 2 tablets (50 mg) by mouth 2 times daily    Class: E-Prescribe    Route: Oral    Multiple Vitamins-Iron TABS    8/8/2022 8/8/2023       Sig: Take 1 tablet by mouth daily    Class: Historical    Route: Oral    mycophenolate (GENERIC  EQUIVALENT) 250 MG capsule  540 capsule 3 4/3/2023    [facility] [place], [place] - [facility] [address]    Sig: Take 3 capsules (750 mg) by mouth 2 times daily    Class: E-Prescribe    Notes to Pharmacy: TXP DT 3/23/2023 (Liver) TXP Dischg DT 3/29/2023 DX Liver replaced by transplant Z94.4 TX [facility] ([place], [place])    Route: Oral    omeprazole 20 MG tablet  180 tablet 3 4/3/2023    [facility] - [place] [address] [address]    Sig: Take 1 tablet (20 mg) by mouth 2 times daily    Class: E-Prescribe    Route: Oral    psyllium (METAMUCIL/KONSYL) 58.6 % powder            Sig: Take 1 teaspoonful by mouth daily as needed for constipation    Class: Historical    Route: Oral    senna-docusate (SENOKOT-S/PERICOLACE) 8.6-50 MG tablet  100 tablet 0 3/29/2023    [facility] - [place], [place] - [address]    Sig: Take 1 tablet by mouth 2 times daily as needed for constipation    Class: E-Prescribe    Route: Oral    sulfamethoxazole-trimethoprim (BACTRIM) 400-80 MG tablet  90 tablet 3 4/3/2023    [facility] [place] [address] [address]    Sig: Take 1 tablet by mouth daily    Class: E-Prescribe    Route: Oral    tacrolimus (GENERIC EQUIVALENT) 1 MG capsule  720 capsule 3 4/11/2023    [facility] [place] [address] [address]    Sig: Take 4 capsules (4 mg) by mouth every 12 hours    Class: E-Prescribe    Notes to Pharmacy: TXP DT 3/23/2023 (Liver) TXP Dischg DT 3/29/2023 DX Liver replaced by transplant Z94.4 TX [facility] ([place], [place])    Route: Oral    traMADol (ULTRAM) 50 MG tablet            Sig: Take 50 mg by mouth every 6 hours as needed for severe pain or breakthrough pain    Class: Historical    Route: Oral    valGANciclovir (VALCYTE) 450 MG tablet  90 tablet 3 4/3/2023    [facility] - [place], [place] - [address]    Sig: Take 1 tablet (450 mg) by mouth daily    Class: E-Prescribe    Route: Oral    tacrolimus (GENERIC EQUIVALENT) 0.5 MG capsule  60 capsule 1 3/29/2023    [facility] - [place], [place] - [address]    Sig: Take 1 capsule (0.5 mg) by mouth 2 times daily as needed (For dose titration) As directed by your transplant team    Class: E-Prescribe    Route: Oral          Patient has no known allergies.   REVIEW OF SYSTEMS (check box if normal)  [x]               GENERAL  [x]                 PULMONARY [x]                GENITOURINARY  [x]                CNS                 [x]                 CARDIAC  [x]                 ENDOCRINE  [x]                EARS,NOSE,THROAT [x]                 GASTROINTESTINAL [x]                 NEUROLOGIC    [x]                MUSCLOSKELTAL  [x]                  HEMATOLOGY      PHYSICAL EXAM (check box if normal)/78 (BP Location: Right arm, Patient Position: Sitting, Cuff Size: Adult Regular)   Pulse 79   Temp 97.7  F (36.5  C) (Oral)   Resp 16   SpO2 100%         [x]            GENERAL:    [x]       EYES:  ICTERIC   []        YES  []                    NO  [x]           EXTREMITIES: Clubbing []       Y     [x]           N    [x]           EARS, NOSE, THROAT: Membranes Moist    YES   [x]                   NO []                  [x]           LUNGS:  CLEAR    YES       [x]                  NO    []                                [x]           SKIN: Jaundice           YES       []                  NO    [x]                   Rash: YES       []                  NO    [x]                                     [x]             HEART: Regular Rate          YES       [x]                  NO    []                   Incision Clean:  YES       [x]                  NO    []                                [x]                    ABDOMEN: Organomegaly YES       []                  NO    [x]                        [x]                    NEUROLOGICAL:  Nonfocal  YES       [x]                  NO    []                       [x]                    Hernia YES       []                  NO    [x]                   PSYCHIATRIC:  Appropriate  YES       [x]                  NO    []                       OTHER:                                                                                                   PAIN SCALE:: 1        Again, thank you for allowing me to participate in the care of your patient.        Sincerely,        Hilaria Arellano NP

## 2023-04-12 ENCOUNTER — INFUSION THERAPY VISIT (OUTPATIENT)
Dept: INFUSION THERAPY | Facility: CLINIC | Age: 63
End: 2023-04-12
Attending: TRANSPLANT SURGERY
Payer: COMMERCIAL

## 2023-04-12 VITALS
DIASTOLIC BLOOD PRESSURE: 70 MMHG | OXYGEN SATURATION: 99 % | SYSTOLIC BLOOD PRESSURE: 104 MMHG | HEART RATE: 84 BPM | RESPIRATION RATE: 18 BRPM

## 2023-04-12 DIAGNOSIS — E83.42 HYPOMAGNESEMIA: Primary | ICD-10-CM

## 2023-04-12 PROCEDURE — 96366 THER/PROPH/DIAG IV INF ADDON: CPT

## 2023-04-12 PROCEDURE — 96365 THER/PROPH/DIAG IV INF INIT: CPT

## 2023-04-12 PROCEDURE — 258N000003 HC RX IP 258 OP 636: Performed by: TRANSPLANT SURGERY

## 2023-04-12 PROCEDURE — 250N000011 HC RX IP 250 OP 636: Performed by: TRANSPLANT SURGERY

## 2023-04-12 RX ORDER — HEPARIN SODIUM (PORCINE) LOCK FLUSH IV SOLN 100 UNIT/ML 100 UNIT/ML
5 SOLUTION INTRAVENOUS
Status: CANCELLED | OUTPATIENT
Start: 2023-04-12

## 2023-04-12 RX ORDER — DIPHENHYDRAMINE HYDROCHLORIDE 50 MG/ML
50 INJECTION INTRAMUSCULAR; INTRAVENOUS
Status: CANCELLED
Start: 2023-04-12

## 2023-04-12 RX ORDER — HEPARIN SODIUM,PORCINE 10 UNIT/ML
5 VIAL (ML) INTRAVENOUS
Status: CANCELLED | OUTPATIENT
Start: 2023-04-12

## 2023-04-12 RX ORDER — ALBUTEROL SULFATE 90 UG/1
1-2 AEROSOL, METERED RESPIRATORY (INHALATION)
Status: CANCELLED
Start: 2023-04-12

## 2023-04-12 RX ORDER — EPINEPHRINE 1 MG/ML
0.3 INJECTION, SOLUTION INTRAMUSCULAR; SUBCUTANEOUS EVERY 5 MIN PRN
Status: CANCELLED | OUTPATIENT
Start: 2023-04-12

## 2023-04-12 RX ORDER — ALBUTEROL SULFATE 0.83 MG/ML
2.5 SOLUTION RESPIRATORY (INHALATION)
Status: CANCELLED | OUTPATIENT
Start: 2023-04-12

## 2023-04-12 RX ORDER — METHYLPREDNISOLONE SODIUM SUCCINATE 125 MG/2ML
125 INJECTION, POWDER, LYOPHILIZED, FOR SOLUTION INTRAMUSCULAR; INTRAVENOUS
Status: CANCELLED
Start: 2023-04-12

## 2023-04-12 RX ORDER — MEPERIDINE HYDROCHLORIDE 25 MG/ML
25 INJECTION INTRAMUSCULAR; INTRAVENOUS; SUBCUTANEOUS EVERY 30 MIN PRN
Status: CANCELLED | OUTPATIENT
Start: 2023-04-12

## 2023-04-12 RX ADMIN — MAGNESIUM SULFATE HEPTAHYDRATE 3 G: 500 INJECTION, SOLUTION INTRAMUSCULAR; INTRAVENOUS at 08:12

## 2023-04-12 ASSESSMENT — PAIN SCALES - GENERAL: PAINLEVEL: NO PAIN (0)

## 2023-04-12 NOTE — PATIENT INSTRUCTIONS
Dear Cricket Mane    Thank you for choosing [facility] ([facility]) for your infusion.  The following information is a summary of our appointment as well as important reminders.      We look forward in seeing you on your next appointment here at [facility] ([facility]).  Please don t hesitate to call us at [phone] to reschedule any of your appointments or to speak with one of the [facility] registered nurses.  It was a pleasure taking care of you today.    Sincerely,    [facility]  [facility]  [address] [place] [address]  [address]  Phone:  [phone]

## 2023-04-12 NOTE — PROGRESS NOTES
Infusion Nursing Note:  Cricket Mane presents today for magnesium.    Patient seen by provider today: No   present during visit today: Not Applicable.    Intravenous Access:  Peripheral IV placed.    Treatment Conditions:  Not Applicable. Orders are not lab dependent. Last Magnesium:    Magnesium   Date Value Ref Range Status   04/10/2023 1.1 (L) 1.8 - 2.6 mg/dL Final     Post Infusion Assessment:  Patient tolerated infusion without incident.  Blood return noted pre and post infusion.  Site patent and intact, free from redness, edema or discomfort.  No evidence of extravasations.  Access discontinued per protocol.     Discharge Plan:   Discharge instructions reviewed with: Patient.  Patient and/or family verbalized understanding of discharge instructions and all questions answered.  AVS to patient via [employer].   Patient discharged in stable condition accompanied by: sister.  Departure Mode: Ambulatory.    Administrations This Visit     magnesium sulfate 3 g in 100 mL NS intermittent infusion     Admin Date  04/12/2023 Action  $New Bag Dose  3 g Rate  66.7 mL/hr Route  Intravenous Administered By  Tianna Wiggins, RN                Tianna Wiggins RN

## 2023-04-13 ENCOUNTER — TEAM CONFERENCE (OUTPATIENT)
Dept: TRANSPLANT | Facility: CLINIC | Age: 63
End: 2023-04-13

## 2023-04-13 ENCOUNTER — TELEPHONE (OUTPATIENT)
Dept: TRANSPLANT | Facility: CLINIC | Age: 63
End: 2023-04-13

## 2023-04-13 ENCOUNTER — LAB (OUTPATIENT)
Dept: LAB | Facility: CLINIC | Age: 63
End: 2023-04-13
Payer: COMMERCIAL

## 2023-04-13 DIAGNOSIS — Z94.4 LIVER REPLACED BY TRANSPLANT (H): Primary | ICD-10-CM

## 2023-04-13 LAB
ALBUMIN SERPL-MCNC: 2.7 G/DL (ref 3.5–5)
ALP SERPL-CCNC: 80 U/L (ref 45–120)
ALT SERPL W P-5'-P-CCNC: 19 U/L (ref 0–45)
ANION GAP SERPL CALCULATED.3IONS-SCNC: 9 MMOL/L (ref 5–18)
AST SERPL W P-5'-P-CCNC: 8 U/L (ref 0–40)
BASOPHILS # BLD AUTO: 0.1 10E3/UL (ref 0–0.2)
BASOPHILS NFR BLD AUTO: 2 %
BILIRUB DIRECT SERPL-MCNC: 0.4 MG/DL
BILIRUB SERPL-MCNC: 0.9 MG/DL (ref 0–1)
BUN SERPL-MCNC: 33 MG/DL (ref 8–22)
CALCIUM SERPL-MCNC: 8.7 MG/DL (ref 8.5–10.5)
CHLORIDE BLD-SCNC: 110 MMOL/L (ref 98–107)
CO2 SERPL-SCNC: 20 MMOL/L (ref 22–31)
CREAT SERPL-MCNC: 1.13 MG/DL (ref 0.7–1.3)
EOSINOPHIL # BLD AUTO: 0.2 10E3/UL (ref 0–0.7)
EOSINOPHIL NFR BLD AUTO: 3 %
ERYTHROCYTE [DISTWIDTH] IN BLOOD BY AUTOMATED COUNT: 15.4 % (ref 10–15)
GFR SERPL CREATININE-BSD FRML MDRD: 74 ML/MIN/1.73M2
GLUCOSE BLD-MCNC: 114 MG/DL (ref 70–125)
HCT VFR BLD AUTO: 26.9 % (ref 40–53)
HGB BLD-MCNC: 8.9 G/DL (ref 13.3–17.7)
IMM GRANULOCYTES # BLD: 0.1 10E3/UL
IMM GRANULOCYTES NFR BLD: 1 %
LYMPHOCYTES # BLD AUTO: 0.9 10E3/UL (ref 0.8–5.3)
LYMPHOCYTES NFR BLD AUTO: 15 %
MAGNESIUM SERPL-MCNC: 1.7 MG/DL (ref 1.8–2.6)
MCH RBC QN AUTO: 31.2 PG (ref 26.5–33)
MCHC RBC AUTO-ENTMCNC: 33.1 G/DL (ref 31.5–36.5)
MCV RBC AUTO: 94 FL (ref 78–100)
MONOCYTES # BLD AUTO: 0.6 10E3/UL (ref 0–1.3)
MONOCYTES NFR BLD AUTO: 10 %
NEUTROPHILS # BLD AUTO: 4.4 10E3/UL (ref 1.6–8.3)
NEUTROPHILS NFR BLD AUTO: 69 %
NRBC # BLD AUTO: 0 10E3/UL
NRBC BLD AUTO-RTO: 0 /100
PHOSPHATE SERPL-MCNC: 2.9 MG/DL (ref 2.5–4.5)
PLATELET # BLD AUTO: 222 10E3/UL (ref 150–450)
POTASSIUM BLD-SCNC: 4.6 MMOL/L (ref 3.5–5)
PROT SERPL-MCNC: 6.5 G/DL (ref 6–8)
RBC # BLD AUTO: 2.85 10E6/UL (ref 4.4–5.9)
SODIUM SERPL-SCNC: 139 MMOL/L (ref 136–145)
TACROLIMUS BLD-MCNC: 6.4 UG/L (ref 5–15)
TME LAST DOSE: NORMAL H
TME LAST DOSE: NORMAL H
WBC # BLD AUTO: 6.3 10E3/UL (ref 4–11)

## 2023-04-13 PROCEDURE — 80197 ASSAY OF TACROLIMUS: CPT

## 2023-04-13 PROCEDURE — 82374 ASSAY BLOOD CARBON DIOXIDE: CPT

## 2023-04-13 PROCEDURE — 83735 ASSAY OF MAGNESIUM: CPT

## 2023-04-13 PROCEDURE — 85025 COMPLETE CBC W/AUTO DIFF WBC: CPT

## 2023-04-13 PROCEDURE — 84100 ASSAY OF PHOSPHORUS: CPT

## 2023-04-13 PROCEDURE — 80053 COMPREHEN METABOLIC PANEL: CPT

## 2023-04-13 PROCEDURE — 82248 BILIRUBIN DIRECT: CPT

## 2023-04-13 PROCEDURE — 36415 COLL VENOUS BLD VENIPUNCTURE: CPT

## 2023-04-13 RX ORDER — MULTIVITAMIN/IRON/FOLIC ACID 18MG-0.4MG
1 TABLET ORAL DAILY
COMMUNITY
Start: 2023-04-13

## 2023-04-13 NOTE — TELEPHONE ENCOUNTER
"Post Liver Transplant Team Conference  Date: 4/13/2023  Transplant Coordinator: Polly Mancini  Transplant Surgeon: Aniket Arzate      Attendees:  [] Dr. Li [] Dr. Reddy []       [x] Dr. Plummer [x] Ree Burleson LPN []    [] Dr. Cowan [] Kelly Cagle NP []    [] Dr. Muñoz [] Hilraia Arellano NP []    [] Dr. Louis [] Hilaria Serna, RN []       [] Dr. Sloan [x] Polly Mancini RN []       [] Dr. Amarjit Alexander [x] Scarlett Michelle RN []       [] Dr. JOHN Arzate [x] Renita Giraldo RN []       [x] Herb Roberts, pharm [] Stephanie Jean RN []       [] Dr. Vargas [] Benjamin Johns RN []         Verbal Plan Read Back:   - Labs/meds reviewed. No new recommendations today.     Called Torin to check in. He is doing good. Denies any N/V/D. Appetite is \"good\" and he is not having any difficulty eating. He is eating small more frequent meals. Torin's sister is staying with him this week. Weight and VS are stable.     Weight is currently 146 lbs this morning, he's gained a couple pounds which was needed. BP has been a little low.     Home nurse was out to see him this morning. SBP was under 100 this morning. I recommended Torin call his PCP to discuss Metoprolol dose. Torin said his dose was recently decreased from 50 mg BID to 25 mg BID by his PCP this week.  I recommended Torin keep a close eye on his BP and if it continues to remain under 100 systolic he should call his PCP asap to discuss/re adjust. Metoprolol is prescribed for A-fib.     Asked Torin about his hydration status.  Encouraged increased water intake to help keep BP within an adequate range.     Reviewed medications and updated list. Discussed PRN use of Robaxin and slowly weaning off completely. Discussed Tylenol use and max dose.     Routed to RN Coordinator   Polly Mancini RN    "

## 2023-04-13 NOTE — PROGRESS NOTES
Transplant Surgery Progress Note    Transplants:  3/23/2023 (Liver); Postoperative day:  14  S: overall doing well, strength and activity slowly improving, here with son today    Transplant History:    Transplant Type:  Liver Tx  Donor Type:    Transplant Date:  3/23/2023 (Liver)   Biliary Stent:  No    Acute Rejection Hx:  No    Present Maintenance Immunosuppression:  Tacrolimus and Mycophenolate mofetil    CMV IgG Ab Discordance:  No  EBV IgG Ab Discordance:  No    Transplant Coordinator: Polly Mancini     Transplant Office Phone Number: [phone]     Immunosuppressant Medications     Immunosuppressive Agents Disp Start End     mycophenolate (GENERIC EQUIVALENT) 250 MG capsule    540 capsule 4/3/2023     Sig - Route: Take 3 capsules (750 mg) by mouth 2 times daily - Oral    Class: E-Prescribe    Notes to Pharmacy: TXP DT 3/23/2023 (Liver) TXP Dischg DT 3/29/2023 DX Liver replaced by transplant Z94.4 TX [facility] ([place], [place])     tacrolimus (GENERIC EQUIVALENT) 0.5 MG capsule    60 capsule 3/29/2023     Sig - Route: Take 1 capsule (0.5 mg) by mouth 2 times daily as needed (For dose titration) As directed by your transplant team - Oral    Patient not taking: Reported on 4/5/2023       Class: E-Prescribe     tacrolimus (GENERIC EQUIVALENT) 1 MG capsule    720 capsule 4/11/2023     Sig - Route: Take 4 capsules (4 mg) by mouth every 12 hours - Oral    Class: E-Prescribe    Notes to Pharmacy: TXP DT 3/23/2023 (Liver) TXP Dischg DT 3/29/2023 DX Liver replaced by transplant Z94.4 [facility] ([place], [place])          Possible Immunosuppression-related side effects:   []             headache  []             vivid dreams  []             irritability  []             cognitive difficuties  []             fine tremor  []             nausea  []             diarrhea  []             neuropathy      []             edema  []              renal calcineurin toxicity  []             hyperkalemia  []             post-transplant diabetes  []             decreased appetite  []             increased appetite  []             other:  []             none    Prescription Medications as of 4/13/2023       Rx Number Disp Refills Start End Last Dispensed Date Next Fill Date Owning Pharmacy    aspirin (ASA) 325 MG tablet  90 tablet 3 4/3/2023    [facility]/[place] [address] [address]    Sig: Take 1 tablet (325 mg) by mouth daily    Class: E-Prescribe    Route: Oral    calcium carbonate-vitamin D (CALTRATE) 600-10 MG-MCG per tablet  180 tablet 3 4/3/2023    [facility]/[place] [address] [address]    Sig: Take 1 tablet by mouth 2 times daily (with meals)    Class: E-Prescribe    Route: Oral    folic acid (FOLVITE) 1 MG tablet  90 tablet 1 4/3/2023    [facility]/[place] [address] [address]    Sig: Take 1 tablet (1,000 mcg) by mouth daily    Class: E-Prescribe    Route: Oral    hydrOXYzine (ATARAX) 25 MG tablet  30 tablet 0 3/29/2023    [place], [place] - [address]    Sig: Take 1-2 tablets (25-50 mg) by mouth every 6 hours as needed for other or anxiety (adjuvant pain)    Class: E-Prescribe    Route: Oral    Lidocaine (LIDOCARE) 4 % Patch  10 patch 0 4/5/2023    [facility] [address] [address]    Sig: Place 1-2 patches onto the skin every 24 hours To prevent lidocaine toxicity, patient should be patch free for 12 hrs daily.    Class: E-Prescribe    Route: Transdermal    magnesium oxide (MAG-OX) 400 MG tablet  360 tablet 3 4/11/2023    [facility]/[place] [address] [address]    Sig: Take 2 tablets (800 mg) by mouth 2 times daily    Class: E-Prescribe    Route: Oral    melatonin 3 MG tablet            Sig: Take 3 mg by mouth nightly as needed for sleep    Class:  Historical    Route: Oral    methocarbamol (ROBAXIN) 500 MG tablet  90 tablet 11 3/29/2023    [facility] [place], [place] - [address] [place] [address] [address]    Sig: Take 1 tablet (500 mg) by mouth 3 times daily    Class: E-Prescribe    Route: Oral    metoprolol tartrate (LOPRESSOR) 25 MG tablet  120 tablet 1 4/3/2023    [facility] [address] [address]    Sig: Take 2 tablets (50 mg) by mouth 2 times daily    Class: E-Prescribe    Route: Oral    Multiple Vitamins-Iron TABS    8/8/2022 8/8/2023       Sig: Take 1 tablet by mouth daily    Class: Historical    Route: Oral    mycophenolate (GENERIC EQUIVALENT) 250 MG capsule  540 capsule 3 4/3/2023    [facility]/[address]     Sig: Take 3 capsules (750 mg) by mouth 2 times daily    Class: E-Prescribe    Notes to Pharmacy: TXP DT 3/23/2023 (Liver) TXP Dischg DT 3/29/2023 DX Liver replaced by transplant Z94.4 [facility] ([place], [place])    Route: Oral    omeprazole 20 MG tablet  180 tablet 3 4/3/2023    [facility] [address] [address]    Sig: Take 1 tablet (20 mg) by mouth 2 times daily    Class: E-Prescribe    Route: Oral    psyllium (METAMUCIL/KONSYL) 58.6 % powder            Sig: Take 1 teaspoonful by mouth daily as needed for constipation    Class: Historical    Route: Oral    senna-docusate (SENOKOT-S/PERICOLACE) 8.6-50 MG tablet  100 tablet 0 3/29/2023    [facility] - [place], [place] - [address]    Sig: Take 1 tablet by mouth 2 times daily as needed for constipation    Class: E-Prescribe    Route: Oral    sulfamethoxazole-trimethoprim (BACTRIM) 400-80 MG tablet  90 tablet 3 4/3/2023    [facility] [address] [address]    Sig: Take 1 tablet by mouth daily    Class: E-Prescribe    Route: Oral    tacrolimus (GENERIC  EQUIVALENT) 0.5 MG capsule  60 capsule 1 3/29/2023    [facility] - [place], [place] - [address]    Sig: Take 1 capsule (0.5 mg) by mouth 2 times daily as needed (For dose titration) As directed by your transplant team    Class: E-Prescribe    Route: Oral    tacrolimus (GENERIC EQUIVALENT) 1 MG capsule  720 capsule 3 4/11/2023    [facility] - [place] - [facility] [address]    Sig: Take 4 capsules (4 mg) by mouth every 12 hours    Class: E-Prescribe    Notes to Pharmacy: TXP DT 3/23/2023 (Liver) TXP Dischg DT 3/29/2023 DX Liver replaced by transplant Z94.4 [facility] ([place], [place])    Route: Oral    traMADol (ULTRAM) 50 MG tablet            Sig: Take 50 mg by mouth every 6 hours as needed for severe pain or breakthrough pain    Class: Historical    Route: Oral    valGANciclovir (VALCYTE) 450 MG tablet  90 tablet 3 4/3/2023    [facility] - [place], [place] - [address]    Sig: Take 1 tablet (450 mg) by mouth daily    Class: E-Prescribe    Route: Oral      Clinic-Administered Medications as of 4/13/2023       Dose Frequency Start End    magnesium sulfate 3 g in 100 mL NS intermittent infusion (Completed) 3 g ONCE 4/12/2023 4/12/2023    Class: E-Prescribe    Route: Intravenous          O:   [unfilled]        Latest Ref Rng & Units 4/13/2023     9:00 AM 4/10/2023    10:00 AM 4/6/2023     9:33 AM 4/3/2023     8:09 AM 3/30/2023     7:39 AM   Transplant Immunosuppression Labs   Creat 0.70 - 1.30 mg/dL  1.21   1.34   1.51   1.12     Urea Nitrogen 8 - 22 mg/dL  36        Urea Nitrogen 8.0 - 23.0 mg/dL   32.5   43.5   25.4     WBC 4.0 - 11.0 10e3/uL 6.3   6.9   8.7   10.9   9.0     Neutrophil % 69   66            Chemistries:   Recent Labs   Lab Test 04/10/23  1000   BUN 36*   CR 1.21   GFRESTIMATED 69   *     Lab Results   Component Value Date    A1C 4.7 03/23/2023     Recent Labs    Lab Test 04/10/23  1000   ALBUMIN 2.9*   BILITOTAL 1.0   ALKPHOS 85   AST 12   ALT 27     Urine Studies:  Recent Labs   Lab Test 12/20/22  0923   COLOR Yellow   APPEARANCE Clear   URINEGLC Negative   URINEBILI Negative   URINEKETONE Negative   SG 1.018   UBLD Negative   URINEPH 6.0   PROTEIN Negative   NITRITE Negative   LEUKEST Negative     No lab results found.  Hematology:   Recent Labs   Lab Test 04/13/23  0900 04/10/23  1000 04/06/23  0933   HGB 8.9* 9.6* 10.0*    282 284   WBC 6.3 6.9 8.7     Coags:   Recent Labs   Lab Test 03/26/23  0338 03/25/23  1117   INR 1.12 1.27*     HLA antibodies:   No results found for: [id number], [id number], [id number], [id number]    Assessment: Cricket Mane is doing well s/p Liver Tx:  Issues we addressed during his visit include:    Plan:    1. Graft function: LFTs normalizing  2. Immunosuppression Management: No change tac 8-12 .  Complexity of management:Low.  Contributing factors: mild renal dysfunction    Followup: 1 week            Aniket Arzate MD/PhD

## 2023-04-14 ENCOUNTER — TELEPHONE (OUTPATIENT)
Dept: TRANSPLANT | Facility: CLINIC | Age: 63
End: 2023-04-14

## 2023-04-14 ENCOUNTER — ALLIED HEALTH/NURSE VISIT (OUTPATIENT)
Dept: TRANSPLANT | Facility: CLINIC | Age: 63
End: 2023-04-14
Attending: NURSE PRACTITIONER
Payer: COMMERCIAL

## 2023-04-14 VITALS
OXYGEN SATURATION: 100 % | TEMPERATURE: 98 F | SYSTOLIC BLOOD PRESSURE: 124 MMHG | DIASTOLIC BLOOD PRESSURE: 84 MMHG | RESPIRATION RATE: 16 BRPM | HEART RATE: 86 BPM

## 2023-04-14 DIAGNOSIS — N18.6 ESRD (END STAGE RENAL DISEASE) (H): Primary | ICD-10-CM

## 2023-04-14 DIAGNOSIS — Z94.4 S/P LIVER TRANSPLANT (H): ICD-10-CM

## 2023-04-14 PROCEDURE — G0463 HOSPITAL OUTPT CLINIC VISIT: HCPCS

## 2023-04-14 PROCEDURE — 99207 PR PB CHARGE NOT FOUND: CPT | Performed by: TRANSPLANT SURGERY

## 2023-04-14 RX ORDER — TACROLIMUS 1 MG/1
CAPSULE ORAL
Qty: 810 CAPSULE | Refills: 3 | Status: SHIPPED | OUTPATIENT
Start: 2023-04-14 | End: 2023-04-25

## 2023-04-14 ASSESSMENT — PAIN SCALES - GENERAL: PAINLEVEL: NO PAIN (0)

## 2023-04-14 NOTE — NURSING NOTE
Staples Removal    There were no vitals taken for this visit.    Removed patients staples. Surgical incision site was Clean, dry, and intact.    Educated patient on continued care around incision site.     Fernando Sidhu RN on 4/14/2023 at 10:31 AM

## 2023-04-14 NOTE — TELEPHONE ENCOUNTER
TRIAGE CALL    Patient paged concerned that his tacrolimus level continues to be below his goal of 8-12.  Tacrolimus: 6.4 today 4/13/2023.  Patient states it was an accurate 12 hour trough level.  Is concerned that he has has two levels below goal.    Patient currently taking tacrolimus 4 mg bid.  RNCC instructed patient to increase tonight's evening dose of tacrolimus to 5 mg.    Let patient know that primary RNCC will reach out tomorrow morning to discuss dose change.  Patient v/u    Maki Rios RN   Transplant Coordinator  [phone]

## 2023-04-17 ENCOUNTER — LAB (OUTPATIENT)
Dept: LAB | Facility: CLINIC | Age: 63
End: 2023-04-17
Payer: COMMERCIAL

## 2023-04-17 ENCOUNTER — VIRTUAL VISIT (OUTPATIENT)
Dept: TRANSPLANT | Facility: CLINIC | Age: 63
End: 2023-04-17
Attending: TRANSPLANT SURGERY
Payer: COMMERCIAL

## 2023-04-17 DIAGNOSIS — Z94.4 LIVER REPLACED BY TRANSPLANT (H): ICD-10-CM

## 2023-04-17 DIAGNOSIS — Z94.4 LIVER REPLACED BY TRANSPLANT (H): Primary | ICD-10-CM

## 2023-04-17 LAB
ALBUMIN SERPL-MCNC: 2.9 G/DL (ref 3.5–5)
ALP SERPL-CCNC: 87 U/L (ref 45–120)
ALT SERPL W P-5'-P-CCNC: 17 U/L (ref 0–45)
ANION GAP SERPL CALCULATED.3IONS-SCNC: 10 MMOL/L (ref 5–18)
AST SERPL W P-5'-P-CCNC: 11 U/L (ref 0–40)
BASOPHILS # BLD AUTO: 0.1 10E3/UL (ref 0–0.2)
BASOPHILS NFR BLD AUTO: 3 %
BILIRUB DIRECT SERPL-MCNC: 0.4 MG/DL
BILIRUB SERPL-MCNC: 0.9 MG/DL (ref 0–1)
BUN SERPL-MCNC: 34 MG/DL (ref 8–22)
CALCIUM SERPL-MCNC: 8.8 MG/DL (ref 8.5–10.5)
CHLORIDE BLD-SCNC: 108 MMOL/L (ref 98–107)
CO2 SERPL-SCNC: 22 MMOL/L (ref 22–31)
CREAT SERPL-MCNC: 1.47 MG/DL (ref 0.7–1.3)
EOSINOPHIL # BLD AUTO: 0.1 10E3/UL (ref 0–0.7)
EOSINOPHIL NFR BLD AUTO: 3 %
ERYTHROCYTE [DISTWIDTH] IN BLOOD BY AUTOMATED COUNT: 15 % (ref 10–15)
GFR SERPL CREATININE-BSD FRML MDRD: 54 ML/MIN/1.73M2
GLUCOSE BLD-MCNC: 114 MG/DL (ref 70–125)
HCT VFR BLD AUTO: 30.4 % (ref 40–53)
HGB BLD-MCNC: 10 G/DL (ref 13.3–17.7)
IMM GRANULOCYTES # BLD: 0 10E3/UL
IMM GRANULOCYTES NFR BLD: 1 %
LYMPHOCYTES # BLD AUTO: 1.2 10E3/UL (ref 0.8–5.3)
LYMPHOCYTES NFR BLD AUTO: 25 %
MAGNESIUM SERPL-MCNC: 1.4 MG/DL (ref 1.8–2.6)
MCH RBC QN AUTO: 30.5 PG (ref 26.5–33)
MCHC RBC AUTO-ENTMCNC: 32.9 G/DL (ref 31.5–36.5)
MCV RBC AUTO: 93 FL (ref 78–100)
MONOCYTES # BLD AUTO: 0.6 10E3/UL (ref 0–1.3)
MONOCYTES NFR BLD AUTO: 11 %
NEUTROPHILS # BLD AUTO: 2.9 10E3/UL (ref 1.6–8.3)
NEUTROPHILS NFR BLD AUTO: 57 %
NRBC # BLD AUTO: 0 10E3/UL
NRBC BLD AUTO-RTO: 0 /100
PHOSPHATE SERPL-MCNC: 3.3 MG/DL (ref 2.5–4.5)
PLATELET # BLD AUTO: 206 10E3/UL (ref 150–450)
POTASSIUM BLD-SCNC: 4.9 MMOL/L (ref 3.5–5)
PROT SERPL-MCNC: 6.9 G/DL (ref 6–8)
RBC # BLD AUTO: 3.28 10E6/UL (ref 4.4–5.9)
SODIUM SERPL-SCNC: 140 MMOL/L (ref 136–145)
TACROLIMUS BLD-MCNC: 11.2 UG/L (ref 5–15)
TME LAST DOSE: NORMAL H
TME LAST DOSE: NORMAL H
WBC # BLD AUTO: 5 10E3/UL (ref 4–11)

## 2023-04-17 PROCEDURE — 83735 ASSAY OF MAGNESIUM: CPT

## 2023-04-17 PROCEDURE — 84100 ASSAY OF PHOSPHORUS: CPT

## 2023-04-17 PROCEDURE — 80053 COMPREHEN METABOLIC PANEL: CPT

## 2023-04-17 PROCEDURE — 36415 COLL VENOUS BLD VENIPUNCTURE: CPT

## 2023-04-17 PROCEDURE — 80197 ASSAY OF TACROLIMUS: CPT

## 2023-04-17 PROCEDURE — 82248 BILIRUBIN DIRECT: CPT

## 2023-04-17 PROCEDURE — 85025 COMPLETE CBC W/AUTO DIFF WBC: CPT

## 2023-04-17 NOTE — LETTER
4/17/2023         RE: Cricket Mane  [address] [place]  [facility] [address]        Dear Colleague,    Thank you for referring your patient, Cricket Mane, to the [facility]. Please see a copy of my visit note below.    Pt evaluated via billable telephone visit d/t COVID-19 restrictions. Time spent: 15 min  [place] [place]     [facility]  Outpatient MNT: Post Liver Transplant    Time Spent: 15 minutes  Visit Type: follow up (saw pt for pre txp eval 12/2022)  Referring Physician: Huey   Pt accompanied by: self     Date of txp: 3/23/23    Nutrition Assessment/Diet Recall  Pt reports he is eating very well   Eating snacks throughout the day + meals  D- shrimp/crab seafood gumbo w/ rice & green veggies   L- sandwich, soup, ribs, pasta w/ sausage/meatballs   Snacks- yogurt, granola bar/Bryce bar, fruit   Desiree- water, flavored water, Gatorade   Most days- 30 g protein Ensure     First few weeks after getting home- weight was lower than normal 145 lbs, over past week has now increased to 152 lbs   Doesn't feel r/t fluid but isn't 100% sure    Labs  4/17 K 4.9 Phos wnl lately   eGFR 54  Low magnesium 1.4- has list of foods to eat more of     Nutrition Diagnosis  Predicted suboptimal protein intake related to increased protein needs s/p transplant.    Nutrition Intervention  1. Discussed importance of consuming adequate calories and protein for 2 months post-txp to help heal and reduce muscle/fat wasting. Discussed sources of protein and ways to ensure he is increasing his protein intake.    2. Discussed potential for wt gain post-txp and after 2 months of eating higher calorie/higher protein foods, to return to baseline eating habits and monitor for any weight gains.     3. Reviewed importance of food safety and increased risk for food-borne illness post-txp. Also discussed potential need to monitor K+, CHO, and Na+ intakes d/t medication side  effects.     4. Avoid the following post txp d/t risk for rejection, unknown effects on the organs, and/or potential interactions with immunosuppressants:  - Herbal, Chinese, holistic, chiropractic, natural, alternative medicines and supplements  - Detoxes and cleanses  - Weight loss pills  - Protein powders or other products with extracts or herbs (ie green tea extract)      Patient Understanding: Pt verbalized understanding of education provided.  Expected Engagement: Good  Follow-Up Plans: PRN     Nutrition Goals  Continue high protein diet x 2 months post txp    Ynes Higgins, RD, LD, CCTD

## 2023-04-17 NOTE — PROGRESS NOTES
Pt evaluated via billable telephone visit d/t COVID-19 restrictions. Time spent: 15 min  Provider location: onsite ([facility] Transplant  Outpatient MNT: Post Liver Transplant    Time Spent: 15 minutes  Visit Type: follow up (saw pt for pre txp eval 12/2022)  Referring Physician: Huey   Pt accompanied by: self     Date of txp: 3/23/23    Nutrition Assessment/Diet Recall  Pt reports he is eating very well   Eating snacks throughout the day + meals  D- shrimp/crab seafood gumbo w/ rice & green veggies   L- sandwich, soup, ribs, pasta w/ sausage/meatballs   Snacks- yogurt, granola bar/Bryce bar, fruit   Desiree- water, flavored water, Gatorade   Most days- 30 g protein Ensure     First few weeks after getting home- weight was lower than normal 145 lbs, over past week has now increased to 152 lbs   Doesn't feel r/t fluid but isn't 100% sure    Labs  4/17 K 4.9 Phos wnl lately   eGFR 54  Low magnesium 1.4- has list of foods to eat more of     Nutrition Diagnosis  Predicted suboptimal protein intake related to increased protein needs s/p transplant.    Nutrition Intervention  1. Discussed importance of consuming adequate calories and protein for 2 months post-txp to help heal and reduce muscle/fat wasting. Discussed sources of protein and ways to ensure he is increasing his protein intake.    2. Discussed potential for wt gain post-txp and after 2 months of eating higher calorie/higher protein foods, to return to baseline eating habits and monitor for any weight gains.     3. Reviewed importance of food safety and increased risk for food-borne illness post-txp. Also discussed potential need to monitor K+, CHO, and Na+ intakes d/t medication side effects.     4. Avoid the following post txp d/t risk for rejection, unknown effects on the organs, and/or potential interactions with immunosuppressants:  - Herbal, Chinese, holistic, chiropractic, natural, alternative medicines and supplements  - Detoxes  and cleanses  - Weight loss pills  - Protein powders or other products with extracts or herbs (ie green tea extract)      Patient Understanding: Pt verbalized understanding of education provided.  Expected Engagement: Good  Follow-Up Plans: PRN     Nutrition Goals  Continue high protein diet x 2 months post txp    Ynes Higgins, RD, LD, CCTD

## 2023-04-18 ENCOUNTER — TELEPHONE (OUTPATIENT)
Dept: TRANSPLANT | Facility: CLINIC | Age: 63
End: 2023-04-18
Payer: COMMERCIAL

## 2023-04-18 DIAGNOSIS — Z94.4 LIVER REPLACED BY TRANSPLANT (H): Primary | ICD-10-CM

## 2023-04-20 ENCOUNTER — LAB (OUTPATIENT)
Dept: LAB | Facility: CLINIC | Age: 63
End: 2023-04-20
Payer: COMMERCIAL

## 2023-04-20 ENCOUNTER — OFFICE VISIT (OUTPATIENT)
Dept: TRANSPLANT | Facility: CLINIC | Age: 63
End: 2023-04-20
Attending: TRANSPLANT SURGERY
Payer: COMMERCIAL

## 2023-04-20 VITALS
BODY MASS INDEX: 23.67 KG/M2 | DIASTOLIC BLOOD PRESSURE: 76 MMHG | WEIGHT: 153.5 LBS | SYSTOLIC BLOOD PRESSURE: 128 MMHG | OXYGEN SATURATION: 99 % | HEART RATE: 90 BPM

## 2023-04-20 DIAGNOSIS — Z94.4 LIVER REPLACED BY TRANSPLANT (H): ICD-10-CM

## 2023-04-20 LAB
ALBUMIN SERPL BCG-MCNC: 3.6 G/DL (ref 3.5–5.2)
ALP SERPL-CCNC: 88 U/L (ref 40–129)
ALT SERPL W P-5'-P-CCNC: 12 U/L (ref 10–50)
ANION GAP SERPL CALCULATED.3IONS-SCNC: 8 MMOL/L (ref 7–15)
AST SERPL W P-5'-P-CCNC: 13 U/L (ref 10–50)
BILIRUB DIRECT SERPL-MCNC: 0.32 MG/DL (ref 0–0.3)
BILIRUB SERPL-MCNC: 0.6 MG/DL
BUN SERPL-MCNC: 29.8 MG/DL (ref 8–23)
CALCIUM SERPL-MCNC: 9.2 MG/DL (ref 8.8–10.2)
CHLORIDE SERPL-SCNC: 106 MMOL/L (ref 98–107)
CREAT SERPL-MCNC: 1.56 MG/DL (ref 0.67–1.17)
DEPRECATED HCO3 PLAS-SCNC: 25 MMOL/L (ref 22–29)
ERYTHROCYTE [DISTWIDTH] IN BLOOD BY AUTOMATED COUNT: 14.9 % (ref 10–15)
GFR SERPL CREATININE-BSD FRML MDRD: 51 ML/MIN/1.73M2
GLUCOSE SERPL-MCNC: 125 MG/DL (ref 70–99)
HCT VFR BLD AUTO: 31 % (ref 40–53)
HGB BLD-MCNC: 9.9 G/DL (ref 13.3–17.7)
MAGNESIUM SERPL-MCNC: 1.8 MG/DL (ref 1.7–2.3)
MCH RBC QN AUTO: 29.9 PG (ref 26.5–33)
MCHC RBC AUTO-ENTMCNC: 31.9 G/DL (ref 31.5–36.5)
MCV RBC AUTO: 94 FL (ref 78–100)
PHOSPHATE SERPL-MCNC: 4 MG/DL (ref 2.5–4.5)
PLATELET # BLD AUTO: 151 10E3/UL (ref 150–450)
POTASSIUM SERPL-SCNC: 4.7 MMOL/L (ref 3.4–5.3)
PROT SERPL-MCNC: 6.9 G/DL (ref 6.4–8.3)
RBC # BLD AUTO: 3.31 10E6/UL (ref 4.4–5.9)
SODIUM SERPL-SCNC: 139 MMOL/L (ref 136–145)
TACROLIMUS BLD-MCNC: 10.4 UG/L (ref 5–15)
TME LAST DOSE: NORMAL H
TME LAST DOSE: NORMAL H
WBC # BLD AUTO: 3.7 10E3/UL (ref 4–11)

## 2023-04-20 PROCEDURE — 80197 ASSAY OF TACROLIMUS: CPT | Performed by: TRANSPLANT SURGERY

## 2023-04-20 PROCEDURE — 80053 COMPREHEN METABOLIC PANEL: CPT | Performed by: PATHOLOGY

## 2023-04-20 PROCEDURE — 82248 BILIRUBIN DIRECT: CPT | Performed by: PATHOLOGY

## 2023-04-20 PROCEDURE — G0463 HOSPITAL OUTPT CLINIC VISIT: HCPCS | Performed by: TRANSPLANT SURGERY

## 2023-04-20 PROCEDURE — 84100 ASSAY OF PHOSPHORUS: CPT | Performed by: PATHOLOGY

## 2023-04-20 PROCEDURE — 99213 OFFICE O/P EST LOW 20 MIN: CPT | Performed by: TRANSPLANT SURGERY

## 2023-04-20 PROCEDURE — 85027 COMPLETE CBC AUTOMATED: CPT | Performed by: PATHOLOGY

## 2023-04-20 PROCEDURE — 83735 ASSAY OF MAGNESIUM: CPT | Performed by: PATHOLOGY

## 2023-04-20 PROCEDURE — 36415 COLL VENOUS BLD VENIPUNCTURE: CPT | Performed by: PATHOLOGY

## 2023-04-20 NOTE — LETTER
4/20/2023         RE: Cricket Mane  [address] [place] [facility] [address]        Dear Colleague,    Thank you for referring your patient, Cricket Mane, to the [facility]. Please see a copy of my visit note below.    Transplant Surgery Progress Note    Transplants:  3/23/2023 (Liver); Postoperative day:  28  S: overall doing well, appetite and activity improving, states he is gaining some weight back    Transplant History:    Transplant Type:  Liver Tx  Donor Type:    Transplant Date:  3/23/2023 (Liver)   Biliary Stent:  No    Acute Rejection Hx:  No    Present Maintenance Immunosuppression:  Tacrolimus and Mycophenolic acid    CMV IgG Ab Discordance:  No  EBV IgG Ab Discordance:  No    Transplant Coordinator: Polly Mancini     Transplant Office Phone Number: [phone]     Immunosuppressant Medications       Immunosuppressive Agents Disp Start End     mycophenolate (GENERIC EQUIVALENT) 250 MG capsule    540 capsule 4/3/2023     Sig - Route: Take 3 capsules (750 mg) by mouth 2 times daily - Oral    Class: E-Prescribe    Notes to Pharmacy: TXP DT 3/23/2023 (Liver) TXP Dischg DT 3/29/2023 DX Liver replaced by transplant Z94.4 TX [facility] ([place], [place])     tacrolimus (GENERIC EQUIVALENT) 1 MG capsule    810 capsule 4/14/2023     Sig - Route: Take 4 capsules (4 mg) by mouth every morning AND 5 capsules (5 mg) every evening. - Oral    Class: E-Prescribe    Notes to Pharmacy: Dose adjustment            Possible Immunosuppression-related side effects:   []             headache  []             vivid dreams  []             irritability  []             cognitive difficuties  []             fine tremor  []             nausea  []             diarrhea  []             neuropathy      []             edema  []             renal calcineurin toxicity  []             hyperkalemia  []             post-transplant diabetes  []              decreased appetite  []             increased appetite  []             other:  []             none    Prescription Medications as of 4/21/2023         Rx Number Disp Refills Start End Last Dispensed Date Next Fill Date Owning Pharmacy    aspirin (ASA) 325 MG tablet  90 tablet 3 4/3/2023    [facility]/[address]    Sig: Take 1 tablet (325 mg) by mouth daily    Class: E-Prescribe    Route: Oral    calcium carbonate-vitamin D (CALTRATE) 600-10 MG-MCG per tablet  180 tablet 3 4/3/2023    [facility]/[address]    Sig: Take 1 tablet by mouth 2 times daily (with meals)    Class: E-Prescribe    Route: Oral    folic acid (FOLVITE) 1 MG tablet  90 tablet 1 4/3/2023    [facility]/[address]    Sig: Take 1 tablet (1,000 mcg) by mouth daily    Class: E-Prescribe    Route: Oral    hydrOXYzine (ATARAX) 25 MG tablet  30 tablet 0 3/29/2023    [address]    Sig: Take 1-2 tablets (25-50 mg) by mouth every 6 hours as needed for other or anxiety (adjuvant pain)    Class: E-Prescribe    Route: Oral    magnesium oxide (MAG-OX) 400 MG tablet  360 tablet 3 4/11/2023    [facility]/[place] [address]    Sig: Take 2 tablets (800 mg) by mouth 2 times daily    Class: E-Prescribe    Route: Oral    melatonin 3 MG tablet            Sig: Take 3 mg by mouth nightly as needed for sleep    Class: Historical    Route: Oral    methocarbamol (ROBAXIN) 500 MG tablet  90 tablet 11 3/29/2023    [address]    Sig: Take 1 tablet (500 mg) by mouth 3 times daily    Class: E-Prescribe    Route: Oral    metoprolol tartrate (LOPRESSOR) 25 MG tablet  120 tablet 1 4/3/2023    [facility]/[place] [address]    Sig: Take 2  tablets (50 mg) by mouth 2 times daily    Class: E-Prescribe    Route: Oral    Multiple Vitamins-Minerals (SPECTRAVITE) TABS    4/13/2023        Sig: Take 1 tablet by mouth daily    Class: Historical    Route: Oral    mycophenolate (GENERIC EQUIVALENT) 250 MG capsule  540 capsule 3 4/3/2023    [facility] [place] [address] [address]    Sig: Take 3 capsules (750 mg) by mouth 2 times daily    Class: E-Prescribe    Notes to Pharmacy: TXP DT 3/23/2023 (Liver) TXP Dischg DT 3/29/2023 DX Liver replaced by transplant Z94.4 TX Center [facility] ([place], [place])    Route: Oral    omeprazole 20 MG tablet  180 tablet 3 4/3/2023    [facility] [place] [address] [address]    Sig: Take 1 tablet (20 mg) by mouth 2 times daily    Class: E-Prescribe    Route: Oral    sulfamethoxazole-trimethoprim (BACTRIM) 400-80 MG tablet  90 tablet 3 4/3/2023    [facility] [place] [address] [address]    Sig: Take 1 tablet by mouth daily    Class: E-Prescribe    Route: Oral    tacrolimus (GENERIC EQUIVALENT) 1 MG capsule  810 capsule 3 4/14/2023    [facility] [place] [address]    Sig: Take 4 capsules (4 mg) by mouth every morning AND 5 capsules (5 mg) every evening.    Class: E-Prescribe    Notes to Pharmacy: Dose adjustment    Route: Oral    traMADol (ULTRAM) 50 MG tablet            Sig: Take 50 mg by mouth every 6 hours as needed for severe pain or breakthrough pain    Class: Historical    Route: Oral    valGANciclovir (VALCYTE) 450 MG tablet  90 tablet 3 4/3/2023    [facility] [place] [address] [address]    Sig: Take 1 tablet (450 mg) by mouth daily    Class: E-Prescribe    Route: Oral            O:   [unfilled]        Latest Ref Rng & Units 4/20/2023     8:32 AM 4/17/2023     9:00 AM 4/13/2023     9:00 AM 4/10/2023    10:00 AM 4/6/2023      9:33 AM   Transplant Immunosuppression Labs   Creat 0.67 - 1.17 mg/dL 1.56   1.47   1.13   1.21   1.34     Urea Nitrogen 8 - 22 mg/dL  34   33   36      Urea Nitrogen 8.0 - 23.0 mg/dL 29.8      32.5     WBC 4.0 - 11.0 10e3/uL 3.7   5.0   6.3   6.9   8.7     Neutrophil %  57   69   66          Chemistries:   Recent Labs   Lab Test 04/20/23  0832   BUN 29.8*   CR 1.56*   GFRESTIMATED 51*   *     Lab Results   Component Value Date    A1C 4.7 03/23/2023     Recent Labs   Lab Test 04/20/23  0832   ALBUMIN 3.6   BILITOTAL 0.6   ALKPHOS 88   AST 13   ALT 12     Urine Studies:  Recent Labs   Lab Test 12/20/22  0923   COLOR Yellow   APPEARANCE Clear   URINEGLC Negative   URINEBILI Negative   URINEKETONE Negative   SG 1.018   UBLD Negative   URINEPH 6.0   PROTEIN Negative   NITRITE Negative   LEUKEST Negative     No lab results found.  Hematology:   Recent Labs   Lab Test 04/20/23  0832 04/17/23  0900 04/13/23  0900   HGB 9.9* 10.0* 8.9*    206 222   WBC 3.7* 5.0 6.3     Coags:   Recent Labs   Lab Test 03/26/23  0338 03/25/23  1117   INR 1.12 1.27*     HLA antibodies:   No results found for: LJ2JOIIBV, [id number], [id number], [id number]    Assessment: Cricket Mane is doing well s/p Liver Tx:  Issues we addressed during his visit include:    Plan:    1. Graft function: LFTs normalizing  2. Immunosuppression Management: No change tac 8-10, given renal function.  Complexity of management:Low.  Contributing factors: elevated Cr  3. Cr mildly elevated at 1.5- encouraged hydration, would aim for tac 8-10 given normal LFTs  Followup: 2 weeks            Aniket Arzate MD/PhD

## 2023-04-21 ENCOUNTER — TELEPHONE (OUTPATIENT)
Dept: GASTROENTEROLOGY | Facility: CLINIC | Age: 63
End: 2023-04-21
Payer: COMMERCIAL

## 2023-04-21 NOTE — TELEPHONE ENCOUNTER
Returned Torin's call. No answer, left a detailed message that the scheduling line was trying to reach him to provide details about his rescheduled hepatology visit. Left visit details on phone.

## 2023-04-21 NOTE — PROGRESS NOTES
Transplant Surgery Progress Note    Transplants:  3/23/2023 (Liver); Postoperative day:  28  S: overall doing well, appetite and activity improving, states he is gaining some weight back    Transplant History:    Transplant Type:  Liver Tx  Donor Type:    Transplant Date:  3/23/2023 (Liver)   Biliary Stent:  No    Acute Rejection Hx:  No    Present Maintenance Immunosuppression:  Tacrolimus and Mycophenolic acid    CMV IgG Ab Discordance:  No  EBV IgG Ab Discordance:  No    Transplant Coordinator: Polly Mancini     Transplant Office Phone Number: [phone]     Immunosuppressant Medications     Immunosuppressive Agents Disp Start End     mycophenolate (GENERIC EQUIVALENT) 250 MG capsule    540 capsule 4/3/2023     Sig - Route: Take 3 capsules (750 mg) by mouth 2 times daily - Oral    Class: E-Prescribe    Notes to Pharmacy: TXP DT 3/23/2023 (Liver) TXP Dischg DT 3/29/2023 DX Liver replaced by transplant Z94.4 TX Center [facility] ([place], [place])     tacrolimus (GENERIC EQUIVALENT) 1 MG capsule    810 capsule 4/14/2023     Sig - Route: Take 4 capsules (4 mg) by mouth every morning AND 5 capsules (5 mg) every evening. - Oral    Class: E-Prescribe    Notes to Pharmacy: Dose adjustment          Possible Immunosuppression-related side effects:   []             headache  []             vivid dreams  []             irritability  []             cognitive difficuties  []             fine tremor  []             nausea  []             diarrhea  []             neuropathy      []             edema  []             renal calcineurin toxicity  []             hyperkalemia  []             post-transplant diabetes  []             decreased appetite  []             increased appetite  []             other:  []             none    Prescription Medications as of 4/21/2023       Rx Number Disp Refills Start End Last Dispensed Date Next Fill Date Owning Pharmacy    aspirin (ASA) 325 MG tablet  90  tablet 3 4/3/2023    [facility]/[place] [address]    Sig: Take 1 tablet (325 mg) by mouth daily    Class: E-Prescribe    Route: Oral    calcium carbonate-vitamin D (CALTRATE) 600-10 MG-MCG per tablet  180 tablet 3 4/3/2023    [facility]/[place] [address]    Sig: Take 1 tablet by mouth 2 times daily (with meals)    Class: E-Prescribe    Route: Oral    folic acid (FOLVITE) 1 MG tablet  90 tablet 1 4/3/2023    [facility] [address]    Sig: Take 1 tablet (1,000 mcg) by mouth daily    Class: E-Prescribe    Route: Oral    hydrOXYzine (ATARAX) 25 MG tablet  30 tablet 0 3/29/2023    [place], [place] - [address] [place] [address] [address]    Sig: Take 1-2 tablets (25-50 mg) by mouth every 6 hours as needed for other or anxiety (adjuvant pain)    Class: E-Prescribe    Route: Oral    magnesium oxide (MAG-OX) 400 MG tablet  360 tablet 3 4/11/2023    [facility] [address]    Sig: Take 2 tablets (800 mg) by mouth 2 times daily    Class: E-Prescribe    Route: Oral    melatonin 3 MG tablet            Sig: Take 3 mg by mouth nightly as needed for sleep    Class: Historical    Route: Oral    methocarbamol (ROBAXIN) 500 MG tablet  90 tablet 11 3/29/2023    [place], [place] - [address] [place] [address] [address]    Sig: Take 1 tablet (500 mg) by mouth 3 times daily    Class: E-Prescribe    Route: Oral    metoprolol tartrate (LOPRESSOR) 25 MG tablet  120 tablet 1 4/3/2023    [facility] [address]    Sig: Take 2 tablets (50 mg) by mouth 2 times daily    Class: E-Prescribe    Route: Oral    Multiple Vitamins-Minerals (SPECTRAVITE) TABS    4/13/2023        Sig: Take 1 tablet by mouth daily    Class: Historical    Route: Oral    mycophenolate (GENERIC EQUIVALENT) 250 MG capsule  540  capsule 3 4/3/2023    [facility] [place] [address] [address]    Sig: Take 3 capsules (750 mg) by mouth 2 times daily    Class: E-Prescribe    Notes to Pharmacy: TXP DT 3/23/2023 (Liver) TXP Dischg DT 3/29/2023 DX Liver replaced by transplant Z94.4 TX Center [facility] ([place], [place])    Route: Oral    omeprazole 20 MG tablet  180 tablet 3 4/3/2023    [facility] [place] [address] [address]    Sig: Take 1 tablet (20 mg) by mouth 2 times daily    Class: E-Prescribe    Route: Oral    sulfamethoxazole-trimethoprim (BACTRIM) 400-80 MG tablet  90 tablet 3 4/3/2023    [facility] - [place] [address] [address]    Sig: Take 1 tablet by mouth daily    Class: E-Prescribe    Route: Oral    tacrolimus (GENERIC EQUIVALENT) 1 MG capsule  810 capsule 3 4/14/2023    [facility] [place] [address]    Sig: Take 4 capsules (4 mg) by mouth every morning AND 5 capsules (5 mg) every evening.    Class: E-Prescribe    Notes to Pharmacy: Dose adjustment    Route: Oral    traMADol (ULTRAM) 50 MG tablet            Sig: Take 50 mg by mouth every 6 hours as needed for severe pain or breakthrough pain    Class: Historical    Route: Oral    valGANciclovir (VALCYTE) 450 MG tablet  90 tablet 3 4/3/2023    [facility] [place] [address] [address]    Sig: Take 1 tablet (450 mg) by mouth daily    Class: E-Prescribe    Route: Oral          O:   [unfilled]        Latest Ref Rng & Units 4/20/2023     8:32 AM 4/17/2023     9:00 AM 4/13/2023     9:00 AM 4/10/2023    10:00 AM 4/6/2023     9:33 AM   Transplant Immunosuppression Labs   Creat 0.67 - 1.17 mg/dL 1.56   1.47   1.13   1.21   1.34     Urea Nitrogen 8 - 22 mg/dL  34   33   36      Urea Nitrogen 8.0 - 23.0 mg/dL 29.8      32.5     WBC 4.0 - 11.0 10e3/uL 3.7   5.0   6.3   6.9   8.7     Neutrophil %   57   69   66          Chemistries:   Recent Labs   Lab Test 04/20/23  0832   BUN 29.8*   CR 1.56*   GFRESTIMATED 51*   *     Lab Results   Component Value Date    A1C 4.7 03/23/2023     Recent Labs   Lab Test 04/20/23  0832   ALBUMIN 3.6   BILITOTAL 0.6   ALKPHOS 88   AST 13   ALT 12     Urine Studies:  Recent Labs   Lab Test 12/20/22  0923   COLOR Yellow   APPEARANCE Clear   URINEGLC Negative   URINEBILI Negative   URINEKETONE Negative   SG 1.018   UBLD Negative   URINEPH 6.0   PROTEIN Negative   NITRITE Negative   LEUKEST Negative     No lab results found.  Hematology:   Recent Labs   Lab Test 04/20/23  0832 04/17/23  0900 04/13/23  0900   HGB 9.9* 10.0* 8.9*    206 222   WBC 3.7* 5.0 6.3     Coags:   Recent Labs   Lab Test 03/26/23  0338 03/25/23  1117   INR 1.12 1.27*     HLA antibodies:   No results found for: [id number], [id number], [id number], KZ6RXHRNAB    Assessment: Cricket Mane is doing well s/p Liver Tx:  Issues we addressed during his visit include:    Plan:    1. Graft function: LFTs normalizing  2. Immunosuppression Management: No change tac 8-10, given renal function.  Complexity of management:Low.  Contributing factors: elevated Cr  3. Cr mildly elevated at 1.5- encouraged hydration, would aim for tac 8-10 given normal LFTs  Followup: 2 weeks            Aniket Arzate MD/PhD

## 2023-04-24 ENCOUNTER — LAB REQUISITION (OUTPATIENT)
Dept: LAB | Facility: CLINIC | Age: 63
End: 2023-04-24
Payer: COMMERCIAL

## 2023-04-24 DIAGNOSIS — Z94.4 LIVER TRANSPLANT STATUS (H): ICD-10-CM

## 2023-04-24 LAB
ALBUMIN SERPL-MCNC: 3.4 G/DL (ref 3.5–5)
ALP SERPL-CCNC: 87 U/L (ref 45–120)
ALT SERPL W P-5'-P-CCNC: 16 U/L (ref 0–45)
ANION GAP SERPL CALCULATED.3IONS-SCNC: 10 MMOL/L (ref 5–18)
AST SERPL W P-5'-P-CCNC: 14 U/L (ref 0–40)
BASOPHILS # BLD AUTO: 0.1 10E3/UL (ref 0–0.2)
BASOPHILS NFR BLD AUTO: 2 %
BILIRUB DIRECT SERPL-MCNC: 0.4 MG/DL
BILIRUB SERPL-MCNC: 1 MG/DL (ref 0–1)
BUN SERPL-MCNC: 24 MG/DL (ref 8–22)
CALCIUM SERPL-MCNC: 9.3 MG/DL (ref 8.5–10.5)
CHLORIDE BLD-SCNC: 106 MMOL/L (ref 98–107)
CO2 SERPL-SCNC: 23 MMOL/L (ref 22–31)
CREAT SERPL-MCNC: 1.72 MG/DL (ref 0.7–1.3)
EOSINOPHIL # BLD AUTO: 0.2 10E3/UL (ref 0–0.7)
EOSINOPHIL NFR BLD AUTO: 6 %
ERYTHROCYTE [DISTWIDTH] IN BLOOD BY AUTOMATED COUNT: 14.7 % (ref 10–15)
GFR SERPL CREATININE-BSD FRML MDRD: 45 ML/MIN/1.73M2
GLUCOSE BLD-MCNC: 112 MG/DL (ref 70–125)
HCT VFR BLD AUTO: 32.5 % (ref 40–53)
HGB BLD-MCNC: 10.6 G/DL (ref 13.3–17.7)
IMM GRANULOCYTES # BLD: 0.1 10E3/UL
IMM GRANULOCYTES NFR BLD: 2 %
LYMPHOCYTES # BLD AUTO: 1.2 10E3/UL (ref 0.8–5.3)
LYMPHOCYTES NFR BLD AUTO: 29 %
MAGNESIUM SERPL-MCNC: 1.5 MG/DL (ref 1.8–2.6)
MCH RBC QN AUTO: 29.8 PG (ref 26.5–33)
MCHC RBC AUTO-ENTMCNC: 32.6 G/DL (ref 31.5–36.5)
MCV RBC AUTO: 91 FL (ref 78–100)
MONOCYTES # BLD AUTO: 0.3 10E3/UL (ref 0–1.3)
MONOCYTES NFR BLD AUTO: 9 %
NEUTROPHILS # BLD AUTO: 2.1 10E3/UL (ref 1.6–8.3)
NEUTROPHILS NFR BLD AUTO: 52 %
NRBC # BLD AUTO: 0 10E3/UL
NRBC BLD AUTO-RTO: 0 /100
PHOSPHATE SERPL-MCNC: 3.8 MG/DL (ref 2.5–4.5)
PLATELET # BLD AUTO: 174 10E3/UL (ref 150–450)
POTASSIUM BLD-SCNC: 5 MMOL/L (ref 3.5–5)
PROT SERPL-MCNC: 7.6 G/DL (ref 6–8)
RBC # BLD AUTO: 3.56 10E6/UL (ref 4.4–5.9)
SODIUM SERPL-SCNC: 139 MMOL/L (ref 136–145)
TACROLIMUS BLD-MCNC: 11.3 UG/L (ref 5–15)
TME LAST DOSE: NORMAL H
TME LAST DOSE: NORMAL H
WBC # BLD AUTO: 4 10E3/UL (ref 4–11)

## 2023-04-24 PROCEDURE — 83735 ASSAY OF MAGNESIUM: CPT | Mod: ORL | Performed by: TRANSPLANT SURGERY

## 2023-04-24 PROCEDURE — 84100 ASSAY OF PHOSPHORUS: CPT | Mod: ORL | Performed by: TRANSPLANT SURGERY

## 2023-04-24 PROCEDURE — 85025 COMPLETE CBC W/AUTO DIFF WBC: CPT | Mod: ORL | Performed by: TRANSPLANT SURGERY

## 2023-04-24 PROCEDURE — 82248 BILIRUBIN DIRECT: CPT | Mod: ORL | Performed by: TRANSPLANT SURGERY

## 2023-04-24 PROCEDURE — 80197 ASSAY OF TACROLIMUS: CPT | Mod: ORL | Performed by: TRANSPLANT SURGERY

## 2023-04-25 ENCOUNTER — TELEPHONE (OUTPATIENT)
Dept: TRANSPLANT | Facility: CLINIC | Age: 63
End: 2023-04-25
Payer: COMMERCIAL

## 2023-04-25 DIAGNOSIS — Z94.4 S/P LIVER TRANSPLANT (H): ICD-10-CM

## 2023-04-25 RX ORDER — TACROLIMUS 1 MG/1
4 CAPSULE ORAL EVERY 12 HOURS
Qty: 720 CAPSULE | Refills: 3 | Status: SHIPPED | OUTPATIENT
Start: 2023-04-25 | End: 2023-05-12

## 2023-04-25 NOTE — TELEPHONE ENCOUNTER
ISSUE:   Tacrolimus IR level 11.3 on 4/24/2023, goal 8-10, dose 4 mg every AM and 5 mg every PM.     PLAN:   Please call patient and confirm this was an accurate 12-hour trough. Verify Tacrolimus IR dose 4 mg every AM and 5 mg every PM. Confirm no new medications or illness. Confirm no missed doses. If accurate trough and accurate dose, decrease Tacrolimus IR dose to 4 mg BID and repeat labs twice weekly as currently scheduled.     Creatinine is also increased. Please recommend increased fluid intake. Creatinine can increase due to hydration status and/or high TAC level. We'll repeat later this week.     Magnesium level is a little low. Continue current magnesium supplement and increase intake of magnesium rich foods.     Polly Mancini, IZABELA      OUTCOME:   Spoke with patient, they confirm accurate trough level and current dose 4 mg am, 5mg pm. Patient confirmed dose change to 4 mg BID and to repeat labs in 2 days. Orders sent to preferred pharmacy for dose change and lab for repeat labs. Patient voiced understanding of plan.    Advised to increase fluids, increase foods high in magnesium.     Ree Burleson LPN

## 2023-04-27 ENCOUNTER — OFFICE VISIT (OUTPATIENT)
Dept: TRANSPLANT | Facility: CLINIC | Age: 63
End: 2023-04-27
Attending: TRANSPLANT SURGERY
Payer: COMMERCIAL

## 2023-04-27 ENCOUNTER — LAB (OUTPATIENT)
Dept: LAB | Facility: CLINIC | Age: 63
End: 2023-04-27
Payer: COMMERCIAL

## 2023-04-27 VITALS
RESPIRATION RATE: 16 BRPM | DIASTOLIC BLOOD PRESSURE: 87 MMHG | TEMPERATURE: 99 F | HEART RATE: 85 BPM | WEIGHT: 153.4 LBS | BODY MASS INDEX: 23.25 KG/M2 | HEIGHT: 68 IN | SYSTOLIC BLOOD PRESSURE: 142 MMHG | OXYGEN SATURATION: 100 %

## 2023-04-27 DIAGNOSIS — I48.91 ATRIAL FIBRILLATION AND FLUTTER (H): ICD-10-CM

## 2023-04-27 DIAGNOSIS — Z94.4 ENCOUNTER FOR IMMUNOSUPPRESSION MANAGEMENT AFTER LIVER TRANSPLANT (H): Primary | ICD-10-CM

## 2023-04-27 DIAGNOSIS — I48.92 ATRIAL FIBRILLATION AND FLUTTER (H): ICD-10-CM

## 2023-04-27 DIAGNOSIS — E83.42 HYPOMAGNESEMIA: ICD-10-CM

## 2023-04-27 DIAGNOSIS — Z94.4 LIVER REPLACED BY TRANSPLANT (H): ICD-10-CM

## 2023-04-27 DIAGNOSIS — R29.898 MUSCLE FUNCTION LOSS: ICD-10-CM

## 2023-04-27 DIAGNOSIS — Z79.899 ENCOUNTER FOR IMMUNOSUPPRESSION MANAGEMENT AFTER LIVER TRANSPLANT (H): Primary | ICD-10-CM

## 2023-04-27 LAB
ALBUMIN SERPL BCG-MCNC: 4 G/DL (ref 3.5–5.2)
ALP SERPL-CCNC: 84 U/L (ref 40–129)
ALT SERPL W P-5'-P-CCNC: 17 U/L (ref 10–50)
ANION GAP SERPL CALCULATED.3IONS-SCNC: 7 MMOL/L (ref 7–15)
AST SERPL W P-5'-P-CCNC: 15 U/L (ref 10–50)
BILIRUB DIRECT SERPL-MCNC: 0.29 MG/DL (ref 0–0.3)
BILIRUB SERPL-MCNC: 0.7 MG/DL
BUN SERPL-MCNC: 28.9 MG/DL (ref 8–23)
CALCIUM SERPL-MCNC: 10.1 MG/DL (ref 8.8–10.2)
CHLORIDE SERPL-SCNC: 105 MMOL/L (ref 98–107)
CREAT SERPL-MCNC: 1.44 MG/DL (ref 0.67–1.17)
DEPRECATED HCO3 PLAS-SCNC: 25 MMOL/L (ref 22–29)
ERYTHROCYTE [DISTWIDTH] IN BLOOD BY AUTOMATED COUNT: 14.5 % (ref 10–15)
GFR SERPL CREATININE-BSD FRML MDRD: 56 ML/MIN/1.73M2
GLUCOSE SERPL-MCNC: 142 MG/DL (ref 70–99)
HCT VFR BLD AUTO: 34.2 % (ref 40–53)
HGB BLD-MCNC: 10.7 G/DL (ref 13.3–17.7)
MAGNESIUM SERPL-MCNC: 1.6 MG/DL (ref 1.7–2.3)
MCH RBC QN AUTO: 29.1 PG (ref 26.5–33)
MCHC RBC AUTO-ENTMCNC: 31.3 G/DL (ref 31.5–36.5)
MCV RBC AUTO: 93 FL (ref 78–100)
PHOSPHATE SERPL-MCNC: 4.3 MG/DL (ref 2.5–4.5)
PLATELET # BLD AUTO: 173 10E3/UL (ref 150–450)
POTASSIUM SERPL-SCNC: 5.1 MMOL/L (ref 3.4–5.3)
PROT SERPL-MCNC: 7.6 G/DL (ref 6.4–8.3)
RBC # BLD AUTO: 3.68 10E6/UL (ref 4.4–5.9)
SODIUM SERPL-SCNC: 137 MMOL/L (ref 136–145)
TACROLIMUS BLD-MCNC: 8.3 UG/L (ref 5–15)
TME LAST DOSE: NORMAL H
TME LAST DOSE: NORMAL H
WBC # BLD AUTO: 4.4 10E3/UL (ref 4–11)

## 2023-04-27 PROCEDURE — 80321 ALCOHOLS BIOMARKERS 1OR 2: CPT | Mod: 90 | Performed by: PATHOLOGY

## 2023-04-27 PROCEDURE — 36415 COLL VENOUS BLD VENIPUNCTURE: CPT | Performed by: PATHOLOGY

## 2023-04-27 PROCEDURE — 80053 COMPREHEN METABOLIC PANEL: CPT | Performed by: PATHOLOGY

## 2023-04-27 PROCEDURE — 87516 HEPATITIS B DNA AMP PROBE: CPT | Mod: 90 | Performed by: PATHOLOGY

## 2023-04-27 PROCEDURE — 99000 SPECIMEN HANDLING OFFICE-LAB: CPT | Performed by: PATHOLOGY

## 2023-04-27 PROCEDURE — 82248 BILIRUBIN DIRECT: CPT | Performed by: PATHOLOGY

## 2023-04-27 PROCEDURE — 85027 COMPLETE CBC AUTOMATED: CPT | Performed by: PATHOLOGY

## 2023-04-27 PROCEDURE — 99214 OFFICE O/P EST MOD 30 MIN: CPT | Mod: 24 | Performed by: TRANSPLANT SURGERY

## 2023-04-27 PROCEDURE — 80197 ASSAY OF TACROLIMUS: CPT | Mod: 90 | Performed by: PATHOLOGY

## 2023-04-27 PROCEDURE — 87521 HEPATITIS C PROBE&RVRS TRNSC: CPT | Mod: 90 | Performed by: PATHOLOGY

## 2023-04-27 PROCEDURE — 83735 ASSAY OF MAGNESIUM: CPT | Performed by: PATHOLOGY

## 2023-04-27 PROCEDURE — 84100 ASSAY OF PHOSPHORUS: CPT | Performed by: PATHOLOGY

## 2023-04-27 PROCEDURE — 87535 HIV-1 PROBE&REVERSE TRNSCRPJ: CPT | Mod: 90 | Performed by: PATHOLOGY

## 2023-04-27 PROCEDURE — G0463 HOSPITAL OUTPT CLINIC VISIT: HCPCS | Performed by: TRANSPLANT SURGERY

## 2023-04-27 ASSESSMENT — PAIN SCALES - GENERAL: PAINLEVEL: NO PAIN (0)

## 2023-04-27 NOTE — NURSING NOTE
"Chief Complaint   Patient presents with     Follow Up     Liver tx 3/23/23       BP (!) 142/87 (BP Location: Right arm, Patient Position: Sitting, Cuff Size: Adult Regular)   Pulse 85   Temp 99  F (37.2  C) (Oral)   Resp 16   Ht 1.727 m (5' 8\")   Wt 69.6 kg (153 lb 6.4 oz)   SpO2 100%   BMI 23.32 kg/m      Fernando Sidhu RN on 4/27/2023 at 9:06 AM    "

## 2023-04-27 NOTE — PROGRESS NOTES
"LT 35 days ago for EtOH.  Only major issue hosp issue was post op AFib.      Doing well.  No prob with heart rhythm  No fever or chills.  No jaundice, excessive pain.  Only minor issues with low mag, multiple tac adjustments.    IS  Tac 4/4 last level 11.3, and then dose reduction  /750  Side effects: slight, but manageable tremor.  Requiring Mag replacement.  No HA.  WBC: ok      Current Outpatient Medications   Medication     aspirin (ASA) 325 MG tablet     calcium carbonate-vitamin D (CALTRATE) 600-10 MG-MCG per tablet     folic acid (FOLVITE) 1 MG tablet     hydrOXYzine (ATARAX) 25 MG tablet     magnesium oxide (MAG-OX) 400 MG tablet     methocarbamol (ROBAXIN) 500 MG tablet     metoprolol tartrate (LOPRESSOR) 25 MG tablet     Multiple Vitamins-Minerals (SPECTRAVITE) TABS     mycophenolate (GENERIC EQUIVALENT) 250 MG capsule     omeprazole 20 MG tablet     sulfamethoxazole-trimethoprim (BACTRIM) 400-80 MG tablet     tacrolimus (GENERIC EQUIVALENT) 1 MG capsule     valGANciclovir (VALCYTE) 450 MG tablet     melatonin 3 MG tablet     traMADol (ULTRAM) 50 MG tablet     No current facility-administered medications for this visit.     BP (!) 142/87 (BP Location: Right arm, Patient Position: Sitting, Cuff Size: Adult Regular)   Pulse 85   Temp 99  F (37.2  C) (Oral)   Resp 16   Ht 1.727 m (5' 8\")   Wt 69.6 kg (153 lb 6.4 oz)   SpO2 100%   BMI 23.32 kg/m    Alert, attentive  No jaundice  Easy breathing  Abd: soft, nontender  Incision clean, no erythema  Ext: min edema.     Latest Reference Range & Units 04/24/23 09:10   Alkaline Phosphatase 45 - 120 U/L 87   ALT 0 - 45 U/L 16   AST 0 - 40 U/L 14   Bilirubin Direct <=0.5 mg/dL 0.4   Bilirubin Total 0.0 - 1.0 mg/dL 1.0      Latest Reference Range & Units 04/27/23 09:36   WBC 4.0 - 11.0 10e3/uL 4.4   Hemoglobin 13.3 - 17.7 g/dL 10.7 (L)   Hematocrit 40.0 - 53.0 % 34.2 (L)   Platelet Count 150 - 450 10e3/uL 173   (L): Data is abnormally low    I/P  1. SP LT " no anatomic issues.  Do not see stent in BD from 3/23  2. IS: no change.  No rej to date.  Just reduced tac dose.  Await new level.  A bit symptomatic with tremor and would prob wait another couple of weeks for dose reductions.  3. A fib: no symptoms: follow with cards  4. Nutrition: follow with dietician      30 min: 5 review chart, 20 f2f and discussion, 10 documentation

## 2023-04-27 NOTE — LETTER
"    4/27/2023         RE: Cricket Mane  [address] [facility], [place] [address]        Dear Colleague,    Thank you for referring your patient, Cricket Mane, to the [facility]. Please see a copy of my visit note below.    LT 35 days ago for EtOH.  Only major issue hosp issue was post op AFib.      Doing well.  No prob with heart rhythm  No fever or chills.  No jaundice, excessive pain.  Only minor issues with low mag, multiple tac adjustments.    IS  Tac 4/4 last level 11.3, and then dose reduction  /750  Side effects: slight, but manageable tremor.  Requiring Mag replacement.  No HA.  WBC: ok      Current Outpatient Medications   Medication    aspirin (ASA) 325 MG tablet    calcium carbonate-vitamin D (CALTRATE) 600-10 MG-MCG per tablet    folic acid (FOLVITE) 1 MG tablet    hydrOXYzine (ATARAX) 25 MG tablet    magnesium oxide (MAG-OX) 400 MG tablet    methocarbamol (ROBAXIN) 500 MG tablet    metoprolol tartrate (LOPRESSOR) 25 MG tablet    Multiple Vitamins-Minerals (SPECTRAVITE) TABS    mycophenolate (GENERIC EQUIVALENT) 250 MG capsule    omeprazole 20 MG tablet    sulfamethoxazole-trimethoprim (BACTRIM) 400-80 MG tablet    tacrolimus (GENERIC EQUIVALENT) 1 MG capsule    valGANciclovir (VALCYTE) 450 MG tablet    melatonin 3 MG tablet    traMADol (ULTRAM) 50 MG tablet     No current facility-administered medications for this visit.     BP (!) 142/87 (BP Location: Right arm, Patient Position: Sitting, Cuff Size: Adult Regular)   Pulse 85   Temp 99  F (37.2  C) (Oral)   Resp 16   Ht 1.727 m (5' 8\")   Wt 69.6 kg (153 lb 6.4 oz)   SpO2 100%   BMI 23.32 kg/m    Alert, attentive  No jaundice  Easy breathing  Abd: soft, nontender  Incision clean, no erythema  Ext: min edema.     Latest Reference Range & Units 04/24/23 09:10   Alkaline Phosphatase 45 - 120 U/L 87   ALT 0 - 45 U/L 16   AST 0 - 40 U/L 14   Bilirubin Direct <=0.5 mg/dL 0.4   Bilirubin Total 0.0 - 1.0 mg/dL 1.0 "      Latest Reference Range & Units 04/27/23 09:36   WBC 4.0 - 11.0 10e3/uL 4.4   Hemoglobin 13.3 - 17.7 g/dL 10.7 (L)   Hematocrit 40.0 - 53.0 % 34.2 (L)   Platelet Count 150 - 450 10e3/uL 173   (L): Data is abnormally low    I/P  1. SP LT no anatomic issues.  Do not see stent in BD from 3/23  2. IS: no change.  No rej to date.  Just reduced tac dose.  Await new level.  A bit symptomatic with tremor and would prob wait another couple of weeks for dose reductions.  3. A fib: no symptoms: follow with cards  4. Nutrition: follow with dietician      30 min: 5 review chart, 20 f2f and discussion, 10 documentation      Again, thank you for allowing me to participate in the care of your patient.        Sincerely,        aMxi Li MD

## 2023-04-28 LAB
HBV DNA SERPL QL NAA+PROBE: NORMAL
HCV RNA SERPL QL NAA+PROBE: NORMAL
HIV1+2 RNA SERPL QL NAA+PROBE: NORMAL

## 2023-04-30 LAB
PLPETH BLD-MCNC: <10 NG/ML
POPETH BLD-MCNC: <10 NG/ML

## 2023-05-01 ENCOUNTER — TELEPHONE (OUTPATIENT)
Dept: PHARMACY | Facility: CLINIC | Age: 63
End: 2023-05-01

## 2023-05-01 ENCOUNTER — LAB (OUTPATIENT)
Dept: LAB | Facility: CLINIC | Age: 63
End: 2023-05-01
Payer: COMMERCIAL

## 2023-05-01 DIAGNOSIS — Z94.4 LIVER REPLACED BY TRANSPLANT (H): ICD-10-CM

## 2023-05-01 LAB
ALBUMIN SERPL-MCNC: 3.8 G/DL (ref 3.5–5)
ALP SERPL-CCNC: 70 U/L (ref 45–120)
ALT SERPL W P-5'-P-CCNC: 16 U/L (ref 0–45)
ANION GAP SERPL CALCULATED.3IONS-SCNC: 11 MMOL/L (ref 5–18)
AST SERPL W P-5'-P-CCNC: 14 U/L (ref 0–40)
BILIRUB DIRECT SERPL-MCNC: 0.4 MG/DL
BILIRUB SERPL-MCNC: 1.1 MG/DL (ref 0–1)
BUN SERPL-MCNC: 29 MG/DL (ref 8–22)
CALCIUM SERPL-MCNC: 9.6 MG/DL (ref 8.5–10.5)
CHLORIDE BLD-SCNC: 108 MMOL/L (ref 98–107)
CO2 SERPL-SCNC: 22 MMOL/L (ref 22–31)
CREAT SERPL-MCNC: 1.58 MG/DL (ref 0.7–1.3)
ERYTHROCYTE [DISTWIDTH] IN BLOOD BY AUTOMATED COUNT: 14.3 % (ref 10–15)
GFR SERPL CREATININE-BSD FRML MDRD: 50 ML/MIN/1.73M2
GLUCOSE BLD-MCNC: 114 MG/DL (ref 70–125)
HCT VFR BLD AUTO: 37 % (ref 40–53)
HGB BLD-MCNC: 11.4 G/DL (ref 13.3–17.7)
MAGNESIUM SERPL-MCNC: 1.7 MG/DL (ref 1.8–2.6)
MCH RBC QN AUTO: 28.9 PG (ref 26.5–33)
MCHC RBC AUTO-ENTMCNC: 30.8 G/DL (ref 31.5–36.5)
MCV RBC AUTO: 94 FL (ref 78–100)
PHOSPHATE SERPL-MCNC: 4.2 MG/DL (ref 2.5–4.5)
PLATELET # BLD AUTO: 199 10E3/UL (ref 150–450)
POTASSIUM BLD-SCNC: 5.3 MMOL/L (ref 3.5–5)
PROT SERPL-MCNC: 7.6 G/DL (ref 6–8)
RBC # BLD AUTO: 3.94 10E6/UL (ref 4.4–5.9)
SODIUM SERPL-SCNC: 141 MMOL/L (ref 136–145)
TACROLIMUS BLD-MCNC: 9.2 UG/L (ref 5–15)
TME LAST DOSE: NORMAL H
TME LAST DOSE: NORMAL H
WBC # BLD AUTO: 4.4 10E3/UL (ref 4–11)

## 2023-05-01 PROCEDURE — 36415 COLL VENOUS BLD VENIPUNCTURE: CPT

## 2023-05-01 PROCEDURE — 84100 ASSAY OF PHOSPHORUS: CPT

## 2023-05-01 PROCEDURE — 83735 ASSAY OF MAGNESIUM: CPT

## 2023-05-01 PROCEDURE — 82248 BILIRUBIN DIRECT: CPT

## 2023-05-01 PROCEDURE — 85027 COMPLETE CBC AUTOMATED: CPT

## 2023-05-01 PROCEDURE — 80053 COMPREHEN METABOLIC PANEL: CPT

## 2023-05-01 PROCEDURE — 80197 ASSAY OF TACROLIMUS: CPT

## 2023-05-01 NOTE — TELEPHONE ENCOUNTER
Clinical Pharmacy Consult:                                                      Transplant Specific: 1 Month Post Transplant Call  Date of Transplant: 3/23/2023  Type of Transplant: liver  First Transplant: yes  History of rejection: no    Immunosuppression Regimen   TAC 4mg qAM & 4mg qPM and MMF 750mg qAM & 750mg qPM  Patient specific goal: 8-10  Most recent level: 8.3, date 4/27/23  Immunosuppressant Levels:  Therapeutic  Pt adherent to lab draws: yes  Scr:   Lab Results   Component Value Date    CR 1.58 05/01/2023     Side effects: Tremors     Prophylactic Medications  Antibacterial:  Bactrim SS daily  Scheduled Discontinue Date: 6 months    Antifungal: Not needed thus far  Scheduled Discontinue Date: N/A    Antiviral: CrCl 40 to 59 mL/minute: Valcyte 450 mg once daily   Scheduled Discontinue Date: 6 months    Acid Reducer: Prilosec (omeprazole)  Scheduled Reviewed Date: followed by clinic    Thrombosis Prevention: Aspirin 325 mg PO daily  Scheduled Discontinue Date: 6 months    Blood Pressure Management  Frequency of home Blood Pressure checks: twice daily  Most recent home BP: 113/85  Patient Blood pressure goal: <140/90  Patient blood pressure at goal:  yes  Hospitalizations/ER visits since last assessment: 0        5/1/2023     3:00 PM   Medication adherence flowsheet   Patient medication administration: Responsible for own medications   Patient estimated adherence level: %   Pharmacist assessment of adherence: Good   Patient reported doses missed per week: 0-1   Facilitators to medication adherence  Medication dosing chart;Pill box;Schedule/routine;Caregiver assistance   Patient reported barriers to medication adherence  No issues identified   Adherence intervention(s):  on importance of adherence          5/1/2023     3:00 PM   Medication access flowsheet   Number of pharmacies used: 1   Pharmacy: [facility]   Enrolled in [facility]? Yes   Patient reported barriers to  accessing medications: No issues reported by patient   Medication access interventions: No issues identified      Med rec/DUR performed: yes  Med Rec Discrepancies: yes, now taking metoprolol 25mg twice daily.    Cricket states he is doing well and not having any problems.  He does have a tremor but it seems to be getting better.  No other side effects.  Cricket's daughter has been helping him with his med box.  Today he will be doing it for the first time on his own.  He is keeping his medication card updated with changes.  He will use this to fill his box.  He has not missed any doses of his medications but admits he is sometimes an hour late.  Talked about the after noon medications being ok to be a little late on, but the morning and evening IS meds need to be on time or within 30 minutes.  He will tighten up his timeline to get there.    Blood pressure: checking twice daily and currently under control.  He did say his primary MD lowered his metoprolol dose to 25mg twice daily.  Will message coordinator to update.     No other questions or concerns today.  Will follow up in 1 month.     Devora Nettles [facility]

## 2023-05-02 ENCOUNTER — TELEPHONE (OUTPATIENT)
Dept: TRANSPLANT | Facility: CLINIC | Age: 63
End: 2023-05-02
Payer: COMMERCIAL

## 2023-05-02 ASSESSMENT — PATIENT HEALTH QUESTIONNAIRE - PHQ9
SUM OF ALL RESPONSES TO PHQ QUESTIONS 1-9: 0
SUM OF ALL RESPONSES TO PHQ QUESTIONS 1-9: 0
10. IF YOU CHECKED OFF ANY PROBLEMS, HOW DIFFICULT HAVE THESE PROBLEMS MADE IT FOR YOU TO DO YOUR WORK, TAKE CARE OF THINGS AT HOME, OR GET ALONG WITH OTHER PEOPLE: NOT DIFFICULT AT ALL

## 2023-05-02 NOTE — TELEPHONE ENCOUNTER
"Called Torin to check in and review 5/1/2023 lab results.     Torin denies any complaints or concerns at this time. BP has jumped around a bit but \"really pretty stable\". Weight is steady.     Reviewed labs in depth, explaining liver tests and what they indicate. Creatinine is slightly elevated and we reviewed hydration status and that creatinine can also rise due to TAC. TAC level is within goal at this time. Dr. Arzate will review at upcoming visit this Thursday.     Potassium also slightly above normal. Reviewed high potassium foods and asked Torin to limit intake for now.     He is drinking 4 Dasani bottles of water per day, minimum.     Torin will repeat labs Thursday. Dr. Arzate notified.   "

## 2023-05-03 ENCOUNTER — VIRTUAL VISIT (OUTPATIENT)
Dept: BEHAVIORAL HEALTH | Facility: CLINIC | Age: 63
End: 2023-05-03
Payer: COMMERCIAL

## 2023-05-03 DIAGNOSIS — F43.22 ADJUSTMENT DISORDER WITH ANXIOUS MOOD: Primary | ICD-10-CM

## 2023-05-03 DIAGNOSIS — I48.20 CHRONIC ATRIAL FIBRILLATION WITH RAPID VENTRICULAR RESPONSE (H): ICD-10-CM

## 2023-05-03 PROCEDURE — 90791 PSYCH DIAGNOSTIC EVALUATION: CPT | Mod: VID | Performed by: PSYCHOLOGIST

## 2023-05-03 RX ORDER — METOPROLOL TARTRATE 25 MG/1
25 TABLET, FILM COATED ORAL 2 TIMES DAILY
Refills: 0 | COMMUNITY
Start: 2023-05-03 | End: 2023-08-30

## 2023-05-03 NOTE — PROGRESS NOTES
"    [facility]: Integrated Behavioral Health      PATIENT'S NAME: Cricket Mane  PREFERRED NAME: Cricket  PRONOUNS: He/Him/His  MRN: [id number]  : 1960  ADDRESS: [address] [place] [id number]  [facility]  [id number]  DATE OF SERVICE: 23  START TIME: 2:00  END TIME: 2:50  PREFERRED PHONE: [phone]  May we leave a program related message: Yes  SERVICE MODALITY:  Video Visit:      Provider verified identity through the following two step process.  Patient provided:  Patient  and Patient address    Telemedicine Visit: The patient's condition can be safely assessed and treated via synchronous audio and visual telemedicine encounter.      Reason for Telemedicine Visit: Patient convenience (e.g. access to timely appointments / distance to available provider)    Originating Site (Patient Location): Patient's home    Distant Site (Provider Location): [facility]: [facility]    Consent:  The patient/guardian has verbally consented to: the potential risks and benefits of telemedicine (video visit) versus in person care; bill my insurance or make self-payment for services provided; and responsibility for payment of non-covered services.     Patient would like the video invitation sent by:  My Chart    Mode of Communication:  Video Conference via [facility]    Distant Location (Provider):  On-site    As the provider I attest to compliance with applicable laws and regulations related to telemedicine.    UNIVERSAL ADULT Mental Health DIAGNOSTIC ASSESSMENT    Identifying Information:  Patient is a 62 year old,  individual.  Patient was referred for an assessment by his T care team.  Patient attended the session alone.    Chief Complaint:   The reason for seeking services at this time is: \"Nothing\". Patient states he did not seek out [facility] services on his own or was aware of the appointment being scheduled for him. States it was figured " "by his care team that he might benefit from behavioral health to check-in about his recent medical events. The patient however states his mood is stable and feels \"returned to normal.\" The patient also notes being able to return to work full time, though requires a few more breaks to rest. He reports feeling optimistic about his recovery s/p liver transplant while recognizing the recovery period can be long. He states receiving his liver transplant March 23rd of 2023 and has been home recovering since March 29th. Upon discharge, the patient describes needing more assistance at home, with managing medications and household chores for a few weeks, though now is stating being able to be independent with these tasks. Currently, the patient is largely denying mental health concerns. In the past, he reports experiencing some mild anxious distress from being away from work for an extended period of time. He states this has improved since returning to work. He also expresses concern about the mental well being of his three children with him having gone through a transplant procedure. He states his adult children are doing well overall, fairly well adjusted, though does see benefit in talking about this concern more.     The problem(s) began about a month ago.     He describes a brief period during which he completed psychotherapy involving a marital counseling following a divorce and a previous therapist with whom he described a unhelpful relationship.      Patient has not attempted to resolve these concerns in the past.    Social/Family History:  Patient reported they grew up in [place], [place].  They were raised by biological parents.  Parents were always together.  Patient reported that their childhood was positive.  Patient described their current relationships with family of origin as positive.     The patient describes their cultural background as .  Cultural influences and impact on patient's life structure, " values, norms, and healthcare: N/A.  Contextual influences on patient's health include: Contextual Factors: Health- Seeking Factors post SOT care needs.  These factors will be addressed in the Preliminary Treatment plan. Patient identified their preferred language to be English. Patient reported they does not need the assistance of an  or other support involved in therapy.     Patient reported had no significant delays in developmental tasks.   Patient's highest education level was graduate school. Patient identified the following learning problems: none reported.  Modifications will not be used to assist communication in therapy. Patient reports they are  able to understand written materials.    Patient's current relationship status is single.  Patient identified their sexual orientation as heterosexual.  Patient reported having 3 child(addie). Patient identified adult child as part of their support system.  Patient identified the quality of these relationships as good.    Patient's current living/housing situation involves staying in own home/apartment.  The immediate members of family and household include Rene, Karla,Son and they report that housing is stable.    Patient is currently employed fulltime.  Patient reports their finances are obtained through employment. Patient does not identify finances as a current stressor.      Patient reported that they have been involved with the legal system.   17+ years ago. We had to agree on child custody. Patient does not report being under probation/ parole/ jurisdiction. They are not under any current court jurisdiction. .    Patient's Strengths and Limitations:  Patient identified the following strengths or resources that will help them succeed in treatment: commitment to health and well being, friends / good social support, family support, motivation and work ethic. Things that may interfere with the patient's success in treatment include: physical  health concerns.     Assessments:  The following assessments were completed by patient for this visit:  PHQ2:       5/2/2023     4:47 PM 4/6/2023     9:54 AM   PHQ-2 ( 1999 [employer])   Q1: Little interest or pleasure in doing things 0 0   Q2: Feeling down, depressed or hopeless 0 0   PHQ-2 Score 0 0   Q1: Little interest or pleasure in doing things Not at all    Q2: Feeling down, depressed or hopeless Not at all    PHQ-2 Score 0      PHQ9:       5/2/2023     4:46 PM   PHQ-9 SCORE   PHQ-9 Total Score MyChart 0   PHQ-9 Total Score 0     GAD2:        View : No data to display.              GAD7:        View : No data to display.              CAGE-AID:       5/2/2023     5:29 PM   CAGE-AID Total Score   Total Score 0   Total Score MyChart 0 (A total score of 2 or greater is considered clinically significant)     PROMIS 10-Global Health (only subscores and total score):       5/2/2023     5:28 PM   PROMIS-10 Scores Only   Global Mental Health Score 17   Global Physical Health Score 16   PROMIS TOTAL - SUBSCORES 33     [place] Suicide Severity Rating Scale (Lifetime/Recent)       View : No data to display.              [place] Suicide Severity Rating Scale (Short Version)      3/22/2023     9:00 PM   [place] Suicide Severity Rating (Short Version)   Over the past 2 weeks have you felt down, depressed, or hopeless? no   Over the past 2 weeks have you had thoughts of killing yourself? no   Have you ever attempted to kill yourself? no       Personal and Family Medical History:  Patient does report a family history of mental health concerns.  Patient reports family history includes Cancer in his brother, mother, and sister; Hypertension in his father; Lung Cancer in his mother; No Known Problems in his brother, daughter, daughter, sister, sister, and sister; Thyroid nodules in his son..     Patient does report Mental Health Diagnosis and/or Treatment.  Patient Patient reported the following previous diagnoses which  include(s): none reported .  Patient reported symptoms began NA.  Patient has not received mental health services in the past:  other [facility] for [facility] Sept. 2022 - Jan. 2023.  Psychiatric Hospitalizations: none. Patient denies a history of civil commitment.  Currently, patient none  receiving other mental health services.  These include none.       Patient has had a physical exam to rule out medical causes for current symptoms.  Date of last physical exam was within the past year. Client was encouraged to follow up with PCP if symptoms were to develop. The patient has a [facility] Primary Care Provider, who is named Jer Stanley  Patient reports the following current medical concerns:       Patient Active Problem List    Diagnosis Date Noted     Hypomagnesemia 04/11/2023     Priority: Medium     S/P liver transplant (H) 03/27/2023     Priority: Medium     Immunosuppression (H) 03/27/2023     Priority: Medium     Acute post-operative pain 03/27/2023     Priority: Medium     Anxiety 03/27/2023     Priority: Medium     Anemia due to blood loss, acute 03/27/2023     Priority: Medium     At risk for malnutrition 03/27/2023     Priority: Medium     FERNANDA (acute kidney injury) (H) 03/27/2023     Priority: Medium     Steroid-induced hyperglycemia 03/27/2023     Priority: Medium     Cirrhosis (H) 01/31/2023     Priority: Medium     Transplant 01/31/2023     Priority: Medium     Pre-operative cardiovascular examination 01/31/2023     Priority: Medium     Mixed hyperlipidemia 01/31/2023     Priority: Medium     Thrombocytopenia (H) 01/31/2023     Priority: Medium     Coagulopathy (H) 01/31/2023     Priority: Medium     Anemia, unspecified type 01/31/2023     Priority: Medium     Atrial fibrillation with RVR (H) 12/25/2015     Priority: Medium     Atrial fibrillation and flutter (H) 12/24/2015     Priority: Medium     Alcoholic hepatitis 12/24/2015     Priority: Medium      Patient  denies any issues with pain..   There are not significant appetite / nutritional concerns / weight changes.   Patient does not report a history of head injury / trauma / cognitive impairment.      Current Outpatient Medications   Medication     aspirin (ASA) 325 MG tablet     calcium carbonate-vitamin D (CALTRATE) 600-10 MG-MCG per tablet     folic acid (FOLVITE) 1 MG tablet     magnesium oxide (MAG-OX) 400 MG tablet     metoprolol tartrate (LOPRESSOR) 25 MG tablet     Multiple Vitamins-Minerals (SPECTRAVITE) TABS     mycophenolate (GENERIC EQUIVALENT) 250 MG capsule     omeprazole 20 MG tablet     sulfamethoxazole-trimethoprim (BACTRIM) 400-80 MG tablet     tacrolimus (GENERIC EQUIVALENT) 1 MG capsule     valGANciclovir (VALCYTE) 450 MG tablet     No current facility-administered medications for this visit.     Medication Adherence:  Patient reports taking prescribed medications as prescribed.    Patient Allergies:  No Known Allergies    Medical History:    Past Medical History:   Diagnosis Date     Alcoholic cirrhosis of liver with ascites (H)      Atrial fibrillation (H)      Coronary artery disease      History of alcohol use disorder      History of esophageal varices with bleeding      Hyperlipidemia          Current Mental Status Exam:   Appearance:  Appropriate    Eye Contact:  Good   Psychomotor:  Normal       Gait / station:  no problem  Attitude / Demeanor: Cooperative   Speech      Rate / Production: Normal/ Responsive      Volume:  Normal  volume      Language:  intact  Mood:   Anxious   Affect:   Appropriate    Thought Content: Clear   Thought Process: Logical       Associations: No loosening of associations  Insight:   Good   Judgment:  Intact   Orientation:  All  Attention/concentration: Good      Substance Use:  Patient did not report a family history of substance use concerns; see medical history section for details.  Patient has not received chemical dependency treatment in the past.  Patient has  not ever been to detox.      Social History     Tobacco Use     Smoking status: Never   Substance Use Topics     Alcohol use: Not Currently     Comment: last ETOH was 7/28/22.     Drug use: No     Patient completed chemical dependency treatment at the [facility] in September of 2022 for alcohol use.    Patient is not currently receiving any chemical dependency treatment.           Substance History of use Age of first use Date of last use     Pattern and duration of use (include amounts and frequency)   Alcohol used in the past   16 07/28/22 REPORTS SUBSTANCE USE: N/A   Cannabis   used in the past 16 01/02/00 REPORTS SUBSTANCE USE: N/A     Amphetamines   never used     REPORTS SUBSTANCE USE: N/A   Cocaine/crack    never used       REPORTS SUBSTANCE USE: N/A   Hallucinogens never used         REPORTS SUBSTANCE USE: N/A   Inhalants never used         REPORTS SUBSTANCE USE: N/A   Heroin never used         REPORTS SUBSTANCE USE: N/A   Other Opiates never used     REPORTS SUBSTANCE USE: N/A   Benzodiazepine   used in the past 2009 07/02/22 REPORTS SUBSTANCE USE: N/A   Barbiturates never used     REPORTS SUBSTANCE USE: N/A   Over the counter meds never used     REPORTS SUBSTANCE USE: N/A   Caffeine currently use 15   REPORTS SUBSTANCE USE: N/A   Nicotine  never used     REPORTS SUBSTANCE USE: N/A   Other substances not listed above:  Identify:  never used     REPORTS SUBSTANCE USE: N/A     Patient reported the following problems as a result of their substance use: no problems, not applicable.    Substance Use: No symptoms    Based on the negative CAGE score and clinical interview there  are not indications of drug or alcohol abuse.        5/2/2023     5:29 PM   CAGE-AID Total Score   Total Score 0   Total Score MyChart 0 (A total score of 2 or greater is considered clinically significant)       CAGE-AID score  > 1 is a positive screen, suggesting further discussion is needed to determine if evaluation  for alcohol or substance abuse is appropriate.  A score > 2 is considered clinically significant, suggesting further evaluation of alcohol or substance-related problems is indicated.      Significant Losses / Trauma / Abuse / Neglect Issues:   Patient did not  serve in the .  There are indications or report of significant loss, trauma, abuse or neglect issues related to: are no indications and client denies any losses, trauma, abuse, or neglect concerns.  Concerns for possible neglect are not present.     Safety Assessment:   Patient denies current homicidal ideation and behaviors.  Patient denies current self-injurious ideation and behaviors.    Patient denied risk behaviors associated with substance use.  Patient denies any high risk behaviors associated with mental health symptoms.  Patient reports the following current concerns for their personal safety: None.  Patient reports there are not firearms in the house.  The firearms are secured in a locked space.    History of Safety Concerns:  Patient denied a history of homicidal ideation.     Patient denied a history of personal safety concerns.    Patient denied a history of assaultive behaviors.    Patient denied a history of sexual assault behaviors.     Patient denied a history of risk behaviors associated with substance use.  Patient denies any history of high risk behaviors associated with mental health symptoms.  Patient reports the following protective factors: forward or future oriented thinking    Risk Plan:  See Recommendations for Safety and Risk Management Plan    Review of Symptoms per patient report:   Depression: No symptoms  Marcelina:  No Symptoms  Psychosis: No Symptoms  Anxiety: Nervousness and Ruminations  Panic:  No symptoms  Post Traumatic Stress Disorder:  No Symptoms   Eating Disorder: No Symptoms  ADD / ADHD:  No symptoms  Conduct Disorder: No symptoms  Autism Spectrum Disorder: No symptoms  Obsessive Compulsive Disorder: No  Symptoms    Patient reports the following compulsive behaviors and treatment history: None.      Diagnostic Criteria:   Adjustment Disorder  A. The development of emotional or behavioral symptoms in response to an identifiable stressor(s) occurring within 3 months of the onset of the stressor(s)  B. These symptoms or behaviors are clinically significant, as evidenced by one or both of the following:       - Marked distress that is out of proportion to the severity/intensity of the stressor (with consideration for external context & culture)  C. The stress-related disturbance does not meet criteria for another disorder & is not not an exacerbation of another mental disorder  D. The symptoms do not represent normal bereavement  E. Once the stressor or its consequences have terminated, the symptoms do not persist for more than an additional 6 months       * Adjustment Disorder with Anxiety: The predominant manfestations are symptoms such as nervousness, worry, or jitteriness, or, in children separation anxiety from major attachment figures    Functional Status:  Patient reports the following functional impairments:  self-care.     Nonprogrammatic care:  Patient is requesting basic services to address current mental health concerns.    Clinical Summary:  1. Reason for assessment: Routine MH assessment post transplant.  2. Psychosocial, Cultural and Contextual Factors: Post SOT; hx of heavy alcohol use.  3. Principal DSM5 Diagnoses  (Sustained by DSM5 Criteria Listed Above):   Adjustment Disorders  309.24 (F43.22) With anxiety.  4. Other Diagnoses that is relevant to services:   5. Provisional Diagnosis: NA  6. Prognosis: Maintain Current Status / Prevent Deterioration. Residual symptoms likely to resolve with engagement in good socials support, abstinence from mood altering substances, remaining active, and engagement in consistent self-care.  7. Likely consequences of symptoms if not treated: possible deterioration of  mood and increase in anxiety though patient status appears stable.  8. Client strengths include:  caring, committed to sobriety, insightful, intelligent, motivated, open to learning, support of family, friends and providers, supportive, wants to learn, willing to ask questions, willing to relate to others and work history .     Recommendations:     1. Plan for Safety and Risk Management:   Safety and Risk: Recommended that patient call 911 or go to the local ED should there be a change in any of these risk factors..          Report to child / adult protection services was NA.     2. Patient's identified no cultural or diversity concerns relevant to care.     3. Initial Treatment will focus on:    Adjustment Difficulties related to: health concerns.     4. Resources/Service Plan:    services are not indicated.   Modifications to assist communication are not indicated.   Additional disability accommodations are not indicated.      5. Collaboration:   Collaboration / coordination of treatment will be initiated with the following  support professionals: primary care physician and SOT care team.      6.  Referrals:   The following referral(s) will be initiated: None. Next Scheduled Appointment: NA.      A Release of Information has been obtained for the following: NA.     Emergency Contact was not obtained.      Clinical Substantiation/medical necessity for the above recommendations:  Anxiety onset while pursuing transplant warrants treatment.    7. MADAN:    MADAN:  Discussed the general effects of drugs and alcohol on health and well-being. Provider gave patient printed information about the  effects of chemical use on their health and well being. Recommendations:  Maintain sobriety from mood altering substances; consider engagement in sober support programs in the community such as SMART recovery or AA.     8. Records:   These were reviewed at time of assessment.   Information in this assessment was obtained  from the medical record and provided by patient who is a good historian.  Patient will have open access to their mental health medical record.    9.   Interactive Complexity: No      Provider Name/ Credentials:  Frederick Garcia LP  May 3, 2023

## 2023-05-04 ENCOUNTER — LAB (OUTPATIENT)
Dept: LAB | Facility: CLINIC | Age: 63
End: 2023-05-04
Payer: COMMERCIAL

## 2023-05-04 ENCOUNTER — OFFICE VISIT (OUTPATIENT)
Dept: TRANSPLANT | Facility: CLINIC | Age: 63
End: 2023-05-04
Attending: TRANSPLANT SURGERY
Payer: COMMERCIAL

## 2023-05-04 VITALS
DIASTOLIC BLOOD PRESSURE: 78 MMHG | SYSTOLIC BLOOD PRESSURE: 123 MMHG | HEART RATE: 84 BPM | BODY MASS INDEX: 22.97 KG/M2 | OXYGEN SATURATION: 99 % | WEIGHT: 151.1 LBS

## 2023-05-04 DIAGNOSIS — Z94.4 LIVER REPLACED BY TRANSPLANT (H): ICD-10-CM

## 2023-05-04 DIAGNOSIS — Z94.4 LIVER REPLACED BY TRANSPLANT (H): Primary | ICD-10-CM

## 2023-05-04 LAB
ALBUMIN SERPL BCG-MCNC: 4.2 G/DL (ref 3.5–5.2)
ALP SERPL-CCNC: 80 U/L (ref 40–129)
ALT SERPL W P-5'-P-CCNC: 22 U/L (ref 10–50)
ANION GAP SERPL CALCULATED.3IONS-SCNC: 8 MMOL/L (ref 7–15)
AST SERPL W P-5'-P-CCNC: 19 U/L (ref 10–50)
BILIRUB DIRECT SERPL-MCNC: 0.31 MG/DL (ref 0–0.3)
BILIRUB SERPL-MCNC: 0.8 MG/DL
BUN SERPL-MCNC: 37.1 MG/DL (ref 8–23)
CALCIUM SERPL-MCNC: 10.1 MG/DL (ref 8.8–10.2)
CHLORIDE SERPL-SCNC: 106 MMOL/L (ref 98–107)
CREAT SERPL-MCNC: 1.62 MG/DL (ref 0.67–1.17)
DEPRECATED HCO3 PLAS-SCNC: 25 MMOL/L (ref 22–29)
ERYTHROCYTE [DISTWIDTH] IN BLOOD BY AUTOMATED COUNT: 13.9 % (ref 10–15)
GFR SERPL CREATININE-BSD FRML MDRD: 48 ML/MIN/1.73M2
GLUCOSE SERPL-MCNC: 125 MG/DL (ref 70–99)
HCT VFR BLD AUTO: 36.2 % (ref 40–53)
HGB BLD-MCNC: 11.2 G/DL (ref 13.3–17.7)
MAGNESIUM SERPL-MCNC: 1.7 MG/DL (ref 1.7–2.3)
MCH RBC QN AUTO: 28.3 PG (ref 26.5–33)
MCHC RBC AUTO-ENTMCNC: 30.9 G/DL (ref 31.5–36.5)
MCV RBC AUTO: 91 FL (ref 78–100)
PHOSPHATE SERPL-MCNC: 4 MG/DL (ref 2.5–4.5)
PLATELET # BLD AUTO: 160 10E3/UL (ref 150–450)
POTASSIUM SERPL-SCNC: 5.4 MMOL/L (ref 3.4–5.3)
PROT SERPL-MCNC: 7.7 G/DL (ref 6.4–8.3)
RBC # BLD AUTO: 3.96 10E6/UL (ref 4.4–5.9)
SODIUM SERPL-SCNC: 139 MMOL/L (ref 136–145)
TACROLIMUS BLD-MCNC: 9.5 UG/L (ref 5–15)
TME LAST DOSE: NORMAL H
TME LAST DOSE: NORMAL H
WBC # BLD AUTO: 4.1 10E3/UL (ref 4–11)

## 2023-05-04 PROCEDURE — 80197 ASSAY OF TACROLIMUS: CPT | Performed by: TRANSPLANT SURGERY

## 2023-05-04 PROCEDURE — 83735 ASSAY OF MAGNESIUM: CPT | Performed by: PATHOLOGY

## 2023-05-04 PROCEDURE — 85027 COMPLETE CBC AUTOMATED: CPT | Performed by: PATHOLOGY

## 2023-05-04 PROCEDURE — 36415 COLL VENOUS BLD VENIPUNCTURE: CPT | Performed by: PATHOLOGY

## 2023-05-04 PROCEDURE — 84100 ASSAY OF PHOSPHORUS: CPT | Performed by: PATHOLOGY

## 2023-05-04 PROCEDURE — 82248 BILIRUBIN DIRECT: CPT | Performed by: PATHOLOGY

## 2023-05-04 PROCEDURE — 80053 COMPREHEN METABOLIC PANEL: CPT | Performed by: PATHOLOGY

## 2023-05-04 PROCEDURE — 99213 OFFICE O/P EST LOW 20 MIN: CPT | Mod: 24 | Performed by: TRANSPLANT SURGERY

## 2023-05-04 PROCEDURE — G0463 HOSPITAL OUTPT CLINIC VISIT: HCPCS | Performed by: TRANSPLANT SURGERY

## 2023-05-04 ASSESSMENT — PAIN SCALES - GENERAL: PAINLEVEL: NO PAIN (0)

## 2023-05-04 NOTE — LETTER
5/4/2023         RE: Cricket Mane  [id number] [facility] [id number]        Dear Colleague,    Thank you for referring your patient, Cricket Mane, to the [facility] TRANSPLANT [facility]. Please see a copy of my visit note below.    Transplant Surgery Progress Note    Transplants:  3/23/2023 (Liver); Postoperative day:  42  S: overall doing quite well, no complaints, denies f/c/n/v/d, meds OK    Transplant History:    Transplant Type:  Liver Tx  Donor Type:    Transplant Date:  3/23/2023 (Liver)   Biliary Stent:  No    Acute Rejection Hx:  No    Present Maintenance Immunosuppression:  Tacrolimus and Mycophenolate mofetil    CMV IgG Ab Discordance:  No  EBV IgG Ab Discordance:  No    Transplant Coordinator: Polly Mancini     Transplant Office Phone Number: [phone]     Immunosuppressant Medications     Immunosuppressive Agents Disp Start End     mycophenolate (GENERIC EQUIVALENT) 250 MG capsule    540 capsule 4/3/2023     Sig - Route: Take 3 capsules (750 mg) by mouth 2 times daily - Oral    Class: E-Prescribe    Notes to Pharmacy: TXP DT 3/23/2023 (Liver) TXP Dischg DT 3/29/2023 DX Liver replaced by transplant Z94.4 TX [facility] ([place], [place])     tacrolimus (GENERIC EQUIVALENT) 1 MG capsule    720 capsule 4/25/2023     Sig - Route: Take 4 capsules (4 mg) by mouth every 12 hours - Oral    Class: E-Prescribe          Possible Immunosuppression-related side effects:   []             headache  []             vivid dreams  []             irritability  []             cognitive difficuties  []             fine tremor  []             nausea  []             diarrhea  []             neuropathy      []             edema  []             renal calcineurin toxicity  []             hyperkalemia  []             post-transplant diabetes  []             decreased appetite  []             increased appetite  []             other:  []              none    Prescription Medications as of 5/4/2023       Rx Number Disp Refills Start End Last Dispensed Date Next Fill Date Owning Pharmacy    aspirin (ASA) 325 MG tablet  90 tablet 3 4/3/2023    [facility] [place] [address] [address]    Sig: Take 1 tablet (325 mg) by mouth daily    Class: E-Prescribe    Route: Oral    calcium carbonate-vitamin D (CALTRATE) 600-10 MG-MCG per tablet  180 tablet 3 4/3/2023    [facility] [place] [address] [address]    Sig: Take 1 tablet by mouth 2 times daily (with meals)    Class: E-Prescribe    Route: Oral    folic acid (FOLVITE) 1 MG tablet  90 tablet 1 4/3/2023    [facility] [place] [address] [address]    Sig: Take 1 tablet (1,000 mcg) by mouth daily    Class: E-Prescribe    Route: Oral    magnesium oxide (MAG-OX) 400 MG tablet  360 tablet 3 4/11/2023    [facility] [place] [address] [address]    Sig: Take 2 tablets (800 mg) by mouth 2 times daily    Class: E-Prescribe    Route: Oral    metoprolol tartrate (LOPRESSOR) 25 MG tablet   0 5/3/2023        Sig: Take 1 tablet (25 mg) by mouth 2 times daily    Class: Historical    Notes to Pharmacy: PCP manages.    Route: Oral    Multiple Vitamins-Minerals (SPECTRAVITE) TABS    4/13/2023        Sig: Take 1 tablet by mouth daily    Class: Historical    Route: Oral    mycophenolate (GENERIC EQUIVALENT) 250 MG capsule  540 capsule 3 4/3/2023    [facility] [place] [address] [address]    Sig: Take 3 capsules (750 mg) by mouth 2 times daily    Class: E-Prescribe    Notes to Pharmacy: TXP DT 3/23/2023 (Liver) TXP Dischg DT 3/29/2023 DX Liver replaced by transplant Z94.4 TX [facility] ([place], [place])    Route: Oral    omeprazole 20 MG tablet  180 tablet 3 4/3/2023    [facility] [place] [address] [address]    Sig:  Take 1 tablet (20 mg) by mouth 2 times daily    Class: E-Prescribe    Route: Oral    sulfamethoxazole-trimethoprim (BACTRIM) 400-80 MG tablet  90 tablet 3 4/3/2023    [facility] [address] [address]    Sig: Take 1 tablet by mouth daily    Class: E-Prescribe    Route: Oral    tacrolimus (GENERIC EQUIVALENT) 1 MG capsule  720 capsule 3 4/25/2023    [facility] [address] [address]    Sig: Take 4 capsules (4 mg) by mouth every 12 hours    Class: E-Prescribe    Route: Oral    valGANciclovir (VALCYTE) 450 MG tablet  90 tablet 3 4/3/2023    [facility] [address] [address]    Sig: Take 1 tablet (450 mg) by mouth daily    Class: E-Prescribe    Route: Oral          O:   [unfilled]        Latest Ref Rng & Units 5/4/2023     8:40 AM 5/1/2023     8:55 AM 4/27/2023     9:36 AM 4/24/2023     9:10 AM 4/20/2023     8:32 AM   Transplant Immunosuppression Labs   Creat 0.67 - 1.17 mg/dL 1.62   1.58   1.44   1.72   1.56     Urea Nitrogen 8 - 22 mg/dL  29    24      Urea Nitrogen 8.0 - 23.0 mg/dL 37.1    28.9    29.8     WBC 4.0 - 11.0 10e3/uL 4.1   4.4   4.4   4.0   3.7     Neutrophil %    52          Chemistries:   Recent Labs   Lab Test 05/04/23  0840   BUN 37.1*   CR 1.62*   GFRESTIMATED 48*   *     Lab Results   Component Value Date    A1C 4.7 03/23/2023     Recent Labs   Lab Test 05/01/23  0855   ALBUMIN 3.8   BILITOTAL 1.1*   ALKPHOS 70   AST 14   ALT 16     Urine Studies:  Recent Labs   Lab Test 12/20/22  0923   COLOR Yellow   APPEARANCE Clear   URINEGLC Negative   URINEBILI Negative   URINEKETONE Negative   SG 1.018   UBLD Negative   URINEPH 6.0   PROTEIN Negative   NITRITE Negative   LEUKEST Negative     No lab results found.  Hematology:   Recent Labs   Lab Test 05/04/23  0840 05/01/23  0855 04/27/23  0936   HGB 11.2* 11.4* 10.7*    199 173   WBC 4.1 4.4 4.4     Coags:   Recent Labs   Lab Test  03/26/23  0338 03/25/23  1117   INR 1.12 1.27*     HLA antibodies:   No results found for: RQ1CRQGQM, DX2RWVHIMV, AF4HYHPJT, UQ4LFLSRYU    Assessment: Cricket Mane is doing well s/p Liver Tx:  Issues we addressed during his visit include:    Plan:    1. Graft function: LFTs normalizing  2. Immunosuppression Management: No change tac 8-10 given Cr  .  Complexity of management:Low.  Contributing factors: renal insufficiency  3. High K, avoid high K foods, consider switching bactrim to pentamidine, watch kidney function  4. Cr mildly elevated, encouraged hydration, tac 8-10  Followup: 2 weeks            Aniket Arzate MD/PhD        Again, thank you for allowing me to participate in the care of your patient.        Sincerely,        Aniket Arzate MD

## 2023-05-04 NOTE — PROGRESS NOTES
Transplant Surgery Progress Note    Transplants:  3/23/2023 (Liver); Postoperative day:  42  S: overall doing quite well, no complaints, denies f/c/n/v/d, meds OK    Transplant History:    Transplant Type:  Liver Tx  Donor Type:    Transplant Date:  3/23/2023 (Liver)   Biliary Stent:  No    Acute Rejection Hx:  No    Present Maintenance Immunosuppression:  Tacrolimus and Mycophenolate mofetil    CMV IgG Ab Discordance:  No  EBV IgG Ab Discordance:  No    Transplant Coordinator: Polly Mancini     Transplant Office Phone Number: [phone]     Immunosuppressant Medications     Immunosuppressive Agents Disp Start End     mycophenolate (GENERIC EQUIVALENT) 250 MG capsule    540 capsule 4/3/2023     Sig - Route: Take 3 capsules (750 mg) by mouth 2 times daily - Oral    Class: E-Prescribe    Notes to Pharmacy: TXP DT 3/23/2023 (Liver) TXP Dischg DT 3/29/2023 DX Liver replaced by transplant Z94.4 TX Center [facility] ([place], [place])     tacrolimus (GENERIC EQUIVALENT) 1 MG capsule    720 capsule 4/25/2023     Sig - Route: Take 4 capsules (4 mg) by mouth every 12 hours - Oral    Class: E-Prescribe          Possible Immunosuppression-related side effects:   []             headache  []             vivid dreams  []             irritability  []             cognitive difficuties  []             fine tremor  []             nausea  []             diarrhea  []             neuropathy      []             edema  []             renal calcineurin toxicity  []             hyperkalemia  []             post-transplant diabetes  []             decreased appetite  []             increased appetite  []             other:  []             none    Prescription Medications as of 5/4/2023       Rx Number Disp Refills Start End Last Dispensed Date Next Fill Date Owning Pharmacy    aspirin (ASA) 325 MG tablet  90 tablet 3 4/3/2023    [facility] - [place], [place] - [address]     Sig: Take 1 tablet (325 mg) by mouth daily    Class: E-Prescribe    Route: Oral    calcium carbonate-vitamin D (CALTRATE) 600-10 MG-MCG per tablet  180 tablet 3 4/3/2023    [facility] [place] [address] [address]    Sig: Take 1 tablet by mouth 2 times daily (with meals)    Class: E-Prescribe    Route: Oral    folic acid (FOLVITE) 1 MG tablet  90 tablet 1 4/3/2023    [facility] [place] [address] [address]    Sig: Take 1 tablet (1,000 mcg) by mouth daily    Class: E-Prescribe    Route: Oral    magnesium oxide (MAG-OX) 400 MG tablet  360 tablet 3 4/11/2023    [facility] [place] [address] [address]    Sig: Take 2 tablets (800 mg) by mouth 2 times daily    Class: E-Prescribe    Route: Oral    metoprolol tartrate (LOPRESSOR) 25 MG tablet   0 5/3/2023        Sig: Take 1 tablet (25 mg) by mouth 2 times daily    Class: Historical    Notes to Pharmacy: PCP manages.    Route: Oral    Multiple Vitamins-Minerals (SPECTRAVITE) TABS    4/13/2023        Sig: Take 1 tablet by mouth daily    Class: Historical    Route: Oral    mycophenolate (GENERIC EQUIVALENT) 250 MG capsule  540 capsule 3 4/3/2023    [facility] [address] [address]    Sig: Take 3 capsules (750 mg) by mouth 2 times daily    Class: E-Prescribe    Notes to Pharmacy: TXP DT 3/23/2023 (Liver) TXP Dischg DT 3/29/2023 DX Liver replaced by transplant Z94.4 TX Center [facility] ([place], [place])    Route: Oral    omeprazole 20 MG tablet  180 tablet 3 4/3/2023    [facility] [place] [address] [address]    Sig: Take 1 tablet (20 mg) by mouth 2 times daily    Class: E-Prescribe    Route: Oral    sulfamethoxazole-trimethoprim (BACTRIM) 400-80 MG tablet  90 tablet 3 4/3/2023    [facility] [place] [address] [address]    Sig: Take 1 tablet by mouth  daily    Class: E-Prescribe    Route: Oral    tacrolimus (GENERIC EQUIVALENT) 1 MG capsule  720 capsule 3 4/25/2023    [facility] - [place], [place] - [address]     Sig: Take 4 capsules (4 mg) by mouth every 12 hours    Class: E-Prescribe    Route: Oral    valGANciclovir (VALCYTE) 450 MG tablet  90 tablet 3 4/3/2023    [facility] [address]    Sig: Take 1 tablet (450 mg) by mouth daily    Class: E-Prescribe    Route: Oral          O:   [unfilled]        Latest Ref Rng & Units 5/4/2023     8:40 AM 5/1/2023     8:55 AM 4/27/2023     9:36 AM 4/24/2023     9:10 AM 4/20/2023     8:32 AM   Transplant Immunosuppression Labs   Creat 0.67 - 1.17 mg/dL 1.62   1.58   1.44   1.72   1.56     Urea Nitrogen 8 - 22 mg/dL  29    24      Urea Nitrogen 8.0 - 23.0 mg/dL 37.1    28.9    29.8     WBC 4.0 - 11.0 10e3/uL 4.1   4.4   4.4   4.0   3.7     Neutrophil %    52          Chemistries:   Recent Labs   Lab Test 05/04/23  0840   BUN 37.1*   CR 1.62*   GFRESTIMATED 48*   *     Lab Results   Component Value Date    A1C 4.7 03/23/2023     Recent Labs   Lab Test 05/01/23  0855   ALBUMIN 3.8   BILITOTAL 1.1*   ALKPHOS 70   AST 14   ALT 16     Urine Studies:  Recent Labs   Lab Test 12/20/22  0923   COLOR Yellow   APPEARANCE Clear   URINEGLC Negative   URINEBILI Negative   URINEKETONE Negative   SG 1.018   UBLD Negative   URINEPH 6.0   PROTEIN Negative   NITRITE Negative   LEUKEST Negative     No lab results found.  Hematology:   Recent Labs   Lab Test 05/04/23  0840 05/01/23  0855 04/27/23  0936   HGB 11.2* 11.4* 10.7*    199 173   WBC 4.1 4.4 4.4     Coags:   Recent Labs   Lab Test 03/26/23  0338 03/25/23  1117   INR 1.12 1.27*     HLA antibodies:   No results found for: RB8FYDLQQ, TO8MGMQHGJ, WA5ZWNWSS, WY8PSAXVGM    Assessment: Cricket Mane is doing well s/p Liver Tx:  Issues we addressed during his visit include:    Plan:    1. Graft  function: LFTs normalizing  2. Immunosuppression Management: No change tac 8-10 given Cr  .  Complexity of management:Low.  Contributing factors: renal insufficiency  3. High K, avoid high K foods, consider switching bactrim to pentamidine, watch kidney function  4. Cr mildly elevated, encouraged hydration, tac 8-10  Followup: 2 weeks            Aniket Arzate MD/PhD

## 2023-05-08 ENCOUNTER — LAB (OUTPATIENT)
Dept: LAB | Facility: CLINIC | Age: 63
End: 2023-05-08
Payer: COMMERCIAL

## 2023-05-08 ENCOUNTER — TELEPHONE (OUTPATIENT)
Dept: TRANSPLANT | Facility: CLINIC | Age: 63
End: 2023-05-08

## 2023-05-08 DIAGNOSIS — M81.0 OSTEOPOROSIS WITHOUT CURRENT PATHOLOGICAL FRACTURE, UNSPECIFIED OSTEOPOROSIS TYPE: ICD-10-CM

## 2023-05-08 DIAGNOSIS — Z94.4 LIVER REPLACED BY TRANSPLANT (H): Primary | ICD-10-CM

## 2023-05-08 LAB
ALBUMIN SERPL-MCNC: 4 G/DL (ref 3.5–5)
ALP SERPL-CCNC: 66 U/L (ref 45–120)
ALT SERPL W P-5'-P-CCNC: 28 U/L (ref 0–45)
ANION GAP SERPL CALCULATED.3IONS-SCNC: 11 MMOL/L (ref 5–18)
AST SERPL W P-5'-P-CCNC: 20 U/L (ref 0–40)
BILIRUB DIRECT SERPL-MCNC: 0.4 MG/DL
BILIRUB SERPL-MCNC: 1.3 MG/DL (ref 0–1)
BUN SERPL-MCNC: 33 MG/DL (ref 8–22)
CALCIUM SERPL-MCNC: 9.8 MG/DL (ref 8.5–10.5)
CHLORIDE BLD-SCNC: 107 MMOL/L (ref 98–107)
CO2 SERPL-SCNC: 22 MMOL/L (ref 22–31)
CREAT SERPL-MCNC: 1.59 MG/DL (ref 0.7–1.3)
ERYTHROCYTE [DISTWIDTH] IN BLOOD BY AUTOMATED COUNT: 13.7 % (ref 10–15)
GFR SERPL CREATININE-BSD FRML MDRD: 49 ML/MIN/1.73M2
GLUCOSE BLD-MCNC: 119 MG/DL (ref 70–125)
HCT VFR BLD AUTO: 36.5 % (ref 40–53)
HGB BLD-MCNC: 11.8 G/DL (ref 13.3–17.7)
MAGNESIUM SERPL-MCNC: 1.5 MG/DL (ref 1.8–2.6)
MCH RBC QN AUTO: 28.9 PG (ref 26.5–33)
MCHC RBC AUTO-ENTMCNC: 32.3 G/DL (ref 31.5–36.5)
MCV RBC AUTO: 90 FL (ref 78–100)
PHOSPHATE SERPL-MCNC: 3.6 MG/DL (ref 2.5–4.5)
PLATELET # BLD AUTO: 168 10E3/UL (ref 150–450)
POTASSIUM BLD-SCNC: 4.9 MMOL/L (ref 3.5–5)
PROT SERPL-MCNC: 7.9 G/DL (ref 6–8)
RBC # BLD AUTO: 4.08 10E6/UL (ref 4.4–5.9)
SODIUM SERPL-SCNC: 140 MMOL/L (ref 136–145)
TACROLIMUS BLD-MCNC: 10.1 UG/L (ref 5–15)
TME LAST DOSE: NORMAL H
TME LAST DOSE: NORMAL H
WBC # BLD AUTO: 3.5 10E3/UL (ref 4–11)

## 2023-05-08 PROCEDURE — 82248 BILIRUBIN DIRECT: CPT

## 2023-05-08 PROCEDURE — 86364 TISS TRNSGLTMNASE EA IG CLAS: CPT

## 2023-05-08 PROCEDURE — 83735 ASSAY OF MAGNESIUM: CPT

## 2023-05-08 PROCEDURE — 84100 ASSAY OF PHOSPHORUS: CPT

## 2023-05-08 PROCEDURE — 84155 ASSAY OF PROTEIN SERUM: CPT

## 2023-05-08 PROCEDURE — 82306 VITAMIN D 25 HYDROXY: CPT

## 2023-05-08 PROCEDURE — 85027 COMPLETE CBC AUTOMATED: CPT

## 2023-05-08 PROCEDURE — 80053 COMPREHEN METABOLIC PANEL: CPT

## 2023-05-08 PROCEDURE — 83970 ASSAY OF PARATHORMONE: CPT

## 2023-05-08 PROCEDURE — 80197 ASSAY OF TACROLIMUS: CPT

## 2023-05-08 PROCEDURE — 36415 COLL VENOUS BLD VENIPUNCTURE: CPT

## 2023-05-08 NOTE — TELEPHONE ENCOUNTER
Called patient back to discuss questions from last week's appt with Dr. Arzate    -decreasing visits to every other week per Dr. Arzate,patient wanting to know if 5/25 appointment is too far out. Will refer to NP for visit on 5/18.  -Lab appointments every Monday and Thursday. Will have no further home care after next week, needs to find lab for draws. Directed to a [facility] lab ([facility] per patient) to ensure lab results are received in a timely manner until 90 days post op.    Patient reports he is otherwise feeling ok, increasing activity, and not reporting any new symptoms.

## 2023-05-08 NOTE — TELEPHONE ENCOUNTER
Patient Call: General  Route to LPN    Reason for call: called in regards of having follow up questions from last week's appointment with Dr. Arzate. Call back number is [phone].     Call back needed? Yes    Return Call Needed  Same as documented in contacts section  When to return call?: Same day: Route High Priority

## 2023-05-10 ENCOUNTER — VIRTUAL VISIT (OUTPATIENT)
Dept: ENDOCRINOLOGY | Facility: CLINIC | Age: 63
End: 2023-05-10
Attending: INTERNAL MEDICINE
Payer: COMMERCIAL

## 2023-05-10 ENCOUNTER — PRE VISIT (OUTPATIENT)
Dept: ENDOCRINOLOGY | Facility: CLINIC | Age: 63
End: 2023-05-10

## 2023-05-10 ENCOUNTER — TELEPHONE (OUTPATIENT)
Dept: ENDOCRINOLOGY | Facility: CLINIC | Age: 63
End: 2023-05-10
Payer: COMMERCIAL

## 2023-05-10 DIAGNOSIS — M81.0 OSTEOPOROSIS WITHOUT CURRENT PATHOLOGICAL FRACTURE, UNSPECIFIED OSTEOPOROSIS TYPE: Primary | ICD-10-CM

## 2023-05-10 LAB — PTH-INTACT SERPL-MCNC: 25 PG/ML (ref 15–65)

## 2023-05-10 PROCEDURE — 99204 OFFICE O/P NEW MOD 45 MIN: CPT | Mod: VID | Performed by: INTERNAL MEDICINE

## 2023-05-10 NOTE — TELEPHONE ENCOUNTER
Provider: Jose Francisco Garcia MD Department: [facility] DIABETES     Visit Disposition    Dispositions Check-out Note   0 Return in about 14 weeks (around 2023). Please schedule at [facility] spot on 23 or 23     Writer contacted patient to schedule follow up per check out notes above from provider. Patient's name and  verified during call to ensure right patient.  Patient scheduled appointment with writer. Patient requests virtual visit at this time, so scheduled per patient request. Writer will route encounter to provider in case this does not work and the visit needs to be in person.    Patient verbalized that he is ok doing an in person visit for his follow up, but would prefer for it to be virtual.  Encounter routed to provider clarify this. Patient did not verbalize any further questions at the time of call.  Sade JONES Virtual Visit Facilitator 1:25 PM May 10, 2023

## 2023-05-10 NOTE — PROGRESS NOTES
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    Osteoporosis without current fracture  I have personally reviewed bone density from 1/4/2023 and agree with the interpretation of lumbar spine L1-L4 T score -3 and at the right femoral neck -1.8 and that the right total hip -2.  Primary risk factors for osteoporosis are age, alcohol use, hx of liver disease, organ transplant & steroid exposure. In order to assess additional secondary causes, we are checking the following labs.  We will screen for hypogonadism down the line [currently few days postsurgery].  Orders Placed This Encounter   Procedures     Vitamin D Deficiency (D3 Only)     Parathyroid Hormone Intact     Calcium timed urine     Creatinine timed urine     Cortisol free urine     TSH with free T4 reflex     Tissue transglutaminase beth IgA and IgG     Protein electrophoresis     Once work-up for secondary causes are completed; we will order bone specific therapy.  Options of therapy discussed.  Anabolic therapy with romosozumab or treatment with Prolia would be reasonable.  Further plan based on results.  In the meantime, calcium 1200 mg daily from all sources, vitamin D 1000 IU daily, strengthening exercises and fall prevention.    Jose Francisco Garcia MD  Staff Endocrinologist    Division of Endocrinology and Diabetes      Subjective:         HPI: Cricket Mane is a 62 year old male with history of alcohol associated liver cirrhosis now s/p Liver transplantation ~ 50 days ago. DEXA scanning was done as part of pre transplant evaluation at which time was incidentally found to have osteoporosis. Denies any history of fractures except a traumatic fracture  at age 12. Denies noticing decrease in height. Never smoker. Quit alcohol use 1 year ago before which patient has significant alcohol intake. Denies chronic opioid use. Reports steroid exposure in the nicole transplant period.    Denies family hx of osteoporosis. Reports consuming 1.2g Ca supplement daily in addition to yogurt and cheese consumption.        No Known Allergies    Current Outpatient Medications   Medication Sig Dispense Refill     aspirin (ASA) 325 MG tablet Take 1 tablet (325 mg) by mouth daily 90 tablet 3     calcium carbonate-vitamin D (CALTRATE) 600-10 MG-MCG per tablet Take 1 tablet by mouth 2 times daily (with meals) 180 tablet 3     folic acid (FOLVITE) 1 MG tablet Take 1 tablet (1,000 mcg) by mouth daily 90 tablet 1     magnesium oxide (MAG-OX) 400 MG tablet Take 2 tablets (800 mg) by mouth 2 times daily 360 tablet 3     metoprolol tartrate (LOPRESSOR) 25 MG tablet Take 1 tablet (25 mg) by mouth 2 times daily  0     Multiple Vitamins-Minerals (SPECTRAVITE) TABS Take 1 tablet by mouth daily       mycophenolate (GENERIC EQUIVALENT) 250 MG capsule Take 3 capsules (750 mg) by mouth 2 times daily 540 capsule 3     omeprazole 20 MG tablet Take 1 tablet (20 mg) by mouth 2 times daily 180 tablet 3     sulfamethoxazole-trimethoprim (BACTRIM) 400-80 MG tablet Take 1 tablet by mouth daily 90 tablet 3     tacrolimus (GENERIC EQUIVALENT) 1 MG capsule Take 4 capsules (4 mg) by mouth every 12 hours 720 capsule 3     valGANciclovir (VALCYTE) 450 MG tablet Take 1 tablet (450 mg) by mouth daily 90 tablet 3       Review of Systems     ROS negative outside of mentioned above.   Pertinent past medical history, past surgical history, social and family history reviewed.      Objective:   GENERAL: Healthy, alert and no distress  EYES: Eyes grossly normal to inspection.    PSYCH: Mentation appears normal, affect normal/bright, judgement and insight intact, normal speech and appearance well-groomed.  In House Labs:   Lab  Results   Component Value Date    A1C 4.7 03/23/2023       TSH   Date Value Ref Range Status   12/20/2022 3.43 0.30 - 4.20 uIU/mL Final       Creatinine   Date Value Ref Range Status   05/08/2023 1.59 (H) 0.70 - 1.30 mg/dL Final   ]    01/2023 DEXA  Results   Lumbar spine     L1-4      T-score -3.0                    BMD 0.865 g/cm 2.     Right Femur neck           T-score -1.8                    BMD 0.842 g/cm 2.  Right Total hip                T-score -2.0                    BMD 0.814 g/cm 2      Video-Visit Details    Type of service:  Video Visit    Video Start Time: 10:22 am  Video End Time: 10:43  Distant Location (provider location):  Off-site.     Platform used for Video Visit: [employer]  {  This note has been dictated using voice recognition software.  As a result, there may be errors in the documentation that have gone undetected.  Please consider this when interpreting information in this documentation.

## 2023-05-10 NOTE — LETTER
5/10/2023       RE: Cricket Mane  [address]  [facility] [address]     Dear Colleague,    Thank you for referring your patient, Cricket Mane, to the [facility] ENDOCRINOLOGY CLINIC [place] at [facility]. Please see a copy of my visit note below.    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    Osteoporosis without current fracture  I have personally reviewed bone density from 1/4/2023 and agree with the interpretation of lumbar spine L1-L4 T score -3 and at the right femoral neck -1.8 and that the right total hip -2.  Primary risk factors for osteoporosis are age, alcohol use, hx of liver disease, organ transplant & steroid exposure. In order to assess additional secondary causes, we are checking the following labs.  We will screen for hypogonadism down the line [currently few days postsurgery].  Orders Placed This Encounter   Procedures    Vitamin D Deficiency (D3 Only)    Parathyroid Hormone Intact    Calcium timed urine    Creatinine timed urine    Cortisol free urine    TSH with free T4 reflex    Tissue transglutaminase beth IgA and IgG    Protein electrophoresis     Once work-up for secondary causes are completed; we will order bone specific therapy.  Options of therapy discussed.  Anabolic therapy with romosozumab or treatment with Prolia would be reasonable.  Further plan based on results.  In the meantime, calcium 1200 mg daily from all sources, vitamin D 1000 IU daily, strengthening exercises and fall prevention.    Jose Francisco Garcia MD  Staff Endocrinologist    Division of Endocrinology and Diabetes      Subjective:         HPI:  Cricket Mane is a 62 year old male with history of alcohol associated liver cirrhosis now s/p Liver transplantation ~ 50 days ago. DEXA scanning was done as part of pre transplant evaluation at which time was incidentally found to have osteoporosis. Denies any history of fractures except a traumatic fracture at age 12. Denies noticing decrease in height. Never smoker. Quit alcohol use 1 year ago before which patient has significant alcohol intake. Denies chronic opioid use. Reports steroid exposure in the nicole transplant period.    Denies family hx of osteoporosis. Reports consuming 1.2g Ca supplement daily in addition to yogurt and cheese consumption.        No Known Allergies    Current Outpatient Medications   Medication Sig Dispense Refill    aspirin (ASA) 325 MG tablet Take 1 tablet (325 mg) by mouth daily 90 tablet 3    calcium carbonate-vitamin D (CALTRATE) 600-10 MG-MCG per tablet Take 1 tablet by mouth 2 times daily (with meals) 180 tablet 3    folic acid (FOLVITE) 1 MG tablet Take 1 tablet (1,000 mcg) by mouth daily 90 tablet 1    magnesium oxide (MAG-OX) 400 MG tablet Take 2 tablets (800 mg) by mouth 2 times daily 360 tablet 3    metoprolol tartrate (LOPRESSOR) 25 MG tablet Take 1 tablet (25 mg) by mouth 2 times daily  0    Multiple Vitamins-Minerals (SPECTRAVITE) TABS Take 1 tablet by mouth daily      mycophenolate (GENERIC EQUIVALENT) 250 MG capsule Take 3 capsules (750 mg) by mouth 2 times daily 540 capsule 3    omeprazole 20 MG tablet Take 1 tablet (20 mg) by mouth 2 times daily 180 tablet 3    sulfamethoxazole-trimethoprim (BACTRIM) 400-80 MG tablet Take 1 tablet by mouth daily 90 tablet 3    tacrolimus (GENERIC EQUIVALENT) 1 MG capsule Take 4 capsules (4 mg) by mouth every 12 hours 720 capsule 3    valGANciclovir (VALCYTE) 450 MG tablet Take 1 tablet (450 mg) by mouth daily 90 tablet 3       Review of Systems     ROS negative outside of mentioned above.   Pertinent past medical history, past  surgical history, social and family history reviewed.      Objective:   GENERAL: Healthy, alert and no distress  EYES: Eyes grossly normal to inspection.    PSYCH: Mentation appears normal, affect normal/bright, judgement and insight intact, normal speech and appearance well-groomed.  In House Labs:   Lab Results   Component Value Date    A1C 4.7 03/23/2023       TSH   Date Value Ref Range Status   12/20/2022 3.43 0.30 - 4.20 uIU/mL Final       Creatinine   Date Value Ref Range Status   05/08/2023 1.59 (H) 0.70 - 1.30 mg/dL Final   ]    01/2023 DEXA  Results   Lumbar spine     L1-4      T-score -3.0                    BMD 0.865 g/cm 2.     Right Femur neck           T-score -1.8                    BMD 0.842 g/cm 2.  Right Total hip                T-score -2.0                    BMD 0.814 g/cm 2      Video-Visit Details    Type of service:  Video Visit    Video Start Time: 10:22 am  Video End Time: 10:43  Distant Location (provider location):  Off-site.     Platform used for Video Visit: [employer]  {  This note has been dictated using voice recognition software.  As a result, there may be errors in the documentation that have gone undetected.  Please consider this when interpreting information in this documentation.

## 2023-05-10 NOTE — PATIENT INSTRUCTIONS
It was a pleasure meeting you at clinic today. We believe that your liver disease and prior alcohol use are some of the key risk factors for osteoporosis. We commend your decision to quit alcohol. We will also do a broad work up to identify other factors that might contribute to low bone density.     - Calcium intake - you meet your daily Calcium needs through diet and supplement medication. Please continue current calcium rich diet and take daily supplement.   - Vitamin D : We will measure your levels in the blood and recommend supplements based on this.   - Blood work and urine labs have been ordered. Some of this blood work has been added to prior blood work.  Urine labs require you to collect urine for 24 hrs. A jug will be provided when you present to lab tomorrow. They will provide you with collection instructions and urine storage instructions. Please make sure to follow this and remember to bring the urine back to laboratory.   - Medication: Once above lab work is complete we will talk to you about medications to manage this.      We will continue to follow your care and update you as results emerge.     Harry Garcia MD

## 2023-05-10 NOTE — NURSING NOTE
Is the patient currently in the state of [place]? YES    Visit mode:VIDEO    If the visit is dropped, the patient can be reconnected by: VIDEO VISIT: Text to cell phone: [phone]    Will anyone else be joining the visit? NO      How would you like to obtain your AVS? MyChart    Are changes needed to the allergy or medication list? Patient declined reviewing medication list/allergy list with [facility] for visit today. Patient notes nothing has changed since it was last reviewed in Epic.    Reason for visit: Video Visit (New Patient )

## 2023-05-11 ENCOUNTER — LAB (OUTPATIENT)
Dept: LAB | Facility: CLINIC | Age: 63
End: 2023-05-11
Payer: COMMERCIAL

## 2023-05-11 ENCOUNTER — TELEPHONE (OUTPATIENT)
Dept: TRANSPLANT | Facility: CLINIC | Age: 63
End: 2023-05-11

## 2023-05-11 DIAGNOSIS — K70.30 ALCOHOLIC CIRRHOSIS (H): ICD-10-CM

## 2023-05-11 DIAGNOSIS — Z94.4 S/P LIVER TRANSPLANT (H): ICD-10-CM

## 2023-05-11 DIAGNOSIS — M81.0 OSTEOPOROSIS WITHOUT CURRENT PATHOLOGICAL FRACTURE, UNSPECIFIED OSTEOPOROSIS TYPE: ICD-10-CM

## 2023-05-11 DIAGNOSIS — Z94.4 LIVER REPLACED BY TRANSPLANT (H): ICD-10-CM

## 2023-05-11 DIAGNOSIS — Z94.4 LIVER REPLACED BY TRANSPLANT (H): Primary | ICD-10-CM

## 2023-05-11 DIAGNOSIS — D70.9 NEUTROPENIA (H): Primary | ICD-10-CM

## 2023-05-11 LAB
ALBUMIN SERPL BCG-MCNC: 4.3 G/DL (ref 3.5–5.2)
ALP SERPL-CCNC: 71 U/L (ref 40–129)
ALT SERPL W P-5'-P-CCNC: 33 U/L (ref 10–50)
ANION GAP SERPL CALCULATED.3IONS-SCNC: 14 MMOL/L (ref 7–15)
AST SERPL W P-5'-P-CCNC: 22 U/L (ref 10–50)
BASOPHILS # BLD AUTO: 0 10E3/UL (ref 0–0.2)
BASOPHILS NFR BLD AUTO: 2 %
BILIRUB DIRECT SERPL-MCNC: 0.28 MG/DL (ref 0–0.3)
BILIRUB SERPL-MCNC: 0.7 MG/DL
BUN SERPL-MCNC: 41.2 MG/DL (ref 8–23)
CALCIUM SERPL-MCNC: 9.9 MG/DL (ref 8.8–10.2)
CHLORIDE SERPL-SCNC: 106 MMOL/L (ref 98–107)
CREAT SERPL-MCNC: 1.65 MG/DL (ref 0.67–1.17)
DEPRECATED CALCIDIOL+CALCIFEROL SERPL-MC: 46 UG/L (ref 20–75)
DEPRECATED HCO3 PLAS-SCNC: 21 MMOL/L (ref 22–29)
EOSINOPHIL # BLD AUTO: 0.2 10E3/UL (ref 0–0.7)
EOSINOPHIL NFR BLD AUTO: 9 %
ERYTHROCYTE [DISTWIDTH] IN BLOOD BY AUTOMATED COUNT: 13.3 % (ref 10–15)
ERYTHROCYTE [DISTWIDTH] IN BLOOD BY AUTOMATED COUNT: 13.3 % (ref 10–15)
GFR SERPL CREATININE-BSD FRML MDRD: 47 ML/MIN/1.73M2
GLUCOSE SERPL-MCNC: 125 MG/DL (ref 70–99)
HCT VFR BLD AUTO: 34.5 % (ref 40–53)
HCT VFR BLD AUTO: 34.6 % (ref 40–53)
HGB BLD-MCNC: 11.2 G/DL (ref 13.3–17.7)
HGB BLD-MCNC: 11.4 G/DL (ref 13.3–17.7)
IMM GRANULOCYTES # BLD: 0 10E3/UL
IMM GRANULOCYTES NFR BLD: 1 %
INR PPP: 1.04 (ref 0.85–1.15)
LYMPHOCYTES # BLD AUTO: 0.9 10E3/UL (ref 0.8–5.3)
LYMPHOCYTES NFR BLD AUTO: 46 %
MAGNESIUM SERPL-MCNC: 1.6 MG/DL (ref 1.7–2.3)
MCH RBC QN AUTO: 28.8 PG (ref 26.5–33)
MCH RBC QN AUTO: 29.5 PG (ref 26.5–33)
MCHC RBC AUTO-ENTMCNC: 32.4 G/DL (ref 31.5–36.5)
MCHC RBC AUTO-ENTMCNC: 33 G/DL (ref 31.5–36.5)
MCV RBC AUTO: 89 FL (ref 78–100)
MCV RBC AUTO: 89 FL (ref 78–100)
MONOCYTES # BLD AUTO: 0.3 10E3/UL (ref 0–1.3)
MONOCYTES NFR BLD AUTO: 13 %
NEUTROPHILS # BLD AUTO: 0.6 10E3/UL (ref 1.6–8.3)
NEUTROPHILS NFR BLD AUTO: 30 %
PHOSPHATE SERPL-MCNC: 4.3 MG/DL (ref 2.5–4.5)
PLATELET # BLD AUTO: 150 10E3/UL (ref 150–450)
PLATELET # BLD AUTO: 153 10E3/UL (ref 150–450)
POTASSIUM SERPL-SCNC: 5.3 MMOL/L (ref 3.4–5.3)
PROT SERPL-MCNC: 7.3 G/DL (ref 6.4–8.3)
RBC # BLD AUTO: 3.87 10E6/UL (ref 4.4–5.9)
RBC # BLD AUTO: 3.89 10E6/UL (ref 4.4–5.9)
SODIUM SERPL-SCNC: 141 MMOL/L (ref 136–145)
TACROLIMUS BLD-MCNC: 10.5 UG/L (ref 5–15)
TME LAST DOSE: NORMAL H
TME LAST DOSE: NORMAL H
TOTAL PROTEIN SERUM FOR ELP: 7.4 G/DL (ref 6.4–8.3)
TSH SERPL DL<=0.005 MIU/L-ACNC: 2.91 UIU/ML (ref 0.3–4.2)
WBC # BLD AUTO: 2 10E3/UL (ref 4–11)
WBC # BLD AUTO: 2.1 10E3/UL (ref 4–11)

## 2023-05-11 PROCEDURE — 85610 PROTHROMBIN TIME: CPT

## 2023-05-11 PROCEDURE — 36415 COLL VENOUS BLD VENIPUNCTURE: CPT

## 2023-05-11 PROCEDURE — 84165 PROTEIN E-PHORESIS SERUM: CPT

## 2023-05-11 PROCEDURE — 84100 ASSAY OF PHOSPHORUS: CPT

## 2023-05-11 PROCEDURE — 82248 BILIRUBIN DIRECT: CPT

## 2023-05-11 PROCEDURE — 83735 ASSAY OF MAGNESIUM: CPT

## 2023-05-11 PROCEDURE — 80197 ASSAY OF TACROLIMUS: CPT

## 2023-05-11 PROCEDURE — 80050 GENERAL HEALTH PANEL: CPT

## 2023-05-11 RX ORDER — MYCOPHENOLATE MOFETIL 250 MG/1
500 CAPSULE ORAL 2 TIMES DAILY
Qty: 540 CAPSULE | Refills: 3 | Status: SHIPPED | OUTPATIENT
Start: 2023-05-11 | End: 2023-06-23

## 2023-05-11 NOTE — TELEPHONE ENCOUNTER
Patient called to adjust MMF dose from 750mg BID to 500mg BID.     Patient verbalized understanding, will have follow up labs on Monday.

## 2023-05-12 ENCOUNTER — TELEPHONE (OUTPATIENT)
Dept: TRANSPLANT | Facility: CLINIC | Age: 63
End: 2023-05-12
Payer: COMMERCIAL

## 2023-05-12 DIAGNOSIS — Z94.4 S/P LIVER TRANSPLANT (H): ICD-10-CM

## 2023-05-12 LAB
ALBUMIN SERPL ELPH-MCNC: 4.2 G/DL (ref 3.7–5.1)
ALPHA1 GLOB SERPL ELPH-MCNC: 0.3 G/DL (ref 0.2–0.4)
ALPHA2 GLOB SERPL ELPH-MCNC: 0.8 G/DL (ref 0.5–0.9)
B-GLOBULIN SERPL ELPH-MCNC: 0.9 G/DL (ref 0.6–1)
GAMMA GLOB SERPL ELPH-MCNC: 1.3 G/DL (ref 0.7–1.6)
M PROTEIN SERPL ELPH-MCNC: 0 G/DL
PROT PATTERN SERPL ELPH-IMP: NORMAL

## 2023-05-12 RX ORDER — TACROLIMUS 1 MG/1
CAPSULE ORAL
Qty: 630 CAPSULE | Refills: 3 | Status: SHIPPED | OUTPATIENT
Start: 2023-05-12 | End: 2023-05-19

## 2023-05-12 NOTE — TELEPHONE ENCOUNTER
ISSUE:   Tacrolimus IR level 10.5 on 5/10, goal 8-10, dose 4 mg BID.    PLAN:   Please call patient and confirm this was an accurate 12-hour trough. Verify Tacrolimus IR dose 4 mg BID. Confirm no new medications or illness. Confirm no missed doses. If accurate trough and accurate dose, decrease Tacrolimus IR dose to 4 mg in the am and 3 mg in the pm and repeat labs in 1 weeks      Reyna Leyva RN on 5/12/2023 at 10:21 AM      OUTCOME:   Spoke with patient, they confirm accurate trough level and current dose 4 mg BID. Patient confirmed dose change to 4 mg am, 3 mg pm and to repeat labs in 3 days. Orders sent to preferred pharmacy for dose change and lab for repeat labs. Patient voiced understanding of plan.     Ree Burleson LPN

## 2023-05-15 ENCOUNTER — LAB (OUTPATIENT)
Dept: LAB | Facility: CLINIC | Age: 63
End: 2023-05-15
Payer: COMMERCIAL

## 2023-05-15 DIAGNOSIS — Z94.4 LIVER REPLACED BY TRANSPLANT (H): ICD-10-CM

## 2023-05-15 LAB
ALBUMIN SERPL-MCNC: 3.8 G/DL (ref 3.5–5)
ALP SERPL-CCNC: 59 U/L (ref 45–120)
ALT SERPL W P-5'-P-CCNC: 28 U/L (ref 0–45)
ANION GAP SERPL CALCULATED.3IONS-SCNC: 10 MMOL/L (ref 5–18)
AST SERPL W P-5'-P-CCNC: 17 U/L (ref 0–40)
BILIRUB DIRECT SERPL-MCNC: 0.3 MG/DL
BILIRUB SERPL-MCNC: 1 MG/DL (ref 0–1)
BUN SERPL-MCNC: 36 MG/DL (ref 8–22)
CALCIUM SERPL-MCNC: 9.7 MG/DL (ref 8.5–10.5)
CHLORIDE BLD-SCNC: 105 MMOL/L (ref 98–107)
CO2 SERPL-SCNC: 23 MMOL/L (ref 22–31)
CREAT SERPL-MCNC: 1.74 MG/DL (ref 0.7–1.3)
ERYTHROCYTE [DISTWIDTH] IN BLOOD BY AUTOMATED COUNT: 13.2 % (ref 10–15)
GFR SERPL CREATININE-BSD FRML MDRD: 44 ML/MIN/1.73M2
GLUCOSE BLD-MCNC: 140 MG/DL (ref 70–125)
HCT VFR BLD AUTO: 37.2 % (ref 40–53)
HGB BLD-MCNC: 12 G/DL (ref 13.3–17.7)
MAGNESIUM SERPL-MCNC: 1.4 MG/DL (ref 1.8–2.6)
MCH RBC QN AUTO: 28.5 PG (ref 26.5–33)
MCHC RBC AUTO-ENTMCNC: 32.3 G/DL (ref 31.5–36.5)
MCV RBC AUTO: 88 FL (ref 78–100)
PHOSPHATE SERPL-MCNC: 3.7 MG/DL (ref 2.5–4.5)
PLATELET # BLD AUTO: 168 10E3/UL (ref 150–450)
POTASSIUM BLD-SCNC: 4.9 MMOL/L (ref 3.5–5)
PROT SERPL-MCNC: 7.5 G/DL (ref 6–8)
RBC # BLD AUTO: 4.21 10E6/UL (ref 4.4–5.9)
SODIUM SERPL-SCNC: 138 MMOL/L (ref 136–145)
TACROLIMUS BLD-MCNC: 9.6 UG/L (ref 5–15)
TME LAST DOSE: NORMAL H
TME LAST DOSE: NORMAL H
WBC # BLD AUTO: 1.7 10E3/UL (ref 4–11)

## 2023-05-15 PROCEDURE — 80197 ASSAY OF TACROLIMUS: CPT

## 2023-05-15 PROCEDURE — 82310 ASSAY OF CALCIUM: CPT

## 2023-05-15 PROCEDURE — 83735 ASSAY OF MAGNESIUM: CPT

## 2023-05-15 PROCEDURE — 85027 COMPLETE CBC AUTOMATED: CPT

## 2023-05-15 PROCEDURE — 84100 ASSAY OF PHOSPHORUS: CPT

## 2023-05-15 PROCEDURE — 80053 COMPREHEN METABOLIC PANEL: CPT

## 2023-05-15 PROCEDURE — 82248 BILIRUBIN DIRECT: CPT

## 2023-05-15 PROCEDURE — 36415 COLL VENOUS BLD VENIPUNCTURE: CPT

## 2023-05-16 ENCOUNTER — APPOINTMENT (OUTPATIENT)
Dept: LAB | Facility: CLINIC | Age: 63
End: 2023-05-16
Payer: COMMERCIAL

## 2023-05-16 LAB
TTG IGA SER-ACNC: 1.9 U/ML
TTG IGG SER-ACNC: 1.4 U/ML

## 2023-05-16 PROCEDURE — 82340 ASSAY OF CALCIUM IN URINE: CPT

## 2023-05-16 PROCEDURE — 81050 URINALYSIS VOLUME MEASURE: CPT

## 2023-05-16 PROCEDURE — 82530 CORTISOL FREE: CPT | Mod: 90

## 2023-05-16 PROCEDURE — 99000 SPECIMEN HANDLING OFFICE-LAB: CPT

## 2023-05-16 PROCEDURE — 82570 ASSAY OF URINE CREATININE: CPT

## 2023-05-17 ENCOUNTER — APPOINTMENT (OUTPATIENT)
Dept: LAB | Facility: CLINIC | Age: 63
End: 2023-05-17
Payer: COMMERCIAL

## 2023-05-17 LAB
CALCIUM 24H UR-MRATE: 0.09 G/SPEC (ref 0.1–0.3)
CALCIUM UR-MCNC: 6 MG/DL
COLLECT DURATION TIME UR: 24 H
COLLECT DURATION TIME UR: 24 H
CREAT 24H UR-MRATE: 1.3 G/(24.H) (ref 0.98–2.2)
CREAT UR-MCNC: 86.6 MG/DL
SPECIMEN VOL UR: 1500 ML
SPECIMEN VOL UR: 1500 ML

## 2023-05-18 ENCOUNTER — TELEPHONE (OUTPATIENT)
Dept: TRANSPLANT | Facility: CLINIC | Age: 63
End: 2023-05-18

## 2023-05-18 ENCOUNTER — LAB (OUTPATIENT)
Dept: LAB | Facility: CLINIC | Age: 63
End: 2023-05-18
Payer: COMMERCIAL

## 2023-05-18 DIAGNOSIS — Z94.4 S/P LIVER TRANSPLANT (H): ICD-10-CM

## 2023-05-18 DIAGNOSIS — Z94.4 LIVER REPLACED BY TRANSPLANT (H): Primary | ICD-10-CM

## 2023-05-18 DIAGNOSIS — K70.30 ALCOHOLIC CIRRHOSIS (H): ICD-10-CM

## 2023-05-18 DIAGNOSIS — Z94.4 LIVER REPLACED BY TRANSPLANT (H): ICD-10-CM

## 2023-05-18 LAB
ALBUMIN SERPL BCG-MCNC: 4.3 G/DL (ref 3.5–5.2)
ALP SERPL-CCNC: 67 U/L (ref 40–129)
ALT SERPL W P-5'-P-CCNC: 34 U/L (ref 10–50)
ANION GAP SERPL CALCULATED.3IONS-SCNC: 13 MMOL/L (ref 7–15)
AST SERPL W P-5'-P-CCNC: 23 U/L (ref 10–50)
BILIRUB DIRECT SERPL-MCNC: 0.21 MG/DL (ref 0–0.3)
BILIRUB SERPL-MCNC: 0.6 MG/DL
BUN SERPL-MCNC: 32.3 MG/DL (ref 8–23)
CALCIUM SERPL-MCNC: 9.9 MG/DL (ref 8.8–10.2)
CHLORIDE SERPL-SCNC: 107 MMOL/L (ref 98–107)
CREAT SERPL-MCNC: 1.64 MG/DL (ref 0.67–1.17)
DEPRECATED HCO3 PLAS-SCNC: 21 MMOL/L (ref 22–29)
ERYTHROCYTE [DISTWIDTH] IN BLOOD BY AUTOMATED COUNT: 13 % (ref 10–15)
GFR SERPL CREATININE-BSD FRML MDRD: 48 ML/MIN/1.73M2
GLUCOSE SERPL-MCNC: 118 MG/DL (ref 70–99)
HCT VFR BLD AUTO: 37.4 % (ref 40–53)
HGB BLD-MCNC: 12.1 G/DL (ref 13.3–17.7)
MAGNESIUM SERPL-MCNC: 1.4 MG/DL (ref 1.7–2.3)
MCH RBC QN AUTO: 28 PG (ref 26.5–33)
MCHC RBC AUTO-ENTMCNC: 32.4 G/DL (ref 31.5–36.5)
MCV RBC AUTO: 87 FL (ref 78–100)
PHOSPHATE SERPL-MCNC: 4.1 MG/DL (ref 2.5–4.5)
PLATELET # BLD AUTO: 165 10E3/UL (ref 150–450)
POTASSIUM SERPL-SCNC: 5.1 MMOL/L (ref 3.4–5.3)
PROT SERPL-MCNC: 7.6 G/DL (ref 6.4–8.3)
RBC # BLD AUTO: 4.32 10E6/UL (ref 4.4–5.9)
SODIUM SERPL-SCNC: 141 MMOL/L (ref 136–145)
TACROLIMUS BLD-MCNC: 10.1 UG/L (ref 5–15)
TME LAST DOSE: NORMAL H
TME LAST DOSE: NORMAL H
WBC # BLD AUTO: 1.9 10E3/UL (ref 4–11)

## 2023-05-18 PROCEDURE — 85027 COMPLETE CBC AUTOMATED: CPT

## 2023-05-18 PROCEDURE — 83735 ASSAY OF MAGNESIUM: CPT

## 2023-05-18 PROCEDURE — 84100 ASSAY OF PHOSPHORUS: CPT

## 2023-05-18 PROCEDURE — 82248 BILIRUBIN DIRECT: CPT

## 2023-05-18 PROCEDURE — 80197 ASSAY OF TACROLIMUS: CPT

## 2023-05-18 PROCEDURE — 36415 COLL VENOUS BLD VENIPUNCTURE: CPT

## 2023-05-18 PROCEDURE — 80053 COMPREHEN METABOLIC PANEL: CPT

## 2023-05-18 NOTE — TELEPHONE ENCOUNTER
Called Cricket RE: missed appointment    Patient waited for 45 minutes and was unable to wait longer due to his work schedule.    Patient will reach out to reschedule next available appointment.

## 2023-05-18 NOTE — TELEPHONE ENCOUNTER
ISSUE:   Tacrolimus IR level 9.6 on 5/15/23, goal 8-10, dose 4 mg in the am and 3mg in the pm.    PLAN:   Please call patient and confirm this was an accurate 12-hour trough. Verify Tacrolimus IR dose 4 mg in the am and 3mg in the pm. Confirm no new medications or illness. Confirm no missed doses. If accurate trough and accurate dose, decrease Tacrolimus IR dose to 3 mg BID and repeat labs in 1 weeks      Reyna Leyva RN on 5/18/2023 at 9:44 AM      OUTCOME:   Spoke with patient, they confirm accurate trough level and current dose 4 mg am, 3 mg pm. Patient confirmed dose change to 3 mg BID and to repeat labs in 1 weeks. Orders sent to preferred pharmacy for dose change and lab for repeat labs. Patient voiced understanding of plan.      Ree Burleson LPN

## 2023-05-18 NOTE — TELEPHONE ENCOUNTER
DATE:  5/18/2023     TIME OF RECEIPT FROM LAB:  0951    ORDERING PROVIDER:     LAB TEST:  WBC    LAB VALUE:  1.8    RESULTS GIVEN WITH READ-BACK TO (PROVIDER):  AMRIDA BAKER    TIME LAB VALUE REPORTED TO PROVIDER:   0952

## 2023-05-18 NOTE — TELEPHONE ENCOUNTER
Post Liver Transplant Team Conference  Date: 5/18/2023  Transplant Coordinator: Reyna Leyva  Transplant Surgeon: Aniket Arzate      Attendees:  [] Dr. Li [x] Taya Leyva, RN []       [] Dr. Plummer [x] Ree Burleson LPN []    [] Dr. Cowan [x] Lisa Castro, RN []    [] Dr. Muñoz [] Hilaria Arellano, ERNESTINE []    [x] DR. Yi [x] Hilaria Serna, RN []       [] Dr. Sloan [] Polly Mancini, RN []       [] Dr. Amarjit Alexander [x] Scarlett Michelle, RN []       [] Dr. JOHN Arzate [x] Renita Giraldo, RN []       [x] Herb Roberts, pharm [] Stephanie Jean RN []       [] Dr. Vargas [] Benjamin Johns, RN []         Verbal Plan Read Back:   Decrease his Tac dose d/t elevated Creatinine  WBC low, will hold Bactrim and check CBC with dif biweekly x3 weeks and then re-evaluate  Encourage hydration

## 2023-05-19 ENCOUNTER — TELEPHONE (OUTPATIENT)
Dept: TRANSPLANT | Facility: CLINIC | Age: 63
End: 2023-05-19
Payer: COMMERCIAL

## 2023-05-19 RX ORDER — TACROLIMUS 1 MG/1
3 CAPSULE ORAL EVERY 12 HOURS
Qty: 180 CAPSULE | Refills: 11 | Status: SHIPPED | OUTPATIENT
Start: 2023-05-19 | End: 2023-05-26

## 2023-05-19 NOTE — TELEPHONE ENCOUNTER
Cricket had a miss call from the transplant office and would like to know if Coordinator tried calling him. No VM was left

## 2023-05-20 LAB
ANNOTATION COMMENT IMP: NORMAL
CORTIS F 24H UR HPLC-MCNC: 3.71 UG/L
CORTIS F 24H UR-MRATE: 5.6 UG/D
CORTIS F/CREAT 24H UR: 4.22 UG/G CRT
CREAT 24H UR-MRATE: 1320 MG/D
CREAT UR-MCNC: 88 MG/DL

## 2023-05-22 ENCOUNTER — LAB (OUTPATIENT)
Dept: LAB | Facility: CLINIC | Age: 63
End: 2023-05-22
Payer: COMMERCIAL

## 2023-05-22 DIAGNOSIS — D70.9 NEUTROPENIA (H): ICD-10-CM

## 2023-05-22 DIAGNOSIS — K70.30 ALCOHOLIC CIRRHOSIS (H): ICD-10-CM

## 2023-05-22 DIAGNOSIS — Z94.4 LIVER REPLACED BY TRANSPLANT (H): ICD-10-CM

## 2023-05-22 LAB
ALBUMIN SERPL BCG-MCNC: 4.2 G/DL (ref 3.5–5.2)
ALP SERPL-CCNC: 65 U/L (ref 40–129)
ALT SERPL W P-5'-P-CCNC: 31 U/L (ref 10–50)
ANION GAP SERPL CALCULATED.3IONS-SCNC: 12 MMOL/L (ref 7–15)
AST SERPL W P-5'-P-CCNC: 25 U/L (ref 10–50)
BASOPHILS # BLD AUTO: 0.1 10E3/UL (ref 0–0.2)
BASOPHILS NFR BLD AUTO: 2 %
BILIRUB DIRECT SERPL-MCNC: <0.2 MG/DL (ref 0–0.3)
BILIRUB SERPL-MCNC: 0.5 MG/DL
BUN SERPL-MCNC: 27.4 MG/DL (ref 8–23)
CALCIUM SERPL-MCNC: 9.8 MG/DL (ref 8.8–10.2)
CHLORIDE SERPL-SCNC: 107 MMOL/L (ref 98–107)
CREAT SERPL-MCNC: 1.45 MG/DL (ref 0.67–1.17)
DEPRECATED HCO3 PLAS-SCNC: 22 MMOL/L (ref 22–29)
EOSINOPHIL # BLD AUTO: 0.2 10E3/UL (ref 0–0.7)
EOSINOPHIL NFR BLD AUTO: 6 %
ERYTHROCYTE [DISTWIDTH] IN BLOOD BY AUTOMATED COUNT: 12.9 % (ref 10–15)
GFR SERPL CREATININE-BSD FRML MDRD: 55 ML/MIN/1.73M2
GLUCOSE SERPL-MCNC: 123 MG/DL (ref 70–99)
HCT VFR BLD AUTO: 36.4 % (ref 40–53)
HGB BLD-MCNC: 11.7 G/DL (ref 13.3–17.7)
IMM GRANULOCYTES # BLD: 0 10E3/UL
IMM GRANULOCYTES NFR BLD: 1 %
INR PPP: 1.03 (ref 0.85–1.15)
LYMPHOCYTES # BLD AUTO: 1.2 10E3/UL (ref 0.8–5.3)
LYMPHOCYTES NFR BLD AUTO: 38 %
MAGNESIUM SERPL-MCNC: 1.5 MG/DL (ref 1.7–2.3)
MCH RBC QN AUTO: 28.1 PG (ref 26.5–33)
MCHC RBC AUTO-ENTMCNC: 32.1 G/DL (ref 31.5–36.5)
MCV RBC AUTO: 87 FL (ref 78–100)
MONOCYTES # BLD AUTO: 0.3 10E3/UL (ref 0–1.3)
MONOCYTES NFR BLD AUTO: 10 %
NEUTROPHILS # BLD AUTO: 1.4 10E3/UL (ref 1.6–8.3)
NEUTROPHILS NFR BLD AUTO: 44 %
PHOSPHATE SERPL-MCNC: 4.2 MG/DL (ref 2.5–4.5)
PLATELET # BLD AUTO: 167 10E3/UL (ref 150–450)
POTASSIUM SERPL-SCNC: 4.9 MMOL/L (ref 3.4–5.3)
PROT SERPL-MCNC: 7 G/DL (ref 6.4–8.3)
RBC # BLD AUTO: 4.17 10E6/UL (ref 4.4–5.9)
SODIUM SERPL-SCNC: 141 MMOL/L (ref 136–145)
TACROLIMUS BLD-MCNC: 9.2 UG/L (ref 5–15)
TME LAST DOSE: NORMAL H
TME LAST DOSE: NORMAL H
WBC # BLD AUTO: 3.2 10E3/UL (ref 4–11)
WBC # BLD AUTO: 3.2 10E3/UL (ref 4–11)

## 2023-05-22 PROCEDURE — 80053 COMPREHEN METABOLIC PANEL: CPT

## 2023-05-22 PROCEDURE — 36415 COLL VENOUS BLD VENIPUNCTURE: CPT

## 2023-05-22 PROCEDURE — 85610 PROTHROMBIN TIME: CPT

## 2023-05-22 PROCEDURE — 83735 ASSAY OF MAGNESIUM: CPT

## 2023-05-22 PROCEDURE — 84100 ASSAY OF PHOSPHORUS: CPT

## 2023-05-22 PROCEDURE — 82248 BILIRUBIN DIRECT: CPT

## 2023-05-22 PROCEDURE — 80197 ASSAY OF TACROLIMUS: CPT

## 2023-05-22 PROCEDURE — 85025 COMPLETE CBC W/AUTO DIFF WBC: CPT

## 2023-05-25 ENCOUNTER — OFFICE VISIT (OUTPATIENT)
Dept: TRANSPLANT | Facility: CLINIC | Age: 63
End: 2023-05-25
Attending: TRANSPLANT SURGERY
Payer: COMMERCIAL

## 2023-05-25 ENCOUNTER — LAB (OUTPATIENT)
Dept: LAB | Facility: CLINIC | Age: 63
End: 2023-05-25
Payer: COMMERCIAL

## 2023-05-25 VITALS
DIASTOLIC BLOOD PRESSURE: 80 MMHG | OXYGEN SATURATION: 100 % | WEIGHT: 153.5 LBS | SYSTOLIC BLOOD PRESSURE: 122 MMHG | BODY MASS INDEX: 23.34 KG/M2

## 2023-05-25 DIAGNOSIS — Z94.4 LIVER REPLACED BY TRANSPLANT (H): ICD-10-CM

## 2023-05-25 DIAGNOSIS — K70.30 ALCOHOLIC CIRRHOSIS (H): ICD-10-CM

## 2023-05-25 LAB
ALBUMIN SERPL BCG-MCNC: 4.1 G/DL (ref 3.5–5.2)
ALP SERPL-CCNC: 64 U/L (ref 40–129)
ALT SERPL W P-5'-P-CCNC: 31 U/L (ref 10–50)
ANION GAP SERPL CALCULATED.3IONS-SCNC: 11 MMOL/L (ref 7–15)
AST SERPL W P-5'-P-CCNC: 31 U/L (ref 10–50)
BILIRUB DIRECT SERPL-MCNC: <0.2 MG/DL (ref 0–0.3)
BILIRUB SERPL-MCNC: 0.6 MG/DL
BUN SERPL-MCNC: 32.4 MG/DL (ref 8–23)
CALCIUM SERPL-MCNC: 9.9 MG/DL (ref 8.8–10.2)
CHLORIDE SERPL-SCNC: 105 MMOL/L (ref 98–107)
CREAT SERPL-MCNC: 1.39 MG/DL (ref 0.67–1.17)
DEPRECATED HCO3 PLAS-SCNC: 21 MMOL/L (ref 22–29)
ERYTHROCYTE [DISTWIDTH] IN BLOOD BY AUTOMATED COUNT: 12.9 % (ref 10–15)
GFR SERPL CREATININE-BSD FRML MDRD: 58 ML/MIN/1.73M2
GLUCOSE SERPL-MCNC: 126 MG/DL (ref 70–99)
HCT VFR BLD AUTO: 37.8 % (ref 40–53)
HGB BLD-MCNC: 12.2 G/DL (ref 13.3–17.7)
INR PPP: 1.07 (ref 0.85–1.15)
MAGNESIUM SERPL-MCNC: 1.6 MG/DL (ref 1.7–2.3)
MCH RBC QN AUTO: 27.9 PG (ref 26.5–33)
MCHC RBC AUTO-ENTMCNC: 32.3 G/DL (ref 31.5–36.5)
MCV RBC AUTO: 87 FL (ref 78–100)
PHOSPHATE SERPL-MCNC: 4.6 MG/DL (ref 2.5–4.5)
PLATELET # BLD AUTO: 140 10E3/UL (ref 150–450)
POTASSIUM SERPL-SCNC: 4.9 MMOL/L (ref 3.4–5.3)
PROT SERPL-MCNC: 7.3 G/DL (ref 6.4–8.3)
RBC # BLD AUTO: 4.37 10E6/UL (ref 4.4–5.9)
SODIUM SERPL-SCNC: 137 MMOL/L (ref 136–145)
TACROLIMUS BLD-MCNC: 7.6 UG/L (ref 5–15)
TME LAST DOSE: NORMAL H
TME LAST DOSE: NORMAL H
WBC # BLD AUTO: 3.7 10E3/UL (ref 4–11)

## 2023-05-25 PROCEDURE — 82248 BILIRUBIN DIRECT: CPT

## 2023-05-25 PROCEDURE — G0463 HOSPITAL OUTPT CLINIC VISIT: HCPCS | Performed by: TRANSPLANT SURGERY

## 2023-05-25 PROCEDURE — 80197 ASSAY OF TACROLIMUS: CPT

## 2023-05-25 PROCEDURE — 84100 ASSAY OF PHOSPHORUS: CPT

## 2023-05-25 PROCEDURE — 83735 ASSAY OF MAGNESIUM: CPT

## 2023-05-25 PROCEDURE — 99213 OFFICE O/P EST LOW 20 MIN: CPT | Mod: 24 | Performed by: TRANSPLANT SURGERY

## 2023-05-25 PROCEDURE — 36415 COLL VENOUS BLD VENIPUNCTURE: CPT

## 2023-05-25 PROCEDURE — 85610 PROTHROMBIN TIME: CPT

## 2023-05-25 PROCEDURE — 80053 COMPREHEN METABOLIC PANEL: CPT

## 2023-05-25 PROCEDURE — 85027 COMPLETE CBC AUTOMATED: CPT

## 2023-05-25 RX ORDER — METHOCARBAMOL 500 MG/1
500 TABLET, FILM COATED ORAL DAILY PRN
COMMUNITY
End: 2023-06-23

## 2023-05-25 NOTE — RESULT ENCOUNTER NOTE
Hello -    Here are my comments about your recent results:  The urine test result show low calcium in the urine.  This is could be secondary to low calcium in your diet or through supplement or problem with absorption of calcium.  If you have been taking consistently -calcium carbonate-vitamin D (CALTRATE) 600-10 MG-MCG -2 tablets per day; I recommend to increase the dose to 3 tablets/day.  We will further discuss at time of your upcoming appointment.  Please let us know if you have any questions or concerns.     Regards,  Jose Francisco Garcia MD

## 2023-05-25 NOTE — LETTER
5/25/2023         RE: Cricket Mane  [address] [place]  [facility] [address]        Dear Colleague,    Thank you for referring your patient, Cricket Mane, to the [facility] TRANSPLANT CLINIC. Please see a copy of my visit note below.    Transplant Surgery Progress Note    Transplants:  3/23/2023 (Liver); Postoperative day:  63  S: overall doing well, denies f/c/n/v/d, appetite and strength slowly improving    Transplant History:    Transplant Type:  Liver Tx  Donor Type:    Transplant Date:  3/23/2023 (Liver)   Biliary Stent:  No     Acute Rejection Hx:  No    Present Maintenance Immunosuppression:  Tacrolimus and Mycophenolate mofetil    CMV IgG Ab Discordance:  No  EBV IgG Ab Discordance:  No    Transplant Coordinator: Reyna Leyva     Transplant Office Phone Number: [phone]     Immunosuppressant Medications     Immunosuppressive Agents Disp Start End     mycophenolate (GENERIC EQUIVALENT) 250 MG capsule    540 capsule 5/11/2023     Sig - Route: Take 2 capsules (500 mg) by mouth 2 times daily - Oral    Class: E-Prescribe    Notes to Pharmacy: TXP DT 3/23/2023 (Liver) TXP Dischg DT 3/29/2023 DX Liver replaced by transplant Z94.4 TX [facility] ([place], [place])     tacrolimus (GENERIC EQUIVALENT) 0.5 MG capsule    180 capsule 5/26/2023     Sig - Route: Take 1 capsule (0.5 mg) by mouth every 12 hours - Oral    Class: E-Prescribe    Notes to Pharmacy: TXP DT 3/23/2023 (Liver) TXP Dischg DT 3/29/2023 DX Liver replaced by transplant Z94.4 TX [facility] ([place], [place])     tacrolimus (GENERIC EQUIVALENT) 1 MG capsule    180 capsule 5/26/2023     Sig - Route: Take 3 capsules (3 mg) by mouth every 12 hours Total dose 3.5mg BID - Oral    Class: E-Prescribe    Notes to Pharmacy: TXP DT 3/23/2023 (Liver) TXP Dischg DT 3/29/2023 DX Liver replaced by transplant Z94.4 [facility] ([place], [place])          Possible Immunosuppression-related side effects:   []             headache  []             vivid dreams  []             irritability  []             cognitive difficuties  []             fine tremor  []             nausea  []             diarrhea  []             neuropathy      []             edema  []             renal calcineurin toxicity  []             hyperkalemia  []             post-transplant diabetes  []             decreased appetite  []             increased appetite  []             other:  []             none    Prescription Medications as of 5/30/2023       Rx Number Disp Refills Start End Last Dispensed Date Next Fill Date Owning Pharmacy    aspirin (ASA) 325 MG tablet  90 tablet 3 4/3/2023    [facility] [address] [address]    Sig: Take 1 tablet (325 mg) by mouth daily    Class: E-Prescribe    Route: Oral    calcium carbonate-vitamin D (CALTRATE) 600-10 MG-MCG per tablet  180 tablet 3 4/3/2023    [facility] [address]    Sig: Take 1 tablet by mouth 2 times daily (with meals)    Class: E-Prescribe    Route: Oral    folic acid (FOLVITE) 1 MG tablet  90 tablet 1 4/3/2023    [facility] [address]    Sig: Take 1 tablet (1,000 mcg) by mouth daily    Class: E-Prescribe    Route: Oral    magnesium oxide (MAG-OX) 400 MG tablet  360 tablet 3 4/11/2023    [facility] [address]    Sig: Take 2 tablets (800 mg) by mouth 2 times daily    Class: E-Prescribe    Route: Oral    methocarbamol (ROBAXIN) 500 MG tablet            Sig: Take 500 mg by mouth daily as needed for muscle spasms    Class: Historical    Route: Oral    metoprolol tartrate (LOPRESSOR) 25 MG tablet   0 5/3/2023        Sig: Take 1 tablet (25 mg) by mouth 2 times daily    Class: Historical    Notes to Pharmacy: PCP manages.     Route: Oral    Multiple Vitamins-Minerals (SPECTRAVITE) TABS    4/13/2023        Sig: Take 1 tablet by mouth daily    Class: Historical    Route: Oral    mycophenolate (GENERIC EQUIVALENT) 250 MG capsule  540 capsule 3 5/11/2023    [facility] [place] [address] [address]    Sig: Take 2 capsules (500 mg) by mouth 2 times daily    Class: E-Prescribe    Notes to Pharmacy: TXP DT 3/23/2023 (Liver) TXP Dischg DT 3/29/2023 DX Liver replaced by transplant Z94.4 TX [facility] ([place], [place])    Route: Oral    omeprazole 20 MG tablet  180 tablet 3 4/3/2023    [facility] [place] [address] [address]    Sig: Take 1 tablet (20 mg) by mouth 2 times daily    Class: E-Prescribe    Route: Oral    sulfamethoxazole-trimethoprim (BACTRIM) 400-80 MG tablet  90 tablet 3 4/3/2023    [facility] [place] [address] [address]    Sig: Take 1 tablet by mouth daily    Class: E-Prescribe    Route: Oral    tacrolimus (GENERIC EQUIVALENT) 0.5 MG capsule  180 capsule 3 5/26/2023    [facility] [place] [address] [address]    Sig: Take 1 capsule (0.5 mg) by mouth every 12 hours    Class: E-Prescribe    Notes to Pharmacy: TXP DT 3/23/2023 (Liver) TXP Dischg DT 3/29/2023 DX Liver replaced by transplant Z94.4 [facility] ([place], [place])    Route: Oral    tacrolimus (GENERIC EQUIVALENT) 1 MG capsule  180 capsule 11 5/26/2023    [facility] [place] [address] [address]    Sig: Take 3 capsules (3 mg) by mouth every 12 hours Total dose 3.5mg BID    Class: E-Prescribe    Notes to Pharmacy: TXP DT 3/23/2023 (Liver) TXP Dischg DT 3/29/2023 DX Liver replaced by transplant Z94.4 [place] [facility] ([place], [place])    Route: Oral    valGANciclovir (VALCYTE) 450 MG tablet  90  tablet 3 4/3/2023    [facility] [place], [place] - [address]    Sig: Take 1 tablet (450 mg) by mouth daily    Class: E-Prescribe    Route: Oral          O:   [unfilled]        Latest Ref Rng & Units 5/30/2023     7:40 AM 5/30/2023     7:33 AM 5/25/2023     7:42 AM 5/22/2023     7:50 AM 5/18/2023     8:25 AM   Transplant Immunosuppression Labs   Creat 0.67 - 1.17 mg/dL  1.46   1.39   1.45   1.64     Urea Nitrogen 8.0 - 23.0 mg/dL  28.2   32.4   27.4   32.3     WBC 4.0 - 11.0 10e3/uL 2.9   3.0   3.7   3.2      3.2   1.9     Neutrophil % 47     44          Chemistries:   Recent Labs   Lab Test 05/30/23  0733   BUN 28.2*   CR 1.46*   GFRESTIMATED 55*   *     Lab Results   Component Value Date    A1C 4.7 03/23/2023     Recent Labs   Lab Test 05/30/23  0733   ALBUMIN 4.1   BILITOTAL 0.6   ALKPHOS 62   AST 31   ALT 36     Urine Studies:  Recent Labs   Lab Test 12/20/22  0923   COLOR Yellow   APPEARANCE Clear   URINEGLC Negative   URINEBILI Negative   URINEKETONE Negative   SG 1.018   UBLD Negative   URINEPH 6.0   PROTEIN Negative   NITRITE Negative   LEUKEST Negative     No lab results found.  Hematology:   Recent Labs   Lab Test 05/30/23  0740 05/30/23  0733 05/25/23  0742 05/22/23  0750   HGB  --  11.7* 12.2* 11.7*   PLT  --  122* 140* 167   WBC 2.9* 3.0* 3.7* 3.2*  3.2*     Coags:   Recent Labs   Lab Test 05/30/23  0740 05/25/23  0742   INR 1.06 1.07     HLA antibodies:   No results found for: EF2ACVVNU, BZ4QFTBTWX, KC3XCLYCG, VL2YMVAOBO    Assessment: Cricket Mane is doing well s/p Liver Tx:  Issues we addressed during his visit include:    Plan:    1. Graft function: LFTs normalized, can reduce frequency of labs  2. Immunosuppression Management: No change tac 8-10 given renal function.  Complexity of management:Low.  Contributing factors: mild renal insufficiency  3. Consider changing bactrim to pantamidine  Followup: 2 weeks then hepatology          Aniket Arzate,  MD/PhD  Professor of Surgery        Again, thank you for allowing me to participate in the care of your patient.        Sincerely,        Aniket Arzate MD

## 2023-05-26 ENCOUNTER — TELEPHONE (OUTPATIENT)
Dept: TRANSPLANT | Facility: CLINIC | Age: 63
End: 2023-05-26
Payer: COMMERCIAL

## 2023-05-26 DIAGNOSIS — Z94.4 LIVER REPLACED BY TRANSPLANT (H): Primary | ICD-10-CM

## 2023-05-26 RX ORDER — TACROLIMUS 1 MG/1
3 CAPSULE ORAL EVERY 12 HOURS
Qty: 180 CAPSULE | Refills: 11 | Status: SHIPPED | OUTPATIENT
Start: 2023-05-26 | End: 2023-06-07

## 2023-05-26 RX ORDER — TACROLIMUS 0.5 MG/1
0.5 CAPSULE ORAL EVERY 12 HOURS
Qty: 180 CAPSULE | Refills: 3 | Status: SHIPPED | OUTPATIENT
Start: 2023-05-26 | End: 2023-06-07

## 2023-05-26 NOTE — TELEPHONE ENCOUNTER
ISSUE:   Tacrolimus IR level 7.6 on 5/25, goal 8-10, dose 3 mg BID.    PLAN:   Please call patient and confirm this was an accurate 12-hour trough. Verify Tacrolimus IR dose 3 mg BID. Confirm no new medications or illness. Confirm no missed doses. If accurate trough and accurate dose, increase Tacrolimus IR dose to 3.5 mg BID and repeat labs in 2 weeks    Reyna Leyva RN on 5/26/2023 at 10:29 AM      OUTCOME:   Spoke with patient, they confirm accurate trough level and current dose 3 mg BID. Patient confirmed dose change to 3.5 mg BID and to repeat labs in 1 weeks. Orders sent to preferred pharmacy for dose change and lab for repeat labs. Patient voiced understanding of plan.     Ree Burleson LPN

## 2023-05-30 ENCOUNTER — LAB (OUTPATIENT)
Dept: LAB | Facility: CLINIC | Age: 63
End: 2023-05-30
Payer: COMMERCIAL

## 2023-05-30 ENCOUNTER — TELEPHONE (OUTPATIENT)
Dept: PHARMACY | Facility: CLINIC | Age: 63
End: 2023-05-30

## 2023-05-30 DIAGNOSIS — Z94.4 LIVER REPLACED BY TRANSPLANT (H): ICD-10-CM

## 2023-05-30 DIAGNOSIS — D70.9 NEUTROPENIA (H): ICD-10-CM

## 2023-05-30 DIAGNOSIS — K70.30 ALCOHOLIC CIRRHOSIS (H): ICD-10-CM

## 2023-05-30 LAB
ALBUMIN SERPL BCG-MCNC: 4.1 G/DL (ref 3.5–5.2)
ALP SERPL-CCNC: 62 U/L (ref 40–129)
ALT SERPL W P-5'-P-CCNC: 36 U/L (ref 10–50)
ANION GAP SERPL CALCULATED.3IONS-SCNC: 11 MMOL/L (ref 7–15)
AST SERPL W P-5'-P-CCNC: 31 U/L (ref 10–50)
BASOPHILS # BLD AUTO: 0.1 10E3/UL (ref 0–0.2)
BASOPHILS NFR BLD AUTO: 2 %
BILIRUB DIRECT SERPL-MCNC: <0.2 MG/DL (ref 0–0.3)
BILIRUB SERPL-MCNC: 0.6 MG/DL
BUN SERPL-MCNC: 28.2 MG/DL (ref 8–23)
CALCIUM SERPL-MCNC: 9.5 MG/DL (ref 8.8–10.2)
CHLORIDE SERPL-SCNC: 107 MMOL/L (ref 98–107)
CREAT SERPL-MCNC: 1.46 MG/DL (ref 0.67–1.17)
DEPRECATED HCO3 PLAS-SCNC: 23 MMOL/L (ref 22–29)
EOSINOPHIL # BLD AUTO: 0.2 10E3/UL (ref 0–0.7)
EOSINOPHIL NFR BLD AUTO: 5 %
ERYTHROCYTE [DISTWIDTH] IN BLOOD BY AUTOMATED COUNT: 12.8 % (ref 10–15)
GFR SERPL CREATININE-BSD FRML MDRD: 55 ML/MIN/1.73M2
GLUCOSE SERPL-MCNC: 115 MG/DL (ref 70–99)
HCT VFR BLD AUTO: 36.4 % (ref 40–53)
HGB BLD-MCNC: 11.7 G/DL (ref 13.3–17.7)
IMM GRANULOCYTES # BLD: 0.1 10E3/UL
IMM GRANULOCYTES NFR BLD: 3 %
INR PPP: 1.06 (ref 0.85–1.15)
LYMPHOCYTES # BLD AUTO: 1 10E3/UL (ref 0.8–5.3)
LYMPHOCYTES NFR BLD AUTO: 34 %
MAGNESIUM SERPL-MCNC: 1.5 MG/DL (ref 1.7–2.3)
MCH RBC QN AUTO: 27.6 PG (ref 26.5–33)
MCHC RBC AUTO-ENTMCNC: 32.1 G/DL (ref 31.5–36.5)
MCV RBC AUTO: 86 FL (ref 78–100)
MONOCYTES # BLD AUTO: 0.3 10E3/UL (ref 0–1.3)
MONOCYTES NFR BLD AUTO: 10 %
NEUTROPHILS # BLD AUTO: 1.4 10E3/UL (ref 1.6–8.3)
NEUTROPHILS NFR BLD AUTO: 47 %
PHOSPHATE SERPL-MCNC: 3.9 MG/DL (ref 2.5–4.5)
PLATELET # BLD AUTO: 122 10E3/UL (ref 150–450)
POTASSIUM SERPL-SCNC: 4.4 MMOL/L (ref 3.4–5.3)
PROT SERPL-MCNC: 6.8 G/DL (ref 6.4–8.3)
RBC # BLD AUTO: 4.24 10E6/UL (ref 4.4–5.9)
SODIUM SERPL-SCNC: 141 MMOL/L (ref 136–145)
WBC # BLD AUTO: 2.9 10E3/UL (ref 4–11)
WBC # BLD AUTO: 3 10E3/UL (ref 4–11)

## 2023-05-30 PROCEDURE — 36415 COLL VENOUS BLD VENIPUNCTURE: CPT

## 2023-05-30 PROCEDURE — 80053 COMPREHEN METABOLIC PANEL: CPT

## 2023-05-30 PROCEDURE — 85048 AUTOMATED LEUKOCYTE COUNT: CPT

## 2023-05-30 PROCEDURE — 85025 COMPLETE CBC W/AUTO DIFF WBC: CPT

## 2023-05-30 PROCEDURE — 83735 ASSAY OF MAGNESIUM: CPT

## 2023-05-30 PROCEDURE — 80321 ALCOHOLS BIOMARKERS 1OR 2: CPT | Mod: 90

## 2023-05-30 PROCEDURE — 85610 PROTHROMBIN TIME: CPT

## 2023-05-30 PROCEDURE — 84100 ASSAY OF PHOSPHORUS: CPT

## 2023-05-30 PROCEDURE — 82248 BILIRUBIN DIRECT: CPT

## 2023-05-30 PROCEDURE — 99000 SPECIMEN HANDLING OFFICE-LAB: CPT

## 2023-05-30 PROCEDURE — 80197 ASSAY OF TACROLIMUS: CPT

## 2023-05-30 NOTE — PROGRESS NOTES
Transplant Surgery Progress Note    Transplants:  3/23/2023 (Liver); Postoperative day:  63  S: overall doing well, denies f/c/n/v/d, appetite and strength slowly improving    Transplant History:    Transplant Type:  Liver Tx  Donor Type:    Transplant Date:  3/23/2023 (Liver)   Biliary Stent:  No     Acute Rejection Hx:  No    Present Maintenance Immunosuppression:  Tacrolimus and Mycophenolate mofetil    CMV IgG Ab Discordance:  No  EBV IgG Ab Discordance:  No    Transplant Coordinator: Reyna Leyva     Transplant Office Phone Number: [phone]     Immunosuppressant Medications     Immunosuppressive Agents Disp Start End     mycophenolate (GENERIC EQUIVALENT) 250 MG capsule    540 capsule 5/11/2023     Sig - Route: Take 2 capsules (500 mg) by mouth 2 times daily - Oral    Class: E-Prescribe    Notes to Pharmacy: TXP DT 3/23/2023 (Liver) TXP Dischg DT 3/29/2023 DX Liver replaced by transplant Z94.4 TX [facility] ([place], [place])     tacrolimus (GENERIC EQUIVALENT) 0.5 MG capsule    180 capsule 5/26/2023     Sig - Route: Take 1 capsule (0.5 mg) by mouth every 12 hours - Oral    Class: E-Prescribe    Notes to Pharmacy: TXP DT 3/23/2023 (Liver) TXP Dischg DT 3/29/2023 DX Liver replaced by transplant Z94.4 TX [facility] ([place], [place])     tacrolimus (GENERIC EQUIVALENT) 1 MG capsule    180 capsule 5/26/2023     Sig - Route: Take 3 capsules (3 mg) by mouth every 12 hours Total dose 3.5mg BID - Oral    Class: E-Prescribe    Notes to Pharmacy: TXP DT 3/23/2023 (Liver) TXP Dischg DT 3/29/2023 DX Liver replaced by transplant Z94.4 TX [facility] ([place], [place])          Possible Immunosuppression-related side effects:   []             headache  []             vivid dreams  []             irritability  []             cognitive difficuties  []             fine tremor  []              nausea  []             diarrhea  []             neuropathy      []             edema  []             renal calcineurin toxicity  []             hyperkalemia  []             post-transplant diabetes  []             decreased appetite  []             increased appetite  []             other:  []             none    Prescription Medications as of 5/30/2023       Rx Number Disp Refills Start End Last Dispensed Date Next Fill Date Owning Pharmacy    aspirin (ASA) 325 MG tablet  90 tablet 3 4/3/2023    [facility] - [place] [address] [address]    Sig: Take 1 tablet (325 mg) by mouth daily    Class: E-Prescribe    Route: Oral    calcium carbonate-vitamin D (CALTRATE) 600-10 MG-MCG per tablet  180 tablet 3 4/3/2023    [facility] [place] [address] [address]    Sig: Take 1 tablet by mouth 2 times daily (with meals)    Class: E-Prescribe    Route: Oral    folic acid (FOLVITE) 1 MG tablet  90 tablet 1 4/3/2023    [facility] [place] [address] [address]    Sig: Take 1 tablet (1,000 mcg) by mouth daily    Class: E-Prescribe    Route: Oral    magnesium oxide (MAG-OX) 400 MG tablet  360 tablet 3 4/11/2023    [facility] [place] [address] [address]    Sig: Take 2 tablets (800 mg) by mouth 2 times daily    Class: E-Prescribe    Route: Oral    methocarbamol (ROBAXIN) 500 MG tablet            Sig: Take 500 mg by mouth daily as needed for muscle spasms    Class: Historical    Route: Oral    metoprolol tartrate (LOPRESSOR) 25 MG tablet   0 5/3/2023        Sig: Take 1 tablet (25 mg) by mouth 2 times daily    Class: Historical    Notes to Pharmacy: PCP manages.    Route: Oral    Multiple Vitamins-Minerals (SPECTRAVITE) TABS    4/13/2023        Sig: Take 1 tablet by mouth daily    Class: Historical    Route: Oral    mycophenolate (GENERIC EQUIVALENT) 250 MG capsule  540 capsule 3 5/11/2023    [facility] [place] [address] [address]    Sig: Take 2 capsules (500 mg) by mouth 2 times daily    Class: E-Prescribe    Notes to Pharmacy: TXP DT 3/23/2023 (Liver) TXP Dischg DT 3/29/2023 DX Liver replaced by transplant Z94.4 [facility] ([place], [place])    Route: Oral    omeprazole 20 MG tablet  180 tablet 3 4/3/2023    [facility] - [place] [address] [address]    Sig: Take 1 tablet (20 mg) by mouth 2 times daily    Class: E-Prescribe    Route: Oral    sulfamethoxazole-trimethoprim (BACTRIM) 400-80 MG tablet  90 tablet 3 4/3/2023    [facility] [place] [address] [address]    Sig: Take 1 tablet by mouth daily    Class: E-Prescribe    Route: Oral    tacrolimus (GENERIC EQUIVALENT) 0.5 MG capsule  180 capsule 3 5/26/2023    [facility] [place] [address] [address]    Sig: Take 1 capsule (0.5 mg) by mouth every 12 hours    Class: E-Prescribe    Notes to Pharmacy: TXP DT 3/23/2023 (Liver) TXP Dischg DT 3/29/2023 DX Liver replaced by transplant Z94.4 [facility] ([place], [place])    Route: Oral    tacrolimus (GENERIC EQUIVALENT) 1 MG capsule  180 capsule 11 5/26/2023    [facility] [place] [address] [address]    Sig: Take 3 capsules (3 mg) by mouth every 12 hours Total dose 3.5mg BID    Class: E-Prescribe    Notes to Pharmacy: TXP DT 3/23/2023 (Liver) TXP Dischg DT 3/29/2023 DX Liver replaced by transplant Z94.4 [facility] ([place], [place])    Route: Oral    valGANciclovir (VALCYTE) 450 MG tablet  90 tablet 3 4/3/2023    [facility] [place] [address] [address]    Sig: Take 1 tablet (450 mg) by mouth daily    Class: E-Prescribe    Route: Oral          O:   [unfilled]        Latest Ref Rng & Units 5/30/2023     7:40 AM  5/30/2023     7:33 AM 5/25/2023     7:42 AM 5/22/2023     7:50 AM 5/18/2023     8:25 AM   Transplant Immunosuppression Labs   Creat 0.67 - 1.17 mg/dL  1.46   1.39   1.45   1.64     Urea Nitrogen 8.0 - 23.0 mg/dL  28.2   32.4   27.4   32.3     WBC 4.0 - 11.0 10e3/uL 2.9   3.0   3.7   3.2      3.2   1.9     Neutrophil % 47     44          Chemistries:   Recent Labs   Lab Test 05/30/23  0733   BUN 28.2*   CR 1.46*   GFRESTIMATED 55*   *     Lab Results   Component Value Date    A1C 4.7 03/23/2023     Recent Labs   Lab Test 05/30/23  0733   ALBUMIN 4.1   BILITOTAL 0.6   ALKPHOS 62   AST 31   ALT 36     Urine Studies:  Recent Labs   Lab Test 12/20/22  0923   COLOR Yellow   APPEARANCE Clear   URINEGLC Negative   URINEBILI Negative   URINEKETONE Negative   SG 1.018   UBLD Negative   URINEPH 6.0   PROTEIN Negative   NITRITE Negative   LEUKEST Negative     No lab results found.  Hematology:   Recent Labs   Lab Test 05/30/23  0740 05/30/23  0733 05/25/23  0742 05/22/23  0750   HGB  --  11.7* 12.2* 11.7*   PLT  --  122* 140* 167   WBC 2.9* 3.0* 3.7* 3.2*  3.2*     Coags:   Recent Labs   Lab Test 05/30/23  0740 05/25/23  0742   INR 1.06 1.07     HLA antibodies:   No results found for: [id number], TZ6VFRPTRP, GZ4KBYGEJ, PW7CMFNEGT    Assessment: Cricket Mane is doing well s/p Liver Tx:  Issues we addressed during his visit include:    Plan:    1. Graft function: LFTs normalized, can reduce frequency of labs  2. Immunosuppression Management: No change tac 8-10 given renal function.  Complexity of management:Low.  Contributing factors: mild renal insufficiency  3. Consider changing bactrim to pantamidine  Followup: 2 weeks then hepatology          Aniket Arzate MD/PhD  Professor of Surgery

## 2023-05-30 NOTE — TELEPHONE ENCOUNTER
Clinical Pharmacy Consult:                                                      Transplant Specific: 2 Month Post Transplant Call  Date of Transplant: 3/23/2023  Type of Transplant: liver  First Transplant: yes  History of rejection: no    Immunosuppression Regimen   TAC 3.5mg qAM & 3.5mg qPM and MMF 500mg qAM & 500mg qPM  Patient specific goal: 8-10  Most recent level: 7.6, date 5/25/23  Immunosuppressant Levels:  Subtherapeutic, dose increased  Pt adherent to lab draws: yes  Scr:   Lab Results   Component Value Date    CR 1.58 05/01/2023     Side effects: None    Prophylactic Medications  Antibacterial:  Bactrim SS daily  Scheduled Discontinue Date: 6 months    Antifungal: Not needed thus far  Scheduled Discontinue Date: N/A    Antiviral: CrCl 40 to 59 mL/minute: Valcyte 450 mg once daily   Scheduled Discontinue Date: 6 months    Acid Reducer: Prilosec (omeprazole)  Scheduled Reviewed Date: followed by clinic    Thrombosis Prevention: Aspirin 325 mg PO daily  Scheduled Discontinue Date: 6 months    Blood Pressure Management  Frequency of home Blood Pressure checks: once daily  Most recent home BP: 120/83  Patient Blood pressure goal: <140/90  Patient blood pressure at goal:  yes  Hospitalizations/ER visits since last assessment: 0        5/1/2023     3:00 PM   Medication adherence flowsheet   Patient medication administration: Responsible for own medications   Patient estimated adherence level: %   Pharmacist assessment of adherence: Good   Patient reported doses missed per week: 0-1   Facilitators to medication adherence  Medication dosing chart;Pill box;Schedule/routine;Caregiver assistance   Patient reported barriers to medication adherence  No issues identified   Adherence intervention(s):  on importance of adherence          5/1/2023     3:00 PM   Medication access flowsheet   Number of pharmacies used: 1   Pharmacy: [facility]   Enrolled in [facility]? Yes   Patient  reported barriers to accessing medications: No issues reported by patient   Medication access interventions: No issues identified      Med rec/DUR performed: yes  Med Rec Discrepancies: no    Cricket states he is doing well and not having any problems. The tremors he was dealing with is now gone, no other side effects to report. He is now taking care of his own medications and has not missed any dose. He confirmed recent tac dose decrease.    Blood pressure: checking just once daily and currently under control.     No other questions or concerns today.  Will follow up in 1 month.     Terry Hong, Pharm D  [facility]

## 2023-05-31 LAB
TACROLIMUS BLD-MCNC: 9.1 UG/L (ref 5–15)
TME LAST DOSE: NORMAL H
TME LAST DOSE: NORMAL H

## 2023-06-01 LAB
LABORATORY REPORT: NORMAL
PLPETH BLD-MCNC: <10 NG/ML
POPETH BLD-MCNC: <10 NG/ML

## 2023-06-05 ENCOUNTER — LAB (OUTPATIENT)
Dept: LAB | Facility: CLINIC | Age: 63
End: 2023-06-05
Payer: COMMERCIAL

## 2023-06-05 ENCOUNTER — TELEPHONE (OUTPATIENT)
Dept: TRANSPLANT | Facility: CLINIC | Age: 63
End: 2023-06-05

## 2023-06-05 DIAGNOSIS — Z94.4 LIVER REPLACED BY TRANSPLANT (H): ICD-10-CM

## 2023-06-05 DIAGNOSIS — K70.30 ALCOHOLIC CIRRHOSIS (H): ICD-10-CM

## 2023-06-05 DIAGNOSIS — D70.9 NEUTROPENIA (H): ICD-10-CM

## 2023-06-05 LAB
ALBUMIN SERPL BCG-MCNC: 4 G/DL (ref 3.5–5.2)
ALP SERPL-CCNC: 71 U/L (ref 40–129)
ALT SERPL W P-5'-P-CCNC: 42 U/L (ref 10–50)
ANION GAP SERPL CALCULATED.3IONS-SCNC: 11 MMOL/L (ref 7–15)
AST SERPL W P-5'-P-CCNC: 30 U/L (ref 10–50)
BASOPHILS # BLD AUTO: 0.1 10E3/UL (ref 0–0.2)
BASOPHILS NFR BLD AUTO: 3 %
BILIRUB DIRECT SERPL-MCNC: 0.22 MG/DL (ref 0–0.3)
BILIRUB SERPL-MCNC: 0.7 MG/DL
BUN SERPL-MCNC: 26.2 MG/DL (ref 8–23)
CALCIUM SERPL-MCNC: 9.7 MG/DL (ref 8.8–10.2)
CHLORIDE SERPL-SCNC: 105 MMOL/L (ref 98–107)
CREAT SERPL-MCNC: 1.43 MG/DL (ref 0.67–1.17)
DEPRECATED HCO3 PLAS-SCNC: 24 MMOL/L (ref 22–29)
EOSINOPHIL # BLD AUTO: 0.2 10E3/UL (ref 0–0.7)
EOSINOPHIL NFR BLD AUTO: 9 %
ERYTHROCYTE [DISTWIDTH] IN BLOOD BY AUTOMATED COUNT: 13 % (ref 10–15)
GFR SERPL CREATININE-BSD FRML MDRD: 56 ML/MIN/1.73M2
GLUCOSE SERPL-MCNC: 120 MG/DL (ref 70–99)
HCT VFR BLD AUTO: 39.8 % (ref 40–53)
HGB BLD-MCNC: 12.6 G/DL (ref 13.3–17.7)
IMM GRANULOCYTES # BLD: 0 10E3/UL
IMM GRANULOCYTES NFR BLD: 2 %
INR PPP: 1.09 (ref 0.85–1.15)
LYMPHOCYTES # BLD AUTO: 1 10E3/UL (ref 0.8–5.3)
LYMPHOCYTES NFR BLD AUTO: 41 %
MAGNESIUM SERPL-MCNC: 1.6 MG/DL (ref 1.7–2.3)
MCH RBC QN AUTO: 27.2 PG (ref 26.5–33)
MCHC RBC AUTO-ENTMCNC: 31.7 G/DL (ref 31.5–36.5)
MCV RBC AUTO: 86 FL (ref 78–100)
MONOCYTES # BLD AUTO: 0.4 10E3/UL (ref 0–1.3)
MONOCYTES NFR BLD AUTO: 15 %
NEUTROPHILS # BLD AUTO: 0.7 10E3/UL (ref 1.6–8.3)
NEUTROPHILS NFR BLD AUTO: 30 %
NRBC # BLD AUTO: 0 10E3/UL
NRBC BLD AUTO-RTO: 0 /100
PHOSPHATE SERPL-MCNC: 4.3 MG/DL (ref 2.5–4.5)
PLATELET # BLD AUTO: 156 10E3/UL (ref 150–450)
POTASSIUM SERPL-SCNC: 4.9 MMOL/L (ref 3.4–5.3)
PROT SERPL-MCNC: 7.1 G/DL (ref 6.4–8.3)
RBC # BLD AUTO: 4.63 10E6/UL (ref 4.4–5.9)
SODIUM SERPL-SCNC: 140 MMOL/L (ref 136–145)
TACROLIMUS BLD-MCNC: 12.2 UG/L (ref 5–15)
TME LAST DOSE: NORMAL H
TME LAST DOSE: NORMAL H
WBC # BLD AUTO: 2.3 10E3/UL (ref 4–11)
WBC # BLD AUTO: 2.4 10E3/UL (ref 4–11)

## 2023-06-05 PROCEDURE — 85025 COMPLETE CBC W/AUTO DIFF WBC: CPT

## 2023-06-05 PROCEDURE — 80053 COMPREHEN METABOLIC PANEL: CPT

## 2023-06-05 PROCEDURE — 85610 PROTHROMBIN TIME: CPT

## 2023-06-05 PROCEDURE — 83735 ASSAY OF MAGNESIUM: CPT

## 2023-06-05 PROCEDURE — 82248 BILIRUBIN DIRECT: CPT

## 2023-06-05 PROCEDURE — 84100 ASSAY OF PHOSPHORUS: CPT

## 2023-06-05 PROCEDURE — 36415 COLL VENOUS BLD VENIPUNCTURE: CPT

## 2023-06-05 PROCEDURE — 80197 ASSAY OF TACROLIMUS: CPT

## 2023-06-05 NOTE — TELEPHONE ENCOUNTER
Left a message to change tacro to 3.5mg am, 3mg pm, check labs in a week and reminded of an accurate 12 hour trough and to confirm.

## 2023-06-05 NOTE — TELEPHONE ENCOUNTER
ISSUE:   Tacrolimus IR level 12.2 on 6/5, goal 8-10, dose 3.5 mg BID.    PLAN:   Please call patient and confirm this was an accurate 12-hour trough. Verify Tacrolimus IR dose 3.5 mg BID. Confirm no new medications or illness. Confirm no missed doses. If accurate trough and accurate dose, decrease Tacrolimus IR dose to 3.5 mg in the am and 3 mg in the pm and repeat labs in 1 weeks    Reyna Leyva RN on 6/5/2023 at 4:17 PM      OUTCOME:   Spoke with patient, they confirm accurate trough level and current dose 3.5 mg BID. Patient confirmed dose change to 3.5 mg am, 3 mg pm  and to repeat labs in 1 weeks. Orders sent to preferred pharmacy for dose change and lab for repeat labs. Patient voiced understanding of plan.     Also informed pt that cbc's can be done weekly.    Ree Burleson LPN

## 2023-06-06 ENCOUNTER — VIRTUAL VISIT (OUTPATIENT)
Dept: ENDOCRINOLOGY | Facility: CLINIC | Age: 63
End: 2023-06-06
Payer: COMMERCIAL

## 2023-06-06 DIAGNOSIS — N18.31 STAGE 3A CHRONIC KIDNEY DISEASE (H): ICD-10-CM

## 2023-06-06 DIAGNOSIS — M81.0 AGE-RELATED OSTEOPOROSIS WITHOUT CURRENT PATHOLOGICAL FRACTURE: ICD-10-CM

## 2023-06-06 PROBLEM — N18.30 CKD (CHRONIC KIDNEY DISEASE) STAGE 3, GFR 30-59 ML/MIN (H): Status: ACTIVE | Noted: 2023-06-06

## 2023-06-06 PROCEDURE — 99215 OFFICE O/P EST HI 40 MIN: CPT | Mod: VID | Performed by: INTERNAL MEDICINE

## 2023-06-06 NOTE — PROGRESS NOTES
Endocrinology Clinic Visit    Chief Complaint: Follow Up     Information obtained from:Patient      Assessment/Treatment Plan:      Cricket Mane is a 62 year old year old male alcohol associated liver cirrhosis s/p liver transplant (3/23/2023), Post op afib with RVR and CAD presents to endocrine clinic for work of up osteoporosis.    Osteoporosis without current fracture   CKD stage III  Neutropenic   History of CAD    I have personally reviewed bone density from 1/4/2023 and agree with the interpretation of lumbar spine L1-L4 T score -3 and at the right femoral neck -1.8 and that the right total hip -2.  Primary risk factors for osteoporosis are age, alcohol use, hx of liver disease, organ transplant & steroid exposure. In order to assess additional secondary causes,screening for primary hyperparathyroidism and other results were unremarkable except low 24 hour urine calcium level.  He has increased his calcium intake since then.  We will screen for hypogonadism down the line [currently few days postsurgery].    We discussed treatment plan today.     Considered anabolic therapy with romosozumab however given recent coronary artery disease this medication might not be the best next step at this time.    Denosumab or Prolia would be a good option particularly given his chronic kidney disease however currently he has neutropenia with a white cell count less than 2.5 over the last 4 weeks.  Is not clear whether this is temporary; we will check with his transplant providers before proceeding.    Reclast infusion is an option and can be given slowly given his CKD.    He is currently on chronic PPI therefore oral medication with Fosamax is not the best option.    We will monitor the course of neutropenia for the next 4-8 weeks and if it does not improve then we will proceed with Reclast instead of denosumab.  In the meantime, calcium 1200 mg daily from all sources, vitamin D 1000 IU daily, strengthening exercises and  "fall prevention.  Per PharmD, appreciate input.   \"Estimated Creatinine Clearance: 54.1 mL/min (A) (based on SCr of 1.43 mg/dL (H)).     Only concern is the increased/additive  infection risk when used with immunosuppression. It is not a contraindication though.     Eventually Mycophenolate Mofetil is tapered off in most liver transplant patients (around 3-6 months post transplant), perhaps it would be better to wait until then to start? WBC should improve when Valcyte (3 month post) and Mycophenolate Mofetil are stopped. \"      Discussed with the patient and the patient is also comfortable with the idea of rechecking in 3 months.   Jose Francisco Garcia MD  Staff Endocrinologist    Division of Endocrinology and Diabetes      Subjective:         HPI: Cricket Mane is a 62 year old male with history of alcohol associated liver cirrhosis now s/p Liver transplantation ~2 and half months ago. DEXA scanning was done as part of pre transplant evaluation at which time was incidentally found to have osteoporosis. Denies any history of fractures except a traumatic fracture at age 12. Denies noticing decrease in height. Never smoker. Quit alcohol use 1 year ago before which patient has significant alcohol intake. Denies chronic opioid use. Reports steroid exposure in the nicole transplant period.    Denies family hx of osteoporosis. Reports consuming 1.2g Ca supplement daily in addition to yogurt and cheese consumption.     Neutropenia, CKD   No Known Allergies    Current Outpatient Medications   Medication Sig Dispense Refill     aspirin (ASA) 325 MG tablet Take 1 tablet (325 mg) by mouth daily 90 tablet 3     calcium carbonate-vitamin D (CALTRATE) 600-10 MG-MCG per tablet Take 1 tablet by mouth 2 times daily (with meals) 180 tablet 3     folic acid (FOLVITE) 1 MG tablet Take 1 tablet (1,000 mcg) by mouth daily 90 tablet 1     magnesium oxide (MAG-OX) 400 MG tablet Take 2 tablets (800 mg) by mouth 2 times daily 360 tablet 3 "     methocarbamol (ROBAXIN) 500 MG tablet Take 500 mg by mouth daily as needed for muscle spasms       metoprolol tartrate (LOPRESSOR) 25 MG tablet Take 1 tablet (25 mg) by mouth 2 times daily  0     Multiple Vitamins-Minerals (SPECTRAVITE) TABS Take 1 tablet by mouth daily       mycophenolate (GENERIC EQUIVALENT) 250 MG capsule Take 2 capsules (500 mg) by mouth 2 times daily 540 capsule 3     omeprazole 20 MG tablet Take 1 tablet (20 mg) by mouth 2 times daily 180 tablet 3     tacrolimus (GENERIC EQUIVALENT) 0.5 MG capsule Take 1 capsule (0.5 mg) by mouth every 12 hours 180 capsule 3     tacrolimus (GENERIC EQUIVALENT) 1 MG capsule Take 3 capsules (3 mg) by mouth every 12 hours Total dose 3.5mg  capsule 11     valGANciclovir (VALCYTE) 450 MG tablet Take 1 tablet (450 mg) by mouth daily 90 tablet 3     sulfamethoxazole-trimethoprim (BACTRIM) 400-80 MG tablet Take 1 tablet by mouth daily (Patient not taking: Reported on 5/25/2023) 90 tablet 3       Review of Systems     ROS negative outside of mentioned above.   Pertinent past medical history, past surgical history, social and family history reviewed.      Objective:   GENERAL: Healthy, alert and no distress  EYES: Eyes grossly normal to inspection.    PSYCH: Mentation appears normal, affect normal/bright, judgement and insight intact, normal speech and appearance well-groomed.  In House Labs:   Lab Results   Component Value Date    A1C 4.7 03/23/2023       TSH   Date Value Ref Range Status   05/11/2023 2.91 0.30 - 4.20 uIU/mL Final   12/20/2022 3.43 0.30 - 4.20 uIU/mL Final       Creatinine   Date Value Ref Range Status   06/05/2023 1.43 (H) 0.67 - 1.17 mg/dL Final   ]    01/2023 DEXA  Results   Lumbar spine     L1-4      T-score -3.0                    BMD 0.865 g/cm 2.     Right Femur neck           T-score -1.8                    BMD 0.842 g/cm 2.  Right Total hip                T-score -2.0                    BMD 0.814 g/cm 2      Video-Visit  Details    Type of service:  Video Visit    Video Start Time: 8:05 AM    Video End Time:8:35 AM    Distant Location (provider location):  Off-site.     Platform used for Video Visit: [email or web address]  49 minutes spent by me on the date of the encounter doing chart review, counseling on osteoporosis medications and side effects in the setting of CKD, neutropenia, coordination of care, documentation and further activities per the note.

## 2023-06-06 NOTE — Clinical Note
Hello,  We were contemplating denosumab for osteoporosis treatment in this patient but noted that he has neutropenia (hopefully, this is transient).  Will hold off treatment until neutropenia resolves. Any interactions with his current medications or concerns in proceeding with denosumab injection in this patient(will wait until things are stable from WBC stand point)? Appreciate your input.

## 2023-06-06 NOTE — NURSING NOTE
Is the patient currently in the state of [place]? YES    Visit mode:VIDEO    If the visit is dropped, the patient can be reconnected by: VIDEO VISIT: Send to e-mail at: [email or web address]    Will anyone else be joining the visit? NO      How would you like to obtain your AVS? MyChart    Are changes needed to the allergy or medication list? YES: Pt is not taking bactrim.     Reason for visit: Follow Up

## 2023-06-06 NOTE — LETTER
6/6/2023         RE: Cricket Mane  [address] [place] [facility] [address]        Dear Colleague,    Thank you for referring your patient, Cricket Mane, to the [facility]. Please see a copy of my visit note below.    Endocrinology Clinic Visit    Chief Complaint: Follow Up     Information obtained from:Patient      Assessment/Treatment Plan:      Cricket Mane is a 62 year old year old male alcohol associated liver cirrhosis s/p liver transplant (3/23/2023), Post op afib with RVR and CAD presents to endocrine clinic for work of up osteoporosis.    Osteoporosis without current fracture   CKD stage III  Neutropenic   History of CAD    I have personally reviewed bone density from 1/4/2023 and agree with the interpretation of lumbar spine L1-L4 T score -3 and at the right femoral neck -1.8 and that the right total hip -2.  Primary risk factors for osteoporosis are age, alcohol use, hx of liver disease, organ transplant & steroid exposure. In order to assess additional secondary causes,screening for primary hyperparathyroidism and other results were unremarkable except low 24 hour urine calcium level.  He has increased his calcium intake since then.  We will screen for hypogonadism down the line [currently few days postsurgery].    We discussed treatment plan today.   Considered anabolic therapy with romosozumab however given recent coronary artery disease this medication might not be the best next step at this time.  Denosumab or Prolia would be a good option particularly given his chronic kidney disease however currently he has neutropenia with a white cell count less than 2.5 over the last 4 weeks.  Is not clear whether this is temporary; we will check with his transplant providers before proceeding.  Reclast infusion is an option and can be given slowly given his CKD.  He is currently on chronic PPI therefore oral medication with Fosamax is not the best option.    We will  "monitor the course of neutropenia for the next 4-8 weeks and if it does not improve then we will proceed with Reclast instead of denosumab.  In the meantime, calcium 1200 mg daily from all sources, vitamin D 1000 IU daily, strengthening exercises and fall prevention.  Per PharmD, appreciate input.   \"Estimated Creatinine Clearance: 54.1 mL/min (A) (based on SCr of 1.43 mg/dL (H)).     Only concern is the increased/additive  infection risk when used with immunosuppression. It is not a contraindication though.     Eventually Mycophenolate Mofetil is tapered off in most liver transplant patients (around 3-6 months post transplant), perhaps it would be better to wait until then to start? WBC should improve when Valcyte (3 month post) and Mycophenolate Mofetil are stopped. \"      Discussed with the patient and the patient is also comfortable with the idea of rechecking in 3 months.   Jose Francisco Garcia MD  Staff Endocrinologist    Division of Endocrinology and Diabetes      Subjective:         HPI: Cricket Mane is a 62 year old male with history of alcohol associated liver cirrhosis now s/p Liver transplantation ~2 and half months ago. DEXA scanning was done as part of pre transplant evaluation at which time was incidentally found to have osteoporosis. Denies any history of fractures except a traumatic fracture at age 12. Denies noticing decrease in height. Never smoker. Quit alcohol use 1 year ago before which patient has significant alcohol intake. Denies chronic opioid use. Reports steroid exposure in the nicole transplant period.    Denies family hx of osteoporosis. Reports consuming 1.2g Ca supplement daily in addition to yogurt and cheese consumption.     Neutropenia, CKD   No Known Allergies    Current Outpatient Medications   Medication Sig Dispense Refill     aspirin (ASA) 325 MG tablet Take 1 tablet (325 mg) by mouth daily 90 tablet 3     calcium carbonate-vitamin D (CALTRATE) 600-10 MG-MCG per tablet Take 1 " tablet by mouth 2 times daily (with meals) 180 tablet 3     folic acid (FOLVITE) 1 MG tablet Take 1 tablet (1,000 mcg) by mouth daily 90 tablet 1     magnesium oxide (MAG-OX) 400 MG tablet Take 2 tablets (800 mg) by mouth 2 times daily 360 tablet 3     methocarbamol (ROBAXIN) 500 MG tablet Take 500 mg by mouth daily as needed for muscle spasms       metoprolol tartrate (LOPRESSOR) 25 MG tablet Take 1 tablet (25 mg) by mouth 2 times daily  0     Multiple Vitamins-Minerals (SPECTRAVITE) TABS Take 1 tablet by mouth daily       mycophenolate (GENERIC EQUIVALENT) 250 MG capsule Take 2 capsules (500 mg) by mouth 2 times daily 540 capsule 3     omeprazole 20 MG tablet Take 1 tablet (20 mg) by mouth 2 times daily 180 tablet 3     tacrolimus (GENERIC EQUIVALENT) 0.5 MG capsule Take 1 capsule (0.5 mg) by mouth every 12 hours 180 capsule 3     tacrolimus (GENERIC EQUIVALENT) 1 MG capsule Take 3 capsules (3 mg) by mouth every 12 hours Total dose 3.5mg  capsule 11     valGANciclovir (VALCYTE) 450 MG tablet Take 1 tablet (450 mg) by mouth daily 90 tablet 3     sulfamethoxazole-trimethoprim (BACTRIM) 400-80 MG tablet Take 1 tablet by mouth daily (Patient not taking: Reported on 5/25/2023) 90 tablet 3       Review of Systems     ROS negative outside of mentioned above.   Pertinent past medical history, past surgical history, social and family history reviewed.      Objective:   GENERAL: Healthy, alert and no distress  EYES: Eyes grossly normal to inspection.    PSYCH: Mentation appears normal, affect normal/bright, judgement and insight intact, normal speech and appearance well-groomed.  In House Labs:   Lab Results   Component Value Date    A1C 4.7 03/23/2023       TSH   Date Value Ref Range Status   05/11/2023 2.91 0.30 - 4.20 uIU/mL Final   12/20/2022 3.43 0.30 - 4.20 uIU/mL Final       Creatinine   Date Value Ref Range Status   06/05/2023 1.43 (H) 0.67 - 1.17 mg/dL Final   ]    01/2023 DEXA  Results   Lumbar spine      L1-4      T-score -3.0                    BMD 0.865 g/cm 2.     Right Femur neck           T-score -1.8                    BMD 0.842 g/cm 2.  Right Total hip                T-score -2.0                    BMD 0.814 g/cm 2      Video-Visit Details    Type of service:  Video Visit    Video Start Time: 8:05 AM    Video End Time:8:35 AM    Distant Location (provider location):  Off-site.     Platform used for Video Visit: uSamp  49 minutes spent by me on the date of the encounter doing chart review, counseling on osteoporosis medications and side effects in the setting of CKD, neutropenia, coordination of care, documentation and further activities per the note.    Again, thank you for allowing me to participate in the care of your patient.        Sincerely,        Jose Francisco Garcia MD

## 2023-06-07 ENCOUNTER — TELEPHONE (OUTPATIENT)
Dept: ENDOCRINOLOGY | Facility: CLINIC | Age: 63
End: 2023-06-07
Payer: COMMERCIAL

## 2023-06-07 NOTE — PROGRESS NOTES
6/7 Called and left voicemail to reschedule provided phone number [phone] to reschedule.     Karen whalen Procedure   Orthopedics, Podiatry, Sports Medicine, Ent ,Eye , Audiology, Adult Endocrine & Diabetes, Nutrition & Medication Therapy Management Specialties   [facility]

## 2023-06-07 NOTE — TELEPHONE ENCOUNTER
6/7 Called and left voicemail. Provided phone number [phone] to reschedule appointment with Dr. Garcia on August to September.     Karen whalen Procedure   Orthopedics, Podiatry, Sports Medicine, Ent ,Eye , Audiology, Adult Endocrine & Diabetes, Nutrition & Medication Therapy Management Specialties   [facility]     Patient has currently an appointment in August 2023.  Please cancel that appointment and rescheduled to CALI opening in 9/2023. Thank you, Jose Francisco Garcia MD

## 2023-06-08 RX ORDER — TACROLIMUS 1 MG/1
3 CAPSULE ORAL EVERY 12 HOURS
Qty: 180 CAPSULE | Refills: 11 | Status: SHIPPED | OUTPATIENT
Start: 2023-06-08 | End: 2023-06-19

## 2023-06-08 RX ORDER — TACROLIMUS 0.5 MG/1
0.5 CAPSULE ORAL EVERY MORNING
Qty: 90 CAPSULE | Refills: 3 | Status: SHIPPED | OUTPATIENT
Start: 2023-06-08 | End: 2023-06-19

## 2023-06-09 ENCOUNTER — VIRTUAL VISIT (OUTPATIENT)
Dept: BEHAVIORAL HEALTH | Facility: CLINIC | Age: 63
End: 2023-06-09
Payer: COMMERCIAL

## 2023-06-09 DIAGNOSIS — F43.22 ADJUSTMENT DISORDER WITH ANXIOUS MOOD: Primary | ICD-10-CM

## 2023-06-09 PROCEDURE — 90834 PSYTX W PT 45 MINUTES: CPT | Mod: VID | Performed by: PSYCHOLOGIST

## 2023-06-09 ASSESSMENT — COLUMBIA-SUICIDE SEVERITY RATING SCALE - C-SSRS
1. HAVE YOU WISHED YOU WERE DEAD OR WISHED YOU COULD GO TO SLEEP AND NOT WAKE UP?: NO
TOTAL  NUMBER OF ABORTED OR SELF INTERRUPTED ATTEMPTS LIFETIME: NO
2. HAVE YOU ACTUALLY HAD ANY THOUGHTS OF KILLING YOURSELF?: NO
6. HAVE YOU EVER DONE ANYTHING, STARTED TO DO ANYTHING, OR PREPARED TO DO ANYTHING TO END YOUR LIFE?: NO
TOTAL  NUMBER OF INTERRUPTED ATTEMPTS LIFETIME: NO
ATTEMPT LIFETIME: NO

## 2023-06-09 NOTE — PROGRESS NOTES
[facility]: Integrated Behavioral Health  2023      Behavioral Health Clinician Progress Note    Patient Name: Cricket Mane           Service Type:  Individual      Service Location:   [facility] / Email (patient reached)     Session Start Time: 1:00  Session End Time: 1:50      Session Length: 38 - 52      Attendees: Patient     Service Modality:  Video Visit:      Provider verified identity through the following two step process.  Patient provided:  Patient  and Patient is known previously to provider    Telemedicine Visit: The patient's condition can be safely assessed and treated via synchronous audio and visual telemedicine encounter.      Reason for Telemedicine Visit: Patient convenience (e.g. access to timely appointments / distance to available provider)    Originating Site (Patient Location): Patient's home    Distant Site (Provider Location): Provider Remote Setting- Home Office    Consent:  The patient/guardian has verbally consented to: the potential risks and benefits of telemedicine (video visit) versus in person care; bill my insurance or make self-payment for services provided; and responsibility for payment of non-covered services.     Patient would like the video invitation sent by:  My Chart    Mode of Communication:  Video Conference via [facility]    Distant Location (Provider):  Off-site    As the provider I attest to compliance with applicable laws and regulations related to telemedicine.    Visit Activities (Refresh list every visit): [facility] [place] Only    Diagnostic Assessment Date: 5/3/2023  Treatment Plan Review Date: IP; making goals  See Flowsheets for today's PHQ-9 and CHAN-7 results  Previous PHQ-9:     2023     4:46 PM   PHQ-9 SCORE   PHQ-9 Total Score MyChart 0   PHQ-9 Total Score 0       LETHA LEVEL:      12/15/2022     1:48 PM   LETHA Score (Last Two)   LETHA Raw Score 30    30   Activation Score 56    56   LETHA Level 3    3        2023  5:28 PM   PROMIS-10 Scores Only    Global Mental Health Score 17    Global Physical Health Score 16    PROMIS TOTAL - SUBSCORES 33          DATA  Extended Session (60+ minutes): No  Interactive Complexity: No  Crisis: No  [facility] Patient: No    Treatment Objective(s) Addressed in This Session:  Target Behavior(s): disease management/lifestyle changes      Psychological distress related to Chronic Disease Management    Current Stressors / Issues:  [facility] met with Cricket today to continue supporting his treatment of mild adjustment-related distress post transplant. Cricket notes he is doing very well overall. He states he continues to make strides in his recovery and is doing more things he enjoys, like going to concerts. We explored a bit of his social history today, validating/supporting his various experiences/preferences. He notes his mental health is largely stable and upbeat. He voiced the value in counseling, though notes he does not necessarily see the need for it right now, which this writer supported. Discussed how he would know returning to counseling would make sense for him in the future. Provided him contact information should he wish to return at a later date. Supportive and insight-oriented counseling was utilized today.     Contact Information:    For [phone]    Direct Line: [phone]      Progress on Treatment Objective(s) / Homework:  Stable - MAINTENANCE (Working to maintain change, with risk of relapse); Intervened by continuing to positively reinforce healthy behavior choice     Motivational Interviewing    MI Intervention: Expressed Empathy/Understanding, Supported Autonomy, Collaboration, Evocation, Permission to raise concern or advise, Open-ended questions, Reflections: simple and complex, Change talk (evoked) and Reframe     Change Talk Expressed by the Patient: Committment to change Activation Taking steps    Provider Response to Change Talk: E - Evoked more info  from patient about behavior change, A - Affirmed patient's thoughts, decisions, or attempts at behavior change, R - Reflected patient's change talk and S - Summarized patient's change talk statements    Also provided psychoeducation about behavioral health condition, symptoms, and treatment options        Care Plan review completed: Yes    Medication Review:  No current psychiatric medications prescribed     Current Outpatient Medications   Medication     aspirin (ASA) 325 MG tablet     calcium carbonate-vitamin D (CALTRATE) 600-10 MG-MCG per tablet     folic acid (FOLVITE) 1 MG tablet     magnesium oxide (MAG-OX) 400 MG tablet     methocarbamol (ROBAXIN) 500 MG tablet     metoprolol tartrate (LOPRESSOR) 25 MG tablet     Multiple Vitamins-Minerals (SPECTRAVITE) TABS     mycophenolate (GENERIC EQUIVALENT) 250 MG capsule     omeprazole 20 MG tablet     sulfamethoxazole-trimethoprim (BACTRIM) 400-80 MG tablet     tacrolimus (GENERIC EQUIVALENT) 0.5 MG capsule     tacrolimus (GENERIC EQUIVALENT) 1 MG capsule     valGANciclovir (VALCYTE) 450 MG tablet     No current facility-administered medications for this visit.       Medication Compliance:  Yes    Changes in Health Issues:   None reported    Chemical Use Review:   Substance Use: Chemical use reviewed, no active concerns identified      Tobacco Use: No current tobacco use.       Social History     Tobacco Use     Smoking status: Never   Vaping Use     Vaping status: Never Used   Substance Use Topics     Alcohol use: Not Currently     Comment: last ETOH was 7/28/22.     Drug use: No         5/2/2023     5:29 PM   CAGE-AID Total Score   Total Score 0   Total Score MyChart 0 (A total score of 2 or greater is considered clinically significant)       CAGE-AID score  > 1 is a positive screen, suggesting further discussion is needed to determine if evaluation for alcohol or substance abuse is appropriate.  A score > 2 is considered clinically significant, suggesting further  evaluation of alcohol or substance-related problems is indicated.      Assessment: Current Emotional / Mental Status (status of significant symptoms):  Risk status (Self / Other harm or suicidal ideation)  Patient denies a history of suicidal ideation, suicide attempts, self-injurious behavior, homicidal ideation, homicidal behavior and and other safety concerns  Patient denies current fears or concerns for personal safety.  Patient denies current or recent suicidal ideation or behaviors.  Patient denies current or recent homicidal ideation or behaviors.  Patient denies current or recent self injurious behavior or ideation.  Patient denies other safety concerns.  A safety and risk management plan has not been developed at this time, however patient was encouraged to call [place] [phone] should there be a change in any of these risk factors.     [place] Suicide Severity Rating Scale (Lifetime/Recent)      6/9/2023    12:59 PM   [place] Suicide Severity Rating (Lifetime/Recent)   1. Wish to be Dead (Lifetime) N   2. Non-Specific Active Suicidal Thoughts (Lifetime) N   Actual Attempt (Lifetime) N   Has subject engaged in non-suicidal self-injurious behavior? (Lifetime) N   Interrupted Attempts (Lifetime) N   Aborted or Self-Interrupted Attempt (Lifetime) N   Preparatory Acts or Behavior (Lifetime) N   Calculated C-SSRS Risk Score (Lifetime/Recent) No Risk Indicated     [place] Suicide Severity Rating Scale (Short Version)      3/22/2023     9:00 PM   [place] Suicide Severity Rating (Short Version)   Over the past 2 weeks have you felt down, depressed, or hopeless? no   Over the past 2 weeks have you had thoughts of killing yourself? no   Have you ever attempted to kill yourself? no       Appearance:   Appropriate   Eye Contact:   Good   Psychomotor Behavior: Normal   Attitude:   Cooperative   Orientation:   All  Speech   Rate / Production: Normal    Volume:  Normal   Mood:    Sad   Affect:    Appropriate    Thought Content:  Clear   Thought Form:  Coherent  Logical   Insight:    Good     Diagnoses:  1. Adjustment disorder with anxious mood        Collateral Reports Completed:  Routed note to Care Team Member(s)    Plan: (Homework, other):  Patient was given information about behavioral services and encouraged to schedule a follow up appointment with the clinic [facility] as needed.  He was also given information about mental health symptoms and treatment options .  CD Recommendations: Maintain Sobriety.     Frederick Garcia Psy.D, LP   Behavioral Health Clinician   [facility]

## 2023-06-12 ENCOUNTER — LAB (OUTPATIENT)
Dept: LAB | Facility: CLINIC | Age: 63
End: 2023-06-12
Payer: COMMERCIAL

## 2023-06-12 DIAGNOSIS — D70.9 NEUTROPENIA (H): ICD-10-CM

## 2023-06-12 DIAGNOSIS — Z94.4 LIVER REPLACED BY TRANSPLANT (H): ICD-10-CM

## 2023-06-12 DIAGNOSIS — Z94.4 LIVER REPLACED BY TRANSPLANT (H): Primary | ICD-10-CM

## 2023-06-12 LAB
ALBUMIN SERPL BCG-MCNC: 4 G/DL (ref 3.5–5.2)
ALP SERPL-CCNC: 65 U/L (ref 40–129)
ALT SERPL W P-5'-P-CCNC: 34 U/L (ref 10–50)
ANION GAP SERPL CALCULATED.3IONS-SCNC: 10 MMOL/L (ref 7–15)
AST SERPL W P-5'-P-CCNC: 26 U/L (ref 10–50)
BASOPHILS # BLD AUTO: 0.1 10E3/UL (ref 0–0.2)
BASOPHILS NFR BLD AUTO: 2 %
BILIRUB DIRECT SERPL-MCNC: <0.2 MG/DL (ref 0–0.3)
BILIRUB SERPL-MCNC: 0.5 MG/DL
BUN SERPL-MCNC: 32.9 MG/DL (ref 8–23)
CALCIUM SERPL-MCNC: 9.9 MG/DL (ref 8.8–10.2)
CHLORIDE SERPL-SCNC: 105 MMOL/L (ref 98–107)
CREAT SERPL-MCNC: 1.52 MG/DL (ref 0.67–1.17)
DEPRECATED HCO3 PLAS-SCNC: 25 MMOL/L (ref 22–29)
EOSINOPHIL # BLD AUTO: 0.2 10E3/UL (ref 0–0.7)
EOSINOPHIL NFR BLD AUTO: 5 %
ERYTHROCYTE [DISTWIDTH] IN BLOOD BY AUTOMATED COUNT: 12.5 % (ref 10–15)
GFR SERPL CREATININE-BSD FRML MDRD: 52 ML/MIN/1.73M2
GLUCOSE SERPL-MCNC: 117 MG/DL (ref 70–99)
HCT VFR BLD AUTO: 37.2 % (ref 40–53)
HGB BLD-MCNC: 12.2 G/DL (ref 13.3–17.7)
IMM GRANULOCYTES # BLD: 0.1 10E3/UL
IMM GRANULOCYTES NFR BLD: 2 %
LYMPHOCYTES # BLD AUTO: 1.2 10E3/UL (ref 0.8–5.3)
LYMPHOCYTES NFR BLD AUTO: 31 %
MAGNESIUM SERPL-MCNC: 1.6 MG/DL (ref 1.7–2.3)
MCH RBC QN AUTO: 27.1 PG (ref 26.5–33)
MCHC RBC AUTO-ENTMCNC: 32.8 G/DL (ref 31.5–36.5)
MCV RBC AUTO: 83 FL (ref 78–100)
MONOCYTES # BLD AUTO: 0.4 10E3/UL (ref 0–1.3)
MONOCYTES NFR BLD AUTO: 10 %
NEUTROPHILS # BLD AUTO: 1.9 10E3/UL (ref 1.6–8.3)
NEUTROPHILS NFR BLD AUTO: 50 %
PHOSPHATE SERPL-MCNC: 4.3 MG/DL (ref 2.5–4.5)
PLATELET # BLD AUTO: 172 10E3/UL (ref 150–450)
POTASSIUM SERPL-SCNC: 4.8 MMOL/L (ref 3.4–5.3)
PROT SERPL-MCNC: 7.2 G/DL (ref 6.4–8.3)
RBC # BLD AUTO: 4.5 10E6/UL (ref 4.4–5.9)
SODIUM SERPL-SCNC: 140 MMOL/L (ref 136–145)
TACROLIMUS BLD-MCNC: 9.3 UG/L (ref 5–15)
TME LAST DOSE: NORMAL H
TME LAST DOSE: NORMAL H
WBC # BLD AUTO: 3.8 10E3/UL (ref 4–11)
WBC # BLD AUTO: 3.8 10E3/UL (ref 4–11)

## 2023-06-12 PROCEDURE — 84100 ASSAY OF PHOSPHORUS: CPT

## 2023-06-12 PROCEDURE — 82248 BILIRUBIN DIRECT: CPT

## 2023-06-12 PROCEDURE — 85025 COMPLETE CBC W/AUTO DIFF WBC: CPT

## 2023-06-12 PROCEDURE — 36415 COLL VENOUS BLD VENIPUNCTURE: CPT

## 2023-06-12 PROCEDURE — 83735 ASSAY OF MAGNESIUM: CPT

## 2023-06-12 PROCEDURE — 80053 COMPREHEN METABOLIC PANEL: CPT

## 2023-06-12 PROCEDURE — 80197 ASSAY OF TACROLIMUS: CPT

## 2023-06-15 ENCOUNTER — OFFICE VISIT (OUTPATIENT)
Dept: TRANSPLANT | Facility: CLINIC | Age: 63
End: 2023-06-15
Attending: TRANSPLANT SURGERY
Payer: COMMERCIAL

## 2023-06-15 VITALS
WEIGHT: 152.5 LBS | SYSTOLIC BLOOD PRESSURE: 121 MMHG | DIASTOLIC BLOOD PRESSURE: 76 MMHG | HEART RATE: 67 BPM | RESPIRATION RATE: 16 BRPM | BODY MASS INDEX: 23.19 KG/M2 | OXYGEN SATURATION: 98 %

## 2023-06-15 DIAGNOSIS — Z94.4 LIVER REPLACED BY TRANSPLANT (H): ICD-10-CM

## 2023-06-15 PROCEDURE — G0463 HOSPITAL OUTPT CLINIC VISIT: HCPCS | Performed by: TRANSPLANT SURGERY

## 2023-06-15 PROCEDURE — 99213 OFFICE O/P EST LOW 20 MIN: CPT | Mod: 24 | Performed by: TRANSPLANT SURGERY

## 2023-06-15 ASSESSMENT — PAIN SCALES - GENERAL: PAINLEVEL: NO PAIN (0)

## 2023-06-15 NOTE — PROGRESS NOTES
Transplant Surgery Progress Note    Transplants:  3/23/2023 (Liver); Postoperative day:  84  S: overall doing quite well, appetite and activity normalizing, denies f/c/n/v/d    Transplant History:    Transplant Type:  Liver Tx  Donor Type:    Transplant Date:  3/23/2023 (Liver)   Biliary Stent:  No    Acute Rejection Hx:  No    Present Maintenance Immunosuppression:  Tacrolimus and Mycophenolate mofetil    CMV IgG Ab Discordance:  No  EBV IgG Ab Discordance:  No    Transplant Coordinator: Reyna Leyva     Transplant Office Phone Number: [phone]     Immunosuppressant Medications     Immunosuppressive Agents Disp Start End     mycophenolate (GENERIC EQUIVALENT) 250 MG capsule    540 capsule 5/11/2023     Sig - Route: Take 2 capsules (500 mg) by mouth 2 times daily - Oral    Class: E-Prescribe    Notes to Pharmacy: TXP DT 3/23/2023 (Liver) TXP Dischg DT 3/29/2023 DX Liver replaced by transplant Z94.4 TX [facility] ([place], [place])     tacrolimus (GENERIC EQUIVALENT) 0.5 MG capsule    90 capsule 6/8/2023     Sig - Route: Take 1 capsule (0.5 mg) by mouth every morning - Oral    Class: E-Prescribe    Notes to Pharmacy: TXP DT 3/23/2023 (Liver) TXP Dischg DT 3/29/2023 DX Liver replaced by transplant Z94.4 TX [facility] ([place], [place])     tacrolimus (GENERIC EQUIVALENT) 1 MG capsule    180 capsule 6/8/2023     Sig - Route: Take 3 capsules (3 mg) by mouth every 12 hours Total dose 3.5mg am 3 mg pm - Oral    Class: E-Prescribe    Notes to Pharmacy: TXP DT 3/23/2023 (Liver) TXP Dischg DT 3/29/2023 DX Liver replaced by transplant Z94.4 TX [facility] ([place], [place])          Possible Immunosuppression-related side effects:   []             headache  []             vivid dreams  []             irritability  []             cognitive difficuties  []             fine tremor  []              nausea  []             diarrhea  []             neuropathy      []             edema  []             renal calcineurin toxicity  []             hyperkalemia  []             post-transplant diabetes  []             decreased appetite  []             increased appetite  []             other:  []             none    Prescription Medications as of 6/15/2023       Rx Number Disp Refills Start End Last Dispensed Date Next Fill Date Owning Pharmacy    aspirin (ASA) 325 MG tablet  90 tablet 3 4/3/2023    [facility] [place] [address] [address]    Sig: Take 1 tablet (325 mg) by mouth daily    Class: E-Prescribe    Route: Oral    calcium carbonate-vitamin D (CALTRATE) 600-10 MG-MCG per tablet  180 tablet 3 4/3/2023    [facility] [place] [address] [address]    Sig: Take 1 tablet by mouth 2 times daily (with meals)    Class: E-Prescribe    Route: Oral    folic acid (FOLVITE) 1 MG tablet  90 tablet 1 4/3/2023    [facility] [place] [address] [address]    Sig: Take 1 tablet (1,000 mcg) by mouth daily    Class: E-Prescribe    Route: Oral    magnesium oxide (MAG-OX) 400 MG tablet  360 tablet 3 4/11/2023    [facility] [place] [address] [address]    Sig: Take 2 tablets (800 mg) by mouth 2 times daily    Class: E-Prescribe    Route: Oral    methocarbamol (ROBAXIN) 500 MG tablet            Sig: Take 500 mg by mouth daily as needed for muscle spasms    Class: Historical    Route: Oral    metoprolol tartrate (LOPRESSOR) 25 MG tablet   0 5/3/2023        Sig: Take 1 tablet (25 mg) by mouth 2 times daily    Class: Historical    Notes to Pharmacy: PCP manages.    Route: Oral    Multiple Vitamins-Minerals (SPECTRAVITE) TABS    4/13/2023        Sig: Take 1 tablet by mouth daily    Class: Historical    Route: Oral    mycophenolate (GENERIC EQUIVALENT) 250 MG capsule  540 capsule 3 5/11/2023    [facility] [place] [address] [address]    Sig: Take 2 capsules (500 mg) by mouth 2 times daily    Class: E-Prescribe    Notes to Pharmacy: TXP DT 3/23/2023 (Liver) TXP Dischg DT 3/29/2023 DX Liver replaced by transplant Z94.4 TX [facility] ([place], [place])    Route: Oral    omeprazole 20 MG tablet  180 tablet 3 4/3/2023    [facility] [place] [address] [address]    Sig: Take 1 tablet (20 mg) by mouth 2 times daily    Class: E-Prescribe    Route: Oral    sulfamethoxazole-trimethoprim (BACTRIM) 400-80 MG tablet  90 tablet 3 4/3/2023    [facility] [place] [address] [address]    Sig: Take 1 tablet by mouth daily    Class: E-Prescribe    Route: Oral    tacrolimus (GENERIC EQUIVALENT) 0.5 MG capsule  90 capsule 3 6/8/2023    [facility] [place] [address] [address]    Sig: Take 1 capsule (0.5 mg) by mouth every morning    Class: E-Prescribe    Notes to Pharmacy: TXP DT 3/23/2023 (Liver) TXP Dischg DT 3/29/2023 DX Liver replaced by transplant Z94.4 [facility] ([place], [place])    Route: Oral    tacrolimus (GENERIC EQUIVALENT) 1 MG capsule  180 capsule 11 6/8/2023    [facility] [place] [address] [address]    Sig: Take 3 capsules (3 mg) by mouth every 12 hours Total dose 3.5mg am 3 mg pm    Class: E-Prescribe    Notes to Pharmacy: TXP DT 3/23/2023 (Liver) TXP Dischg DT 3/29/2023 DX Liver replaced by transplant Z94.4 TX [facility] ([place], [place])    Route: Oral    valGANciclovir (VALCYTE) 450 MG tablet  90 tablet 3 4/3/2023    [facility] [place] [address] [address]    Sig: Take 1 tablet (450 mg) by mouth daily    Class: E-Prescribe    Route: Oral          O:   [unfilled]        Latest Ref Rng & Units 6/12/2023     7:59 AM  6/5/2023     7:24 AM 5/30/2023     7:40 AM 5/30/2023     7:33 AM 5/25/2023     7:42 AM   Transplant Immunosuppression Labs   Creat 0.67 - 1.17 mg/dL 1.52   1.43    1.46   1.39     Urea Nitrogen 8.0 - 23.0 mg/dL 32.9   26.2    28.2   32.4     WBC 4.0 - 11.0 10e3/uL  4.0 - 11.0 10e3/uL 3.8      3.8   2.3      2.4   2.9   3.0   3.7     Neutrophil % 50   30   47           Chemistries:   Recent Labs   Lab Test 06/12/23  0759   BUN 32.9*   CR 1.52*   GFRESTIMATED 52*   *     Lab Results   Component Value Date    A1C 4.7 03/23/2023     Recent Labs   Lab Test 06/12/23  0759   ALBUMIN 4.0   BILITOTAL 0.5   ALKPHOS 65   AST 26   ALT 34     Urine Studies:  Recent Labs   Lab Test 12/20/22  0923   COLOR Yellow   APPEARANCE Clear   URINEGLC Negative   URINEBILI Negative   URINEKETONE Negative   SG 1.018   UBLD Negative   URINEPH 6.0   PROTEIN Negative   NITRITE Negative   LEUKEST Negative     No lab results found.  Hematology:   Recent Labs   Lab Test 06/12/23  0759 06/05/23  0724 05/30/23  0740 05/30/23  0733   HGB 12.2* 12.6*  --  11.7*    156  --  122*   WBC 3.8*  3.8* 2.3*  2.4* 2.9* 3.0*     Coags:   Recent Labs   Lab Test 06/05/23  0724 05/30/23  0740   INR 1.09 1.06     HLA antibodies:   No results found for: [id number], [id number], [id number], [id number]    Assessment: Cricket Mane is doing well s/p Liver Tx:  Issues we addressed during his visit include:    Plan:    1. Graft function: LFTs normal  2. Immunosuppression Management: No change tac 8-10 may wean MMF.  Complexity of management:Low.  Contributing factors:  renal insufficiency  3. Bactrim held for elevated Cr, may need to start dapsone or pentamidine  4. Elevated Cr, may be fine reducing tac goal to help with Cr, will defer to Dr Sloan with his next visit  Followup: hepatology            Aniket Arzate MD/PhD

## 2023-06-15 NOTE — LETTER
6/15/2023         RE: Cricket Mane  [address]  [facility] [address]        Dear Colleague,    Thank you for referring your patient, Cricket Mane, to the [facility]. Please see a copy of my visit note below.    Transplant Surgery Progress Note    Transplants:  3/23/2023 (Liver); Postoperative day:  84  S: overall doing quite well, appetite and activity normalizing, denies f/c/n/v/d    Transplant History:    Transplant Type:  Liver Tx  Donor Type:    Transplant Date:  3/23/2023 (Liver)   Biliary Stent:  No    Acute Rejection Hx:  No    Present Maintenance Immunosuppression:  Tacrolimus and Mycophenolate mofetil    CMV IgG Ab Discordance:  No  EBV IgG Ab Discordance:  No    Transplant Coordinator: Reyna Leyva     Transplant Office Phone Number: [phone]     Immunosuppressant Medications     Immunosuppressive Agents Disp Start End     mycophenolate (GENERIC EQUIVALENT) 250 MG capsule    540 capsule 5/11/2023     Sig - Route: Take 2 capsules (500 mg) by mouth 2 times daily - Oral    Class: E-Prescribe    Notes to Pharmacy: TXP DT 3/23/2023 (Liver) TXP Dischg DT 3/29/2023 DX Liver replaced by transplant Z94.4 TX [facility] ([place], [place])     tacrolimus (GENERIC EQUIVALENT) 0.5 MG capsule    90 capsule 6/8/2023     Sig - Route: Take 1 capsule (0.5 mg) by mouth every morning - Oral    Class: E-Prescribe    Notes to Pharmacy: TXP DT 3/23/2023 (Liver) TXP Dischg DT 3/29/2023 DX Liver replaced by transplant Z94.4 [facility] ([place], [place])     tacrolimus (GENERIC EQUIVALENT) 1 MG capsule    180 capsule 6/8/2023     Sig - Route: Take 3 capsules (3 mg) by mouth every 12 hours Total dose 3.5mg am 3 mg pm - Oral    Class: E-Prescribe    Notes to Pharmacy: TXP DT 3/23/2023 (Liver) TXP Dischg DT 3/29/2023 DX Liver replaced by transplant Z94.4 [facility] ([place], [place])          Possible Immunosuppression-related side effects:   []             headache  []             vivid dreams  []             irritability  []             cognitive difficuties  []             fine tremor  []             nausea  []             diarrhea  []             neuropathy      []             edema  []             renal calcineurin toxicity  []             hyperkalemia  []             post-transplant diabetes  []             decreased appetite  []             increased appetite  []             other:  []             none    Prescription Medications as of 6/15/2023       Rx Number Disp Refills Start End Last Dispensed Date Next Fill Date Owning Pharmacy    aspirin (ASA) 325 MG tablet  90 tablet 3 4/3/2023    [facility] [address] [address]    Sig: Take 1 tablet (325 mg) by mouth daily    Class: E-Prescribe    Route: Oral    calcium carbonate-vitamin D (CALTRATE) 600-10 MG-MCG per tablet  180 tablet 3 4/3/2023    [facility] [address]    Sig: Take 1 tablet by mouth 2 times daily (with meals)    Class: E-Prescribe    Route: Oral    folic acid (FOLVITE) 1 MG tablet  90 tablet 1 4/3/2023    [facility] [address]    Sig: Take 1 tablet (1,000 mcg) by mouth daily    Class: E-Prescribe    Route: Oral    magnesium oxide (MAG-OX) 400 MG tablet  360 tablet 3 4/11/2023    [facility] [address]    Sig: Take 2 tablets (800 mg) by mouth 2 times daily    Class: E-Prescribe    Route: Oral    methocarbamol (ROBAXIN) 500 MG tablet            Sig: Take 500 mg by mouth daily as needed for muscle spasms    Class: Historical    Route: Oral    metoprolol tartrate (LOPRESSOR) 25 MG tablet   0 5/3/2023        Sig: Take 1 tablet (25 mg) by mouth 2 times daily    Class: Historical    Notes to Pharmacy: PCP manages.     Route: Oral    Multiple Vitamins-Minerals (SPECTRAVITE) TABS    4/13/2023        Sig: Take 1 tablet by mouth daily    Class: Historical    Route: Oral    mycophenolate (GENERIC EQUIVALENT) 250 MG capsule  540 capsule 3 5/11/2023    [facility] [place] [address] [address]    Sig: Take 2 capsules (500 mg) by mouth 2 times daily    Class: E-Prescribe    Notes to Pharmacy: TXP DT 3/23/2023 (Liver) TXP Dischg DT 3/29/2023 DX Liver replaced by transplant Z94.4 TX [facility] ([place], [place])    Route: Oral    omeprazole 20 MG tablet  180 tablet 3 4/3/2023    [facility] [place] [address] [address]    Sig: Take 1 tablet (20 mg) by mouth 2 times daily    Class: E-Prescribe    Route: Oral    sulfamethoxazole-trimethoprim (BACTRIM) 400-80 MG tablet  90 tablet 3 4/3/2023    [facility] [place] [address] [address]    Sig: Take 1 tablet by mouth daily    Class: E-Prescribe    Route: Oral    tacrolimus (GENERIC EQUIVALENT) 0.5 MG capsule  90 capsule 3 6/8/2023    [facility] [place] [address] [address]    Sig: Take 1 capsule (0.5 mg) by mouth every morning    Class: E-Prescribe    Notes to Pharmacy: TXP DT 3/23/2023 (Liver) TXP Dischg DT 3/29/2023 DX Liver replaced by transplant Z94.4 [facility] ([place], [place])    Route: Oral    tacrolimus (GENERIC EQUIVALENT) 1 MG capsule  180 capsule 11 6/8/2023    [facility] [place] [address] [address]    Sig: Take 3 capsules (3 mg) by mouth every 12 hours Total dose 3.5mg am 3 mg pm    Class: E-Prescribe    Notes to Pharmacy: TXP DT 3/23/2023 (Liver) TXP Dischg DT 3/29/2023 DX Liver replaced by transplant Z94.4 TX [facility] ([place], [place])    Route: Oral    valGANciclovir (VALCYTE) 450 MG tablet  90  tablet 3 4/3/2023    [facility] [place], [place] - [address]    Sig: Take 1 tablet (450 mg) by mouth daily    Class: E-Prescribe    Route: Oral          O:   [unfilled]        Latest Ref Rng & Units 6/12/2023     7:59 AM 6/5/2023     7:24 AM 5/30/2023     7:40 AM 5/30/2023     7:33 AM 5/25/2023     7:42 AM   Transplant Immunosuppression Labs   Creat 0.67 - 1.17 mg/dL 1.52   1.43    1.46   1.39     Urea Nitrogen 8.0 - 23.0 mg/dL 32.9   26.2    28.2   32.4     WBC 4.0 - 11.0 10e3/uL  4.0 - 11.0 10e3/uL 3.8      3.8   2.3      2.4   2.9   3.0   3.7     Neutrophil % 50   30   47           Chemistries:   Recent Labs   Lab Test 06/12/23  0759   BUN 32.9*   CR 1.52*   GFRESTIMATED 52*   *     Lab Results   Component Value Date    A1C 4.7 03/23/2023     Recent Labs   Lab Test 06/12/23  0759   ALBUMIN 4.0   BILITOTAL 0.5   ALKPHOS 65   AST 26   ALT 34     Urine Studies:  Recent Labs   Lab Test 12/20/22  0923   COLOR Yellow   APPEARANCE Clear   URINEGLC Negative   URINEBILI Negative   URINEKETONE Negative   SG 1.018   UBLD Negative   URINEPH 6.0   PROTEIN Negative   NITRITE Negative   LEUKEST Negative     No lab results found.  Hematology:   Recent Labs   Lab Test 06/12/23  0759 06/05/23  0724 05/30/23  0740 05/30/23  0733   HGB 12.2* 12.6*  --  11.7*    156  --  122*   WBC 3.8*  3.8* 2.3*  2.4* 2.9* 3.0*     Coags:   Recent Labs   Lab Test 06/05/23  0724 05/30/23  0740   INR 1.09 1.06     HLA antibodies:   No results found for: [id number], [id number], [id number], [id number]    Assessment: Cricket Mane is doing well s/p Liver Tx:  Issues we addressed during his visit include:    Plan:    1. Graft function: LFTs normal  2. Immunosuppression Management: No change tac 8-10 may wean MMF.  Complexity of management:Low.  Contributing factors:  renal insufficiency  3. Bactrim held for elevated Cr, may need to start dapsone or pentamidine  4. Elevated Cr, may be fine reducing tac  goal to help with Cr, will defer to Dr Sloan with his next visit  Followup: hepatology            Aniket Arzate MD/PhD      Again, thank you for allowing me to participate in the care of your patient.        Sincerely,        Aniket Arzate MD

## 2023-06-15 NOTE — NURSING NOTE
Chief Complaint   Patient presents with     Follow Up     Liver transplant on 3/23/23     /76 (BP Location: Right arm, Patient Position: Sitting, Cuff Size: Adult Regular)   Pulse 67   Resp 16   Wt 69.2 kg (152 lb 8 oz)   SpO2 98%   BMI 23.19 kg/m      GISSELLE CONRAD, RN on 6/15/2023 at 10:17 AM

## 2023-06-19 ENCOUNTER — TELEPHONE (OUTPATIENT)
Dept: TRANSPLANT | Facility: CLINIC | Age: 63
End: 2023-06-19

## 2023-06-19 ENCOUNTER — LAB (OUTPATIENT)
Dept: LAB | Facility: CLINIC | Age: 63
End: 2023-06-19
Payer: COMMERCIAL

## 2023-06-19 ENCOUNTER — MYC MEDICAL ADVICE (OUTPATIENT)
Dept: TRANSPLANT | Facility: CLINIC | Age: 63
End: 2023-06-19

## 2023-06-19 DIAGNOSIS — K70.30 ALCOHOLIC CIRRHOSIS (H): ICD-10-CM

## 2023-06-19 DIAGNOSIS — D84.9 IMMUNOSUPPRESSION (H): Primary | ICD-10-CM

## 2023-06-19 DIAGNOSIS — Z94.4 LIVER REPLACED BY TRANSPLANT (H): ICD-10-CM

## 2023-06-19 DIAGNOSIS — D70.9 NEUTROPENIA (H): ICD-10-CM

## 2023-06-19 DIAGNOSIS — D84.9 IMMUNOSUPPRESSION (H): ICD-10-CM

## 2023-06-19 LAB
ALBUMIN SERPL BCG-MCNC: 3.9 G/DL (ref 3.5–5.2)
ALP SERPL-CCNC: 58 U/L (ref 40–129)
ALT SERPL W P-5'-P-CCNC: 25 U/L (ref 0–70)
ANION GAP SERPL CALCULATED.3IONS-SCNC: 10 MMOL/L (ref 7–15)
AST SERPL W P-5'-P-CCNC: 23 U/L (ref 0–45)
BILIRUB DIRECT SERPL-MCNC: <0.2 MG/DL (ref 0–0.3)
BILIRUB SERPL-MCNC: 0.4 MG/DL
BUN SERPL-MCNC: 29.4 MG/DL (ref 8–23)
CALCIUM SERPL-MCNC: 9.3 MG/DL (ref 8.8–10.2)
CHLORIDE SERPL-SCNC: 106 MMOL/L (ref 98–107)
CREAT SERPL-MCNC: 1.46 MG/DL (ref 0.67–1.17)
DEPRECATED HCO3 PLAS-SCNC: 24 MMOL/L (ref 22–29)
ERYTHROCYTE [DISTWIDTH] IN BLOOD BY AUTOMATED COUNT: 12.7 % (ref 10–15)
GFR SERPL CREATININE-BSD FRML MDRD: 55 ML/MIN/1.73M2
GLUCOSE SERPL-MCNC: 110 MG/DL (ref 70–99)
HCT VFR BLD AUTO: 38.9 % (ref 40–53)
HGB BLD-MCNC: 12.5 G/DL (ref 13.3–17.7)
INR PPP: 1.12 (ref 0.85–1.15)
MAGNESIUM SERPL-MCNC: 1.4 MG/DL (ref 1.7–2.3)
MCH RBC QN AUTO: 26.7 PG (ref 26.5–33)
MCHC RBC AUTO-ENTMCNC: 32.1 G/DL (ref 31.5–36.5)
MCV RBC AUTO: 83 FL (ref 78–100)
PHOSPHATE SERPL-MCNC: 3.7 MG/DL (ref 2.5–4.5)
PLATELET # BLD AUTO: 158 10E3/UL (ref 150–450)
POTASSIUM SERPL-SCNC: 4.8 MMOL/L (ref 3.4–5.3)
PROT SERPL-MCNC: 6.9 G/DL (ref 6.4–8.3)
PSA SERPL DL<=0.01 NG/ML-MCNC: 0.8 NG/ML (ref 0–4.5)
RBC # BLD AUTO: 4.68 10E6/UL (ref 4.4–5.9)
SODIUM SERPL-SCNC: 140 MMOL/L (ref 136–145)
TACROLIMUS BLD-MCNC: 11.8 UG/L (ref 5–15)
TME LAST DOSE: NORMAL H
TME LAST DOSE: NORMAL H
WBC # BLD AUTO: 3.1 10E3/UL (ref 4–11)

## 2023-06-19 PROCEDURE — 80197 ASSAY OF TACROLIMUS: CPT

## 2023-06-19 PROCEDURE — 80053 COMPREHEN METABOLIC PANEL: CPT

## 2023-06-19 PROCEDURE — 85610 PROTHROMBIN TIME: CPT

## 2023-06-19 PROCEDURE — 82248 BILIRUBIN DIRECT: CPT

## 2023-06-19 PROCEDURE — 83735 ASSAY OF MAGNESIUM: CPT

## 2023-06-19 PROCEDURE — 36415 COLL VENOUS BLD VENIPUNCTURE: CPT

## 2023-06-19 PROCEDURE — 85027 COMPLETE CBC AUTOMATED: CPT

## 2023-06-19 PROCEDURE — G0103 PSA SCREENING: HCPCS

## 2023-06-19 PROCEDURE — 84100 ASSAY OF PHOSPHORUS: CPT

## 2023-06-19 RX ORDER — TACROLIMUS 1 MG/1
3 CAPSULE ORAL EVERY 12 HOURS
Qty: 180 CAPSULE | Refills: 11 | Status: SHIPPED | OUTPATIENT
Start: 2023-06-19 | End: 2023-06-27

## 2023-06-19 RX ORDER — TACROLIMUS 0.5 MG/1
0.5 CAPSULE ORAL PRN
Qty: 90 CAPSULE | Refills: 3 | Status: SHIPPED | OUTPATIENT
Start: 2023-06-19 | End: 2024-06-05

## 2023-06-19 NOTE — TELEPHONE ENCOUNTER
ISSUE:   Tacrolimus IR level 11.8 on 6/19, goal 8-10, dose 3.5 mg in the am and 3 mg in the pm.    PLAN:   Please call patient and confirm this was an accurate 12-hour trough. Verify Tacrolimus IR dose 3.5 mg in the am and 3 in the pm. Confirm no new medications or illness. Confirm no missed doses. If accurate trough and accurate dose, decrease Tacrolimus IR dose to 3 mg BID and repeat labs in 1 weeks    Reyna Leyva RN on 6/19/2023 at 3:25 PM    OUTCOME:   Spoke with patient, they confirm accurate trough level and current dose 3.5 mg am, 3 mg pm. Patient confirmed dose change to 3 mg BID and to repeat labs in 1 weeks. Orders sent to preferred pharmacy for dose change and lab for repeat labs. Patient voiced understanding of plan.     Ree Burleson LPN

## 2023-06-19 NOTE — TELEPHONE ENCOUNTER
Pt did labs today  wheb he saw Dr Arzate last week  He wnted a PSA drawn  Needs orders in [facility]  For [facility] to add on  Or get drawn nest time

## 2023-06-23 ENCOUNTER — TELEPHONE (OUTPATIENT)
Dept: PHARMACY | Facility: CLINIC | Age: 63
End: 2023-06-23
Payer: COMMERCIAL

## 2023-06-23 ENCOUNTER — TELEPHONE (OUTPATIENT)
Dept: TRANSPLANT | Facility: CLINIC | Age: 63
End: 2023-06-23
Payer: COMMERCIAL

## 2023-06-23 DIAGNOSIS — Z94.4 S/P LIVER TRANSPLANT (H): ICD-10-CM

## 2023-06-23 RX ORDER — MYCOPHENOLATE MOFETIL 250 MG/1
250 CAPSULE ORAL 2 TIMES DAILY
Qty: 14 CAPSULE | Refills: 0 | Status: SHIPPED | OUTPATIENT
Start: 2023-06-24 | End: 2023-07-19

## 2023-06-23 NOTE — TELEPHONE ENCOUNTER
Clinical Pharmacy Consult:                                                      Transplant Specific: 3 Month Post Transplant Call  Date of Transplant: 3/23/2023  Type of Transplant: liver  First Transplant: yes  History of rejection: no    Immunosuppression Regimen   TAC 3mg qAM & 3mg qPM and MMF 500mg qAM & 500mg qPM  Patient specific goal: 8-10  Most recent level: 11.8, date 6/19/23  Immunosuppressant Levels:  Supratherapeutic, dose decreased  Pt adherent to lab draws: yes  Scr:   Lab Results   Component Value Date    CR 1.58 05/01/2023     Side effects: None    Prophylactic Medications  Antibacterial:  Bactrim SS daily  Scheduled Discontinue Date: 6 months    Antifungal: Not needed thus far  Scheduled Discontinue Date: N/A    Antiviral: CrCl 40 to 59 mL/minute: Valcyte 450 mg once daily   Scheduled Discontinue Date: 6 months    Acid Reducer: Prilosec (omeprazole)  Scheduled Reviewed Date: followed by clinic    Thrombosis Prevention: Aspirin 325 mg PO daily  Scheduled Discontinue Date: 6 months    Blood Pressure Management  Frequency of home Blood Pressure checks: once daily  Most recent home BP: 120/83  Patient Blood pressure goal: <140/90  Patient blood pressure at goal:  yes  Hospitalizations/ER visits since last assessment: 0        5/1/2023     3:00 PM   Medication adherence flowsheet   Patient medication administration: Responsible for own medications   Patient estimated adherence level: %   Pharmacist assessment of adherence: Good   Patient reported doses missed per week: 0-1   Facilitators to medication adherence  Medication dosing chart;Pill box;Schedule/routine;Caregiver assistance   Patient reported barriers to medication adherence  No issues identified   Adherence intervention(s):  on importance of adherence          5/1/2023     3:00 PM   Medication access flowsheet   Number of pharmacies used: 1   Pharmacy: [facility]   Enrolled in [facility]? Yes   Patient  reported barriers to accessing medications: No issues reported by patient   Medication access interventions: No issues identified      Med rec/DUR performed: yes  Med Rec Discrepancies: yes, calcium carb increased to TID    Cricket reports doing really well, not bothered by any side effects. His tremors are barely noticeable. He sets up his own medications and have not been late or missed any doses. Endocrinologist increased his calcium to TID. Tac lab was above goal, Cricket is aware of dose decrease.    BP: checking once a week, readings at goal. Clinic BP at goal.    No other questions or concerns. Next follow up with MTM on 7/19. We follow up at 6 months.    Terry Hong, Pharm D  [facility]

## 2023-06-23 NOTE — TELEPHONE ENCOUNTER
Post Liver Transplant Team Conference  Date: 6/23/2023  Transplant Coordinator: Reyna Leyva  Transplant Surgeon: Aniket Arzate      Attendees:  [] Dr. Li [x] Taya Leyva, RN []       [x] Dr. Plummer [x] Ree Burleson LPN []    [] Dr. Cowan [x] Lisa Castro, RN []    [] Dr. Muñoz [] Hilaria Arellano, ERNESTINE []    [] DR. Yi [x] Hilaria Serna, IZABELA []       [] Dr. Sloan [x] Polly Mancini, RN []       [] Dr. Amarjit Alexander [x] Scarlett Michelle, IZABELA []       [] Dr. JOHN Arzate [x] Renita Giraldo RN []       [x] Herb Roberts, pharm [] Stephanie Jean RN []       [] Dr. Vargas [] Benjamin Johns RN []         Verbal Plan Read Back:   Per Dr. Plummer:  *Wean MMF per protocol  *Discontinue Valcyte per protocol    Called patient to discuss following medication changes-   *Stop Valcyte  *MMF wean as follows-250mg BID x1 week, then stop.  Patient verbalized understanding.    Routed to RN Coordinator   Reyna Leyva RN

## 2023-06-26 ENCOUNTER — TELEPHONE (OUTPATIENT)
Dept: TRANSPLANT | Facility: CLINIC | Age: 63
End: 2023-06-26

## 2023-06-26 ENCOUNTER — LAB (OUTPATIENT)
Dept: LAB | Facility: CLINIC | Age: 63
End: 2023-06-26
Payer: COMMERCIAL

## 2023-06-26 DIAGNOSIS — Z94.4 LIVER REPLACED BY TRANSPLANT (H): ICD-10-CM

## 2023-06-26 DIAGNOSIS — Z94.4 S/P LIVER TRANSPLANT (H): ICD-10-CM

## 2023-06-26 DIAGNOSIS — Z94.4 S/P LIVER TRANSPLANT (H): Primary | ICD-10-CM

## 2023-06-26 DIAGNOSIS — K70.30 ALCOHOLIC CIRRHOSIS (H): ICD-10-CM

## 2023-06-26 LAB
ALBUMIN SERPL BCG-MCNC: 4.3 G/DL (ref 3.5–5.2)
ALP SERPL-CCNC: 64 U/L (ref 40–129)
ALT SERPL W P-5'-P-CCNC: 32 U/L (ref 0–70)
ANION GAP SERPL CALCULATED.3IONS-SCNC: 12 MMOL/L (ref 7–15)
AST SERPL W P-5'-P-CCNC: 27 U/L (ref 0–45)
BASOPHILS # BLD AUTO: 0 10E3/UL (ref 0–0.2)
BASOPHILS NFR BLD AUTO: 2 %
BILIRUB DIRECT SERPL-MCNC: <0.2 MG/DL (ref 0–0.3)
BILIRUB SERPL-MCNC: 0.6 MG/DL
BUN SERPL-MCNC: 33 MG/DL (ref 8–23)
CALCIUM SERPL-MCNC: 10 MG/DL (ref 8.8–10.2)
CHLORIDE SERPL-SCNC: 103 MMOL/L (ref 98–107)
CREAT SERPL-MCNC: 1.64 MG/DL (ref 0.67–1.17)
DEPRECATED HCO3 PLAS-SCNC: 23 MMOL/L (ref 22–29)
EOSINOPHIL # BLD AUTO: 0.2 10E3/UL (ref 0–0.7)
EOSINOPHIL NFR BLD AUTO: 9 %
ERYTHROCYTE [DISTWIDTH] IN BLOOD BY AUTOMATED COUNT: 13.1 % (ref 10–15)
GFR SERPL CREATININE-BSD FRML MDRD: 48 ML/MIN/1.73M2
GLUCOSE SERPL-MCNC: 125 MG/DL (ref 70–99)
HCT VFR BLD AUTO: 39.6 % (ref 40–53)
HGB BLD-MCNC: 12.9 G/DL (ref 13.3–17.7)
IMM GRANULOCYTES # BLD: 0 10E3/UL
IMM GRANULOCYTES NFR BLD: 2 %
INR PPP: 1.04 (ref 0.85–1.15)
LYMPHOCYTES # BLD AUTO: 0.9 10E3/UL (ref 0.8–5.3)
LYMPHOCYTES NFR BLD AUTO: 54 %
MAGNESIUM SERPL-MCNC: 1.6 MG/DL (ref 1.7–2.3)
MCH RBC QN AUTO: 26.6 PG (ref 26.5–33)
MCHC RBC AUTO-ENTMCNC: 32.6 G/DL (ref 31.5–36.5)
MCV RBC AUTO: 82 FL (ref 78–100)
MONOCYTES # BLD AUTO: 0.2 10E3/UL (ref 0–1.3)
MONOCYTES NFR BLD AUTO: 13 %
NEUTROPHILS # BLD AUTO: 0.4 10E3/UL (ref 1.6–8.3)
NEUTROPHILS NFR BLD AUTO: 20 %
NRBC # BLD AUTO: 0 10E3/UL
NRBC BLD AUTO-RTO: 0 /100
PHOSPHATE SERPL-MCNC: 4.5 MG/DL (ref 2.5–4.5)
PLATELET # BLD AUTO: 143 10E3/UL (ref 150–450)
POTASSIUM SERPL-SCNC: 4.5 MMOL/L (ref 3.4–5.3)
PROT SERPL-MCNC: 7.2 G/DL (ref 6.4–8.3)
RBC # BLD AUTO: 4.85 10E6/UL (ref 4.4–5.9)
SODIUM SERPL-SCNC: 138 MMOL/L (ref 136–145)
TACROLIMUS BLD-MCNC: 12.1 UG/L (ref 5–15)
TME LAST DOSE: NORMAL H
TME LAST DOSE: NORMAL H
WBC # BLD AUTO: 1.7 10E3/UL (ref 4–11)
WBC # BLD AUTO: 1.7 10E3/UL (ref 4–11)

## 2023-06-26 PROCEDURE — 85610 PROTHROMBIN TIME: CPT

## 2023-06-26 PROCEDURE — 85025 COMPLETE CBC W/AUTO DIFF WBC: CPT

## 2023-06-26 PROCEDURE — 80321 ALCOHOLS BIOMARKERS 1OR 2: CPT | Mod: 90

## 2023-06-26 PROCEDURE — 99000 SPECIMEN HANDLING OFFICE-LAB: CPT

## 2023-06-26 PROCEDURE — 84100 ASSAY OF PHOSPHORUS: CPT

## 2023-06-26 PROCEDURE — 82248 BILIRUBIN DIRECT: CPT

## 2023-06-26 PROCEDURE — 36415 COLL VENOUS BLD VENIPUNCTURE: CPT

## 2023-06-26 PROCEDURE — 80053 COMPREHEN METABOLIC PANEL: CPT

## 2023-06-26 PROCEDURE — 83735 ASSAY OF MAGNESIUM: CPT

## 2023-06-26 PROCEDURE — 80197 ASSAY OF TACROLIMUS: CPT

## 2023-06-26 NOTE — TELEPHONE ENCOUNTER
DATE:  6/26/2023     TIME OF RECEIPT FROM LAB:  0906    ORDERING PROVIDER:     LAB TEST:  WBC    LAB VALUE:  1.65    RESULTS GIVEN WITH READ-BACK TO (PROVIDER):  ARMIDA BAKER    TIME LAB VALUE REPORTED TO PROVIDER:

## 2023-06-27 ENCOUNTER — TELEPHONE (OUTPATIENT)
Dept: TRANSPLANT | Facility: CLINIC | Age: 63
End: 2023-06-27
Payer: COMMERCIAL

## 2023-06-27 DIAGNOSIS — Z94.4 LIVER REPLACED BY TRANSPLANT (H): ICD-10-CM

## 2023-06-27 DIAGNOSIS — D70.9 NEUTROPENIA (H): Primary | ICD-10-CM

## 2023-06-27 DIAGNOSIS — D84.9 IMMUNOSUPPRESSION (H): Primary | ICD-10-CM

## 2023-06-27 DIAGNOSIS — D84.9 IMMUNOSUPPRESSION (H): ICD-10-CM

## 2023-06-27 RX ORDER — EPINEPHRINE 1 MG/ML
0.3 INJECTION, SOLUTION, CONCENTRATE INTRAVENOUS EVERY 5 MIN PRN
Status: CANCELLED | OUTPATIENT
Start: 2023-06-27

## 2023-06-27 RX ORDER — ALBUTEROL SULFATE 90 UG/1
1-2 AEROSOL, METERED RESPIRATORY (INHALATION)
Status: CANCELLED
Start: 2023-06-27

## 2023-06-27 RX ORDER — METHYLPREDNISOLONE SODIUM SUCCINATE 125 MG/2ML
125 INJECTION, POWDER, LYOPHILIZED, FOR SOLUTION INTRAMUSCULAR; INTRAVENOUS
Status: CANCELLED
Start: 2023-06-28

## 2023-06-27 RX ORDER — MEPERIDINE HYDROCHLORIDE 25 MG/ML
25 INJECTION INTRAMUSCULAR; INTRAVENOUS; SUBCUTANEOUS EVERY 30 MIN PRN
Status: CANCELLED | OUTPATIENT
Start: 2023-06-27

## 2023-06-27 RX ORDER — ALBUTEROL SULFATE 0.83 MG/ML
2.5 SOLUTION RESPIRATORY (INHALATION)
Status: CANCELLED | OUTPATIENT
Start: 2023-06-28

## 2023-06-27 RX ORDER — DIPHENHYDRAMINE HYDROCHLORIDE 50 MG/ML
50 INJECTION INTRAMUSCULAR; INTRAVENOUS
Status: CANCELLED
Start: 2023-06-27

## 2023-06-27 RX ORDER — TACROLIMUS 1 MG/1
CAPSULE ORAL
Qty: 450 CAPSULE | Refills: 3 | Status: SHIPPED | OUTPATIENT
Start: 2023-06-27 | End: 2023-10-04

## 2023-06-27 RX ORDER — ALBUTEROL SULFATE 0.83 MG/ML
2.5 SOLUTION RESPIRATORY (INHALATION)
Status: CANCELLED | OUTPATIENT
Start: 2023-06-27

## 2023-06-27 RX ORDER — ALBUTEROL SULFATE 90 UG/1
1-2 AEROSOL, METERED RESPIRATORY (INHALATION)
Status: CANCELLED
Start: 2023-06-28

## 2023-06-27 RX ORDER — EPINEPHRINE 1 MG/ML
0.3 INJECTION, SOLUTION, CONCENTRATE INTRAVENOUS EVERY 5 MIN PRN
Status: CANCELLED | OUTPATIENT
Start: 2023-06-28

## 2023-06-27 RX ORDER — HEPARIN SODIUM (PORCINE) LOCK FLUSH IV SOLN 100 UNIT/ML 100 UNIT/ML
5 SOLUTION INTRAVENOUS
Status: CANCELLED | OUTPATIENT
Start: 2023-06-27

## 2023-06-27 RX ORDER — DIPHENHYDRAMINE HYDROCHLORIDE 50 MG/ML
50 INJECTION INTRAMUSCULAR; INTRAVENOUS
Status: CANCELLED
Start: 2023-06-28

## 2023-06-27 RX ORDER — METHYLPREDNISOLONE SODIUM SUCCINATE 125 MG/2ML
125 INJECTION, POWDER, LYOPHILIZED, FOR SOLUTION INTRAMUSCULAR; INTRAVENOUS
Status: CANCELLED
Start: 2023-06-27

## 2023-06-27 RX ORDER — HEPARIN SODIUM,PORCINE 10 UNIT/ML
5 VIAL (ML) INTRAVENOUS
Status: CANCELLED | OUTPATIENT
Start: 2023-06-27

## 2023-06-27 RX ORDER — MEPERIDINE HYDROCHLORIDE 25 MG/ML
25 INJECTION INTRAMUSCULAR; INTRAVENOUS; SUBCUTANEOUS EVERY 30 MIN PRN
Status: CANCELLED | OUTPATIENT
Start: 2023-06-28

## 2023-06-27 NOTE — TELEPHONE ENCOUNTER
ISSUE:   Tacrolimus IR level 12.1 on 6/26, goal 8-10, dose 3 mg BID.    PLAN:   Please call patient and confirm this was an accurate 12-hour trough. Verify Tacrolimus IR dose 3 mg BID. Confirm no new medications or illness. Confirm no missed doses. If accurate trough and accurate dose, decrease Tacrolimus IR dose to 3 mg in the am and 2 mg in the pm and repeat labs in 1 weeks    Reyna Leyva RN on 6/27/2023 at 9:43 AM      OUTCOME:   Spoke with patient, they confirm accurate trough level and current dose 3 mg BID. Patient confirmed dose change to 3 mg am, 2 mg pm and to repeat labs in 3 days. Orders sent to preferred pharmacy for dose change and lab for repeat labs. Patient voiced understanding of plan.     Ree Burleson LPN

## 2023-06-27 NOTE — TELEPHONE ENCOUNTER
Called patient re: low WBC and ANC.    Per Dr. Plummer- please hold Bactrim and give him GCSF 300mcg subcutaneous daily for 3 days.     Bactrim is on hold as of 6/15.     Patient verbalized understanding, orders entered and appointments requested at [facility].

## 2023-06-28 ENCOUNTER — ALLIED HEALTH/NURSE VISIT (OUTPATIENT)
Dept: TRANSPLANT | Facility: CLINIC | Age: 63
End: 2023-06-28
Attending: TRANSPLANT SURGERY
Payer: COMMERCIAL

## 2023-06-28 DIAGNOSIS — Z94.4 LIVER REPLACED BY TRANSPLANT (H): ICD-10-CM

## 2023-06-28 DIAGNOSIS — D70.9 NEUTROPENIA (H): Primary | ICD-10-CM

## 2023-06-28 DIAGNOSIS — D84.9 IMMUNOSUPPRESSION (H): ICD-10-CM

## 2023-06-28 LAB
LABORATORY REPORT: NORMAL
PLPETH BLD-MCNC: <10 NG/ML
POPETH BLD-MCNC: <10 NG/ML

## 2023-06-28 PROCEDURE — 96372 THER/PROPH/DIAG INJ SC/IM: CPT | Performed by: TRANSPLANT SURGERY

## 2023-06-28 PROCEDURE — 250N000011 HC RX IP 250 OP 636: Mod: JZ | Performed by: TRANSPLANT SURGERY

## 2023-06-28 RX ORDER — DIPHENHYDRAMINE HYDROCHLORIDE 50 MG/ML
50 INJECTION INTRAMUSCULAR; INTRAVENOUS
Status: CANCELLED
Start: 2023-06-29

## 2023-06-28 RX ORDER — ALBUTEROL SULFATE 90 UG/1
1-2 AEROSOL, METERED RESPIRATORY (INHALATION)
Status: CANCELLED
Start: 2023-06-29

## 2023-06-28 RX ORDER — MEPERIDINE HYDROCHLORIDE 25 MG/ML
25 INJECTION INTRAMUSCULAR; INTRAVENOUS; SUBCUTANEOUS EVERY 30 MIN PRN
Status: CANCELLED | OUTPATIENT
Start: 2023-06-29

## 2023-06-28 RX ORDER — EPINEPHRINE 1 MG/ML
0.3 INJECTION, SOLUTION, CONCENTRATE INTRAVENOUS EVERY 5 MIN PRN
Status: CANCELLED | OUTPATIENT
Start: 2023-06-29

## 2023-06-28 RX ORDER — METHYLPREDNISOLONE SODIUM SUCCINATE 125 MG/2ML
125 INJECTION, POWDER, LYOPHILIZED, FOR SOLUTION INTRAMUSCULAR; INTRAVENOUS
Status: CANCELLED
Start: 2023-06-29

## 2023-06-28 RX ORDER — ALBUTEROL SULFATE 0.83 MG/ML
2.5 SOLUTION RESPIRATORY (INHALATION)
Status: CANCELLED | OUTPATIENT
Start: 2023-06-29

## 2023-06-28 RX ADMIN — FILGRASTIM-SNDZ 300 MCG: 300 INJECTION, SOLUTION INTRAVENOUS; SUBCUTANEOUS at 13:26

## 2023-06-28 NOTE — NURSING NOTE
Frequency: Dose 1 of 3  Dose: 300 mcg  Route: Sub-Q  Location: Left arm    Diagnosis:Neutropeina     Ordered by:MD Kavitha      Double Checked by:Corine ALCANTARA CMA

## 2023-06-29 ENCOUNTER — ALLIED HEALTH/NURSE VISIT (OUTPATIENT)
Dept: TRANSPLANT | Facility: CLINIC | Age: 63
End: 2023-06-29
Attending: TRANSPLANT SURGERY
Payer: COMMERCIAL

## 2023-06-29 VITALS — SYSTOLIC BLOOD PRESSURE: 111 MMHG | DIASTOLIC BLOOD PRESSURE: 78 MMHG

## 2023-06-29 DIAGNOSIS — Z94.4 LIVER REPLACED BY TRANSPLANT (H): ICD-10-CM

## 2023-06-29 DIAGNOSIS — D84.9 IMMUNOSUPPRESSION (H): ICD-10-CM

## 2023-06-29 DIAGNOSIS — D70.9 NEUTROPENIA (H): Primary | ICD-10-CM

## 2023-06-29 PROCEDURE — 96372 THER/PROPH/DIAG INJ SC/IM: CPT | Performed by: TRANSPLANT SURGERY

## 2023-06-29 PROCEDURE — 250N000011 HC RX IP 250 OP 636: Mod: JZ | Performed by: TRANSPLANT SURGERY

## 2023-06-29 RX ORDER — ALBUTEROL SULFATE 0.83 MG/ML
2.5 SOLUTION RESPIRATORY (INHALATION)
Status: CANCELLED | OUTPATIENT
Start: 2023-06-29

## 2023-06-29 RX ORDER — METHYLPREDNISOLONE SODIUM SUCCINATE 125 MG/2ML
125 INJECTION, POWDER, LYOPHILIZED, FOR SOLUTION INTRAMUSCULAR; INTRAVENOUS
Status: CANCELLED
Start: 2023-06-29

## 2023-06-29 RX ORDER — EPINEPHRINE 1 MG/ML
0.3 INJECTION, SOLUTION, CONCENTRATE INTRAVENOUS EVERY 5 MIN PRN
Status: CANCELLED | OUTPATIENT
Start: 2023-06-29

## 2023-06-29 RX ORDER — EPINEPHRINE 1 MG/ML
0.3 INJECTION, SOLUTION, CONCENTRATE INTRAVENOUS EVERY 5 MIN PRN
OUTPATIENT
Start: 2023-06-30

## 2023-06-29 RX ORDER — MEPERIDINE HYDROCHLORIDE 25 MG/ML
25 INJECTION INTRAMUSCULAR; INTRAVENOUS; SUBCUTANEOUS EVERY 30 MIN PRN
OUTPATIENT
Start: 2023-06-30

## 2023-06-29 RX ORDER — MEPERIDINE HYDROCHLORIDE 25 MG/ML
25 INJECTION INTRAMUSCULAR; INTRAVENOUS; SUBCUTANEOUS EVERY 30 MIN PRN
Status: CANCELLED | OUTPATIENT
Start: 2023-06-29

## 2023-06-29 RX ORDER — ALBUTEROL SULFATE 90 UG/1
1-2 AEROSOL, METERED RESPIRATORY (INHALATION)
Start: 2023-06-30

## 2023-06-29 RX ORDER — DIPHENHYDRAMINE HYDROCHLORIDE 50 MG/ML
50 INJECTION INTRAMUSCULAR; INTRAVENOUS
Start: 2023-06-30

## 2023-06-29 RX ORDER — ALBUTEROL SULFATE 0.83 MG/ML
2.5 SOLUTION RESPIRATORY (INHALATION)
OUTPATIENT
Start: 2023-06-30

## 2023-06-29 RX ORDER — DIPHENHYDRAMINE HYDROCHLORIDE 50 MG/ML
50 INJECTION INTRAMUSCULAR; INTRAVENOUS
Status: CANCELLED
Start: 2023-06-29

## 2023-06-29 RX ORDER — ALBUTEROL SULFATE 90 UG/1
1-2 AEROSOL, METERED RESPIRATORY (INHALATION)
Status: CANCELLED
Start: 2023-06-29

## 2023-06-29 RX ORDER — HEPARIN SODIUM,PORCINE 10 UNIT/ML
5 VIAL (ML) INTRAVENOUS
Status: CANCELLED | OUTPATIENT
Start: 2023-06-29

## 2023-06-29 RX ORDER — HEPARIN SODIUM (PORCINE) LOCK FLUSH IV SOLN 100 UNIT/ML 100 UNIT/ML
5 SOLUTION INTRAVENOUS
Status: CANCELLED | OUTPATIENT
Start: 2023-06-29

## 2023-06-29 RX ORDER — METHYLPREDNISOLONE SODIUM SUCCINATE 125 MG/2ML
125 INJECTION, POWDER, LYOPHILIZED, FOR SOLUTION INTRAMUSCULAR; INTRAVENOUS
Start: 2023-06-30

## 2023-06-29 RX ADMIN — FILGRASTIM-SNDZ 300 MCG: 300 INJECTION, SOLUTION INTRAVENOUS; SUBCUTANEOUS at 15:00

## 2023-06-29 NOTE — NURSING NOTE
Frequency: 3 days in a row, today is 2/3  Date of last dose: 2023    Diagnosis: neutropenia    Ordered by: Dr. Goodwin      Double Checked by: Amalia Wick CMA    See MAR for administration details    Pt's first name, last name and  verified prior to medication administration, injection given without complications or questions.

## 2023-06-30 ENCOUNTER — ALLIED HEALTH/NURSE VISIT (OUTPATIENT)
Dept: TRANSPLANT | Facility: CLINIC | Age: 63
End: 2023-06-30
Attending: INTERNAL MEDICINE
Payer: COMMERCIAL

## 2023-06-30 ENCOUNTER — LAB (OUTPATIENT)
Dept: LAB | Facility: CLINIC | Age: 63
End: 2023-06-30
Attending: INTERNAL MEDICINE
Payer: COMMERCIAL

## 2023-06-30 ENCOUNTER — OFFICE VISIT (OUTPATIENT)
Dept: GASTROENTEROLOGY | Facility: CLINIC | Age: 63
End: 2023-06-30
Attending: INTERNAL MEDICINE
Payer: COMMERCIAL

## 2023-06-30 ENCOUNTER — ANCILLARY PROCEDURE (OUTPATIENT)
Dept: GENERAL RADIOLOGY | Facility: CLINIC | Age: 63
End: 2023-06-30
Attending: TRANSPLANT SURGERY
Payer: COMMERCIAL

## 2023-06-30 VITALS
BODY MASS INDEX: 22.85 KG/M2 | WEIGHT: 150.3 LBS | OXYGEN SATURATION: 99 % | SYSTOLIC BLOOD PRESSURE: 121 MMHG | TEMPERATURE: 97.7 F | DIASTOLIC BLOOD PRESSURE: 83 MMHG | HEART RATE: 84 BPM

## 2023-06-30 DIAGNOSIS — Z94.4 LIVER REPLACED BY TRANSPLANT (H): ICD-10-CM

## 2023-06-30 DIAGNOSIS — D70.9 NEUTROPENIA (H): ICD-10-CM

## 2023-06-30 DIAGNOSIS — Z94.4 LIVER REPLACED BY TRANSPLANT (H): Primary | ICD-10-CM

## 2023-06-30 DIAGNOSIS — D84.9 IMMUNOSUPPRESSION (H): Primary | ICD-10-CM

## 2023-06-30 LAB
ALBUMIN SERPL BCG-MCNC: 4.3 G/DL (ref 3.5–5.2)
ALP SERPL-CCNC: 70 U/L (ref 40–129)
ALT SERPL W P-5'-P-CCNC: 24 U/L (ref 0–70)
ANION GAP SERPL CALCULATED.3IONS-SCNC: 10 MMOL/L (ref 7–15)
AST SERPL W P-5'-P-CCNC: 21 U/L (ref 0–45)
BASOPHILS # BLD MANUAL: 0 10E3/UL (ref 0–0.2)
BASOPHILS NFR BLD MANUAL: 1 %
BILIRUB DIRECT SERPL-MCNC: 0.29 MG/DL (ref 0–0.3)
BILIRUB SERPL-MCNC: 0.9 MG/DL
BUN SERPL-MCNC: 24.2 MG/DL (ref 8–23)
BURR CELLS BLD QL SMEAR: ABNORMAL
CALCIUM SERPL-MCNC: 9.9 MG/DL (ref 8.8–10.2)
CHLORIDE SERPL-SCNC: 104 MMOL/L (ref 98–107)
CREAT SERPL-MCNC: 1.51 MG/DL (ref 0.67–1.17)
DACRYOCYTES BLD QL SMEAR: ABNORMAL
DEPRECATED HCO3 PLAS-SCNC: 25 MMOL/L (ref 22–29)
EOSINOPHIL # BLD MANUAL: 0.3 10E3/UL (ref 0–0.7)
EOSINOPHIL NFR BLD MANUAL: 13 %
ERYTHROCYTE [DISTWIDTH] IN BLOOD BY AUTOMATED COUNT: 13.6 % (ref 10–15)
GFR SERPL CREATININE-BSD FRML MDRD: 53 ML/MIN/1.73M2
GLUCOSE SERPL-MCNC: 136 MG/DL (ref 70–99)
HCT VFR BLD AUTO: 41.9 % (ref 40–53)
HGB BLD-MCNC: 13.3 G/DL (ref 13.3–17.7)
LYMPHOCYTES # BLD MANUAL: 0.9 10E3/UL (ref 0.8–5.3)
LYMPHOCYTES NFR BLD MANUAL: 34 %
MAGNESIUM SERPL-MCNC: 1.3 MG/DL (ref 1.7–2.3)
MCH RBC QN AUTO: 26 PG (ref 26.5–33)
MCHC RBC AUTO-ENTMCNC: 31.7 G/DL (ref 31.5–36.5)
MCV RBC AUTO: 82 FL (ref 78–100)
MONOCYTES # BLD MANUAL: 0.5 10E3/UL (ref 0–1.3)
MONOCYTES NFR BLD MANUAL: 20 %
NEUTROPHILS # BLD MANUAL: 0.8 10E3/UL (ref 1.6–8.3)
NEUTROPHILS NFR BLD MANUAL: 32 %
NRBC # BLD AUTO: 0.1 10E3/UL
NRBC BLD MANUAL-RTO: 3 %
PHOSPHATE SERPL-MCNC: 2.4 MG/DL (ref 2.5–4.5)
PLAT MORPH BLD: ABNORMAL
PLATELET # BLD AUTO: 121 10E3/UL (ref 150–450)
POTASSIUM SERPL-SCNC: 4.6 MMOL/L (ref 3.4–5.3)
PROT SERPL-MCNC: 7.4 G/DL (ref 6.4–8.3)
RBC # BLD AUTO: 5.11 10E6/UL (ref 4.4–5.9)
RBC MORPH BLD: ABNORMAL
SODIUM SERPL-SCNC: 139 MMOL/L (ref 136–145)
TACROLIMUS BLD-MCNC: 7.1 UG/L (ref 5–15)
TME LAST DOSE: NORMAL H
TME LAST DOSE: NORMAL H
WBC # BLD AUTO: 2.6 10E3/UL (ref 4–11)
WBC # BLD AUTO: 2.6 10E3/UL (ref 4–11)

## 2023-06-30 PROCEDURE — 36415 COLL VENOUS BLD VENIPUNCTURE: CPT | Performed by: PATHOLOGY

## 2023-06-30 PROCEDURE — 80053 COMPREHEN METABOLIC PANEL: CPT | Performed by: PATHOLOGY

## 2023-06-30 PROCEDURE — 99215 OFFICE O/P EST HI 40 MIN: CPT | Performed by: INTERNAL MEDICINE

## 2023-06-30 PROCEDURE — 84100 ASSAY OF PHOSPHORUS: CPT | Performed by: PATHOLOGY

## 2023-06-30 PROCEDURE — 85007 BL SMEAR W/DIFF WBC COUNT: CPT | Performed by: PATHOLOGY

## 2023-06-30 PROCEDURE — 82248 BILIRUBIN DIRECT: CPT | Performed by: PATHOLOGY

## 2023-06-30 PROCEDURE — G0463 HOSPITAL OUTPT CLINIC VISIT: HCPCS | Performed by: INTERNAL MEDICINE

## 2023-06-30 PROCEDURE — 80197 ASSAY OF TACROLIMUS: CPT | Performed by: INTERNAL MEDICINE

## 2023-06-30 PROCEDURE — 74019 RADEX ABDOMEN 2 VIEWS: CPT | Mod: GC | Performed by: RADIOLOGY

## 2023-06-30 PROCEDURE — 99000 SPECIMEN HANDLING OFFICE-LAB: CPT | Performed by: PATHOLOGY

## 2023-06-30 PROCEDURE — 83735 ASSAY OF MAGNESIUM: CPT | Performed by: PATHOLOGY

## 2023-06-30 PROCEDURE — 85027 COMPLETE CBC AUTOMATED: CPT | Performed by: PATHOLOGY

## 2023-06-30 RX ORDER — ALBUTEROL SULFATE 90 UG/1
1-2 AEROSOL, METERED RESPIRATORY (INHALATION)
Start: 2023-06-30

## 2023-06-30 RX ORDER — ALBUTEROL SULFATE 0.83 MG/ML
2.5 SOLUTION RESPIRATORY (INHALATION)
OUTPATIENT
Start: 2023-06-30

## 2023-06-30 RX ORDER — DIPHENHYDRAMINE HYDROCHLORIDE 50 MG/ML
50 INJECTION INTRAMUSCULAR; INTRAVENOUS
Start: 2023-06-30

## 2023-06-30 RX ORDER — MEPERIDINE HYDROCHLORIDE 25 MG/ML
25 INJECTION INTRAMUSCULAR; INTRAVENOUS; SUBCUTANEOUS EVERY 30 MIN PRN
OUTPATIENT
Start: 2023-06-30

## 2023-06-30 RX ORDER — METHYLPREDNISOLONE SODIUM SUCCINATE 125 MG/2ML
125 INJECTION, POWDER, LYOPHILIZED, FOR SOLUTION INTRAMUSCULAR; INTRAVENOUS
Start: 2023-06-30

## 2023-06-30 RX ORDER — EPINEPHRINE 1 MG/ML
0.3 INJECTION, SOLUTION, CONCENTRATE INTRAVENOUS EVERY 5 MIN PRN
OUTPATIENT
Start: 2023-06-30

## 2023-06-30 ASSESSMENT — PAIN SCALES - GENERAL: PAINLEVEL: MODERATE PAIN (5)

## 2023-06-30 NOTE — NURSING NOTE
Chief Complaint   Patient presents with     RECHECK     Return visit. Nupagen injections last two days, has caused much bone pain. Couldn't sleep last night.     Blood pressure 121/83, pulse 84, temperature 97.7  F (36.5  C), weight 68.2 kg (150 lb 4.8 oz), SpO2 99 %.    MARCO WALKER

## 2023-06-30 NOTE — PROGRESS NOTES
Per Dr. Sloan due to this patient's labs it was determined that he does not need the shot today. Injection was returned to the pharmacy.    Corine Quiroz CMA

## 2023-06-30 NOTE — PROGRESS NOTES
HISTORY OF PRESENT ILLNESS:  I had the pleasure of seeing Torin Mane for followup in the Liver Transplant Clinic at the [facility] on 06/30/2023.  Mr. 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    For the most part, he is doing well.  He does note some intermittent abdominal discomfort.  He denies any itching or skin rash.  He still notes a moderate amount of fatigue.  He denies any increased abdominal girth or lower extremity edema.    He denies any gastrointestinal bleeding.    He denies any fevers or chills, cough or shortness of breath.  He still has some decreased exercise tolerance.  He denies any nausea or vomiting.  He does have some occasional diarrhea.  He denies any constipation.  He states his appetite is now improving, and he is beginning to gain some weight back.    Current Outpatient Medications   Medication     aspirin (ASA) 325 MG tablet     calcium carbonate-vitamin D (CALTRATE) 600-10 MG-MCG per tablet     folic acid (FOLVITE) 1 MG tablet     magnesium oxide (MAG-OX) 400 MG tablet     metoprolol tartrate (LOPRESSOR) 25 MG tablet     Multiple Vitamins-Minerals (SPECTRAVITE) TABS     mycophenolate (GENERIC EQUIVALENT) 250 MG capsule     omeprazole 20 MG tablet     tacrolimus (GENERIC EQUIVALENT) 1 MG capsule     sulfamethoxazole-trimethoprim (BACTRIM) 400-80 MG tablet     tacrolimus (GENERIC EQUIVALENT) 0.5 MG capsule     No current facility-administered medications for this visit.     /83   Pulse 84   Temp 97.7  F (36.5  C)   Wt 68.2 kg (150 lb 4.8 oz)   SpO2 99%   BMI 22.85 kg/m      PHYSICAL EXAMINATION:    GENERAL:  He looks quite well.  HEENT:  No scleral icterus or temporal muscle wasting.  CHEST:  Clear.  ABDOMEN:  No increase in girth.  No masses or tenderness to palpation is present.  His liver is 10 cm in span without left lobe enlargement.  No spleen tip is palpable.  EXTREMITIES:  No  edema.  SKIN:  No suspicious lesions.  NEUROLOGIC:  Nonfocal.    Recent Results (from the past 168 hour(s))   Basic metabolic panel    Collection Time: 06/26/23  8:05 AM   Result Value Ref Range    Sodium 138 136 - 145 mmol/L    Potassium 4.5 3.4 - 5.3 mmol/L    Chloride 103 98 - 107 mmol/L    Carbon Dioxide (CO2) 23 22 - 29 mmol/L    Anion Gap 12 7 - 15 mmol/L    Urea Nitrogen 33.0 (H) 8.0 - 23.0 mg/dL    Creatinine 1.64 (H) 0.67 - 1.17 mg/dL    Calcium 10.0 8.8 - 10.2 mg/dL    Glucose 125 (H) 70 - 99 mg/dL    GFR Estimate 48 (L) >60 mL/min/1.73m2   CBC with platelets    Collection Time: 06/26/23  8:05 AM   Result Value Ref Range    WBC Count 1.7 (LL) 4.0 - 11.0 10e3/uL    RBC Count 4.85 4.40 - 5.90 10e6/uL    Hemoglobin 12.9 (L) 13.3 - 17.7 g/dL    Hematocrit 39.6 (L) 40.0 - 53.0 %    MCV 82 78 - 100 fL    MCH 26.6 26.5 - 33.0 pg    MCHC 32.6 31.5 - 36.5 g/dL    RDW 13.1 10.0 - 15.0 %    Platelet Count 143 (L) 150 - 450 10e3/uL   Hepatic panel    Collection Time: 06/26/23  8:05 AM   Result Value Ref Range    Protein Total 7.2 6.4 - 8.3 g/dL    Albumin 4.3 3.5 - 5.2 g/dL    Bilirubin Total 0.6 <=1.2 mg/dL    Alkaline Phosphatase 64 40 - 129 U/L    AST 27 0 - 45 U/L    ALT 32 0 - 70 U/L    Bilirubin Direct <0.20 0.00 - 0.30 mg/dL   Magnesium    Collection Time: 06/26/23  8:05 AM   Result Value Ref Range    Magnesium 1.6 (L) 1.7 - 2.3 mg/dL   Phosphorus    Collection Time: 06/26/23  8:05 AM   Result Value Ref Range    Phosphorus 4.5 2.5 - 4.5 mg/dL   Tacrolimus by Tandem Mass Spectrometry    Collection Time: 06/26/23  8:05 AM   Result Value Ref Range    Tacrolimus by Tandem Mass Spectrometry 12.1 5.0 - 15.0 ug/L    Tacrolimus Last Dose Date 6/25/2023     Tacrolimus Last Dose Time 10:00 PM    WBC and Differential    Collection Time: 06/26/23  8:05 AM   Result Value Ref Range    WBC Count 1.7 (L) 4.0 - 11.0 10e3/uL    % Neutrophils 20 %    % Lymphocytes 54 %    % Monocytes 13 %    % Eosinophils 9 %    % Basophils 2 %     % Immature Granulocytes 2 %    NRBCs per 100 WBC 0 <1 /100    Absolute Neutrophils 0.4 (LL) 1.6 - 8.3 10e3/uL    Absolute Lymphocytes 0.9 0.8 - 5.3 10e3/uL    Absolute Monocytes 0.2 0.0 - 1.3 10e3/uL    Absolute Eosinophils 0.2 0.0 - 0.7 10e3/uL    Absolute Basophils 0.0 0.0 - 0.2 10e3/uL    Absolute Immature Granulocytes 0.0 <=0.4 10e3/uL    Absolute NRBCs 0.0 10e3/uL   Phosphatidylethanol (PEth), Whole Blood    Collection Time: 06/26/23  8:06 AM   Result Value Ref Range    PEth 16:0/18:1 (POPEth) <10 ng/mL    PEth 16:0/18:2 (PLPEth) <10 ng/mL    EER Phosphatidylethanol (PETH) See Note    INR    Collection Time: 06/26/23  8:06 AM   Result Value Ref Range    INR 1.04 0.85 - 1.15   Hepatic panel    Collection Time: 06/30/23  6:46 AM   Result Value Ref Range    Protein Total 7.4 6.4 - 8.3 g/dL    Albumin 4.3 3.5 - 5.2 g/dL    Bilirubin Total 0.9 <=1.2 mg/dL    Alkaline Phosphatase 70 40 - 129 U/L    AST 21 0 - 45 U/L    ALT 24 0 - 70 U/L    Bilirubin Direct 0.29 0.00 - 0.30 mg/dL   Basic metabolic panel    Collection Time: 06/30/23  6:46 AM   Result Value Ref Range    Sodium 139 136 - 145 mmol/L    Potassium 4.6 3.4 - 5.3 mmol/L    Chloride 104 98 - 107 mmol/L    Carbon Dioxide (CO2) 25 22 - 29 mmol/L    Anion Gap 10 7 - 15 mmol/L    Urea Nitrogen 24.2 (H) 8.0 - 23.0 mg/dL    Creatinine 1.51 (H) 0.67 - 1.17 mg/dL    Calcium 9.9 8.8 - 10.2 mg/dL    Glucose 136 (H) 70 - 99 mg/dL    GFR Estimate 53 (L) >60 mL/min/1.73m2   CBC with platelets    Collection Time: 06/30/23  6:46 AM   Result Value Ref Range    WBC Count 2.6 (L) 4.0 - 11.0 10e3/uL    RBC Count 5.11 4.40 - 5.90 10e6/uL    Hemoglobin 13.3 13.3 - 17.7 g/dL    Hematocrit 41.9 40.0 - 53.0 %    MCV 82 78 - 100 fL    MCH 26.0 (L) 26.5 - 33.0 pg    MCHC 31.7 31.5 - 36.5 g/dL    RDW 13.6 10.0 - 15.0 %    Platelet Count 121 (L) 150 - 450 10e3/uL   Phosphorus    Collection Time: 06/30/23  6:46 AM   Result Value Ref Range    Phosphorus 2.4 (L) 2.5 - 4.5 mg/dL    Magnesium    Collection Time: 06/30/23  6:46 AM   Result Value Ref Range    Magnesium 1.3 (L) 1.7 - 2.3 mg/dL   WBC and Differential    Collection Time: 06/30/23  6:46 AM   Result Value Ref Range    WBC Count 2.6 (L) 4.0 - 11.0 10e3/uL   Manual Differential    Collection Time: 06/30/23  6:46 AM   Result Value Ref Range    % Neutrophils 32 %    % Lymphocytes 34 %    % Monocytes 20 %    % Eosinophils 13 %    % Basophils 1 %    NRBCs per 100 WBC 3 (H) <=0 %    Absolute Neutrophils 0.8 (L) 1.6 - 8.3 10e3/uL    Absolute Lymphocytes 0.9 0.8 - 5.3 10e3/uL    Absolute Monocytes 0.5 0.0 - 1.3 10e3/uL    Absolute Eosinophils 0.3 0.0 - 0.7 10e3/uL    Absolute Basophils 0.0 0.0 - 0.2 10e3/uL    Absolute NRBCs 0.1 (H) <=0.0 10e3/uL    RBC Morphology Confirmed RBC Indices     Platelet Assessment  Automated Count Confirmed. Platelet morphology is normal.     Automated Count Confirmed. Platelet morphology is normal.    [place] Cells Moderate (A) None Seen    [place] Cells Moderate (A) None Seen      IMPRESSION:  My impression is that Mr. Mane is 3 months status post liver transplantation.  His current problem has been some neutropenia.  He did get 1 shot of G-CSF and had a great deal of bone pain from it today.  His absolute neutrophils are 800 and his total white count is up to 2.6.  I will hold off on giving him his G-CSF today.    Otherwise, his liver function is excellent.  He does have a moderate amount of CKD that we will keep an eye on.  He is in the process of being weaned off medications.  He is now off Valcyte, Bactrim, and his CellCept is being weaned as well.  I will see him back in the clinic again in 3 months.    I did spend a total of 40 minutes (on the date of the encounter), including 30 minutes of face-to-face clinic time including counseling. The rest of the time was spent in documentation and review of records.     Thank you very much for allowing me to participate in the care of this patient.  If you have any  questions regarding recommendations, please do not hesitate to contact me.         Pancho Sloan MD      Professor of Medicine  [employer]      Executive Medical Director, Solid Organ Transplant Program

## 2023-06-30 NOTE — LETTER
6/30/2023         RE: Cricket Mane  [address] [place]  [facility] [id number]        Dear Colleague,    Thank you for referring your patient, Cricket Mane, to the [facility] [place]. Please see a copy of my visit note below.    HISTORY OF PRESENT ILLNESS:  I had the pleasure of seeing Torin Mane for followup in the Liver Transplant Clinic at the [facility] on 06/30/2023.  Mr. 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    For the most part, he is doing well.  He does note some intermittent abdominal discomfort.  He denies any itching or skin rash.  He still notes a moderate amount of fatigue.  He denies any increased abdominal girth or lower extremity edema.    He denies any gastrointestinal bleeding.    He denies any fevers or chills, cough or shortness of breath.  He still has some decreased exercise tolerance.  He denies any nausea or vomiting.  He does have some occasional diarrhea.  He denies any constipation.  He states his appetite is now improving, and he is beginning to gain some weight back.    Current Outpatient Medications   Medication    aspirin (ASA) 325 MG tablet    calcium carbonate-vitamin D (CALTRATE) 600-10 MG-MCG per tablet    folic acid (FOLVITE) 1 MG tablet    magnesium oxide (MAG-OX) 400 MG tablet    metoprolol tartrate (LOPRESSOR) 25 MG tablet    Multiple Vitamins-Minerals (SPECTRAVITE) TABS    mycophenolate (GENERIC EQUIVALENT) 250 MG capsule    omeprazole 20 MG tablet    tacrolimus (GENERIC EQUIVALENT) 1 MG capsule    sulfamethoxazole-trimethoprim (BACTRIM) 400-80 MG tablet    tacrolimus (GENERIC EQUIVALENT) 0.5 MG capsule     No current facility-administered medications for this visit.     /83   Pulse 84   Temp 97.7  F (36.5  C)   Wt 68.2 kg (150 lb 4.8 oz)   SpO2 99%   BMI 22.85 kg/m      PHYSICAL EXAMINATION:    GENERAL:  He looks quite  well.  HEENT:  No scleral icterus or temporal muscle wasting.  CHEST:  Clear.  ABDOMEN:  No increase in girth.  No masses or tenderness to palpation is present.  His liver is 10 cm in span without left lobe enlargement.  No spleen tip is palpable.  EXTREMITIES:  No edema.  SKIN:  No suspicious lesions.  NEUROLOGIC:  Nonfocal.    Recent Results (from the past 168 hour(s))   Basic metabolic panel    Collection Time: 06/26/23  8:05 AM   Result Value Ref Range    Sodium 138 136 - 145 mmol/L    Potassium 4.5 3.4 - 5.3 mmol/L    Chloride 103 98 - 107 mmol/L    Carbon Dioxide (CO2) 23 22 - 29 mmol/L    Anion Gap 12 7 - 15 mmol/L    Urea Nitrogen 33.0 (H) 8.0 - 23.0 mg/dL    Creatinine 1.64 (H) 0.67 - 1.17 mg/dL    Calcium 10.0 8.8 - 10.2 mg/dL    Glucose 125 (H) 70 - 99 mg/dL    GFR Estimate 48 (L) >60 mL/min/1.73m2   CBC with platelets    Collection Time: 06/26/23  8:05 AM   Result Value Ref Range    WBC Count 1.7 (LL) 4.0 - 11.0 10e3/uL    RBC Count 4.85 4.40 - 5.90 10e6/uL    Hemoglobin 12.9 (L) 13.3 - 17.7 g/dL    Hematocrit 39.6 (L) 40.0 - 53.0 %    MCV 82 78 - 100 fL    MCH 26.6 26.5 - 33.0 pg    MCHC 32.6 31.5 - 36.5 g/dL    RDW 13.1 10.0 - 15.0 %    Platelet Count 143 (L) 150 - 450 10e3/uL   Hepatic panel    Collection Time: 06/26/23  8:05 AM   Result Value Ref Range    Protein Total 7.2 6.4 - 8.3 g/dL    Albumin 4.3 3.5 - 5.2 g/dL    Bilirubin Total 0.6 <=1.2 mg/dL    Alkaline Phosphatase 64 40 - 129 U/L    AST 27 0 - 45 U/L    ALT 32 0 - 70 U/L    Bilirubin Direct <0.20 0.00 - 0.30 mg/dL   Magnesium    Collection Time: 06/26/23  8:05 AM   Result Value Ref Range    Magnesium 1.6 (L) 1.7 - 2.3 mg/dL   Phosphorus    Collection Time: 06/26/23  8:05 AM   Result Value Ref Range    Phosphorus 4.5 2.5 - 4.5 mg/dL   Tacrolimus by Tandem Mass Spectrometry    Collection Time: 06/26/23  8:05 AM   Result Value Ref Range    Tacrolimus by Tandem Mass Spectrometry 12.1 5.0 - 15.0 ug/L    Tacrolimus Last Dose Date 6/25/2023      Tacrolimus Last Dose Time 10:00 PM    WBC and Differential    Collection Time: 06/26/23  8:05 AM   Result Value Ref Range    WBC Count 1.7 (L) 4.0 - 11.0 10e3/uL    % Neutrophils 20 %    % Lymphocytes 54 %    % Monocytes 13 %    % Eosinophils 9 %    % Basophils 2 %    % Immature Granulocytes 2 %    NRBCs per 100 WBC 0 <1 /100    Absolute Neutrophils 0.4 (LL) 1.6 - 8.3 10e3/uL    Absolute Lymphocytes 0.9 0.8 - 5.3 10e3/uL    Absolute Monocytes 0.2 0.0 - 1.3 10e3/uL    Absolute Eosinophils 0.2 0.0 - 0.7 10e3/uL    Absolute Basophils 0.0 0.0 - 0.2 10e3/uL    Absolute Immature Granulocytes 0.0 <=0.4 10e3/uL    Absolute NRBCs 0.0 10e3/uL   Phosphatidylethanol (PEth), Whole Blood    Collection Time: 06/26/23  8:06 AM   Result Value Ref Range    PEth 16:0/18:1 (POPEth) <10 ng/mL    PEth 16:0/18:2 (PLPEth) <10 ng/mL    EER Phosphatidylethanol (PETH) See Note    INR    Collection Time: 06/26/23  8:06 AM   Result Value Ref Range    INR 1.04 0.85 - 1.15   Hepatic panel    Collection Time: 06/30/23  6:46 AM   Result Value Ref Range    Protein Total 7.4 6.4 - 8.3 g/dL    Albumin 4.3 3.5 - 5.2 g/dL    Bilirubin Total 0.9 <=1.2 mg/dL    Alkaline Phosphatase 70 40 - 129 U/L    AST 21 0 - 45 U/L    ALT 24 0 - 70 U/L    Bilirubin Direct 0.29 0.00 - 0.30 mg/dL   Basic metabolic panel    Collection Time: 06/30/23  6:46 AM   Result Value Ref Range    Sodium 139 136 - 145 mmol/L    Potassium 4.6 3.4 - 5.3 mmol/L    Chloride 104 98 - 107 mmol/L    Carbon Dioxide (CO2) 25 22 - 29 mmol/L    Anion Gap 10 7 - 15 mmol/L    Urea Nitrogen 24.2 (H) 8.0 - 23.0 mg/dL    Creatinine 1.51 (H) 0.67 - 1.17 mg/dL    Calcium 9.9 8.8 - 10.2 mg/dL    Glucose 136 (H) 70 - 99 mg/dL    GFR Estimate 53 (L) >60 mL/min/1.73m2   CBC with platelets    Collection Time: 06/30/23  6:46 AM   Result Value Ref Range    WBC Count 2.6 (L) 4.0 - 11.0 10e3/uL    RBC Count 5.11 4.40 - 5.90 10e6/uL    Hemoglobin 13.3 13.3 - 17.7 g/dL    Hematocrit 41.9 40.0 - 53.0 %    MCV 82  78 - 100 fL    MCH 26.0 (L) 26.5 - 33.0 pg    MCHC 31.7 31.5 - 36.5 g/dL    RDW 13.6 10.0 - 15.0 %    Platelet Count 121 (L) 150 - 450 10e3/uL   Phosphorus    Collection Time: 06/30/23  6:46 AM   Result Value Ref Range    Phosphorus 2.4 (L) 2.5 - 4.5 mg/dL   Magnesium    Collection Time: 06/30/23  6:46 AM   Result Value Ref Range    Magnesium 1.3 (L) 1.7 - 2.3 mg/dL   WBC and Differential    Collection Time: 06/30/23  6:46 AM   Result Value Ref Range    WBC Count 2.6 (L) 4.0 - 11.0 10e3/uL   Manual Differential    Collection Time: 06/30/23  6:46 AM   Result Value Ref Range    % Neutrophils 32 %    % Lymphocytes 34 %    % Monocytes 20 %    % Eosinophils 13 %    % Basophils 1 %    NRBCs per 100 WBC 3 (H) <=0 %    Absolute Neutrophils 0.8 (L) 1.6 - 8.3 10e3/uL    Absolute Lymphocytes 0.9 0.8 - 5.3 10e3/uL    Absolute Monocytes 0.5 0.0 - 1.3 10e3/uL    Absolute Eosinophils 0.3 0.0 - 0.7 10e3/uL    Absolute Basophils 0.0 0.0 - 0.2 10e3/uL    Absolute NRBCs 0.1 (H) <=0.0 10e3/uL    RBC Morphology Confirmed RBC Indices     Platelet Assessment  Automated Count Confirmed. Platelet morphology is normal.     Automated Count Confirmed. Platelet morphology is normal.    Marsing Cells Moderate (A) None Seen    Teardrop Cells Moderate (A) None Seen      IMPRESSION:  My impression is that Mr. Mane is 3 months status post liver transplantation.  His current problem has been some neutropenia.  He did get 1 shot of G-CSF and had a great deal of bone pain from it today.  His absolute neutrophils are 800 and his total white count is up to 2.6.  I will hold off on giving him his G-CSF today.    Otherwise, his liver function is excellent.  He does have a moderate amount of CKD that we will keep an eye on.  He is in the process of being weaned off medications.  He is now off Valcyte, Bactrim, and his CellCept is being weaned as well.  I will see him back in the clinic again in 3 months.    I did spend a total of 40 minutes (on the date of  the encounter), including 30 minutes of face-to-face clinic time including counseling. The rest of the time was spent in documentation and review of records.     Thank you very much for allowing me to participate in the care of this patient.  If you have any questions regarding recommendations, please do not hesitate to contact me.         Pancho Sloan MD      Professor of Medicine  [employer]      Executive Medical Director, Solid Organ Transplant Program

## 2023-07-03 ENCOUNTER — TELEPHONE (OUTPATIENT)
Dept: GASTROENTEROLOGY | Facility: CLINIC | Age: 63
End: 2023-07-03
Payer: COMMERCIAL

## 2023-07-03 NOTE — TELEPHONE ENCOUNTER
[facility] Call Center    Phone Message    May a detailed message be left on voicemail: yes     Reason for Call: Other: Pt is returning Cricket's call requesting he please reach back out to schedule a follow up in Oct, writer was unable to find any available openings. Please advise. Thank you!      Action Taken: Message routed to:  [facility] Hepatology    Travel Screening: Not Applicable

## 2023-07-03 NOTE — TELEPHONE ENCOUNTER
[facility] Call Center    Phone Message    May a detailed message be left on voicemail: yes     Reason for Call: Other:     Pt returned a call from the clinic to discuss scheduling a follow up with Dr. Sloan in October. Writer is unable to see dates in October. Please review and call pt back.     Action Taken: Message routed to:  [facility] [place] [facility]    Travel Screening: Not Applicable

## 2023-07-10 ENCOUNTER — LAB (OUTPATIENT)
Dept: LAB | Facility: CLINIC | Age: 63
End: 2023-07-10
Payer: COMMERCIAL

## 2023-07-10 DIAGNOSIS — K70.30 ALCOHOLIC CIRRHOSIS (H): ICD-10-CM

## 2023-07-10 DIAGNOSIS — Z94.4 LIVER REPLACED BY TRANSPLANT (H): ICD-10-CM

## 2023-07-10 LAB
ALBUMIN SERPL BCG-MCNC: 4.1 G/DL (ref 3.5–5.2)
ALP SERPL-CCNC: 59 U/L (ref 40–129)
ALT SERPL W P-5'-P-CCNC: 47 U/L (ref 0–70)
ANION GAP SERPL CALCULATED.3IONS-SCNC: 11 MMOL/L (ref 7–15)
AST SERPL W P-5'-P-CCNC: 34 U/L (ref 0–45)
BILIRUB DIRECT SERPL-MCNC: <0.2 MG/DL (ref 0–0.3)
BILIRUB SERPL-MCNC: 0.6 MG/DL
BUN SERPL-MCNC: 42.4 MG/DL (ref 8–23)
CALCIUM SERPL-MCNC: 10 MG/DL (ref 8.8–10.2)
CHLORIDE SERPL-SCNC: 106 MMOL/L (ref 98–107)
CREAT SERPL-MCNC: 1.58 MG/DL (ref 0.67–1.17)
DEPRECATED HCO3 PLAS-SCNC: 24 MMOL/L (ref 22–29)
ERYTHROCYTE [DISTWIDTH] IN BLOOD BY AUTOMATED COUNT: 13.5 % (ref 10–15)
GFR SERPL CREATININE-BSD FRML MDRD: 50 ML/MIN/1.73M2
GLUCOSE SERPL-MCNC: 118 MG/DL (ref 70–99)
HCT VFR BLD AUTO: 40.3 % (ref 40–53)
HGB BLD-MCNC: 12.9 G/DL (ref 13.3–17.7)
INR PPP: 1.08 (ref 0.85–1.15)
MAGNESIUM SERPL-MCNC: 1.6 MG/DL (ref 1.7–2.3)
MCH RBC QN AUTO: 26.1 PG (ref 26.5–33)
MCHC RBC AUTO-ENTMCNC: 32 G/DL (ref 31.5–36.5)
MCV RBC AUTO: 81 FL (ref 78–100)
PHOSPHATE SERPL-MCNC: 4.2 MG/DL (ref 2.5–4.5)
PLATELET # BLD AUTO: 119 10E3/UL (ref 150–450)
POTASSIUM SERPL-SCNC: 5 MMOL/L (ref 3.4–5.3)
PROT SERPL-MCNC: 7.1 G/DL (ref 6.4–8.3)
RBC # BLD AUTO: 4.95 10E6/UL (ref 4.4–5.9)
SODIUM SERPL-SCNC: 141 MMOL/L (ref 136–145)
TACROLIMUS BLD-MCNC: 10.1 UG/L (ref 5–15)
TME LAST DOSE: NORMAL H
TME LAST DOSE: NORMAL H
WBC # BLD AUTO: 3.9 10E3/UL (ref 4–11)

## 2023-07-10 PROCEDURE — 85027 COMPLETE CBC AUTOMATED: CPT

## 2023-07-10 PROCEDURE — 82248 BILIRUBIN DIRECT: CPT

## 2023-07-10 PROCEDURE — 80053 COMPREHEN METABOLIC PANEL: CPT

## 2023-07-10 PROCEDURE — 80197 ASSAY OF TACROLIMUS: CPT

## 2023-07-10 PROCEDURE — 85610 PROTHROMBIN TIME: CPT

## 2023-07-10 PROCEDURE — 99000 SPECIMEN HANDLING OFFICE-LAB: CPT

## 2023-07-10 PROCEDURE — 83735 ASSAY OF MAGNESIUM: CPT

## 2023-07-10 PROCEDURE — 36415 COLL VENOUS BLD VENIPUNCTURE: CPT

## 2023-07-10 PROCEDURE — 80321 ALCOHOLS BIOMARKERS 1OR 2: CPT | Mod: 90

## 2023-07-10 PROCEDURE — 84100 ASSAY OF PHOSPHORUS: CPT

## 2023-07-11 LAB
LABORATORY REPORT: NORMAL
PLPETH BLD-MCNC: <10 NG/ML
POPETH BLD-MCNC: <10 NG/ML

## 2023-07-11 NOTE — TELEPHONE ENCOUNTER
[facility] Call Center    Phone Message    May a detailed message be left on voicemail: yes     Reason for Call: Other: Pt is requesting a call back please from Cricket please to discuss his upcoming visit. Please advise. Thank you!     Action Taken: Message routed to:  [facility] Hepatology    Travel Screening: Not Applicable

## 2023-07-17 ENCOUNTER — LAB (OUTPATIENT)
Dept: LAB | Facility: CLINIC | Age: 63
End: 2023-07-17
Payer: COMMERCIAL

## 2023-07-17 DIAGNOSIS — K70.30 ALCOHOLIC CIRRHOSIS (H): ICD-10-CM

## 2023-07-17 DIAGNOSIS — Z94.4 LIVER REPLACED BY TRANSPLANT (H): ICD-10-CM

## 2023-07-17 LAB
ALBUMIN SERPL BCG-MCNC: 4.2 G/DL (ref 3.5–5.2)
ALP SERPL-CCNC: 62 U/L (ref 40–129)
ALT SERPL W P-5'-P-CCNC: 57 U/L (ref 0–70)
ANION GAP SERPL CALCULATED.3IONS-SCNC: 11 MMOL/L (ref 7–15)
AST SERPL W P-5'-P-CCNC: 39 U/L (ref 0–45)
BILIRUB DIRECT SERPL-MCNC: <0.2 MG/DL (ref 0–0.3)
BILIRUB SERPL-MCNC: 0.6 MG/DL
BUN SERPL-MCNC: 34.5 MG/DL (ref 8–23)
CALCIUM SERPL-MCNC: 9.8 MG/DL (ref 8.8–10.2)
CHLORIDE SERPL-SCNC: 105 MMOL/L (ref 98–107)
CREAT SERPL-MCNC: 1.56 MG/DL (ref 0.67–1.17)
DEPRECATED HCO3 PLAS-SCNC: 25 MMOL/L (ref 22–29)
ERYTHROCYTE [DISTWIDTH] IN BLOOD BY AUTOMATED COUNT: 13.8 % (ref 10–15)
GFR SERPL CREATININE-BSD FRML MDRD: 51 ML/MIN/1.73M2
GLUCOSE SERPL-MCNC: 123 MG/DL (ref 70–99)
HCT VFR BLD AUTO: 40.5 % (ref 40–53)
HGB BLD-MCNC: 12.9 G/DL (ref 13.3–17.7)
INR PPP: 1.13 (ref 0.85–1.15)
MAGNESIUM SERPL-MCNC: 1.6 MG/DL (ref 1.7–2.3)
MCH RBC QN AUTO: 26 PG (ref 26.5–33)
MCHC RBC AUTO-ENTMCNC: 31.9 G/DL (ref 31.5–36.5)
MCV RBC AUTO: 82 FL (ref 78–100)
PHOSPHATE SERPL-MCNC: 3.7 MG/DL (ref 2.5–4.5)
PLATELET # BLD AUTO: 170 10E3/UL (ref 150–450)
POTASSIUM SERPL-SCNC: 4.8 MMOL/L (ref 3.4–5.3)
PROT SERPL-MCNC: 7.3 G/DL (ref 6.4–8.3)
RBC # BLD AUTO: 4.97 10E6/UL (ref 4.4–5.9)
SODIUM SERPL-SCNC: 141 MMOL/L (ref 136–145)
TACROLIMUS BLD-MCNC: 9.8 UG/L (ref 5–15)
TME LAST DOSE: NORMAL H
TME LAST DOSE: NORMAL H
WBC # BLD AUTO: 3.3 10E3/UL (ref 4–11)

## 2023-07-17 PROCEDURE — 85027 COMPLETE CBC AUTOMATED: CPT

## 2023-07-17 PROCEDURE — 80053 COMPREHEN METABOLIC PANEL: CPT

## 2023-07-17 PROCEDURE — 84100 ASSAY OF PHOSPHORUS: CPT

## 2023-07-17 PROCEDURE — 36415 COLL VENOUS BLD VENIPUNCTURE: CPT

## 2023-07-17 PROCEDURE — 82248 BILIRUBIN DIRECT: CPT

## 2023-07-17 PROCEDURE — 83735 ASSAY OF MAGNESIUM: CPT

## 2023-07-17 PROCEDURE — 85610 PROTHROMBIN TIME: CPT

## 2023-07-17 PROCEDURE — 80197 ASSAY OF TACROLIMUS: CPT

## 2023-07-19 ENCOUNTER — VIRTUAL VISIT (OUTPATIENT)
Dept: PHARMACY | Facility: CLINIC | Age: 63
End: 2023-07-19
Payer: COMMERCIAL

## 2023-07-19 DIAGNOSIS — Z94.4 S/P LIVER TRANSPLANT (H): Primary | ICD-10-CM

## 2023-07-19 PROCEDURE — 99207 PR NO CHARGE LOS: CPT | Performed by: PHARMACIST

## 2023-07-19 NOTE — PATIENT INSTRUCTIONS
"Recommendations from today's MTM visit:                                                       1. Stop Omeprazole, can use Famotidine (Pepcid) as needed for GERD symptoms.   2. Discussed that you are Hep B core antibody positive which suggests you had a past viral infection. You did not show any viral loads when we checked, but may be reasonable to do a general HBV screening with PCP at 6 months post to see if the HBV core antibody positive was a lab error.     Follow-up: as needed    It was great speaking with you today.  I value your experience and would be very thankful for your time in providing feedback in our clinic survey. In the next few days, you may receive an email or text message from [employer] with a link to a survey related to your  clinical pharmacist.\"     To schedule another MTM appointment, please call the clinic directly or you may call the MTM scheduling line at [phone] or toll-free at [phone].     My Clinical Pharmacist's contact information:                                                      Please feel free to contact me with any questions or concerns you have.      Herb Roberts, PharmD  [facility] Pharmacist    Phone: [phone]     " Yes

## 2023-07-19 NOTE — PROGRESS NOTES
Disease State Management Encounter:                          Torin Mane is a 62 year old male called for a follow-up visit from 4/5.      Reason for visit: 4 months post txp.    Allergies/ADRs: Reviewed in chart  Past Medical History: Reviewed in chart  Tobacco: He reports that he has never smoked. He does not have any smokeless tobacco history on file.  Alcohol: not currently using  THC/CBD: none     Medication Adherence/Access: Per patient, misses medication 0 times per week.     Liver Transplant:  Current immunosuppressants include Tacrolimus 3mg every morning & 2mg every evening. Pt reports Tremors make basic tasks and hand writing more difficult.   Transplant date: 3/23/23  Estimated Creatinine Clearance: 48.6 mL/min (A) (based on SCr of 1.56 mg/dL (H)).  CMV prophylaxis: completed  PJP prophylaxis: completed  Vascular prophylaxis: Aspirin 325mg daily. Denies gastrointestinal bleed sx.   PPI use: Omeprazole 20mg once to twice daily. Denies GERD sx.   Supplements: Mag Oxide 800mg twice daily ( 2 hours from MM[facility]), Calcium/D 3 TIMES DAILY, Folic acid 1mg daily, MVI daily  Tx Coordinator: Giselle Leyva Tx MD: Dr. Thomas, Dr. Sloan Using Med Card: Yes  Immunizations: annual flu shot 2022; Prevnar 20: 2023; TDaP:  2023; Shingrix: x2, HBV: no immunity, core positive. , COVID: Pfizer x4, Bivalent x1.   Lab Results   Component Value Date    MAG 1.6 (L) 07/17/2023    MAG 1.6 (L) 07/10/2023    PEDRO 9.8 07/17/2023    PEDRO 10.0 07/10/2023    PEDRO 9.9 06/30/2023     Today's Vitals: There were no vitals taken for this visit.    Assessment/Plan:    1. Stop Omeprazole, can use Famotidine (Pepcid) as needed for GERD symptoms.   2. Discussed that you are Hep B core antibody positive which suggests you had a past viral infection. You did not show any viral loads when we checked, but may be reasonable to do a general HBV screening with PCP at 6 months post to see if the HBV core antibody positive was a lab error.      Follow-up: as needed    I spent 20 minutes with this patient today. All changes were made via collaborative practice agreement with Dr. Sloan. A copy of the visit note was provided to the patient's provider(s).    A summary of these recommendations was sent via [employer].    Herb Roberts, PharmD  [facility] Pharmacist    Phone: [phone]      Medication Therapy Recommendations  S/P liver transplant (H)    Current Medication: omeprazole 20 MG tablet (Discontinued)   Rationale: No medical indication at this time - Unnecessary medication therapy - Indication   Recommendation: Discontinue Medication   Status: Accepted per CPA

## 2023-07-25 ENCOUNTER — LAB (OUTPATIENT)
Dept: LAB | Facility: CLINIC | Age: 63
End: 2023-07-25
Payer: COMMERCIAL

## 2023-07-25 DIAGNOSIS — Z94.4 LIVER REPLACED BY TRANSPLANT (H): ICD-10-CM

## 2023-07-25 DIAGNOSIS — K70.30 ALCOHOLIC CIRRHOSIS (H): ICD-10-CM

## 2023-07-25 LAB
ALBUMIN SERPL BCG-MCNC: 4.3 G/DL (ref 3.5–5.2)
ALP SERPL-CCNC: 66 U/L (ref 40–129)
ALT SERPL W P-5'-P-CCNC: 62 U/L (ref 0–70)
ANION GAP SERPL CALCULATED.3IONS-SCNC: 15 MMOL/L (ref 7–15)
AST SERPL W P-5'-P-CCNC: 43 U/L (ref 0–45)
BILIRUB DIRECT SERPL-MCNC: 0.23 MG/DL (ref 0–0.3)
BILIRUB SERPL-MCNC: 0.9 MG/DL
BUN SERPL-MCNC: 26.9 MG/DL (ref 8–23)
CALCIUM SERPL-MCNC: 10.2 MG/DL (ref 8.8–10.2)
CHLORIDE SERPL-SCNC: 104 MMOL/L (ref 98–107)
CREAT SERPL-MCNC: 1.58 MG/DL (ref 0.67–1.17)
DEPRECATED HCO3 PLAS-SCNC: 22 MMOL/L (ref 22–29)
ERYTHROCYTE [DISTWIDTH] IN BLOOD BY AUTOMATED COUNT: 13.7 % (ref 10–15)
GFR SERPL CREATININE-BSD FRML MDRD: 50 ML/MIN/1.73M2
GLUCOSE SERPL-MCNC: 127 MG/DL (ref 70–99)
HCT VFR BLD AUTO: 40.5 % (ref 40–53)
HGB BLD-MCNC: 12.9 G/DL (ref 13.3–17.7)
INR PPP: 1.11 (ref 0.85–1.15)
MAGNESIUM SERPL-MCNC: 1.7 MG/DL (ref 1.7–2.3)
MCH RBC QN AUTO: 25.7 PG (ref 26.5–33)
MCHC RBC AUTO-ENTMCNC: 31.9 G/DL (ref 31.5–36.5)
MCV RBC AUTO: 81 FL (ref 78–100)
PHOSPHATE SERPL-MCNC: 4.1 MG/DL (ref 2.5–4.5)
PLATELET # BLD AUTO: 150 10E3/UL (ref 150–450)
POTASSIUM SERPL-SCNC: 4.9 MMOL/L (ref 3.4–5.3)
PROT SERPL-MCNC: 7.6 G/DL (ref 6.4–8.3)
RBC # BLD AUTO: 5.01 10E6/UL (ref 4.4–5.9)
SODIUM SERPL-SCNC: 141 MMOL/L (ref 136–145)
TACROLIMUS BLD-MCNC: 7.6 UG/L (ref 5–15)
TME LAST DOSE: NORMAL H
TME LAST DOSE: NORMAL H
WBC # BLD AUTO: 3.6 10E3/UL (ref 4–11)

## 2023-07-25 PROCEDURE — 80053 COMPREHEN METABOLIC PANEL: CPT

## 2023-07-25 PROCEDURE — 85027 COMPLETE CBC AUTOMATED: CPT

## 2023-07-25 PROCEDURE — 83735 ASSAY OF MAGNESIUM: CPT

## 2023-07-25 PROCEDURE — 82248 BILIRUBIN DIRECT: CPT

## 2023-07-25 PROCEDURE — 36415 COLL VENOUS BLD VENIPUNCTURE: CPT

## 2023-07-25 PROCEDURE — 84100 ASSAY OF PHOSPHORUS: CPT

## 2023-07-25 PROCEDURE — 85610 PROTHROMBIN TIME: CPT

## 2023-07-25 PROCEDURE — 80197 ASSAY OF TACROLIMUS: CPT

## 2023-07-31 ENCOUNTER — LAB (OUTPATIENT)
Dept: LAB | Facility: CLINIC | Age: 63
End: 2023-07-31
Payer: COMMERCIAL

## 2023-07-31 DIAGNOSIS — K70.30 ALCOHOLIC CIRRHOSIS (H): ICD-10-CM

## 2023-07-31 DIAGNOSIS — Z94.4 LIVER REPLACED BY TRANSPLANT (H): ICD-10-CM

## 2023-07-31 LAB
ALBUMIN SERPL BCG-MCNC: 4.4 G/DL (ref 3.5–5.2)
ALP SERPL-CCNC: 62 U/L (ref 40–129)
ALT SERPL W P-5'-P-CCNC: 55 U/L (ref 0–70)
ANION GAP SERPL CALCULATED.3IONS-SCNC: 11 MMOL/L (ref 7–15)
AST SERPL W P-5'-P-CCNC: 38 U/L (ref 0–45)
BILIRUB DIRECT SERPL-MCNC: 0.22 MG/DL (ref 0–0.3)
BILIRUB SERPL-MCNC: 0.7 MG/DL
BUN SERPL-MCNC: 26.5 MG/DL (ref 8–23)
CALCIUM SERPL-MCNC: 9.5 MG/DL (ref 8.8–10.2)
CHLORIDE SERPL-SCNC: 105 MMOL/L (ref 98–107)
CREAT SERPL-MCNC: 1.56 MG/DL (ref 0.67–1.17)
DEPRECATED HCO3 PLAS-SCNC: 24 MMOL/L (ref 22–29)
ERYTHROCYTE [DISTWIDTH] IN BLOOD BY AUTOMATED COUNT: 13.9 % (ref 10–15)
GFR SERPL CREATININE-BSD FRML MDRD: 51 ML/MIN/1.73M2
GLUCOSE SERPL-MCNC: 111 MG/DL (ref 70–99)
HCT VFR BLD AUTO: 39.8 % (ref 40–53)
HGB BLD-MCNC: 13 G/DL (ref 13.3–17.7)
INR PPP: 1.11 (ref 0.85–1.15)
MAGNESIUM SERPL-MCNC: 1.8 MG/DL (ref 1.7–2.3)
MCH RBC QN AUTO: 26.3 PG (ref 26.5–33)
MCHC RBC AUTO-ENTMCNC: 32.7 G/DL (ref 31.5–36.5)
MCV RBC AUTO: 81 FL (ref 78–100)
PHOSPHATE SERPL-MCNC: 3.7 MG/DL (ref 2.5–4.5)
PLATELET # BLD AUTO: 138 10E3/UL (ref 150–450)
POTASSIUM SERPL-SCNC: 4.6 MMOL/L (ref 3.4–5.3)
PROT SERPL-MCNC: 7.4 G/DL (ref 6.4–8.3)
RBC # BLD AUTO: 4.94 10E6/UL (ref 4.4–5.9)
SODIUM SERPL-SCNC: 140 MMOL/L (ref 136–145)
TACROLIMUS BLD-MCNC: 8.4 UG/L (ref 5–15)
TME LAST DOSE: NORMAL H
TME LAST DOSE: NORMAL H
WBC # BLD AUTO: 4.4 10E3/UL (ref 4–11)

## 2023-07-31 PROCEDURE — 36415 COLL VENOUS BLD VENIPUNCTURE: CPT

## 2023-07-31 PROCEDURE — 80053 COMPREHEN METABOLIC PANEL: CPT

## 2023-07-31 PROCEDURE — 80197 ASSAY OF TACROLIMUS: CPT

## 2023-07-31 PROCEDURE — 82248 BILIRUBIN DIRECT: CPT

## 2023-07-31 PROCEDURE — 84100 ASSAY OF PHOSPHORUS: CPT

## 2023-07-31 PROCEDURE — 85610 PROTHROMBIN TIME: CPT

## 2023-07-31 PROCEDURE — 85027 COMPLETE CBC AUTOMATED: CPT

## 2023-07-31 PROCEDURE — 83735 ASSAY OF MAGNESIUM: CPT

## 2023-08-08 ENCOUNTER — LAB (OUTPATIENT)
Dept: LAB | Facility: CLINIC | Age: 63
End: 2023-08-08
Payer: COMMERCIAL

## 2023-08-08 DIAGNOSIS — Z94.4 LIVER REPLACED BY TRANSPLANT (H): ICD-10-CM

## 2023-08-08 DIAGNOSIS — K70.30 ALCOHOLIC CIRRHOSIS (H): ICD-10-CM

## 2023-08-08 LAB
ALBUMIN SERPL BCG-MCNC: 4.2 G/DL (ref 3.5–5.2)
ALP SERPL-CCNC: 57 U/L (ref 40–129)
ALT SERPL W P-5'-P-CCNC: 42 U/L (ref 0–70)
ANION GAP SERPL CALCULATED.3IONS-SCNC: 10 MMOL/L (ref 7–15)
AST SERPL W P-5'-P-CCNC: 32 U/L (ref 0–45)
BILIRUB DIRECT SERPL-MCNC: 0.22 MG/DL (ref 0–0.3)
BILIRUB SERPL-MCNC: 0.8 MG/DL
BUN SERPL-MCNC: 27.8 MG/DL (ref 8–23)
CALCIUM SERPL-MCNC: 9.9 MG/DL (ref 8.8–10.2)
CHLORIDE SERPL-SCNC: 106 MMOL/L (ref 98–107)
CREAT SERPL-MCNC: 1.63 MG/DL (ref 0.67–1.17)
DEPRECATED HCO3 PLAS-SCNC: 24 MMOL/L (ref 22–29)
ERYTHROCYTE [DISTWIDTH] IN BLOOD BY AUTOMATED COUNT: 14.2 % (ref 10–15)
GFR SERPL CREATININE-BSD FRML MDRD: 48 ML/MIN/1.73M2
GLUCOSE SERPL-MCNC: 111 MG/DL (ref 70–99)
HCT VFR BLD AUTO: 40.8 % (ref 40–53)
HGB BLD-MCNC: 13.1 G/DL (ref 13.3–17.7)
INR PPP: 1.06 (ref 0.85–1.15)
MAGNESIUM SERPL-MCNC: 1.7 MG/DL (ref 1.7–2.3)
MCH RBC QN AUTO: 26.3 PG (ref 26.5–33)
MCHC RBC AUTO-ENTMCNC: 32.1 G/DL (ref 31.5–36.5)
MCV RBC AUTO: 82 FL (ref 78–100)
PHOSPHATE SERPL-MCNC: 3.7 MG/DL (ref 2.5–4.5)
PLATELET # BLD AUTO: 124 10E3/UL (ref 150–450)
POTASSIUM SERPL-SCNC: 5 MMOL/L (ref 3.4–5.3)
PROT SERPL-MCNC: 7.4 G/DL (ref 6.4–8.3)
RBC # BLD AUTO: 4.99 10E6/UL (ref 4.4–5.9)
SODIUM SERPL-SCNC: 140 MMOL/L (ref 136–145)
TACROLIMUS BLD-MCNC: 7.1 UG/L (ref 5–15)
TME LAST DOSE: NORMAL H
TME LAST DOSE: NORMAL H
WBC # BLD AUTO: 4.9 10E3/UL (ref 4–11)

## 2023-08-08 PROCEDURE — 99000 SPECIMEN HANDLING OFFICE-LAB: CPT

## 2023-08-08 PROCEDURE — 80053 COMPREHEN METABOLIC PANEL: CPT

## 2023-08-08 PROCEDURE — 84100 ASSAY OF PHOSPHORUS: CPT

## 2023-08-08 PROCEDURE — 85027 COMPLETE CBC AUTOMATED: CPT

## 2023-08-08 PROCEDURE — G0480 DRUG TEST DEF 1-7 CLASSES: HCPCS | Mod: 90

## 2023-08-08 PROCEDURE — 82248 BILIRUBIN DIRECT: CPT

## 2023-08-08 PROCEDURE — 85610 PROTHROMBIN TIME: CPT

## 2023-08-08 PROCEDURE — 83735 ASSAY OF MAGNESIUM: CPT

## 2023-08-08 PROCEDURE — 80197 ASSAY OF TACROLIMUS: CPT

## 2023-08-08 PROCEDURE — 36415 COLL VENOUS BLD VENIPUNCTURE: CPT

## 2023-08-10 LAB
LABORATORY REPORT: NORMAL
PLPETH BLD-MCNC: <10 NG/ML
POPETH BLD-MCNC: <10 NG/ML

## 2023-08-21 ENCOUNTER — LAB (OUTPATIENT)
Dept: LAB | Facility: CLINIC | Age: 63
End: 2023-08-21
Payer: COMMERCIAL

## 2023-08-21 DIAGNOSIS — Z94.4 LIVER REPLACED BY TRANSPLANT (H): ICD-10-CM

## 2023-08-21 DIAGNOSIS — K70.30 ALCOHOLIC CIRRHOSIS (H): ICD-10-CM

## 2023-08-21 LAB
ALBUMIN SERPL BCG-MCNC: 4.2 G/DL (ref 3.5–5.2)
ALP SERPL-CCNC: 59 U/L (ref 40–129)
ALT SERPL W P-5'-P-CCNC: 39 U/L (ref 0–70)
ANION GAP SERPL CALCULATED.3IONS-SCNC: 12 MMOL/L (ref 7–15)
AST SERPL W P-5'-P-CCNC: 32 U/L (ref 0–45)
BILIRUB DIRECT SERPL-MCNC: 0.26 MG/DL (ref 0–0.3)
BILIRUB SERPL-MCNC: 1 MG/DL
BUN SERPL-MCNC: 21.3 MG/DL (ref 8–23)
CALCIUM SERPL-MCNC: 10.1 MG/DL (ref 8.8–10.2)
CHLORIDE SERPL-SCNC: 107 MMOL/L (ref 98–107)
CREAT SERPL-MCNC: 1.5 MG/DL (ref 0.67–1.17)
DEPRECATED HCO3 PLAS-SCNC: 23 MMOL/L (ref 22–29)
ERYTHROCYTE [DISTWIDTH] IN BLOOD BY AUTOMATED COUNT: 14.4 % (ref 10–15)
GFR SERPL CREATININE-BSD FRML MDRD: 53 ML/MIN/1.73M2
GLUCOSE SERPL-MCNC: 111 MG/DL (ref 70–99)
HCT VFR BLD AUTO: 42.1 % (ref 40–53)
HGB BLD-MCNC: 13.4 G/DL (ref 13.3–17.7)
INR PPP: 1.08 (ref 0.85–1.15)
MAGNESIUM SERPL-MCNC: 1.6 MG/DL (ref 1.7–2.3)
MCH RBC QN AUTO: 26 PG (ref 26.5–33)
MCHC RBC AUTO-ENTMCNC: 31.8 G/DL (ref 31.5–36.5)
MCV RBC AUTO: 82 FL (ref 78–100)
PHOSPHATE SERPL-MCNC: 3.9 MG/DL (ref 2.5–4.5)
PLATELET # BLD AUTO: 157 10E3/UL (ref 150–450)
POTASSIUM SERPL-SCNC: 4.9 MMOL/L (ref 3.4–5.3)
PROT SERPL-MCNC: 7.5 G/DL (ref 6.4–8.3)
RBC # BLD AUTO: 5.16 10E6/UL (ref 4.4–5.9)
SODIUM SERPL-SCNC: 142 MMOL/L (ref 136–145)
TACROLIMUS BLD-MCNC: 7.7 UG/L (ref 5–15)
TME LAST DOSE: NORMAL H
TME LAST DOSE: NORMAL H
WBC # BLD AUTO: 5.7 10E3/UL (ref 4–11)

## 2023-08-21 PROCEDURE — 80197 ASSAY OF TACROLIMUS: CPT

## 2023-08-21 PROCEDURE — 85610 PROTHROMBIN TIME: CPT

## 2023-08-21 PROCEDURE — 83735 ASSAY OF MAGNESIUM: CPT

## 2023-08-21 PROCEDURE — 82248 BILIRUBIN DIRECT: CPT

## 2023-08-21 PROCEDURE — 85027 COMPLETE CBC AUTOMATED: CPT

## 2023-08-21 PROCEDURE — 36415 COLL VENOUS BLD VENIPUNCTURE: CPT

## 2023-08-21 PROCEDURE — 84100 ASSAY OF PHOSPHORUS: CPT

## 2023-08-21 PROCEDURE — 80053 COMPREHEN METABOLIC PANEL: CPT

## 2023-08-27 DIAGNOSIS — I48.20 CHRONIC ATRIAL FIBRILLATION WITH RAPID VENTRICULAR RESPONSE (H): ICD-10-CM

## 2023-08-30 RX ORDER — METOPROLOL TARTRATE 25 MG/1
25 TABLET, FILM COATED ORAL 2 TIMES DAILY
Qty: 180 TABLET | Refills: 3 | Status: SHIPPED | OUTPATIENT
Start: 2023-08-30 | End: 2024-08-22

## 2023-08-30 NOTE — TELEPHONE ENCOUNTER
METOPROLOL TARTRATE 25 MG TAB       Last Written Prescription Date:  HISTORICAL    Last Office Visit : 1-31-23  Future Office visit:  NONE    Routing refill request to provider for review/approval because:  Medication is reported/historical

## 2023-09-05 ENCOUNTER — LAB (OUTPATIENT)
Dept: LAB | Facility: CLINIC | Age: 63
End: 2023-09-05
Payer: COMMERCIAL

## 2023-09-05 DIAGNOSIS — Z94.4 LIVER REPLACED BY TRANSPLANT (H): ICD-10-CM

## 2023-09-05 DIAGNOSIS — K70.30 ALCOHOLIC CIRRHOSIS (H): ICD-10-CM

## 2023-09-05 LAB
ALBUMIN SERPL BCG-MCNC: 4.3 G/DL (ref 3.5–5.2)
ALP SERPL-CCNC: 62 U/L (ref 40–129)
ALT SERPL W P-5'-P-CCNC: 31 U/L (ref 0–70)
ANION GAP SERPL CALCULATED.3IONS-SCNC: 11 MMOL/L (ref 7–15)
AST SERPL W P-5'-P-CCNC: 27 U/L (ref 0–45)
BILIRUB DIRECT SERPL-MCNC: 0.21 MG/DL (ref 0–0.3)
BILIRUB SERPL-MCNC: 0.8 MG/DL
BUN SERPL-MCNC: 21.9 MG/DL (ref 8–23)
CALCIUM SERPL-MCNC: 10 MG/DL (ref 8.8–10.2)
CHLORIDE SERPL-SCNC: 105 MMOL/L (ref 98–107)
CREAT SERPL-MCNC: 1.54 MG/DL (ref 0.67–1.17)
DEPRECATED HCO3 PLAS-SCNC: 25 MMOL/L (ref 22–29)
ERYTHROCYTE [DISTWIDTH] IN BLOOD BY AUTOMATED COUNT: 15.1 % (ref 10–15)
GFR SERPL CREATININE-BSD FRML MDRD: 51 ML/MIN/1.73M2
GLUCOSE SERPL-MCNC: 113 MG/DL (ref 70–99)
HCT VFR BLD AUTO: 41.4 % (ref 40–53)
HGB BLD-MCNC: 13.5 G/DL (ref 13.3–17.7)
INR PPP: 1 (ref 0.85–1.15)
MAGNESIUM SERPL-MCNC: 1.7 MG/DL (ref 1.7–2.3)
MCH RBC QN AUTO: 26.5 PG (ref 26.5–33)
MCHC RBC AUTO-ENTMCNC: 32.6 G/DL (ref 31.5–36.5)
MCV RBC AUTO: 81 FL (ref 78–100)
PHOSPHATE SERPL-MCNC: 3.4 MG/DL (ref 2.5–4.5)
PLATELET # BLD AUTO: 139 10E3/UL (ref 150–450)
POTASSIUM SERPL-SCNC: 4.6 MMOL/L (ref 3.4–5.3)
PROT SERPL-MCNC: 7.3 G/DL (ref 6.4–8.3)
RBC # BLD AUTO: 5.09 10E6/UL (ref 4.4–5.9)
SODIUM SERPL-SCNC: 141 MMOL/L (ref 136–145)
TACROLIMUS BLD-MCNC: 7.8 UG/L (ref 5–15)
TME LAST DOSE: NORMAL H
TME LAST DOSE: NORMAL H
WBC # BLD AUTO: 4.8 10E3/UL (ref 4–11)

## 2023-09-05 PROCEDURE — 83735 ASSAY OF MAGNESIUM: CPT

## 2023-09-05 PROCEDURE — 85027 COMPLETE CBC AUTOMATED: CPT

## 2023-09-05 PROCEDURE — 80053 COMPREHEN METABOLIC PANEL: CPT

## 2023-09-05 PROCEDURE — 84100 ASSAY OF PHOSPHORUS: CPT

## 2023-09-05 PROCEDURE — 85610 PROTHROMBIN TIME: CPT

## 2023-09-05 PROCEDURE — 36415 COLL VENOUS BLD VENIPUNCTURE: CPT

## 2023-09-05 PROCEDURE — 82248 BILIRUBIN DIRECT: CPT

## 2023-09-05 PROCEDURE — 80197 ASSAY OF TACROLIMUS: CPT

## 2023-09-07 DIAGNOSIS — Z94.4 LIVER REPLACED BY TRANSPLANT (H): Primary | ICD-10-CM

## 2023-09-07 DIAGNOSIS — N17.9 AKI (ACUTE KIDNEY INJURY) (H): ICD-10-CM

## 2023-09-13 ENCOUNTER — VIRTUAL VISIT (OUTPATIENT)
Dept: ENDOCRINOLOGY | Facility: CLINIC | Age: 63
End: 2023-09-13
Payer: COMMERCIAL

## 2023-09-13 VITALS — BODY MASS INDEX: 22.66 KG/M2 | WEIGHT: 149 LBS

## 2023-09-13 DIAGNOSIS — N18.31 STAGE 3A CHRONIC KIDNEY DISEASE (H): ICD-10-CM

## 2023-09-13 DIAGNOSIS — M81.0 AGE-RELATED OSTEOPOROSIS WITHOUT CURRENT PATHOLOGICAL FRACTURE: Primary | ICD-10-CM

## 2023-09-13 PROCEDURE — 99215 OFFICE O/P EST HI 40 MIN: CPT | Mod: VID | Performed by: INTERNAL MEDICINE

## 2023-09-13 RX ORDER — ALBUTEROL SULFATE 0.83 MG/ML
2.5 SOLUTION RESPIRATORY (INHALATION)
Status: CANCELLED | OUTPATIENT
Start: 2023-09-13

## 2023-09-13 RX ORDER — ALBUTEROL SULFATE 90 UG/1
1-2 AEROSOL, METERED RESPIRATORY (INHALATION)
Status: CANCELLED
Start: 2023-09-13

## 2023-09-13 RX ORDER — EPINEPHRINE 1 MG/ML
0.3 INJECTION, SOLUTION, CONCENTRATE INTRAVENOUS EVERY 5 MIN PRN
Status: CANCELLED | OUTPATIENT
Start: 2023-09-13

## 2023-09-13 RX ORDER — DIPHENHYDRAMINE HYDROCHLORIDE 50 MG/ML
50 INJECTION INTRAMUSCULAR; INTRAVENOUS
Status: CANCELLED
Start: 2023-09-13

## 2023-09-13 RX ORDER — METHYLPREDNISOLONE SODIUM SUCCINATE 125 MG/2ML
125 INJECTION, POWDER, LYOPHILIZED, FOR SOLUTION INTRAMUSCULAR; INTRAVENOUS
Status: CANCELLED
Start: 2023-09-13

## 2023-09-13 ASSESSMENT — PAIN SCALES - GENERAL: PAINLEVEL: NO PAIN (0)

## 2023-09-13 NOTE — NURSING NOTE
Is the patient currently in the state of [place]? YES    Visit mode:VIDEO    If the visit is dropped, the patient can be reconnected by: VIDEO VISIT: Send to e-mail at: [email or web address]    Will anyone else be joining the visit? NO  (If patient encounters technical issues they should call [phone]    How would you like to obtain your AVS? MyChart    Are changes needed to the allergy or medication list? No    Reason for visit: Follow Up    Casi Guerra LPN

## 2023-09-13 NOTE — PATIENT INSTRUCTIONS
- Calcium intake - you meet your daily Calcium needs through diet and supplement medication. Please continue current calcium rich diet and take daily supplement.   - Vitamin D : Continue with supplement.  -Fall prevention, strengthening exercises  - Prolia injection every 6 months without missing a dose.      Prolia is used to treat osteoporosis in women after menopause  How should I use this medicine?  This medicine is for injection under the skin. It is given by a health care professional in a hospital or clinic setting.  A special MedGuide will be given to you before each treatment. Be sure to read this information carefully each time.    What side effects may I notice from receiving this medicine?  Side effects that you should report to your doctor or health care professional as soon as possible:  allergic reactions like skin rash, itching or hives, swelling of the face, lips, or tongue  bone pain  breathing problems  dizziness  jaw pain, especially after dental work (osteonecrosis of the Jaw)  redness, blistering, peeling of the skin  signs and symptoms of infection like fever or chills; cough; sore throat; pain or trouble passing urine  signs of low calcium like fast heartbeat, muscle cramps or muscle pain; pain, tingling, numbness in the hands or feet; seizures  unusual bleeding or bruising  unusually weak or tired  Atypical fracture of the hip      What may interact with this medicine?  Do not take this medicine with any of the following medications:  other medicines containing denosumab  This medicine may also interact with the following medications:  medicines that lower your chance of fighting infection  steroid medicines like prednisone or cortisone  What if I miss a dose?  It is important not to miss your dose.

## 2023-09-13 NOTE — PROGRESS NOTES
Endocrinology Clinic Visit    Chief Complaint: Follow Up     Information obtained from:Patient      Assessment/Treatment Plan:      Cricket Mane is a 63 year old year old male alcohol associated liver cirrhosis s/p liver transplant (3/23/2023), Post op afib with RVR and hx of CAD per documentation presents to endocrine clinic for work of up osteoporosis.    Osteoporosis without current fracture   CKD stage III      I have personally reviewed bone density from 1/4/2023 and agree with the interpretation of lumbar spine L1-L4 T score -3 and at the right femoral neck -1.8 and that the right total hip -2.  Primary risk factors for osteoporosis are age, alcohol use, hx of liver disease, organ transplant & steroid exposure. In order to assess additional secondary causes,screening for primary hyperparathyroidism and other results were unremarkable except low 24 hour urine calcium level.  He has increased his calcium intake since then.  Testosterone level within the normal range.      Considered anabolic therapy with romosozumab/Forteo however after looking at risk-benefit decision was made to proceed with antiresorptive therapy.  Denosumab or Prolia would be a good option particularly given his chronic kidney disease previously noted neutropenia has resolved.    Plan:  calcium 1200 mg daily from all sources, vitamin D 1000 IU daily, strengthening exercises and fall prevention.  Prolia every 6 months without missing a dose.  We will plan on getting a follow-up DEXA scan and C telopeptide to monitor therapy.  At the end of treatment with Prolia we will plan on following up with Reclast infusion which was discussed today.      Question regarding testosterone replacement therapy.  Recently checked testosterone labs documented below are within the normal range.  Therefore, testosterone placement therapy is not recommended.     Ref Range & Units 7 d ago   Sex Hormone Binding Globulin Adult 13 - 74 nmol/L 53   Testosterone 200  - 745 ng/dL 680   Testosterone, Free 3.1 - 12.8 ng/dL 11.0   Testosterone, Bioavailable 71.7 - 300.0 ng/dL 257.7   [facility]     Jose Francisco Garcia MD  Staff Endocrinologist    Division of Endocrinology and Diabetes      Subjective:         HPI: Cricket Mane is a 63 year old male with history of alcohol associated liver cirrhosis now s/p Liver transplantation ~2 and half months ago. DEXA scanning was done as part of pre transplant evaluation at which time was incidentally found to have osteoporosis. Denies any history of fractures except a traumatic fracture at age 12. Denies noticing decrease in height. Never smoker. Quit alcohol use 1 year ago before which patient has significant alcohol intake. Denies chronic opioid use. Reports steroid exposure in the nicole transplant period.    Denies family hx of osteoporosis. Reports consuming 1.2g Ca supplement daily in addition to yogurt and cheese consumption.      CKD   No Known Allergies    Current Outpatient Medications   Medication Sig Dispense Refill    aspirin (ASA) 325 MG tablet Take 1 tablet (325 mg) by mouth daily 90 tablet 3    calcium carbonate-vitamin D (CALTRATE) 600-10 MG-MCG per tablet Take 1 tablet by mouth 2 times daily (with meals) (Patient taking differently: Take 1 tablet by mouth 3 times daily) 180 tablet 3    folic acid (FOLVITE) 1 MG tablet Take 1 tablet (1,000 mcg) by mouth daily 90 tablet 1    magnesium oxide (MAG-OX) 400 MG tablet Take 2 tablets (800 mg) by mouth 2 times daily 360 tablet 3    metoprolol tartrate (LOPRESSOR) 25 MG tablet TAKE 1 TABLET BY MOUTH TWICE A  tablet 3    Multiple Vitamins-Minerals (SPECTRAVITE) TABS Take 1 tablet by mouth daily      tacrolimus (GENERIC EQUIVALENT) 1 MG capsule Take 3 capsules (3 mg) by mouth every morning AND 2 capsules (2 mg) every evening. 450 capsule 3    tacrolimus (GENERIC EQUIVALENT) 0.5 MG capsule Take 1 capsule (0.5 mg) by mouth as needed (for dose  changes) (Patient not taking: Reported on 6/30/2023) 90 capsule 3       Review of Systems     ROS negative outside of mentioned above.   Pertinent past medical history, past surgical history, social and family history reviewed.      Objective:   GENERAL: Healthy, alert and no distress  EYES: Eyes grossly normal to inspection.    PSYCH: Mentation appears normal, affect normal/bright, judgement and insight intact, normal speech and appearance well-groomed.  In House Labs:   Lab Results   Component Value Date    A1C 4.7 03/23/2023       TSH   Date Value Ref Range Status   05/11/2023 2.91 0.30 - 4.20 uIU/mL Final   12/20/2022 3.43 0.30 - 4.20 uIU/mL Final       Creatinine   Date Value Ref Range Status   09/05/2023 1.54 (H) 0.67 - 1.17 mg/dL Final     Order: [id number]   Ref Range & Units 7 d ago   Sex Hormone Binding Globulin Adult 13 - 74 nmol/L 53   Testosterone 200 - 745 ng/dL 680   Testosterone, Free 3.1 - 12.8 ng/dL 11.0   Testosterone, Bioavailable 71.7 - 300.0 ng/dL 257.7   Resulting [facility]       01/2023 DEXA  Results   Lumbar spine     L1-4      T-score -3.0                    BMD 0.865 g/cm 2.     Right Femur neck           T-score -1.8                    BMD 0.842 g/cm 2.  Right Total hip                T-score -2.0                    BMD 0.814 g/cm 2      Video-Visit Details    Type of service:  Video Visit  Joined the call at 9/13/2023, 11:05 am.  Left the call at 9/13/2023, 11:32 am.  Distant Location (provider location):  Off-site.     Platform used for Video Visit: [employer]  42 minutes spent by me on the date of the encounter doing chart review, counseling on osteoporosis medications and side effects in the setting of CKD, coordination of care, documentation and further activities per the note.

## 2023-09-13 NOTE — LETTER
9/13/2023       RE: Cricket Mane  [address] [place]  [facility] [address]     Dear Colleague,    Thank you for referring your patient, Cricket Mane, to the [facility] ENDOCRINOLOGY CLINIC [place] [facility]. Please see a copy of my visit note below.    Endocrinology Clinic Visit    Chief Complaint: Follow Up     Information obtained from:Patient      Assessment/Treatment Plan:      Cricket Mane is a 63 year old year old male alcohol associated liver cirrhosis s/p liver transplant (3/23/2023), Post op afib with RVR and hx of CAD per documentation presents to endocrine clinic for work of up osteoporosis.    Osteoporosis without current fracture   CKD stage III      I have personally reviewed bone density from 1/4/2023 and agree with the interpretation of lumbar spine L1-L4 T score -3 and at the right femoral neck -1.8 and that the right total hip -2.  Primary risk factors for osteoporosis are age, alcohol use, hx of liver disease, organ transplant & steroid exposure. In order to assess additional secondary causes,screening for primary hyperparathyroidism and other results were unremarkable except low 24 hour urine calcium level.  He has increased his calcium intake since then.  Testosterone level within the normal range.      Considered anabolic therapy with romosozumab/Forteo however after looking at risk-benefit decision was made to proceed with antiresorptive therapy.  Denosumab or Prolia would be a good option particularly given his chronic kidney disease previously noted neutropenia has resolved.    Plan:  calcium 1200 mg daily from all sources, vitamin D 1000 IU daily, strengthening exercises and fall prevention.  Prolia every 6 months without missing a dose.  We will plan on getting a follow-up DEXA scan and C telopeptide to monitor therapy.  At the end of treatment with Prolia we will plan on following up with Reclast infusion which was  discussed today.      Question regarding testosterone replacement therapy.  Recently checked testosterone labs documented below are within the normal range.  Therefore, testosterone placement therapy is not recommended.     Ref Range & Units 7 d ago   Sex Hormone Binding Globulin Adult 13 - 74 nmol/L 53   Testosterone 200 - 745 ng/dL 680   Testosterone, Free 3.1 - 12.8 ng/dL 11.0   Testosterone, Bioavailable 71.7 - 300.0 ng/dL 257.7   [facility] CENTRAL LAB     Jose Francisco Garcia MD  Staff Endocrinologist    Division of Endocrinology and Diabetes      Subjective:         HPI: Cricket Mane is a 63 year old male with history of alcohol associated liver cirrhosis now s/p Liver transplantation ~2 and half months ago. DEXA scanning was done as part of pre transplant evaluation at which time was incidentally found to have osteoporosis. Denies any history of fractures except a traumatic fracture at age 12. Denies noticing decrease in height. Never smoker. Quit alcohol use 1 year ago before which patient has significant alcohol intake. Denies chronic opioid use. Reports steroid exposure in the nicole transplant period.    Denies family hx of osteoporosis. Reports consuming 1.2g Ca supplement daily in addition to yogurt and cheese consumption.      CKD   No Known Allergies    Current Outpatient Medications   Medication Sig Dispense Refill    aspirin (ASA) 325 MG tablet Take 1 tablet (325 mg) by mouth daily 90 tablet 3    calcium carbonate-vitamin D (CALTRATE) 600-10 MG-MCG per tablet Take 1 tablet by mouth 2 times daily (with meals) (Patient taking differently: Take 1 tablet by mouth 3 times daily) 180 tablet 3    folic acid (FOLVITE) 1 MG tablet Take 1 tablet (1,000 mcg) by mouth daily 90 tablet 1    magnesium oxide (MAG-OX) 400 MG tablet Take 2 tablets (800 mg) by mouth 2 times daily 360 tablet 3    metoprolol tartrate (LOPRESSOR) 25 MG tablet TAKE 1 TABLET BY MOUTH TWICE A  tablet 3     Multiple Vitamins-Minerals (SPECTRAVITE) TABS Take 1 tablet by mouth daily      tacrolimus (GENERIC EQUIVALENT) 1 MG capsule Take 3 capsules (3 mg) by mouth every morning AND 2 capsules (2 mg) every evening. 450 capsule 3    tacrolimus (GENERIC EQUIVALENT) 0.5 MG capsule Take 1 capsule (0.5 mg) by mouth as needed (for dose changes) (Patient not taking: Reported on 6/30/2023) 90 capsule 3       Review of Systems     ROS negative outside of mentioned above.   Pertinent past medical history, past surgical history, social and family history reviewed.      Objective:   GENERAL: Healthy, alert and no distress  EYES: Eyes grossly normal to inspection.    PSYCH: Mentation appears normal, affect normal/bright, judgement and insight intact, normal speech and appearance well-groomed.  In House Labs:   Lab Results   Component Value Date    A1C 4.7 03/23/2023       TSH   Date Value Ref Range Status   05/11/2023 2.91 0.30 - 4.20 uIU/mL Final   12/20/2022 3.43 0.30 - 4.20 uIU/mL Final       Creatinine   Date Value Ref Range Status   09/05/2023 1.54 (H) 0.67 - 1.17 mg/dL Final     Order: [id number]   Ref Range & Units 7 d ago   Sex Hormone Binding Globulin Adult 13 - 74 nmol/L 53   Testosterone 200 - 745 ng/dL 680   Testosterone, Free 3.1 - 12.8 ng/dL 11.0   Testosterone, Bioavailable 71.7 - 300.0 ng/dL 257.7   Resulting [facility] CENTRAL LAB       01/2023 DEXA  Results   Lumbar spine     L1-4      T-score -3.0                    BMD 0.865 g/cm 2.     Right Femur neck           T-score -1.8                    BMD 0.842 g/cm 2.  Right Total hip                T-score -2.0                    BMD 0.814 g/cm 2      Video-Visit Details    Type of service:  Video Visit  Joined the call at 9/13/2023, 11:05 am.  Left the call at 9/13/2023, 11:32 am.  Distant Location (provider location):  Off-site.     Platform used for Video Visit: Wibiya  42 minutes spent by me on the date of the encounter doing chart review, counseling on  osteoporosis medications and side effects in the setting of CKD, coordination of care, documentation and further activities per the note.      Jose Francisco Garcia MD

## 2023-09-19 ENCOUNTER — TELEPHONE (OUTPATIENT)
Dept: ENDOCRINOLOGY | Facility: CLINIC | Age: 63
End: 2023-09-19
Payer: COMMERCIAL

## 2023-09-19 NOTE — TELEPHONE ENCOUNTER
----- Message from Jose Francisco Garcia MD sent at 9/19/2023  9:39 AM CDT -----  Regarding: FW: Ins Not Acitve  Please follow up with patient on the message below.   It might be better to wait until his coverage is clarified before starting treatment.   Jose Francisco Garcia MD    ----- Message -----  From: Carrie Muhammad  Sent: 9/18/2023   6:08 PM CDT  To: Jose Francisco Garcia MD; #  Subject: Ins Not Acitve                                   Hello,     I spoke with patient and he does not have active coverage through [facility] at this time because he is waiting for his Cobra to be reinstated from his former employer. He is quite frustrated as his infusion of Prolia is scheduled on 09.21.23 without an auth. I advised him to call and speak with the Nurse to see what his options are given he is a transplant patient. I am not able to do an auth until insurance is active. Possible postponement might be needed if patient does not want to sign waiver. Please let me know if there is anything I can help with.    Thank You  Carrie Muhammad  Infusion  - [facility]  Office: [phone]  Fax: [phone]  [email or web address]

## 2023-09-19 NOTE — TELEPHONE ENCOUNTER
My chart message sent to patient to cancel the 9/21/23  prolia injection elsewhere  and reschedule when he has insurance again. Mary More RN on 9/19/2023 at 9:56 AM

## 2023-09-20 ENCOUNTER — OFFICE VISIT (OUTPATIENT)
Dept: TRANSPLANT | Facility: CLINIC | Age: 63
End: 2023-09-20
Attending: INTERNAL MEDICINE
Payer: COMMERCIAL

## 2023-09-20 VITALS
SYSTOLIC BLOOD PRESSURE: 133 MMHG | DIASTOLIC BLOOD PRESSURE: 89 MMHG | BODY MASS INDEX: 23.43 KG/M2 | WEIGHT: 154.1 LBS | HEART RATE: 83 BPM | OXYGEN SATURATION: 98 %

## 2023-09-20 DIAGNOSIS — K70.30 ALCOHOLIC CIRRHOSIS OF LIVER WITHOUT ASCITES (H): ICD-10-CM

## 2023-09-20 DIAGNOSIS — N17.9 AKI (ACUTE KIDNEY INJURY) (H): Primary | ICD-10-CM

## 2023-09-20 DIAGNOSIS — D84.9 IMMUNOSUPPRESSION (H): ICD-10-CM

## 2023-09-20 DIAGNOSIS — Z94.4 S/P LIVER TRANSPLANT (H): ICD-10-CM

## 2023-09-20 DIAGNOSIS — Z48.298 AFTERCARE FOLLOWING ORGAN TRANSPLANT: ICD-10-CM

## 2023-09-20 DIAGNOSIS — Z94.4 LIVER REPLACED BY TRANSPLANT (H): ICD-10-CM

## 2023-09-20 DIAGNOSIS — E83.42 HYPOMAGNESEMIA: ICD-10-CM

## 2023-09-20 LAB
ALBUMIN MFR UR ELPH: <6 MG/DL
ALBUMIN UR-MCNC: NEGATIVE MG/DL
APPEARANCE UR: CLEAR
BILIRUB UR QL STRIP: NEGATIVE
COLOR UR AUTO: ABNORMAL
CREAT UR-MCNC: 73.6 MG/DL
GLUCOSE UR STRIP-MCNC: NEGATIVE MG/DL
HGB UR QL STRIP: NEGATIVE
KETONES UR STRIP-MCNC: NEGATIVE MG/DL
LEUKOCYTE ESTERASE UR QL STRIP: NEGATIVE
MUCOUS THREADS #/AREA URNS LPF: PRESENT /LPF
NITRATE UR QL: NEGATIVE
PH UR STRIP: 5.5 [PH] (ref 5–7)
PROT/CREAT 24H UR: NORMAL MG/G{CREAT}
RBC URINE: <1 /HPF
SP GR UR STRIP: 1.01 (ref 1–1.03)
UROBILINOGEN UR STRIP-MCNC: NORMAL MG/DL
WBC URINE: 0 /HPF

## 2023-09-20 PROCEDURE — 84156 ASSAY OF PROTEIN URINE: CPT | Performed by: INTERNAL MEDICINE

## 2023-09-20 PROCEDURE — 99204 OFFICE O/P NEW MOD 45 MIN: CPT | Mod: GC | Performed by: INTERNAL MEDICINE

## 2023-09-20 PROCEDURE — G0463 HOSPITAL OUTPT CLINIC VISIT: HCPCS | Performed by: INTERNAL MEDICINE

## 2023-09-20 PROCEDURE — 81001 URINALYSIS AUTO W/SCOPE: CPT | Performed by: INTERNAL MEDICINE

## 2023-09-20 ASSESSMENT — PAIN SCALES - GENERAL: PAINLEVEL: NO PAIN (0)

## 2023-09-20 NOTE — NURSING NOTE
Chief Complaint   Patient presents with    RECHECK     /89 (BP Location: Right arm, Patient Position: Sitting, Cuff Size: Adult Regular)   Pulse 83   Wt 69.9 kg (154 lb 1.6 oz)   SpO2 98%   BMI 23.43 kg/m    [place] PAK

## 2023-09-20 NOTE — LETTER
9/20/2023         RE: Cricket Mane  [address]  [facility] [address]        Dear Colleague,    Thank you for referring your patient, Cricket Mane, to the [facility] TRANSPLANT [facility]INIC. Please see a copy of my visit note below.    TRANSPLANT NEPHROLOGY EARLY POST TRANSPLANT VISIT    Assessment & Plan  # Liver Tx:  with development (unmasking?) of CKD after transplant.   Prior to transplant baseline 0.7 - 1.0. No evidence of HRS physiology. Noted to have increase in creatinine to 1.5 shortly after transplant (3/23/23). He had a transient improvement to 1.1 afterwards but since has largely remained ~1.4-1.6 since 4/17/23. Suspect much of this is related to his surgery. Tac at goal. Transitioned off of bactrim with limited improvement in creatinine. Also possible that his creatinine was overestimating his true kidney function prior to transplant.   - Baseline Creatinine: ~ 1.4 - 1.6 (eGFR ~50)   - Proteinuria: Not checked recently   - Plan to obtain urinalysis and UPCR today (bland urine and no proteinuria)   - Schedule renal US    # Immunosuppression: Tacrolimus immediate release (goal 8-10)   - Continue with intensive monitoring of immunosuppression for efficacy and toxicity.   - Changes: Not at this time Managed by transplant hepatology    # Infection Prophylaxis:   - PJP: None     # Hypertension: Controlled;  Goal BP: < 130/80   - Volume status: Euvolemic    - Changes: No    # Elevated Blood Glucose: Glucose generally running ~ 100-120s   - Management as per primary care.    # Mineral Bone Disorder:    - Secondary renal hyperparathyroidism; PTH level: Not checked recently        On treatment: None  - Vitamin D; level: Not checked recently        On supplement: No  - Calcium; level: High normal        On supplement: No  - Phosphorus; level: Normal        On supplement: No    # Electrolytes:   - Potassium; level: Normal        On supplement: No  - Magnesium; level: Normal        On  supplement: Yes  - Bicarbonate; level: Normal        On supplement: No    # A fib: Followed by [facility] cardiologist. Diagnosed around 2017 and status post failed DCCV. He has since been on a rate control strategy with carvedilol. Per notes - CHADS2 Vasc is 0, therefore he has not been on anticoagulation.     #Osteoporosis: DEXA scanning was done as part of pre transplant evaluation at which time was incidentally found to have osteoporosis. Seen by endocrinology. Getting set up with denosumab.    # Medical Compliance: Yes    # Health Maintenance and Vaccination Review: Not Reviewed    # Transplant History:  Etiology of Liver Failure: EtOH  Tx: Liver Tx  Transplant: 3/23/2023 (Liver)  Donor Type:  Donor Class:   Significant changes in immunosuppression: None  Significant transplant-related complications: None    Transplant Office Phone Number: [phone]    Assessment and plan was discussed with the patient and he voiced his understanding and agreement.    Return visit: Return in about 6 months (around 3/20/2024) for Follow up.    Patient was seen and discussed with Dr. Casimiro Tapia MD  Division of Renal Disease and Hypertension  [facility]  [email or web address]  Vocera Web Console      Chief Complaint  Mr. Mane is a 63 year old here for evaluation of CKD      History of Present Illness  Since transplant reports that generally things have been going well.  His energy is good.  He sleeps well.  He has been walking outside for his main way to stay active.  His appetite is good.  He does not report any specific concerns in relation to his transplant or with his kidney function.    In review of this timeline, he had previously normal kidney function before transplant and subsequently developed an FERNANDA at the time of surgery.  This increased creatinine to 1.5.  Afterwards, had transient resolution to 1.1-1.2, however this returned back up to 1.5 and has largely stayed stable since middle of April.  In review of  this time course, he does not recall any specific events that may have led to renal injury.  He was noted to have mild hypotension with SBP 90.  Metoprolol was dose reduced and he reports that this resolved the problem.  He is asymptomatic from this at that time.  He also denies any prior UTI, stone, hematuria, or NSAID use.  Denies any symptoms of urinary retention.  No family history of kidney disease.  Previous UA bland.  Last abdominal ultrasound with normal kidney size, no hydronephrosis.    Home BP:  120 systolic    Problem List  Patient Active Problem List   Diagnosis     Atrial fibrillation and flutter (H)     Alcoholic hepatitis     Atrial fibrillation with RVR (H)     Cirrhosis (H)     Transplant     Pre-operative cardiovascular examination     Mixed hyperlipidemia     Thrombocytopenia (H)     Coagulopathy (H)     Anemia, unspecified type     S/P liver transplant (H)     Immunosuppression (H)     Acute post-operative pain     Anxiety     Anemia due to blood loss, acute     At risk for malnutrition     FERNANDA (acute kidney injury) (H)     Steroid-induced hyperglycemia     Hypomagnesemia     Age-related osteoporosis without current pathological fracture     CKD (chronic kidney disease) stage 3, GFR 30-59 ml/min (H)       Allergies  No Known Allergies    Medications  Current Outpatient Medications   Medication Sig     aspirin (ASA) 325 MG tablet Take 1 tablet (325 mg) by mouth daily     calcium carbonate-vitamin D (CALTRATE) 600-10 MG-MCG per tablet Take 1 tablet by mouth 2 times daily (with meals) (Patient taking differently: Take 1 tablet by mouth 3 times daily)     folic acid (FOLVITE) 1 MG tablet Take 1 tablet (1,000 mcg) by mouth daily     magnesium oxide (MAG-OX) 400 MG tablet Take 2 tablets (800 mg) by mouth 2 times daily     metoprolol tartrate (LOPRESSOR) 25 MG tablet TAKE 1 TABLET BY MOUTH TWICE A DAY     Multiple Vitamins-Minerals (SPECTRAVITE) TABS Take 1 tablet by mouth daily     tacrolimus (GENERIC  EQUIVALENT) 0.5 MG capsule Take 1 capsule (0.5 mg) by mouth as needed (for dose changes)     tacrolimus (GENERIC EQUIVALENT) 1 MG capsule Take 3 capsules (3 mg) by mouth every morning AND 2 capsules (2 mg) every evening.     No current facility-administered medications for this visit.     There are no discontinued medications.    Physical Exam  Vital Signs: /89 (BP Location: Right arm, Patient Position: Sitting, Cuff Size: Adult Regular)   Pulse 83   Wt 69.9 kg (154 lb 1.6 oz)   SpO2 98%   BMI 23.43 kg/m      GENERAL APPEARANCE: alert and no distress  HENT: mouth without ulcers or lesions  RESP: lungs clear to auscultation - no rales, rhonchi or wheezes  CV: regular rhythm, normal rate, no rub, no murmur  EDEMA: no LE edema bilaterally  ABDOMEN: soft, nondistended, nontender  MS: extremities normal - no gross deformities noted, no evidence of inflammation in joints, no muscle tenderness  SKIN: no rash    Data        Latest Ref Rng & Units 9/5/2023     7:18 AM 8/21/2023     7:32 AM 8/8/2023     7:56 AM   Renal   Sodium 136 - 145 mmol/L 141  142  140    K 3.4 - 5.3 mmol/L 4.6  4.9  5.0    Cl 98 - 107 mmol/L 105  107  106    Cl (external) 98 - 107 mmol/L 105  107  106    CO2 22 - 29 mmol/L 25  23  24    Urea Nitrogen 8.0 - 23.0 mg/dL 21.9  21.3  27.8    Creatinine 0.67 - 1.17 mg/dL 1.54  1.50  1.63    Glucose 70 - 99 mg/dL 113  111  111    Calcium 8.8 - 10.2 mg/dL 10.0  10.1  9.9    Magnesium 1.7 - 2.3 mg/dL 1.7  1.6  1.7          Latest Ref Rng & Units 9/5/2023     7:18 AM 8/21/2023     7:32 AM 8/8/2023     7:56 AM   Bone Health   Phosphorus 2.5 - 4.5 mg/dL 3.4  3.9  3.7          Latest Ref Rng & Units 9/5/2023     7:18 AM 8/21/2023     7:32 AM 8/8/2023     7:56 AM   Heme   WBC 4.0 - 11.0 10e3/uL 4.8  5.7  4.9    Hgb 13.3 - 17.7 g/dL 13.5  13.4  13.1    Plt 150 - 450 10e3/uL 139  157  124          Latest Ref Rng & Units 9/5/2023     7:18 AM 8/21/2023     7:32 AM 8/8/2023     7:56 AM   Liver   AP 40 - 129 U/L  62  59  57    TBili <=1.2 mg/dL 0.8  1.0  0.8    Bilirubin Direct 0.00 - 0.30 mg/dL 0.21  0.26  0.22    ALT 0 - 70 U/L 31  39  42    AST 0 - 45 U/L 27  32  32    Tot Protein 6.4 - 8.3 g/dL 7.3  7.5  7.4    Albumin 3.5 - 5.2 g/dL 4.3  4.2  4.2          Latest Ref Rng & Units 3/24/2023     4:07 AM 3/23/2023     1:29 PM 3/22/2023     8:35 PM   Pancreas   A1C <5.7 %  4.7     Amylase 28 - 100 U/L 379   113    Lipase (Roche) 13 - 60 U/L 11            Latest Ref Rng & Units 12/20/2022     8:04 AM   Iron studies   Iron 61 - 157 ug/dL 225    Ferritin 31 - 409 ng/mL 904          Latest Ref Rng & Units 3/22/2023     8:35 PM 12/20/2022     8:04 AM   [facility] Virology   EBV CAPSID ANTIBODY IGG No detectable antibody. Positive  Positive        Recent Labs   Lab Test 08/08/23  0756 08/21/23  0732 09/05/23  0718   DOSTAC 8/7/2023 8/20/2023 9/4/2023   TACROL 7.1 7.7 7.8       This patient has been seen and evaluated by me, Farzaneh Manzo MD.  I have reviewed the note and agree with plan of care as documented by the fellow.        Again, thank you for allowing me to participate in the care of your patient.        Sincerely,        Farzaneh Manzo MD

## 2023-09-20 NOTE — PROGRESS NOTES
TRANSPLANT NEPHROLOGY EARLY POST TRANSPLANT VISIT    Assessment & Plan   # Liver Tx:  with development (unmasking?) of CKD after transplant.   Prior to transplant baseline 0.7 - 1.0. No evidence of HRS physiology. Noted to have increase in creatinine to 1.5 shortly after transplant (3/23/23). He had a transient improvement to 1.1 afterwards but since has largely remained ~1.4-1.6 since 4/17/23. Suspect much of this is related to his surgery. Tac at goal. Transitioned off of bactrim with limited improvement in creatinine. Also possible that his creatinine was overestimating his true kidney function prior to transplant.   - Baseline Creatinine: ~ 1.4 - 1.6 (eGFR ~50)   - Proteinuria: Not checked recently   - Plan to obtain urinalysis and UPCR today (bland urine and no proteinuria)   - Schedule renal US    # Immunosuppression: Tacrolimus immediate release (goal 8-10)   - Continue with intensive monitoring of immunosuppression for efficacy and toxicity.   - Changes: Not at this time Managed by transplant hepatology    # Infection Prophylaxis:   - PJP: None     # Hypertension: Controlled;  Goal BP: < 130/80   - Volume status: Euvolemic    - Changes: No    # Elevated Blood Glucose: Glucose generally running ~ 100-120s   - Management as per primary care.    # Mineral Bone Disorder:    - Secondary renal hyperparathyroidism; PTH level: Not checked recently        On treatment: None  - Vitamin D; level: Not checked recently        On supplement: No  - Calcium; level: High normal        On supplement: No  - Phosphorus; level: Normal        On supplement: No    # Electrolytes:   - Potassium; level: Normal        On supplement: No  - Magnesium; level: Normal        On supplement: Yes  - Bicarbonate; level: Normal        On supplement: No    # A fib: Followed by [facility] cardiologist. Diagnosed around 2017 and status post failed DCCV. He has since been on a rate control strategy with carvedilol. Per notes - CHADS2 Vasc is  0, therefore he has not been on anticoagulation.     #Osteoporosis: DEXA scanning was done as part of pre transplant evaluation at which time was incidentally found to have osteoporosis. Seen by endocrinology. Getting set up with denosumab.    # Medical Compliance: Yes    # Health Maintenance and Vaccination Review: Not Reviewed    # Transplant History:  Etiology of Liver Failure: EtOH  Tx: Liver Tx  Transplant: 3/23/2023 (Liver)  Donor Type:  Donor Class:   Significant changes in immunosuppression: None  Significant transplant-related complications: None    Transplant Office Phone Number: [phone]    Assessment and plan was discussed with the patient and he voiced his understanding and agreement.    Return visit: Return in about 6 months (around 3/20/2024) for Follow up.    Patient was seen and discussed with Dr. Casimiro Tapia MD  Division of Renal Disease and Hypertension  [facility]  [employer]      Chief Complaint   Mr. Mane is a 63 year old here for evaluation of CKD      History of Present Illness   Since transplant reports that generally things have been going well.  His energy is good.  He sleeps well.  He has been walking outside for his main way to stay active.  His appetite is good.  He does not report any specific concerns in relation to his transplant or with his kidney function.    In review of this timeline, he had previously normal kidney function before transplant and subsequently developed an FERNANDA at the time of surgery.  This increased creatinine to 1.5.  Afterwards, had transient resolution to 1.1-1.2, however this returned back up to 1.5 and has largely stayed stable since middle of April.  In review of this time course, he does not recall any specific events that may have led to renal injury.  He was noted to have mild hypotension with SBP 90.  Metoprolol was dose reduced and he reports that this resolved the problem.  He is asymptomatic from this at that time.   He also denies any prior UTI, stone, hematuria, or NSAID use.  Denies any symptoms of urinary retention.  No family history of kidney disease.  Previous UA bland.  Last abdominal ultrasound with normal kidney size, no hydronephrosis.    Home BP:  120 systolic    Problem List   Patient Active Problem List   Diagnosis    Atrial fibrillation and flutter (H)    Alcoholic hepatitis    Atrial fibrillation with RVR (H)    Cirrhosis (H)    Transplant    Pre-operative cardiovascular examination    Mixed hyperlipidemia    Thrombocytopenia (H)    Coagulopathy (H)    Anemia, unspecified type    S/P liver transplant (H)    Immunosuppression (H)    Acute post-operative pain    Anxiety    Anemia due to blood loss, acute    At risk for malnutrition    FERNANDA (acute kidney injury) (H)    Steroid-induced hyperglycemia    Hypomagnesemia    Age-related osteoporosis without current pathological fracture    CKD (chronic kidney disease) stage 3, GFR 30-59 ml/min (H)       Allergies   No Known Allergies    Medications   Current Outpatient Medications   Medication Sig    aspirin (ASA) 325 MG tablet Take 1 tablet (325 mg) by mouth daily    calcium carbonate-vitamin D (CALTRATE) 600-10 MG-MCG per tablet Take 1 tablet by mouth 2 times daily (with meals) (Patient taking differently: Take 1 tablet by mouth 3 times daily)    folic acid (FOLVITE) 1 MG tablet Take 1 tablet (1,000 mcg) by mouth daily    magnesium oxide (MAG-OX) 400 MG tablet Take 2 tablets (800 mg) by mouth 2 times daily    metoprolol tartrate (LOPRESSOR) 25 MG tablet TAKE 1 TABLET BY MOUTH TWICE A DAY    Multiple Vitamins-Minerals (SPECTRAVITE) TABS Take 1 tablet by mouth daily    tacrolimus (GENERIC EQUIVALENT) 0.5 MG capsule Take 1 capsule (0.5 mg) by mouth as needed (for dose changes)    tacrolimus (GENERIC EQUIVALENT) 1 MG capsule Take 3 capsules (3 mg) by mouth every morning AND 2 capsules (2 mg) every evening.     No current facility-administered medications for this visit.      There are no discontinued medications.    Physical Exam   Vital Signs: /89 (BP Location: Right arm, Patient Position: Sitting, Cuff Size: Adult Regular)   Pulse 83   Wt 69.9 kg (154 lb 1.6 oz)   SpO2 98%   BMI 23.43 kg/m      GENERAL APPEARANCE: alert and no distress  HENT: mouth without ulcers or lesions  RESP: lungs clear to auscultation - no rales, rhonchi or wheezes  CV: regular rhythm, normal rate, no rub, no murmur  EDEMA: no LE edema bilaterally  ABDOMEN: soft, nondistended, nontender  MS: extremities normal - no gross deformities noted, no evidence of inflammation in joints, no muscle tenderness  SKIN: no rash    Data         Latest Ref Rng & Units 9/5/2023     7:18 AM 8/21/2023     7:32 AM 8/8/2023     7:56 AM   Renal   Sodium 136 - 145 mmol/L 141  142  140    K 3.4 - 5.3 mmol/L 4.6  4.9  5.0    Cl 98 - 107 mmol/L 105  107  106    Cl (external) 98 - 107 mmol/L 105  107  106    CO2 22 - 29 mmol/L 25  23  24    Urea Nitrogen 8.0 - 23.0 mg/dL 21.9  21.3  27.8    Creatinine 0.67 - 1.17 mg/dL 1.54  1.50  1.63    Glucose 70 - 99 mg/dL 113  111  111    Calcium 8.8 - 10.2 mg/dL 10.0  10.1  9.9    Magnesium 1.7 - 2.3 mg/dL 1.7  1.6  1.7          Latest Ref Rng & Units 9/5/2023     7:18 AM 8/21/2023     7:32 AM 8/8/2023     7:56 AM   Bone Health   Phosphorus 2.5 - 4.5 mg/dL 3.4  3.9  3.7          Latest Ref Rng & Units 9/5/2023     7:18 AM 8/21/2023     7:32 AM 8/8/2023     7:56 AM   Heme   WBC 4.0 - 11.0 10e3/uL 4.8  5.7  4.9    Hgb 13.3 - 17.7 g/dL 13.5  13.4  13.1    Plt 150 - 450 10e3/uL 139  157  124          Latest Ref Rng & Units 9/5/2023     7:18 AM 8/21/2023     7:32 AM 8/8/2023     7:56 AM   Liver   AP 40 - 129 U/L 62  59  57    TBili <=1.2 mg/dL 0.8  1.0  0.8    Bilirubin Direct 0.00 - 0.30 mg/dL 0.21  0.26  0.22    ALT 0 - 70 U/L 31  39  42    AST 0 - 45 U/L 27  32  32    Tot Protein 6.4 - 8.3 g/dL 7.3  7.5  7.4    Albumin 3.5 - 5.2 g/dL 4.3  4.2  4.2          Latest Ref Rng & Units  3/24/2023     4:07 AM 3/23/2023     1:29 PM 3/22/2023     8:35 PM   Pancreas   A1C <5.7 %  4.7     Amylase 28 - 100 U/L 379   113    Lipase (Roche) 13 - 60 U/L 11            Latest Ref Rng & Units 12/20/2022     8:04 AM   Iron studies   Iron 61 - 157 ug/dL 225    Ferritin 31 - 409 ng/mL 904          Latest Ref Rng & Units 3/22/2023     8:35 PM 12/20/2022     8:04 AM   [facility] Virology   EBV CAPSID ANTIBODY IGG No detectable antibody. Positive  Positive        Recent Labs   Lab Test 08/08/23  0756 08/21/23  0732 09/05/23  0718   DOSTAC 8/7/2023 8/20/2023 9/4/2023   TACROL 7.1 7.7 7.8       This patient has been seen and evaluated by me, Farzaneh Manzo MD.  I have reviewed the note and agree with plan of care as documented by the fellow.

## 2023-09-25 ENCOUNTER — TELEPHONE (OUTPATIENT)
Dept: PHARMACY | Facility: CLINIC | Age: 63
End: 2023-09-25
Payer: COMMERCIAL

## 2023-09-25 NOTE — TELEPHONE ENCOUNTER
Clinical Pharmacy Consult:                                                      Transplant Specific: 6 Month Post Transplant Call  Date of Transplant: 3/23/2023  Type of Transplant: liver  First Transplant: yes  History of rejection: no    Immunosuppression Regimen   TAC 3mg qAM & 3mg qPM   Patient specific goal: 6-10  Most recent level: 7.8, date 9/5/23  Immunosuppressant Levels:  therapeutic  Pt adherent to lab draws: yes  Scr:   Lab Results   Component Value Date    CR 1.58 05/01/2023     Side effects: barely noticeable tremor    Prophylactic Medications  Antibacterial:  Bactrim SS daily  Scheduled Discontinue Date: 6 months    Antifungal: Not needed thus far  Scheduled Discontinue Date: N/A    Antiviral: CrCl 40 to 59 mL/minute: Valcyte 450 mg once daily   Scheduled Discontinue Date: 6 months    Acid Reducer: Prilosec (omeprazole)  Scheduled Reviewed Date:  followed by clinic    Thrombosis Prevention: Aspirin 325 mg PO daily  Scheduled Discontinue Date: 6 months    Blood Pressure Management  Frequency of home Blood Pressure checks: once daily  Most recent home BP: 110-120's/80's  Patient Blood pressure goal: <140/90  Patient blood pressure at goal:  yes  Hospitalizations/ER visits since last assessment: 0        5/1/2023     3:00 PM   Medication adherence flowsheet   Patient medication administration: Responsible for own medications   Patient estimated adherence level: %   Pharmacist assessment of adherence: Good   Patient reported doses missed per week: 0-1   Facilitators to medication adherence  Medication dosing chart;Pill box;Schedule/routine;Caregiver assistance   Patient reported barriers to medication adherence  No issues identified   Adherence intervention(s):  on importance of adherence          5/1/2023     3:00 PM   Medication access flowsheet   Number of pharmacies used: 1   Pharmacy: [facility]   Enrolled in [facility] pharmacy? Yes   Patient reported barriers to  accessing medications: No issues reported by patient   Medication access interventions: No issues identified      Med rec/DUR performed: yes  Med Rec Discrepancies: no    Spoke with Torin, who is doing real good. His tremors is barely noticeable and has no other side effects. He is happy his regimen is much simpler now and have not missed any doses.  He is still checking BP once daily and been consistently within goal.    No other questions or concerns. Follow up in  6 months.    Terry Hong, Pharm D  [facility]

## 2023-09-29 NOTE — PROGRESS NOTES
Transplant Surgery  Inpatient Daily Progress Note  03/26/2023    Assessment & Plan: Cricket Mane is a 62 year old male with PMH significant for EtOH cirrhosis (MELD 22) complicated by bleeding esophageal varices, A fib on metoprolol, and CAD. He is now s/p DDOLT (no biliary stent) and umbilical hernia repair on 3/23/23 with Dr. Arzate.      Graft function:   Liver transplant: POD#3. LFTs trending down appropriately. Post op US patent with mildly elevated RIs. JUSTINO x2 with serosang output. Continue ASA 81 mg daily for vascular ppx.      Immunosuppression management:  Induction: Steroid taper per protocol.  Maintenance:   -  mg BID  - Tacrolimus 1.5 mg BID. Goal level 8-12. Monitor closely with Fluconazole, Diltiazem, and Amiodarone.      Neurology/Psych:  Acute postoperative pain: Continue PRN oxycodone, add Lidoderm patches, Robaxin PRN.   Anxiety: PRN Hydoxyzine added.    Hematology:   Acute blood loss/Anemia of chronic disease: Hgb 9-10, stable.   Thrombocytopenia: due to liver disease. Platelets 94, improving.       Cardiorespiratory:   Hypoxia: Encourage CDB/IS. Wean O2 as able. Lasix today.   A fib: In A fib throughout operation controlled intra-operatively with Diltiazem. Controlled with amiodarone gtt in SICU. Now stable on PTA Metoprolol 25 mg BID.  [id number] Score: 1 point, no need for anticoagulation. Cardiology consulted; metoprolol increased to 50 mg BID.   CAD: ASA, as above.     GI/Nutrition:   At risk for malnutrition: Nutrition consulted. No plan to place FT at this time. Advance diet to regular today and add supplements.   -Bowel regimen: miralax qdaily + senna BID     Endocrine:   Steroid induced hyperglycemia: HGBA1C 4.7%. Transition off insulin gtt to sliding scale Novolog today.      Fluid/Electrolytes/Neph:  FERNANDA: likely r/t hypotension intra-op. Cr 1.7 (up from 0.7-0.8 pre-op). Good UOP. Monitor.      : No issues.      Infectious disease: Afebrile. WBC WNL.      Prophylaxis: DVT  (SCDs), GI (PPI), fungal (Flucon x 7 days followed by nystatin), PCP (Vactrim), CMV (Valcyte x 3 months), Periop (Zosyn x48hrs).    Dispo: 7A; PT/OT recommending ARU.    AMY/Fellow/Resident Provider: Rachna Louis NP #[id number]    Faculty: Aniket Arzate MD    __________________________________________________________________  Transplant History: Admitted 3/22/2023 for liver transplant.   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    3/23/2023 (Liver), Postoperative day: 3     Interval History: History is obtained from the patient  Patient anxious on morning rounds. Not very hungry, no BM since surgery.     ROS:   A 10-point review of systems was negative except as noted above.    Curent Meds:    aspirin  81 mg Oral or Feeding Tube Daily     bisacodyl  10 mg Rectal Once     [START ON 3/27/2023] fluconazole  400 mg Oral Daily     insulin aspart  1-7 Units Subcutaneous TID AC     insulin aspart  1-5 Units Subcutaneous At Bedtime     lidocaine  1-2 patch Transdermal Q24H     lidocaine   Transdermal Q8H TALIB     metoprolol tartrate  50 mg Oral BID     mycophenolate  750 mg Oral BID IS    Or     mycophenolate  750 mg Oral or NG Tube BID IS     pantoprazole  40 mg Oral QAM AC     polyethylene glycol  17 g Oral Daily     [START ON 3/27/2023] predniSONE  25 mg Oral Once    Followed by     [START ON 3/28/2023] predniSONE  10 mg Oral Once     senna-docusate  1-2 tablet Oral BID     sodium chloride (PF)  3 mL Intravenous Q8H     sulfamethoxazole-trimethoprim  1 tablet Oral Daily     tacrolimus  1.5 mg Oral BID IS    Or     tacrolimus  1.5 mg Oral or NG Tube BID IS     valGANciclovir  450 mg Oral Daily    Or     valGANciclovir  450 mg Oral or NG Tube Daily       Physical Exam:     Admit Weight: 73.5 kg (162 lb 1.6 oz)    Current Vitals:   BP (!) 133/95 (BP Location: Right arm)   Pulse 97   Temp 98.3  F (36.8  C) (Oral)   Resp 18   Wt 76.7 kg (169 lb 1.6 oz)   SpO2 91%   BMI 26.08 kg/m      Vital sign ranges:     Temp:  [97  F (36.1  C)-98.5  F (36.9  C)] 98.3  F (36.8  C)  Pulse:  [] 97  Resp:  [16-18] 18  BP: (133-151)/() 133/95  SpO2:  [91 %-99 %] 91 %  Patient Vitals for the past 24 hrs:   BP Temp Temp src Pulse Resp SpO2 Weight   03/26/23 1738 (!) 133/95 98.3  F (36.8  C) Oral 97 18 91 % --   03/26/23 1456 (!) 148/90 98.5  F (36.9  C) Axillary 99 18 98 % --   03/26/23 1129 -- 98  F (36.7  C) Axillary 100 18 99 % --   03/26/23 0754 -- -- -- -- -- -- 76.7 kg (169 lb 1.6 oz)   03/26/23 0656 (!) 149/93 98.3  F (36.8  C) Oral 96 18 97 % --   03/26/23 0301 -- 97  F (36.1  C) Oral -- -- -- --   03/26/23 0219 (!) 144/104 97.6  F (36.4  C) Oral 101 18 95 % --   03/25/23 2218 (!) 151/107 97.3  F (36.3  C) Oral 107 18 95 % --   03/25/23 1825 (!) 141/97 97.7  F (36.5  C) Oral 107 16 97 % --     General Appearance: in no apparent distress.   Skin: normal, warm, dry, No rashes, induration, or jaundice  Heart: perfused  Lungs: unlabored on 2L NC  Abdomen: rounded, appropriately tender. Incision C/D/I. JUSTINO with serosanguinous output.   : Morales removed  Extremities: edema: present generalized.   Neurologic: awake, alert and oriented. Tremor absent.    Frailty Scores     Frailty Scores 12/20/2022 12/15/2022 12/15/2022    Final Score Not Frail Not Frail Not Frail    Final Score Number 1 1 1          Data:   CMP  Recent Labs   Lab 03/26/23  1740 03/26/23  1353 03/26/23  0511 03/26/23  0338 03/25/23  1256 03/25/23  1117 03/24/23  0547 03/24/23  0407 03/23/23  1332 03/23/23  1331 03/23/23  0737 03/22/23 2035   NA  --   --   --  133*  --  137   < > 137   < >  --    < > 129*   POTASSIUM  --   --   --  4.9  --  4.2   < > 4.3   < >  --    < > 4.6   CHLORIDE  --   --   --  97*  --  102   < > 103   < >  --    < > 99   CO2  --   --   --  26  --  24   < > 26   < >  --    < > 19*   * 133*   < > 129*   < > 108*   < > 119*  120*   < >  --    < > 102*   BUN  --   --   --  50.6*  --  39.4*   < > 16.4   < >  --    < > 19.2   CR  --    --   --  1.67*  --  1.55*   < > 0.89   < >  --    < > 0.83   GFRESTIMATED  --   --   --  47*  --  51*   < > >90   < >  --    < > >90   PEDRO  --   --   --  9.1  --  8.4*   < > 8.4*   < >  --    < > 8.6*   ICAW  --   --   --   --   --   --   --  4.8  --  5.1   < >  --    MAG  --   --   --  2.0  --  1.8  --  2.7*  --   --    < > 1.9   PHOS  --   --   --  5.9*  --  5.3*  --  5.0*  --   --    < > 2.6   AMYLASE  --   --   --   --   --   --   --  379*  --   --   --  113*   LIPASE  --   --   --   --   --   --   --  11*  --   --   --   --    ALBUMIN  --   --   --  3.0*  --  2.8*   < > 2.5*   < >  --    < > 2.6*   BILITOTAL  --   --   --  2.0*  --  1.9*   < > 2.7*   < >  --    < > 7.7*   ALKPHOS  --   --   --  55  --  55   < > 66   < >  --    < > 223*   AST  --   --   --  109*  --  151*   < > 435*   < >  --    < > 92*   ALT  --   --   --  152*  --  155*   < > 222*   < >  --    < > 54*    < > = values in this interval not displayed.     CBC  Recent Labs   Lab 03/26/23 0338 03/25/23  1117 03/23/23  1545 03/23/23  1329   HGB 9.7* 8.8*   < > 10.2*   WBC 10.0 10.0   < > 9.8   PLT 94* 80*   < > 73*   A1C  --   --   --  4.7    < > = values in this interval not displayed.     COAGS  Recent Labs   Lab 03/26/23 0338 03/25/23  1117 03/24/23  1513 03/24/23  1220   INR 1.12 1.27* 1.41* 1.49*   PTT  --   --  27 28      Urinalysis  Recent Labs   Lab Test 12/20/22  0923   COLOR Yellow   APPEARANCE Clear   URINEGLC Negative   URINEBILI Negative   URINEKETONE Negative   SG 1.018   UBLD Negative   URINEPH 6.0   PROTEIN Negative   NITRITE Negative   LEUKEST Negative     Virology:  Hepatitis C Antibody   Date Value Ref Range Status   03/22/2023 Nonreactive Nonreactive Final      normal...

## 2023-10-03 ENCOUNTER — LAB (OUTPATIENT)
Dept: LAB | Facility: CLINIC | Age: 63
End: 2023-10-03
Payer: COMMERCIAL

## 2023-10-03 ENCOUNTER — INFUSION THERAPY VISIT (OUTPATIENT)
Dept: INFUSION THERAPY | Facility: CLINIC | Age: 63
End: 2023-10-03
Attending: INTERNAL MEDICINE
Payer: COMMERCIAL

## 2023-10-03 VITALS
DIASTOLIC BLOOD PRESSURE: 95 MMHG | RESPIRATION RATE: 18 BRPM | TEMPERATURE: 97.9 F | OXYGEN SATURATION: 96 % | SYSTOLIC BLOOD PRESSURE: 129 MMHG | HEART RATE: 75 BPM

## 2023-10-03 DIAGNOSIS — K70.30 ALCOHOLIC CIRRHOSIS (H): ICD-10-CM

## 2023-10-03 DIAGNOSIS — M81.0 AGE-RELATED OSTEOPOROSIS WITHOUT CURRENT PATHOLOGICAL FRACTURE: Primary | ICD-10-CM

## 2023-10-03 DIAGNOSIS — N18.31 STAGE 3A CHRONIC KIDNEY DISEASE (H): ICD-10-CM

## 2023-10-03 DIAGNOSIS — Z94.4 LIVER REPLACED BY TRANSPLANT (H): ICD-10-CM

## 2023-10-03 LAB
ALBUMIN SERPL BCG-MCNC: 4.3 G/DL (ref 3.5–5.2)
ALP SERPL-CCNC: 58 U/L (ref 40–129)
ALT SERPL W P-5'-P-CCNC: 25 U/L (ref 0–70)
ANION GAP SERPL CALCULATED.3IONS-SCNC: 13 MMOL/L (ref 7–15)
AST SERPL W P-5'-P-CCNC: 24 U/L (ref 0–45)
BILIRUB DIRECT SERPL-MCNC: <0.2 MG/DL (ref 0–0.3)
BILIRUB SERPL-MCNC: 0.7 MG/DL
BUN SERPL-MCNC: 23.2 MG/DL (ref 8–23)
CALCIUM SERPL-MCNC: 10 MG/DL (ref 8.8–10.2)
CHLORIDE SERPL-SCNC: 105 MMOL/L (ref 98–107)
CREAT SERPL-MCNC: 1.56 MG/DL (ref 0.67–1.17)
DEPRECATED HCO3 PLAS-SCNC: 22 MMOL/L (ref 22–29)
EGFRCR SERPLBLD CKD-EPI 2021: 50 ML/MIN/1.73M2
ERYTHROCYTE [DISTWIDTH] IN BLOOD BY AUTOMATED COUNT: 15.1 % (ref 10–15)
GLUCOSE SERPL-MCNC: 107 MG/DL (ref 70–99)
HCT VFR BLD AUTO: 42.7 % (ref 40–53)
HGB BLD-MCNC: 13.5 G/DL (ref 13.3–17.7)
INR PPP: 1.08 (ref 0.85–1.15)
MAGNESIUM SERPL-MCNC: 1.7 MG/DL (ref 1.7–2.3)
MCH RBC QN AUTO: 26.8 PG (ref 26.5–33)
MCHC RBC AUTO-ENTMCNC: 31.6 G/DL (ref 31.5–36.5)
MCV RBC AUTO: 85 FL (ref 78–100)
PHOSPHATE SERPL-MCNC: 3.9 MG/DL (ref 2.5–4.5)
PLATELET # BLD AUTO: 155 10E3/UL (ref 150–450)
POTASSIUM SERPL-SCNC: 4.9 MMOL/L (ref 3.4–5.3)
PROT SERPL-MCNC: 7.5 G/DL (ref 6.4–8.3)
RBC # BLD AUTO: 5.04 10E6/UL (ref 4.4–5.9)
SODIUM SERPL-SCNC: 140 MMOL/L (ref 135–145)
TACROLIMUS BLD-MCNC: 8.6 UG/L (ref 5–15)
TME LAST DOSE: NORMAL H
TME LAST DOSE: NORMAL H
WBC # BLD AUTO: 5.5 10E3/UL (ref 4–11)

## 2023-10-03 PROCEDURE — 82248 BILIRUBIN DIRECT: CPT

## 2023-10-03 PROCEDURE — 85027 COMPLETE CBC AUTOMATED: CPT

## 2023-10-03 PROCEDURE — 85610 PROTHROMBIN TIME: CPT

## 2023-10-03 PROCEDURE — 80197 ASSAY OF TACROLIMUS: CPT

## 2023-10-03 PROCEDURE — 250N000011 HC RX IP 250 OP 636: Mod: JZ | Performed by: INTERNAL MEDICINE

## 2023-10-03 PROCEDURE — 84100 ASSAY OF PHOSPHORUS: CPT

## 2023-10-03 PROCEDURE — 96372 THER/PROPH/DIAG INJ SC/IM: CPT | Performed by: INTERNAL MEDICINE

## 2023-10-03 PROCEDURE — 83735 ASSAY OF MAGNESIUM: CPT

## 2023-10-03 PROCEDURE — 99000 SPECIMEN HANDLING OFFICE-LAB: CPT

## 2023-10-03 PROCEDURE — G0480 DRUG TEST DEF 1-7 CLASSES: HCPCS | Mod: 90

## 2023-10-03 PROCEDURE — 80053 COMPREHEN METABOLIC PANEL: CPT

## 2023-10-03 PROCEDURE — 36415 COLL VENOUS BLD VENIPUNCTURE: CPT

## 2023-10-03 RX ORDER — ALBUTEROL SULFATE 90 UG/1
1-2 AEROSOL, METERED RESPIRATORY (INHALATION)
Status: DISCONTINUED | OUTPATIENT
Start: 2023-10-03 | End: 2023-10-03 | Stop reason: HOSPADM

## 2023-10-03 RX ORDER — METHYLPREDNISOLONE SODIUM SUCCINATE 125 MG/2ML
125 INJECTION, POWDER, LYOPHILIZED, FOR SOLUTION INTRAMUSCULAR; INTRAVENOUS
Status: CANCELLED
Start: 2024-03-31

## 2023-10-03 RX ORDER — EPINEPHRINE 1 MG/ML
0.3 INJECTION, SOLUTION INTRAMUSCULAR; SUBCUTANEOUS EVERY 5 MIN PRN
Status: DISCONTINUED | OUTPATIENT
Start: 2023-10-03 | End: 2023-10-03 | Stop reason: HOSPADM

## 2023-10-03 RX ORDER — DIPHENHYDRAMINE HYDROCHLORIDE 50 MG/ML
50 INJECTION INTRAMUSCULAR; INTRAVENOUS
Status: CANCELLED
Start: 2024-03-31

## 2023-10-03 RX ORDER — ALBUTEROL SULFATE 90 UG/1
1-2 AEROSOL, METERED RESPIRATORY (INHALATION)
Status: CANCELLED
Start: 2024-03-31

## 2023-10-03 RX ORDER — EPINEPHRINE 1 MG/ML
0.3 INJECTION, SOLUTION INTRAMUSCULAR; SUBCUTANEOUS EVERY 5 MIN PRN
Status: CANCELLED | OUTPATIENT
Start: 2024-03-31

## 2023-10-03 RX ORDER — DIPHENHYDRAMINE HYDROCHLORIDE 50 MG/ML
50 INJECTION INTRAMUSCULAR; INTRAVENOUS
Status: DISCONTINUED | OUTPATIENT
Start: 2023-10-03 | End: 2023-10-03 | Stop reason: HOSPADM

## 2023-10-03 RX ORDER — METHYLPREDNISOLONE SODIUM SUCCINATE 125 MG/2ML
125 INJECTION, POWDER, LYOPHILIZED, FOR SOLUTION INTRAMUSCULAR; INTRAVENOUS
Status: DISCONTINUED | OUTPATIENT
Start: 2023-10-03 | End: 2023-10-03 | Stop reason: HOSPADM

## 2023-10-03 RX ORDER — ALBUTEROL SULFATE 0.83 MG/ML
2.5 SOLUTION RESPIRATORY (INHALATION)
Status: CANCELLED | OUTPATIENT
Start: 2024-03-31

## 2023-10-03 RX ORDER — ALBUTEROL SULFATE 0.83 MG/ML
2.5 SOLUTION RESPIRATORY (INHALATION)
Status: DISCONTINUED | OUTPATIENT
Start: 2023-10-03 | End: 2023-10-03 | Stop reason: HOSPADM

## 2023-10-03 RX ADMIN — DENOSUMAB 60 MG: 60 INJECTION SUBCUTANEOUS at 12:46

## 2023-10-03 NOTE — PROGRESS NOTES
Torin was here today for injection x 1 to back of right arm.  Pt tolerated injection well and without incident. Pt was given medication information on the drug administered.      Elizabeth Bhakta CMA

## 2023-10-04 ENCOUNTER — TELEPHONE (OUTPATIENT)
Dept: TRANSPLANT | Facility: CLINIC | Age: 63
End: 2023-10-04
Payer: COMMERCIAL

## 2023-10-04 DIAGNOSIS — Z94.4 LIVER REPLACED BY TRANSPLANT (H): ICD-10-CM

## 2023-10-04 RX ORDER — TACROLIMUS 1 MG/1
2 CAPSULE ORAL EVERY 12 HOURS
Qty: 360 CAPSULE | Refills: 3 | Status: SHIPPED | OUTPATIENT
Start: 2023-10-04 | End: 2024-05-17

## 2023-10-04 NOTE — TELEPHONE ENCOUNTER
ISSUE:   Tacrolimus IR level 8.6 on 10/3, goal 5-7, dose 3 mg in the am and 2 mg in the pm.    PLAN:   Please call patient and confirm this was an accurate 12-hour trough. Verify Tacrolimus IR dose 3 mg in the am and 2 mg in the pm. Confirm no new medications or illness. Confirm no missed doses. If accurate trough and accurate dose, decrease Tacrolimus IR dose to 2 mg BID and repeat labs in 2 weeks    Reyna Leyva RN on 10/4/2023 at 9:05 AM      OUTCOME:   Spoke with patient, they confirm accurate trough level and current dose 3 mg am, 2 mg pm. Patient confirmed dose change to 2 mg BID and to repeat labs in 2 weeks. Orders sent to preferred pharmacy for dose change and lab for repeat labs. Patient voiced understanding of plan.     Ree Burleson LPN

## 2023-10-05 LAB
LABORATORY REPORT: NORMAL
PLPETH BLD-MCNC: <10 NG/ML
POPETH BLD-MCNC: <10 NG/ML

## 2023-10-06 NOTE — ANESTHESIA PROCEDURE NOTES
Airway       Patient location during procedure: OR       Procedure Start/Stop Times: 3/23/2023 6:49 AM  Staff -        Anesthesiologist:  Surendra Guzman DO       Resident/Fellow: Baldo Vera MD       Performed By: residentIndications and Patient Condition       Indications for airway management: nicole-procedural         Mask difficulty assessment: 2 - vent by mask + OA or adjuvant +/- NMBA    Final Airway Details       Final airway type: endotracheal airway       Successful airway: ETT - single  Endotracheal Airway Details        ETT size (mm): 7.5       Cuffed: yes       Successful intubation technique: video laryngoscopy       VL Blade Size: Glidescope 3       Grade View of Cords: 1       Adjucts: stylet       Position: Right       Measured from: lips       Secured at (cm): 24       Bite block used: None    Post intubation assessment        Placement verified by: capnometry, equal breath sounds and chest rise        Number of attempts at approach: 2       Number of other approaches attempted: 0       Secured with: pink tape       Ease of procedure: easy       Dentition: Intact and Unchanged    Medication(s) Administered   Medication Administration Time: 3/23/2023 6:49 AM    Additional Comments       Small mouth. Anterior VC. G2B view with MAC4 DL. G1V w/ VL.      
Arterial Line Procedure Note    Pre-Procedure   Staff -        Anesthesiologist:  Surendra Guzman DO       Performed By: anesthesiologist       Location: OR       Pre-Anesthestic Checklist: patient identified, IV checked, risks and benefits discussed, informed consent, monitors and equipment checked, pre-op evaluation and at physician/surgeon's request  Timeout:       Correct Patient: Yes        Correct Procedure: Yes        Correct Site: Yes        Correct Position: Yes   Line Placement:   This line was placed Post Induction starting at 3/23/2023 7:30 AM and ending at 3/23/2023 7:40 AM  Procedure   Procedure: arterial line       Diagnosis: Hemodynamic monitoring       Laterality: left       Insertion Site: radial.  Sterile Prep        Standard elements of sterile barrier followed       Skin prep: Chloraprep  Insertion/Injection        Technique: ultrasound guided        1. Ultrasound was used to evaluate the access site.       2. Artery evaluated via ultrasound for patency/adequacy.       3. Using real-time ultrasound the needle/catheter was observed entering the artery/vein.       Catheter Type/Size: 20 G, 1.75 in/4.5 cm quick cath (integral wire)  Narrative         Secured by: suture       Tegaderm dressing used.       Complications: None apparent,        Arterial waveform: Yes        IBP within 10% of NIBP: Yes    
Central Line/PA Catheter Placement    Pre-Procedure   Staff -        Anesthesiologist:  Surendra Guzman DO       Resident/Fellow: Baldo Vera MD       Performed By: resident       Location: OR       Pre-Anesthestic Checklist: patient identified, IV checked, site marked, risks and benefits discussed, informed consent, monitors and equipment checked, pre-op evaluation and at physician/surgeon's request  Timeout:       Correct Patient: Yes        Correct Procedure: Yes        Correct Site: Yes        Correct Position: Yes        Correct Laterality: Yes and N/A   Line Placement:   This line was placed Post Induction starting at 3/23/2023 7:15 AM and ending at 3/23/2023 7:20 AM    Procedure   Procedure: central line       Diagnosis: Medication administration, PAC       Laterality: right       Insertion Site: internal jugular.       Patient Position: Trendelenburg  Sterile Prep        All elements of maximal sterile barrier technique followed       Patient Prep/Sterile Barriers: draped, hand hygiene, gloves , hat , mask , draped, gown, sterile gel and probe cover       Skin prep: Chloraprep  Insertion/Injection        Technique: ultrasound guided        1. Ultrasound was used to evaluate the access site.       2. Vein evaluated via ultrasound for patency/adequacy.       3. Using real-time ultrasound the needle/catheter was observed entering the artery/vein.       Introducer Type: 9 Fr, 2-lumen MAC        Type: PA/CVC with Introducer       PA Catheter Type: CCO         Appropriate RV, RA and PA waveforms noted:  Yes            Withdrawn and Locked at cm: 47            Balloon down at end of the procedure:   Narrative         Secured by: suture       Tegaderm and Biopatch dressing used.       Complications: None apparent,        blood aspirated from all lumens,        Verification method: Placement to be verified post-op    
Perioperative KYAW Procedure Note    Staff -        Anesthesiologist:  Surendra Guzman DO       Performed By: anesthesiologist  Preanesthesia Checklist:  Patient identified, IV assessed, risks and benefits discussed, monitors and equipment assessed, procedure being performed at surgeon's request and anesthesia consent obtained.    KYAW Probe Insertion    Probe Status PRE Insertion: NO obvious damage  Probe type:  Adult 2D  Bite block used:   Soft  Insertion Technique: Easy, no oropharyngeal manipulation  Insertion complications: None obvious  Billing Report:KYAW report by Anesthesiologist (See Separate Report note)  Probe Status POST Removal: NO obvious damage              
Left breast deflated - no redness or irritation noted

## 2023-10-15 ENCOUNTER — HEALTH MAINTENANCE LETTER (OUTPATIENT)
Age: 63
End: 2023-10-15

## 2023-10-23 ENCOUNTER — HOSPITAL ENCOUNTER (OUTPATIENT)
Dept: ULTRASOUND IMAGING | Facility: CLINIC | Age: 63
Discharge: HOME | End: 2023-10-23
Attending: INTERNAL MEDICINE | Admitting: INTERNAL MEDICINE
Payer: COMMERCIAL

## 2023-10-23 DIAGNOSIS — N17.9 AKI (ACUTE KIDNEY INJURY) (H): ICD-10-CM

## 2023-10-23 PROCEDURE — 76770 US EXAM ABDO BACK WALL COMP: CPT

## 2023-10-27 ENCOUNTER — LAB (OUTPATIENT)
Dept: LAB | Facility: CLINIC | Age: 63
End: 2023-10-27
Payer: COMMERCIAL

## 2023-10-27 ENCOUNTER — OFFICE VISIT (OUTPATIENT)
Dept: GASTROENTEROLOGY | Facility: CLINIC | Age: 63
End: 2023-10-27
Attending: INTERNAL MEDICINE
Payer: COMMERCIAL

## 2023-10-27 VITALS
OXYGEN SATURATION: 99 % | HEART RATE: 78 BPM | BODY MASS INDEX: 23.54 KG/M2 | DIASTOLIC BLOOD PRESSURE: 104 MMHG | WEIGHT: 150 LBS | SYSTOLIC BLOOD PRESSURE: 144 MMHG | HEIGHT: 67 IN

## 2023-10-27 DIAGNOSIS — Z94.4 LIVER REPLACED BY TRANSPLANT (H): ICD-10-CM

## 2023-10-27 DIAGNOSIS — K70.30 ALCOHOLIC CIRRHOSIS (H): ICD-10-CM

## 2023-10-27 DIAGNOSIS — Z94.4 LIVER REPLACED BY TRANSPLANT (H): Primary | ICD-10-CM

## 2023-10-27 LAB
ALBUMIN SERPL BCG-MCNC: 4.4 G/DL (ref 3.5–5.2)
ALP SERPL-CCNC: 70 U/L (ref 40–129)
ALT SERPL W P-5'-P-CCNC: 25 U/L (ref 0–70)
ANION GAP SERPL CALCULATED.3IONS-SCNC: 10 MMOL/L (ref 7–15)
AST SERPL W P-5'-P-CCNC: 24 U/L (ref 0–45)
BILIRUB DIRECT SERPL-MCNC: 0.21 MG/DL (ref 0–0.3)
BILIRUB SERPL-MCNC: 0.7 MG/DL
BUN SERPL-MCNC: 18.3 MG/DL (ref 8–23)
CALCIUM SERPL-MCNC: 9.9 MG/DL (ref 8.8–10.2)
CHLORIDE SERPL-SCNC: 105 MMOL/L (ref 98–107)
CREAT SERPL-MCNC: 1.44 MG/DL (ref 0.67–1.17)
DEPRECATED HCO3 PLAS-SCNC: 23 MMOL/L (ref 22–29)
EGFRCR SERPLBLD CKD-EPI 2021: 55 ML/MIN/1.73M2
ERYTHROCYTE [DISTWIDTH] IN BLOOD BY AUTOMATED COUNT: 15.1 % (ref 10–15)
GLUCOSE SERPL-MCNC: 112 MG/DL (ref 70–99)
HCT VFR BLD AUTO: 43.5 % (ref 40–53)
HGB BLD-MCNC: 14.4 G/DL (ref 13.3–17.7)
INR PPP: 1.02 (ref 0.85–1.15)
MAGNESIUM SERPL-MCNC: 1.7 MG/DL (ref 1.7–2.3)
MCH RBC QN AUTO: 27.2 PG (ref 26.5–33)
MCHC RBC AUTO-ENTMCNC: 33.1 G/DL (ref 31.5–36.5)
MCV RBC AUTO: 82 FL (ref 78–100)
PHOSPHATE SERPL-MCNC: 3.2 MG/DL (ref 2.5–4.5)
PLATELET # BLD AUTO: 137 10E3/UL (ref 150–450)
POTASSIUM SERPL-SCNC: 5.2 MMOL/L (ref 3.4–5.3)
PROT SERPL-MCNC: 7.6 G/DL (ref 6.4–8.3)
RBC # BLD AUTO: 5.29 10E6/UL (ref 4.4–5.9)
SODIUM SERPL-SCNC: 138 MMOL/L (ref 135–145)
TACROLIMUS BLD-MCNC: 5.5 UG/L (ref 5–15)
TME LAST DOSE: NORMAL H
TME LAST DOSE: NORMAL H
WBC # BLD AUTO: 5.8 10E3/UL (ref 4–11)

## 2023-10-27 PROCEDURE — 99213 OFFICE O/P EST LOW 20 MIN: CPT | Performed by: INTERNAL MEDICINE

## 2023-10-27 PROCEDURE — 36415 COLL VENOUS BLD VENIPUNCTURE: CPT | Performed by: PATHOLOGY

## 2023-10-27 PROCEDURE — 80197 ASSAY OF TACROLIMUS: CPT | Performed by: INTERNAL MEDICINE

## 2023-10-27 PROCEDURE — 99000 SPECIMEN HANDLING OFFICE-LAB: CPT | Performed by: PATHOLOGY

## 2023-10-27 PROCEDURE — 82248 BILIRUBIN DIRECT: CPT | Performed by: PATHOLOGY

## 2023-10-27 PROCEDURE — 84100 ASSAY OF PHOSPHORUS: CPT | Performed by: PATHOLOGY

## 2023-10-27 PROCEDURE — 80053 COMPREHEN METABOLIC PANEL: CPT | Performed by: PATHOLOGY

## 2023-10-27 PROCEDURE — 99214 OFFICE O/P EST MOD 30 MIN: CPT | Performed by: INTERNAL MEDICINE

## 2023-10-27 PROCEDURE — 83735 ASSAY OF MAGNESIUM: CPT | Performed by: PATHOLOGY

## 2023-10-27 PROCEDURE — G0480 DRUG TEST DEF 1-7 CLASSES: HCPCS | Mod: 90 | Performed by: PATHOLOGY

## 2023-10-27 PROCEDURE — 85027 COMPLETE CBC AUTOMATED: CPT | Performed by: PATHOLOGY

## 2023-10-27 PROCEDURE — 85610 PROTHROMBIN TIME: CPT | Performed by: PATHOLOGY

## 2023-10-27 ASSESSMENT — PAIN SCALES - GENERAL: PAINLEVEL: NO PAIN (0)

## 2023-10-27 NOTE — NURSING NOTE
"Chief Complaint   Patient presents with    RECHECK     Vital signs:      BP: (!) 144/104 Pulse: 78     SpO2: 99 %     Height: 170.2 cm (5' 7\") (pt reported) Weight: 68 kg (150 lb) (pt reported)  Estimated body mass index is 23.49 kg/m  as calculated from the following:    Height as of this encounter: 1.702 m (5' 7\").    Weight as of this encounter: 68 kg (150 lb).      Nori Parker CMA   10/27/2023 10:45 AM    "

## 2023-10-27 NOTE — PROGRESS NOTES
"Solid Organ Transplant Hepatology Follow-Up Visit:     HISTORY OF PRESENT ILLNESS:   I had the pleasure of seeing Cricket Mane for followup in the Liver Clinic at the [facility] on October 27, 2023.  Mr. 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     For the most part, he is doing well.  He does note some intermittent abdominal discomfort.  He denies any itching or skin rash.  He still notes mild fatigue.  He denies any increased abdominal girth or lower extremity edema.     He denies any gastrointestinal bleeding.     He denies any fevers or chills, cough or shortness of breath.  He still has some decreased exercise tolerance.  He denies any nausea or vomiting.  He does have some occasional diarrhea.  He denies any constipation.  He states his appetite is improving, and he is where he would like it.    Medications:   Current Outpatient Medications   Medication    aspirin (ASA) 325 MG tablet    calcium carbonate-vitamin D (CALTRATE) 600-10 MG-MCG per tablet    folic acid (FOLVITE) 1 MG tablet    magnesium oxide (MAG-OX) 400 MG tablet    metoprolol tartrate (LOPRESSOR) 25 MG tablet    Multiple Vitamins-Minerals (SPECTRAVITE) TABS    tacrolimus (GENERIC EQUIVALENT) 0.5 MG capsule    tacrolimus (GENERIC) 1 MG capsule     No current facility-administered medications for this visit.     Vitals:   BP (!) 144/104   Pulse 78   Ht 1.702 m (5' 7\")   Wt 68 kg (150 lb)   SpO2 99%   BMI 23.49 kg/m      Physical Exam:   In general he looks quite well. HEENT exam shows no scleral icterus or temporal muscle wasting. Chest is clear. Abdominal exam shows no increase in girth. No masses or tenderness to palpation are present. Liver is 10 cm in span without left lobe enlargement. No spleen tip is palpable. Extremity exam shows no edema. Skin exam shows no suspicious lesions and neurologic exam is non-focal.     Labs:   Lab Results "   Component Value Date     10/03/2023    POTASSIUM 4.9 10/03/2023    CHLORIDE 105 10/03/2023    ANIONGAP 13 10/03/2023    CO2 22 10/03/2023    BUN 23.2 (H) 10/03/2023    CR 1.56 (H) 10/03/2023    GFRESTIMATED 50 (L) 10/03/2023    PEDRO 10.0 10/03/2023      Lab Results   Component Value Date    WBC 5.8 10/27/2023    HGB 14.4 10/27/2023    HCT 43.5 10/27/2023    MCV 82 10/27/2023    MCH 27.2 10/27/2023    MCHC 33.1 10/27/2023    RDW 15.1 (H) 10/27/2023     (L) 10/27/2023     Lab Results   Component Value Date    ALBUMIN 4.3 10/03/2023    ALKPHOS 58 10/03/2023    AST 24 10/03/2023     Lab Results   Component Value Date    INR 1.02 10/27/2023       Recent Labs   Lab Test 10/03/23  0727 09/05/23  0718 08/21/23  0732 08/08/23  0756 07/31/23  0726 07/25/23  0743 07/17/23  0740 07/10/23  0742 06/30/23  0646 06/26/23  0805   ALKPHOS 58 62 59 57 62 66 62 59 70 64   ALT 25 31 39 42 55 62 57 47 24 32   AST 24 27 32 32 38 43 39 34 21 27      Imaging:   No images are attached to the encounter.     Assessment/Plan:   IMPRESSION:   My impression is that Mr. Mane is 3 months status post liver transplantation.  His pancytopenia has resolved.  his liver function is excellent.  He does have a moderate amount of CKD that we will keep an eye on.  He has been weaned off medications.  He is now off Valcyte, Bactrim, and CellCept.  I will see him back in the clinic again in 3 months.     I did spend a total of 30 minutes (on the date of the encounter), including 20 minutes of face-to-face clinic time including counseling. The rest of the time was spent in documentation and review of records.    Thank you very much for allowing me to participate in the care of this patient.  If you have any questions regarding my recommendations, please do not hesitate to contact me.         Pancho Sloan MD      Professor of Medicine  [employer]      Executive Medical Director, Solid Organ Transplant  Program  [facility]

## 2023-10-27 NOTE — LETTER
"    10/27/2023         RE: Cricket Mane  [address] [place]  [facility] [address]        Dear Colleague,    Thank you for referring your patient, Cricket Mane, to the [facility]. Please see a copy of my visit note below.    Solid Organ Transplant Hepatology Follow-Up Visit:     HISTORY OF PRESENT ILLNESS:   I had the pleasure of seeing Cricket Mane for followup in the Liver Clinic at the [facility] on October 27, 2023.  Mr. 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     For the most part, he is doing well.  He does note some intermittent abdominal discomfort.  He denies any itching or skin rash.  He still notes mild fatigue.  He denies any increased abdominal girth or lower extremity edema.     He denies any gastrointestinal bleeding.     He denies any fevers or chills, cough or shortness of breath.  He still has some decreased exercise tolerance.  He denies any nausea or vomiting.  He does have some occasional diarrhea.  He denies any constipation.  He states his appetite is improving, and he is where he would like it.    Medications:   Current Outpatient Medications   Medication    aspirin (ASA) 325 MG tablet    calcium carbonate-vitamin D (CALTRATE) 600-10 MG-MCG per tablet    folic acid (FOLVITE) 1 MG tablet    magnesium oxide (MAG-OX) 400 MG tablet    metoprolol tartrate (LOPRESSOR) 25 MG tablet    Multiple Vitamins-Minerals (SPECTRAVITE) TABS    tacrolimus (GENERIC EQUIVALENT) 0.5 MG capsule    tacrolimus (GENERIC) 1 MG capsule     No current facility-administered medications for this visit.     Vitals:   BP (!) 144/104   Pulse 78   Ht 1.702 m (5' 7\")   Wt 68 kg (150 lb)   SpO2 99%   BMI 23.49 kg/m      Physical Exam:   In general he looks quite well. HEENT exam shows no scleral icterus or temporal muscle wasting. Chest is clear. Abdominal exam shows no increase in " girth. No masses or tenderness to palpation are present. Liver is 10 cm in span without left lobe enlargement. No spleen tip is palpable. Extremity exam shows no edema. Skin exam shows no suspicious lesions and neurologic exam is non-focal.     Labs:   Lab Results   Component Value Date     10/03/2023    POTASSIUM 4.9 10/03/2023    CHLORIDE 105 10/03/2023    ANIONGAP 13 10/03/2023    CO2 22 10/03/2023    BUN 23.2 (H) 10/03/2023    CR 1.56 (H) 10/03/2023    GFRESTIMATED 50 (L) 10/03/2023    PEDRO 10.0 10/03/2023      Lab Results   Component Value Date    WBC 5.8 10/27/2023    HGB 14.4 10/27/2023    HCT 43.5 10/27/2023    MCV 82 10/27/2023    MCH 27.2 10/27/2023    MCHC 33.1 10/27/2023    RDW 15.1 (H) 10/27/2023     (L) 10/27/2023     Lab Results   Component Value Date    ALBUMIN 4.3 10/03/2023    ALKPHOS 58 10/03/2023    AST 24 10/03/2023     Lab Results   Component Value Date    INR 1.02 10/27/2023       Recent Labs   Lab Test 10/03/23  0727 09/05/23  0718 08/21/23  0732 08/08/23  0756 07/31/23  0726 07/25/23  0743 07/17/23  0740 07/10/23  0742 06/30/23  0646 06/26/23  0805   ALKPHOS 58 62 59 57 62 66 62 59 70 64   ALT 25 31 39 42 55 62 57 47 24 32   AST 24 27 32 32 38 43 39 34 21 27      Imaging:   No images are attached to the encounter.     Assessment/Plan:   IMPRESSION:   My impression is that Mr. Mane is 3 months status post liver transplantation.  His pancytopenia has resolved.  his liver function is excellent.  He does have a moderate amount of CKD that we will keep an eye on.  He has been weaned off medications.  He is now off Valcyte, Bactrim, and CellCept.  I will see him back in the clinic again in 3 months.     I did spend a total of 30 minutes (on the date of the encounter), including 20 minutes of face-to-face clinic time including counseling. The rest of the time was spent in documentation and review of records.    Thank you very much for allowing me to participate in the care of this  patient.  If you have any questions regarding my recommendations, please do not hesitate to contact me.         Panhco Sloan MD      Professor of Medicine  [employer]      Executive Medical Director, Solid Organ Transplant Program  [facility]

## 2023-10-29 LAB
LABORATORY REPORT: NORMAL
PLPETH BLD-MCNC: <10 NG/ML
POPETH BLD-MCNC: <10 NG/ML

## 2023-11-27 ENCOUNTER — LAB (OUTPATIENT)
Dept: LAB | Facility: CLINIC | Age: 63
End: 2023-11-27
Payer: COMMERCIAL

## 2023-11-27 DIAGNOSIS — K70.30 ALCOHOLIC CIRRHOSIS (H): ICD-10-CM

## 2023-11-27 DIAGNOSIS — Z94.4 LIVER REPLACED BY TRANSPLANT (H): ICD-10-CM

## 2023-11-27 LAB
ALBUMIN SERPL BCG-MCNC: 4.1 G/DL (ref 3.5–5.2)
ALP SERPL-CCNC: 56 U/L (ref 40–150)
ALT SERPL W P-5'-P-CCNC: 26 U/L (ref 0–70)
ANION GAP SERPL CALCULATED.3IONS-SCNC: 11 MMOL/L (ref 7–15)
AST SERPL W P-5'-P-CCNC: 20 U/L (ref 0–45)
BILIRUB DIRECT SERPL-MCNC: <0.2 MG/DL (ref 0–0.3)
BILIRUB SERPL-MCNC: 0.6 MG/DL
BUN SERPL-MCNC: 21.8 MG/DL (ref 8–23)
CALCIUM SERPL-MCNC: 9.7 MG/DL (ref 8.8–10.2)
CHLORIDE SERPL-SCNC: 104 MMOL/L (ref 98–107)
CREAT SERPL-MCNC: 1.52 MG/DL (ref 0.67–1.17)
DEPRECATED HCO3 PLAS-SCNC: 24 MMOL/L (ref 22–29)
EGFRCR SERPLBLD CKD-EPI 2021: 52 ML/MIN/1.73M2
ERYTHROCYTE [DISTWIDTH] IN BLOOD BY AUTOMATED COUNT: 14.3 % (ref 10–15)
GLUCOSE SERPL-MCNC: 107 MG/DL (ref 70–99)
HCT VFR BLD AUTO: 41.4 % (ref 40–53)
HGB BLD-MCNC: 13.6 G/DL (ref 13.3–17.7)
INR PPP: 1.08 (ref 0.85–1.15)
MAGNESIUM SERPL-MCNC: 1.7 MG/DL (ref 1.7–2.3)
MCH RBC QN AUTO: 27 PG (ref 26.5–33)
MCHC RBC AUTO-ENTMCNC: 32.9 G/DL (ref 31.5–36.5)
MCV RBC AUTO: 82 FL (ref 78–100)
PHOSPHATE SERPL-MCNC: 3 MG/DL (ref 2.5–4.5)
PLATELET # BLD AUTO: 194 10E3/UL (ref 150–450)
POTASSIUM SERPL-SCNC: 5.1 MMOL/L (ref 3.4–5.3)
PROT SERPL-MCNC: 7.3 G/DL (ref 6.4–8.3)
RBC # BLD AUTO: 5.03 10E6/UL (ref 4.4–5.9)
SODIUM SERPL-SCNC: 139 MMOL/L (ref 135–145)
TACROLIMUS BLD-MCNC: 7.9 UG/L (ref 5–15)
TME LAST DOSE: NORMAL H
TME LAST DOSE: NORMAL H
WBC # BLD AUTO: 5.9 10E3/UL (ref 4–11)

## 2023-11-27 PROCEDURE — 85027 COMPLETE CBC AUTOMATED: CPT

## 2023-11-27 PROCEDURE — 80197 ASSAY OF TACROLIMUS: CPT

## 2023-11-27 PROCEDURE — 36415 COLL VENOUS BLD VENIPUNCTURE: CPT

## 2023-11-27 PROCEDURE — 82248 BILIRUBIN DIRECT: CPT

## 2023-11-27 PROCEDURE — G0480 DRUG TEST DEF 1-7 CLASSES: HCPCS | Mod: 90

## 2023-11-27 PROCEDURE — 84100 ASSAY OF PHOSPHORUS: CPT

## 2023-11-27 PROCEDURE — 83735 ASSAY OF MAGNESIUM: CPT

## 2023-11-27 PROCEDURE — 80053 COMPREHEN METABOLIC PANEL: CPT

## 2023-11-27 PROCEDURE — 85610 PROTHROMBIN TIME: CPT

## 2023-11-30 LAB
LABORATORY REPORT: NORMAL
PETH INTERPRETATION: NORMAL
PLPETH BLD-MCNC: <10 NG/ML
POPETH BLD-MCNC: <10 NG/ML

## 2023-12-08 ENCOUNTER — TELEPHONE (OUTPATIENT)
Dept: TRANSPLANT | Facility: CLINIC | Age: 63
End: 2023-12-08
Payer: COMMERCIAL

## 2023-12-08 ASSESSMENT — ENCOUNTER SYMPTOMS: NEW SYMPTOMS OF CORONARY ARTERY DISEASE: 0

## 2023-12-08 NOTE — TELEPHONE ENCOUNTER
Call placed to patient for Annual UNOS Patient Status Update -     Questions asked are standardized questions that the [employer] requires all transplant centers to report back to the UNOS.  [employer] requests this information for all transplant recipients (kidney, liver, heart, lung, pancreas, etc).  [employer] utilizes this information to best understand recipient outcomes and equitable distribution of organs.     Patient status form completed.

## 2023-12-13 DIAGNOSIS — Z94.4 LIVER REPLACED BY TRANSPLANT (H): ICD-10-CM

## 2023-12-13 DIAGNOSIS — N17.9 AKI (ACUTE KIDNEY INJURY) (H): Primary | ICD-10-CM

## 2023-12-27 ENCOUNTER — LAB (OUTPATIENT)
Dept: LAB | Facility: CLINIC | Age: 63
End: 2023-12-27
Payer: COMMERCIAL

## 2023-12-27 DIAGNOSIS — Z94.4 LIVER REPLACED BY TRANSPLANT (H): ICD-10-CM

## 2023-12-27 DIAGNOSIS — K70.30 ALCOHOLIC CIRRHOSIS (H): ICD-10-CM

## 2023-12-27 LAB
ALBUMIN SERPL BCG-MCNC: 4 G/DL (ref 3.5–5.2)
ALP SERPL-CCNC: 54 U/L (ref 40–150)
ALT SERPL W P-5'-P-CCNC: 23 U/L (ref 0–70)
ANION GAP SERPL CALCULATED.3IONS-SCNC: 10 MMOL/L (ref 7–15)
AST SERPL W P-5'-P-CCNC: 22 U/L (ref 0–45)
BILIRUB DIRECT SERPL-MCNC: <0.2 MG/DL (ref 0–0.3)
BILIRUB SERPL-MCNC: 0.6 MG/DL
BUN SERPL-MCNC: 21.5 MG/DL (ref 8–23)
CALCIUM SERPL-MCNC: 9.2 MG/DL (ref 8.8–10.2)
CHLORIDE SERPL-SCNC: 107 MMOL/L (ref 98–107)
CREAT SERPL-MCNC: 1.5 MG/DL (ref 0.67–1.17)
DEPRECATED HCO3 PLAS-SCNC: 23 MMOL/L (ref 22–29)
EGFRCR SERPLBLD CKD-EPI 2021: 53 ML/MIN/1.73M2
ERYTHROCYTE [DISTWIDTH] IN BLOOD BY AUTOMATED COUNT: 14.6 % (ref 10–15)
GLUCOSE SERPL-MCNC: 105 MG/DL (ref 70–99)
HCT VFR BLD AUTO: 40.8 % (ref 40–53)
HGB BLD-MCNC: 13.4 G/DL (ref 13.3–17.7)
INR PPP: 1.05 (ref 0.85–1.15)
MAGNESIUM SERPL-MCNC: 1.7 MG/DL (ref 1.7–2.3)
MCH RBC QN AUTO: 27.6 PG (ref 26.5–33)
MCHC RBC AUTO-ENTMCNC: 32.8 G/DL (ref 31.5–36.5)
MCV RBC AUTO: 84 FL (ref 78–100)
PHOSPHATE SERPL-MCNC: 2.8 MG/DL (ref 2.5–4.5)
PLATELET # BLD AUTO: 164 10E3/UL (ref 150–450)
POTASSIUM SERPL-SCNC: 4.8 MMOL/L (ref 3.4–5.3)
PROT SERPL-MCNC: 7 G/DL (ref 6.4–8.3)
RBC # BLD AUTO: 4.85 10E6/UL (ref 4.4–5.9)
SODIUM SERPL-SCNC: 140 MMOL/L (ref 135–145)
WBC # BLD AUTO: 5.3 10E3/UL (ref 4–11)

## 2023-12-27 PROCEDURE — 84100 ASSAY OF PHOSPHORUS: CPT

## 2023-12-27 PROCEDURE — 85027 COMPLETE CBC AUTOMATED: CPT

## 2023-12-27 PROCEDURE — 80053 COMPREHEN METABOLIC PANEL: CPT

## 2023-12-27 PROCEDURE — 85610 PROTHROMBIN TIME: CPT

## 2023-12-27 PROCEDURE — G0480 DRUG TEST DEF 1-7 CLASSES: HCPCS | Mod: 90

## 2023-12-27 PROCEDURE — 82248 BILIRUBIN DIRECT: CPT

## 2023-12-27 PROCEDURE — 36415 COLL VENOUS BLD VENIPUNCTURE: CPT

## 2023-12-27 PROCEDURE — 83735 ASSAY OF MAGNESIUM: CPT

## 2024-01-09 DIAGNOSIS — Z94.4 S/P LIVER TRANSPLANT (H): ICD-10-CM

## 2024-01-09 RX ORDER — CALCIUM CARBONATE/VITAMIN D3 600 MG-10
1 TABLET ORAL 2 TIMES DAILY WITH MEALS
Qty: 180 TABLET | Refills: 3 | Status: CANCELLED | OUTPATIENT
Start: 2024-01-09

## 2024-02-23 ENCOUNTER — LAB (OUTPATIENT)
Dept: LAB | Facility: CLINIC | Age: 64
End: 2024-02-23
Attending: INTERNAL MEDICINE
Payer: COMMERCIAL

## 2024-02-23 ENCOUNTER — OFFICE VISIT (OUTPATIENT)
Dept: TRANSPLANT | Facility: CLINIC | Age: 64
End: 2024-02-23
Attending: INTERNAL MEDICINE
Payer: COMMERCIAL

## 2024-02-23 VITALS
SYSTOLIC BLOOD PRESSURE: 111 MMHG | WEIGHT: 156.2 LBS | HEART RATE: 84 BPM | BODY MASS INDEX: 24.46 KG/M2 | DIASTOLIC BLOOD PRESSURE: 75 MMHG | OXYGEN SATURATION: 96 %

## 2024-02-23 DIAGNOSIS — E83.42 HYPOMAGNESEMIA: ICD-10-CM

## 2024-02-23 DIAGNOSIS — Z94.4 LIVER REPLACED BY TRANSPLANT (H): ICD-10-CM

## 2024-02-23 DIAGNOSIS — Z48.298 AFTERCARE FOLLOWING ORGAN TRANSPLANT: Primary | ICD-10-CM

## 2024-02-23 DIAGNOSIS — Z94.4 S/P LIVER TRANSPLANT (H): ICD-10-CM

## 2024-02-23 DIAGNOSIS — K70.30 ALCOHOLIC CIRRHOSIS (H): ICD-10-CM

## 2024-02-23 LAB
ALBUMIN MFR UR ELPH: 12.5 MG/DL
ALBUMIN SERPL BCG-MCNC: 4.2 G/DL (ref 3.5–5.2)
ALBUMIN UR-MCNC: NEGATIVE MG/DL
ALP SERPL-CCNC: 67 U/L (ref 40–150)
ALT SERPL W P-5'-P-CCNC: 22 U/L (ref 0–70)
ANION GAP SERPL CALCULATED.3IONS-SCNC: 12 MMOL/L (ref 7–15)
APPEARANCE UR: CLEAR
AST SERPL W P-5'-P-CCNC: 23 U/L (ref 0–45)
BILIRUB DIRECT SERPL-MCNC: 0.21 MG/DL (ref 0–0.3)
BILIRUB SERPL-MCNC: 0.7 MG/DL
BILIRUB UR QL STRIP: NEGATIVE
BUN SERPL-MCNC: 31.1 MG/DL (ref 8–23)
CALCIUM SERPL-MCNC: 9.8 MG/DL (ref 8.8–10.2)
CHLORIDE SERPL-SCNC: 104 MMOL/L (ref 98–107)
CHOLEST SERPL-MCNC: 133 MG/DL
COLOR UR AUTO: YELLOW
CREAT SERPL-MCNC: 1.47 MG/DL (ref 0.67–1.17)
CREAT UR-MCNC: 158 MG/DL
DEPRECATED HCO3 PLAS-SCNC: 24 MMOL/L (ref 22–29)
EGFRCR SERPLBLD CKD-EPI 2021: 54 ML/MIN/1.73M2
ERYTHROCYTE [DISTWIDTH] IN BLOOD BY AUTOMATED COUNT: 14.4 % (ref 10–15)
FASTING STATUS PATIENT QL REPORTED: NORMAL
GLUCOSE SERPL-MCNC: 114 MG/DL (ref 70–99)
GLUCOSE UR STRIP-MCNC: NEGATIVE MG/DL
HCT VFR BLD AUTO: 43.2 % (ref 40–53)
HDLC SERPL-MCNC: 65 MG/DL
HGB BLD-MCNC: 14.7 G/DL (ref 13.3–17.7)
HGB UR QL STRIP: NEGATIVE
INR PPP: 1.13 (ref 0.85–1.15)
KETONES UR STRIP-MCNC: NEGATIVE MG/DL
LDLC SERPL CALC-MCNC: 43 MG/DL
LEUKOCYTE ESTERASE UR QL STRIP: NEGATIVE
MAGNESIUM SERPL-MCNC: 1.8 MG/DL (ref 1.7–2.3)
MCH RBC QN AUTO: 28 PG (ref 26.5–33)
MCHC RBC AUTO-ENTMCNC: 34 G/DL (ref 31.5–36.5)
MCV RBC AUTO: 82 FL (ref 78–100)
NITRATE UR QL: NEGATIVE
NONHDLC SERPL-MCNC: 68 MG/DL
PH UR STRIP: 5 [PH] (ref 5–7)
PHOSPHATE SERPL-MCNC: 2.7 MG/DL (ref 2.5–4.5)
PLATELET # BLD AUTO: 150 10E3/UL (ref 150–450)
POTASSIUM SERPL-SCNC: 4.5 MMOL/L (ref 3.4–5.3)
PROT SERPL-MCNC: 7.4 G/DL (ref 6.4–8.3)
PROT/CREAT 24H UR: 0.08 MG/MG CR (ref 0–0.2)
RBC # BLD AUTO: 5.25 10E6/UL (ref 4.4–5.9)
SODIUM SERPL-SCNC: 140 MMOL/L (ref 135–145)
SP GR UR STRIP: 1.02 (ref 1–1.03)
TACROLIMUS BLD-MCNC: 5.9 UG/L (ref 5–15)
TME LAST DOSE: NORMAL H
TME LAST DOSE: NORMAL H
TRIGL SERPL-MCNC: 127 MG/DL
UROBILINOGEN UR STRIP-MCNC: NORMAL MG/DL
WBC # BLD AUTO: 5.8 10E3/UL (ref 4–11)

## 2024-02-23 PROCEDURE — 80053 COMPREHEN METABOLIC PANEL: CPT | Performed by: PATHOLOGY

## 2024-02-23 PROCEDURE — 80197 ASSAY OF TACROLIMUS: CPT | Performed by: TRANSPLANT SURGERY

## 2024-02-23 PROCEDURE — 81003 URINALYSIS AUTO W/O SCOPE: CPT | Performed by: PATHOLOGY

## 2024-02-23 PROCEDURE — 82248 BILIRUBIN DIRECT: CPT | Performed by: PATHOLOGY

## 2024-02-23 PROCEDURE — 87517 HEPATITIS B DNA QUANT: CPT | Performed by: TRANSPLANT SURGERY

## 2024-02-23 PROCEDURE — 84156 ASSAY OF PROTEIN URINE: CPT | Performed by: PATHOLOGY

## 2024-02-23 PROCEDURE — 84100 ASSAY OF PHOSPHORUS: CPT | Performed by: PATHOLOGY

## 2024-02-23 PROCEDURE — 85610 PROTHROMBIN TIME: CPT | Performed by: PATHOLOGY

## 2024-02-23 PROCEDURE — 85027 COMPLETE CBC AUTOMATED: CPT | Performed by: PATHOLOGY

## 2024-02-23 PROCEDURE — 99000 SPECIMEN HANDLING OFFICE-LAB: CPT | Performed by: PATHOLOGY

## 2024-02-23 PROCEDURE — 99213 OFFICE O/P EST LOW 20 MIN: CPT | Performed by: INTERNAL MEDICINE

## 2024-02-23 PROCEDURE — 99215 OFFICE O/P EST HI 40 MIN: CPT | Performed by: INTERNAL MEDICINE

## 2024-02-23 PROCEDURE — 36415 COLL VENOUS BLD VENIPUNCTURE: CPT | Performed by: PATHOLOGY

## 2024-02-23 PROCEDURE — 83735 ASSAY OF MAGNESIUM: CPT | Performed by: PATHOLOGY

## 2024-02-23 PROCEDURE — 80061 LIPID PANEL: CPT | Performed by: PATHOLOGY

## 2024-02-23 NOTE — NURSING NOTE
Chief Complaint   Patient presents with    RECHECK     Follow up. Wants to talk about kidney damage.     BP Readings from Last 3 Encounters:   02/23/24 111/75   10/27/23 (!) 144/104   10/03/23 (!) 129/95       /75 (BP Location: Right arm, Patient Position: Sitting, Cuff Size: Adult Regular)   Pulse 84   Wt 70.9 kg (156 lb 3.2 oz)   SpO2 96%   BMI 24.46 kg/m       Sunshine Heymnoel

## 2024-02-23 NOTE — PROGRESS NOTES
TRANSPLANT NEPHROLOGY EARLY POST TRANSPLANT VISIT    Assessment & Plan       # CKD G3aA1 post Liver Tx  Prior to transplant baseline 0.7 - 1.0. No evidence of HRS physiology. Noted to have increase in creatinine to 1.5 shortly after transplant (3/23/23). He had a transient improvement to 1.1 afterwards but since has largely remained ~1.4-1.6 since 4/17/23. Also possible that his creatinine was overestimating his true kidney function prior to transplant.   - Baseline Creatinine: ~ 1.4 - 1.6 (eGFR ~50)   - Proteinuria: Not checked recently   - bland urine and no proteinuria   - Renal US 10/2023: nl sized kidneys, nl echogenicity, no hydronephrosis    # Liver Tx:    - 2/2 alcoholic cirrhosis   - stable LFT's    # Immunosuppression: Tacrolimus immediate release (goal per hepatology)   - Continue with intensive monitoring of immunosuppression for efficacy and toxicity.   - Changes: Not at this time Managed by transplant hepatology    # Infection Prophylaxis:   - PJP: None     # Hypertension: Controlled;  Goal BP: < 130/80   - Volume status: Euvolemic    - Changes: No    # Elevated Blood Glucose: Glucose generally running ~ 100-120s   - Management as per primary care.    # Mineral Bone Disorder:    - Secondary renal hyperparathyroidism; PTH level: Not checked recently        On treatment: None  - Vitamin D; level: Not checked recently        On supplement: No  - Calcium; level: High normal        On supplement: No  - Phosphorus; level: Normal        On supplement: No    # Electrolytes:   - Potassium; level: Normal        On supplement: No  - Magnesium; level: Normal        On supplement: Yes Mg oxide 400 mg po bid  - Bicarbonate; level: Normal        On supplement: No    # A fib: Followed by [facility] cardiologist. Diagnosed around 2017 and status post failed DCCV. He has since been on a rate control strategy with carvedilol. Per notes - CHADS2 Vasc is 0, therefore he has not been on anticoagulation.     #  Osteoporosis: DEXA scanning was done as part of pre transplant evaluation at which time was incidentally found to have osteoporosis. Seen by endocrinology. On denosumab.10/23    # Medical Compliance: Yes    # Health Maintenance and Vaccination Review: Not Reviewed    # Transplant History:  Etiology of Liver Failure: EtOH  Tx: Liver Tx  Transplant: 3/23/2023 (Liver)  Donor Type:  Donor Class:   Significant changes in immunosuppression: None  Significant transplant-related complications: None    Transplant Office Phone Number: [phone]    Assessment and plan was discussed with the patient and he voiced his understanding and agreement.    Return visit: Return in about 6 months (around 8/23/2024).      Chief Complaint   Mr. Mane is a 63 year old here for management of CKD post liver transplant    History of Present Illness       Interval Hx:  Doing well overall. He is spending this winter in [place]. He remains active, plays pickleball.   BP is well controlled per clinic readings, he doesn't monitor home readings and he remains on metoprolol for Afib rate control  Labs show stable renal function most  recent Cr-1.4 mg/dl today, no proteinuria on UPC  He had questions about the risk of CKD progression over time. Maintaining good BP control, absence of proteinuria, and lower CNI goals over time from liver Tx will likely slow down the rate of disease progression over time.     Energy level is good. Denies any fevers, chills, weight loss, night sweats. No nausea, vomiting, abdominal pain, diarrhea. No chest pain, sob, leg swelling. No recent illness or hospitalization.       Current IS: FK 2/2  Ppx: none    Home BP:  120 systolic    Problem List   Patient Active Problem List   Diagnosis    Atrial fibrillation and flutter (H)    Alcoholic hepatitis (H28)    Atrial fibrillation with RVR (H)    Cirrhosis (H)    Transplant    Pre-operative cardiovascular examination    Mixed hyperlipidemia    Thrombocytopenia (H24)     Coagulopathy (H24)    Anemia, unspecified type    S/P liver transplant (H)    Immunosuppression (H24)    Acute post-operative pain    Anxiety    Anemia due to blood loss, acute    At risk for malnutrition    FERNANDA (acute kidney injury) (H24)    Steroid-induced hyperglycemia    Hypomagnesemia    Age-related osteoporosis without current pathological fracture    CKD (chronic kidney disease) stage 3, GFR 30-59 ml/min (H)       Allergies   No Known Allergies    Medications   Current Outpatient Medications   Medication Sig    aspirin (ASA) 325 MG tablet Take 1 tablet (325 mg) by mouth daily    calcium carbonate-vitamin D (CALTRATE) 600-10 MG-MCG per tablet Take 1 tablet by mouth 2 times daily (with meals) (Patient taking differently: Take 1 tablet by mouth 3 times daily)    folic acid (FOLVITE) 1 MG tablet Take 1 tablet (1,000 mcg) by mouth daily    magnesium oxide (MAG-OX) 400 MG tablet Take 2 tablets (800 mg) by mouth 2 times daily    metoprolol tartrate (LOPRESSOR) 25 MG tablet TAKE 1 TABLET BY MOUTH TWICE A DAY    Multiple Vitamins-Minerals (SPECTRAVITE) TABS Take 1 tablet by mouth daily    tacrolimus (GENERIC EQUIVALENT) 0.5 MG capsule Take 1 capsule (0.5 mg) by mouth as needed (for dose changes)    tacrolimus (GENERIC) 1 MG capsule Take 2 capsules (2 mg) by mouth every 12 hours     No current facility-administered medications for this visit.     There are no discontinued medications.    Physical Exam   Vital Signs: There were no vitals taken for this visit.    GENERAL APPEARANCE: alert and no distress  HENT: mouth without ulcers or lesions  RESP: lungs clear to auscultation - no rales, rhonchi or wheezes  CV: regular rhythm, normal rate, no rub, no murmur  EDEMA: no LE edema bilaterally  ABDOMEN: soft, nondistended, nontender  MS: extremities normal - no gross deformities noted, no evidence of inflammation in joints, no muscle tenderness  SKIN: no rash    Data         Latest Ref Rng & Units 12/27/2023     7:13 AM  11/27/2023     9:01 AM 10/27/2023    10:25 AM   Renal   Sodium 135 - 145 mmol/L 140  139  138    K 3.4 - 5.3 mmol/L 4.8  5.1  5.2    Cl 98 - 107 mmol/L 107  104  105    Cl (external) 98 - 107 mmol/L 107  104  105    CO2 22 - 29 mmol/L 23  24  23    Urea Nitrogen 8.0 - 23.0 mg/dL 21.5  21.8  18.3    Creatinine 0.67 - 1.17 mg/dL 1.50  1.52  1.44    Glucose 70 - 99 mg/dL 105  107  112    Calcium 8.8 - 10.2 mg/dL 9.2  9.7  9.9    Magnesium 1.7 - 2.3 mg/dL 1.7  1.7  1.7          Latest Ref Rng & Units 12/27/2023     7:13 AM 11/27/2023     9:01 AM 10/27/2023    10:25 AM   Bone Health   Phosphorus 2.5 - 4.5 mg/dL 2.8  3.0  3.2          Latest Ref Rng & Units 2/23/2024    11:27 AM 12/27/2023     7:13 AM 11/27/2023     9:01 AM   Heme   WBC 4.0 - 11.0 10e3/uL 5.8  5.3  5.9    Hgb 13.3 - 17.7 g/dL 14.7  13.4  13.6    Plt 150 - 450 10e3/uL 150  164  194          Latest Ref Rng & Units 12/27/2023     7:13 AM 11/27/2023     9:01 AM 10/27/2023    10:25 AM   Liver   AP 40 - 150 U/L 54  56  70    TBili <=1.2 mg/dL 0.6  0.6  0.7    Bilirubin Direct 0.00 - 0.30 mg/dL <0.20  <0.20  0.21    ALT 0 - 70 U/L 23  26  25    AST 0 - 45 U/L 22  20  24    Tot Protein 6.4 - 8.3 g/dL 7.0  7.3  7.6    Albumin 3.5 - 5.2 g/dL 4.0  4.1  4.4          Latest Ref Rng & Units 3/24/2023     4:07 AM 3/23/2023     1:29 PM 3/22/2023     8:35 PM   Pancreas   A1C <5.7 %  4.7     Amylase 28 - 100 U/L 379   113    Lipase (Roche) 13 - 60 U/L 11            Latest Ref Rng & Units 12/20/2022     8:04 AM   Iron studies   Iron 61 - 157 ug/dL 225    Ferritin 31 - 409 ng/mL 904          Latest Ref Rng & Units 3/22/2023     8:35 PM 12/20/2022     8:04 AM   [facility] Txp Virology   EBV CAPSID ANTIBODY IGG No detectable antibody. Positive  Positive        Recent Labs   Lab Test 09/05/23  0718 10/03/23  0727 10/27/23  1025 11/27/23  0914   DOSTAC 9/4/2023  --  10/26/2023 11/26/2023   TACROL 7.8 8.6 5.5 7.9       I spent a total of 46 minutes on the date of the encounter doing  chart review, performing a history and physical exam, completing documentation and counseling, and any further activities as noted above.

## 2024-02-23 NOTE — LETTER
2/23/2024         RE: Cricket Mane  [address] [place]  [facility] [address]        Dear Colleague,    Thank you for referring your patient, Cricket Mane, to the [facility]ANSPL[facility]. Please see a copy of my visit note below.    TRANSPLANT NEPHROLOGY EARLY POST TRANSPLANT VISIT    Assessment & Plan      # CKD G3aA1 post Liver Tx  Prior to transplant baseline 0.7 - 1.0. No evidence of HRS physiology. Noted to have increase in creatinine to 1.5 shortly after transplant (3/23/23). He had a transient improvement to 1.1 afterwards but since has largely remained ~1.4-1.6 since 4/17/23. Also possible that his creatinine was overestimating his true kidney function prior to transplant.   - Baseline Creatinine: ~ 1.4 - 1.6 (eGFR ~50)   - Proteinuria: Not checked recently   - bland urine and no proteinuria   - Renal US 10/2023: nl sized kidneys, nl echogenicity, no hydronephrosis    # Liver Tx:    - 2/2 alcoholic cirrhosis   - stable LFT's    # Immunosuppression: Tacrolimus immediate release (goal per hepatology)   - Continue with intensive monitoring of immunosuppression for efficacy and toxicity.   - Changes: Not at this time Managed by transplant hepatology    # Infection Prophylaxis:   - PJP: None     # Hypertension: Controlled;  Goal BP: < 130/80   - Volume status: Euvolemic    - Changes: No    # Elevated Blood Glucose: Glucose generally running ~ 100-120s   - Management as per primary care.    # Mineral Bone Disorder:    - Secondary renal hyperparathyroidism; PTH level: Not checked recently        On treatment: None  - Vitamin D; level: Not checked recently        On supplement: No  - Calcium; level: High normal        On supplement: No  - Phosphorus; level: Normal        On supplement: No    # Electrolytes:   - Potassium; level: Normal        On supplement: No  - Magnesium; level: Normal        On supplement: Yes Mg oxide 400 mg po bid  - Bicarbonate; level: Normal        On supplement:  No    # A fib: Followed by [facility] cardiologist. Diagnosed around 2017 and status post failed DCCV. He has since been on a rate control strategy with carvedilol. Per notes - CHADS2 Vasc is 0, therefore he has not been on anticoagulation.     # Osteoporosis: DEXA scanning was done as part of pre transplant evaluation at which time was incidentally found to have osteoporosis. Seen by endocrinology. On denosumab.10/23    # Medical Compliance: Yes    # Health Maintenance and Vaccination Review: Not Reviewed    # Transplant History:  Etiology of Liver Failure: EtOH  Tx: Liver Tx  Transplant: 3/23/2023 (Liver)  Donor Type:  Donor Class:   Significant changes in immunosuppression: None  Significant transplant-related complications: None    Transplant Office Phone Number: [phone]    Assessment and plan was discussed with the patient and he voiced his understanding and agreement.    Return visit: Return in about 6 months (around 8/23/2024).      Chief Complaint  Mr. Mane is a 63 year old here for management of CKD post liver transplant    History of Present Illness      Interval Hx:  Doing well overall. He is spending this winter in [place]. He remains active, plays pickleball.   BP is well controlled per clinic readings, he doesn't monitor home readings and he remains on metoprolol for Afib rate control  Labs show stable renal function most  recent Cr-1.4 mg/dl today, no proteinuria on UPC  He had questions about the risk of CKD progression over time. Maintaining good BP control, absence of proteinuria, and lower CNI goals over time from liver Tx will likely slow down the rate of disease progression over time.     Energy level is good. Denies any fevers, chills, weight loss, night sweats. No nausea, vomiting, abdominal pain, diarrhea. No chest pain, sob, leg swelling. No recent illness or hospitalization.       Current IS: FK 2/2  Ppx: none    Home BP:  120 systolic    Problem List  Patient Active Problem  List   Diagnosis     Atrial fibrillation and flutter (H)     Alcoholic hepatitis (H28)     Atrial fibrillation with RVR (H)     Cirrhosis (H)     Transplant     Pre-operative cardiovascular examination     Mixed hyperlipidemia     Thrombocytopenia (H24)     Coagulopathy (H24)     Anemia, unspecified type     S/P liver transplant (H)     Immunosuppression (H24)     Acute post-operative pain     Anxiety     Anemia due to blood loss, acute     At risk for malnutrition     FERNANDA (acute kidney injury) (H24)     Steroid-induced hyperglycemia     Hypomagnesemia     Age-related osteoporosis without current pathological fracture     CKD (chronic kidney disease) stage 3, GFR 30-59 ml/min (H)       Allergies  No Known Allergies    Medications  Current Outpatient Medications   Medication Sig     aspirin (ASA) 325 MG tablet Take 1 tablet (325 mg) by mouth daily     calcium carbonate-vitamin D (CALTRATE) 600-10 MG-MCG per tablet Take 1 tablet by mouth 2 times daily (with meals) (Patient taking differently: Take 1 tablet by mouth 3 times daily)     folic acid (FOLVITE) 1 MG tablet Take 1 tablet (1,000 mcg) by mouth daily     magnesium oxide (MAG-OX) 400 MG tablet Take 2 tablets (800 mg) by mouth 2 times daily     metoprolol tartrate (LOPRESSOR) 25 MG tablet TAKE 1 TABLET BY MOUTH TWICE A DAY     Multiple Vitamins-Minerals (SPECTRAVITE) TABS Take 1 tablet by mouth daily     tacrolimus (GENERIC EQUIVALENT) 0.5 MG capsule Take 1 capsule (0.5 mg) by mouth as needed (for dose changes)     tacrolimus (GENERIC) 1 MG capsule Take 2 capsules (2 mg) by mouth every 12 hours     No current facility-administered medications for this visit.     There are no discontinued medications.    Physical Exam  Vital Signs: There were no vitals taken for this visit.    GENERAL APPEARANCE: alert and no distress  HENT: mouth without ulcers or lesions  RESP: lungs clear to auscultation - no rales, rhonchi or wheezes  CV: regular rhythm, normal rate, no rub, no  murmur  EDEMA: no LE edema bilaterally  ABDOMEN: soft, nondistended, nontender  MS: extremities normal - no gross deformities noted, no evidence of inflammation in joints, no muscle tenderness  SKIN: no rash    Data        Latest Ref Rng & Units 12/27/2023     7:13 AM 11/27/2023     9:01 AM 10/27/2023    10:25 AM   Renal   Sodium 135 - 145 mmol/L 140  139  138    K 3.4 - 5.3 mmol/L 4.8  5.1  5.2    Cl 98 - 107 mmol/L 107  104  105    Cl (external) 98 - 107 mmol/L 107  104  105    CO2 22 - 29 mmol/L 23  24  23    Urea Nitrogen 8.0 - 23.0 mg/dL 21.5  21.8  18.3    Creatinine 0.67 - 1.17 mg/dL 1.50  1.52  1.44    Glucose 70 - 99 mg/dL 105  107  112    Calcium 8.8 - 10.2 mg/dL 9.2  9.7  9.9    Magnesium 1.7 - 2.3 mg/dL 1.7  1.7  1.7          Latest Ref Rng & Units 12/27/2023     7:13 AM 11/27/2023     9:01 AM 10/27/2023    10:25 AM   Bone Health   Phosphorus 2.5 - 4.5 mg/dL 2.8  3.0  3.2          Latest Ref Rng & Units 2/23/2024    11:27 AM 12/27/2023     7:13 AM 11/27/2023     9:01 AM   Heme   WBC 4.0 - 11.0 10e3/uL 5.8  5.3  5.9    Hgb 13.3 - 17.7 g/dL 14.7  13.4  13.6    Plt 150 - 450 10e3/uL 150  164  194          Latest Ref Rng & Units 12/27/2023     7:13 AM 11/27/2023     9:01 AM 10/27/2023    10:25 AM   Liver   AP 40 - 150 U/L 54  56  70    TBili <=1.2 mg/dL 0.6  0.6  0.7    Bilirubin Direct 0.00 - 0.30 mg/dL <0.20  <0.20  0.21    ALT 0 - 70 U/L 23  26  25    AST 0 - 45 U/L 22  20  24    Tot Protein 6.4 - 8.3 g/dL 7.0  7.3  7.6    Albumin 3.5 - 5.2 g/dL 4.0  4.1  4.4          Latest Ref Rng & Units 3/24/2023     4:07 AM 3/23/2023     1:29 PM 3/22/2023     8:35 PM   Pancreas   A1C <5.7 %  4.7     Amylase 28 - 100 U/L 379   113    Lipase (Roche) 13 - 60 U/L 11            Latest Ref Rng & Units 12/20/2022     8:04 AM   Iron studies   Iron 61 - 157 ug/dL 225    Ferritin 31 - 409 ng/mL 904          Latest Ref Rng & Units 3/22/2023     8:35 PM 12/20/2022     8:04 AM   [facility] Virology   EBV CAPSID ANTIBODY IGG No  detectable antibody. Positive  Positive        Recent Labs   Lab Test 09/05/23  0718 10/03/23  0727 10/27/23  1025 11/27/23  0914   DOSTAC 9/4/2023  --  10/26/2023 11/26/2023   TACROL 7.8 8.6 5.5 7.9       I spent a total of 46 minutes on the date of the encounter doing chart review, performing a history and physical exam, completing documentation and counseling, and any further activities as noted above.        Again, thank you for allowing me to participate in the care of your patient.        Sincerely,        Farzaneh Manzo MD

## 2024-02-24 LAB — HBV DNA SERPL NAA+PROBE-ACNC: NOT DETECTED IU/ML

## 2024-03-15 DIAGNOSIS — Z94.4 S/P LIVER TRANSPLANT (H): ICD-10-CM

## 2024-03-15 RX ORDER — CALCIUM CARBONATE/VITAMIN D3 600 MG-10
1 TABLET ORAL 2 TIMES DAILY WITH MEALS
Qty: 180 TABLET | Refills: 3 | Status: SHIPPED | OUTPATIENT
Start: 2024-03-15

## 2024-03-28 ENCOUNTER — TELEPHONE (OUTPATIENT)
Dept: PHARMACY | Facility: CLINIC | Age: 64
End: 2024-03-28
Payer: COMMERCIAL

## 2024-03-28 DIAGNOSIS — Z94.4 S/P LIVER TRANSPLANT (H): ICD-10-CM

## 2024-03-28 RX ORDER — MAGNESIUM OXIDE 400 MG/1
400 TABLET ORAL 2 TIMES DAILY
Qty: 360 TABLET | Refills: 3 | Status: SHIPPED | OUTPATIENT
Start: 2024-03-28

## 2024-03-28 NOTE — TELEPHONE ENCOUNTER
Clinical Pharmacy Consult:                                                      Transplant Specific: 12 Month Post Transplant Call  Date of Transplant: 3/23/2023  Type of Transplant: liver  First Transplant: yes  History of rejection: no    Immunosuppression Regimen   TAC 2mg qAM & 2mg qPM   Patient specific goal: 5-7  Most recent level: 5.9, date 2/23/24  Immunosuppressant Levels:  therapeutic  Pt adherent to lab draws: yes  Scr:   Lab Results   Component Value Date    CR 1.58 05/01/2023     Side effects: None    Prophylactic Medications  Antibacterial:  Bactrim SS daily  Scheduled Discontinue Date: 6 months    Antifungal: Not needed thus far  Scheduled Discontinue Date: N/A    Antiviral: CrCl 40 to 59 mL/minute: Valcyte 450 mg once daily   Scheduled Discontinue Date: 6 months    Acid Reducer: Prilosec (omeprazole)  Scheduled Reviewed Date:  followed by clinic    Thrombosis Prevention: Aspirin 325 mg PO daily  Scheduled Discontinue Date: 6 months    Blood Pressure Management  Frequency of home Blood Pressure checks: infrequently  Most recent home BP: last /104 on 10/27/23, usually closer to 120/80 however  Patient Blood pressure goal: <130/80  Patient blood pressure at goal:  Mostly  Hospitalizations/ER visits since last assessment: 0        5/1/2023     3:00 PM   Medication adherence flowsheet   Patient medication administration: Responsible for own medications   Patient estimated adherence level: %   Pharmacist assessment of adherence: Good   Patient reported doses missed per week: 0-1   Facilitators to medication adherence  Medication dosing chart;Pill box;Schedule/routine;Caregiver assistance   Patient reported barriers to medication adherence  No issues identified   Adherence intervention(s):  on importance of adherence          5/1/2023     3:00 PM   Medication access flowsheet   Number of pharmacies used: 1   Pharmacy: [facility]   Enrolled in [facility]? Yes    Patient reported barriers to accessing medications: No issues reported by patient   Medication access interventions: No issues identified      Med rec/DUR performed: yes  Med Rec Discrepancies: yes    Spoke with Torin who reports he is doing very well. He no longer uses his med box or med card anymore as his regimen has significantly simplified. Denies any troubles with remember to take his medication or getting his medications refilled.He also denies having any side effects from his medications anymore. The only  discrepancy in his med list was he reports taking only 400mg of MagOx BID instead of 800mg BID like his prescription states. Reached out to coordinator to clarify.    Torin reports he checks his BP at home infrequently but whenever he goes into the office it is usually in around 120/80. Other than one elevated BP in October, OV blood pressure readings back up his recollection.    No other questions or concerns. Follow up in  12 months.    Kermit Caldwell, PharmD  [facility]  [phone]

## 2024-03-29 ENCOUNTER — LAB (OUTPATIENT)
Dept: INFUSION THERAPY | Facility: HOSPITAL | Age: 64
End: 2024-03-29
Payer: COMMERCIAL

## 2024-03-29 ENCOUNTER — INFUSION THERAPY VISIT (OUTPATIENT)
Dept: INFUSION THERAPY | Facility: HOSPITAL | Age: 64
End: 2024-03-29
Payer: COMMERCIAL

## 2024-03-29 VITALS
HEIGHT: 67 IN | BODY MASS INDEX: 25.6 KG/M2 | HEART RATE: 75 BPM | DIASTOLIC BLOOD PRESSURE: 97 MMHG | WEIGHT: 163.1 LBS | RESPIRATION RATE: 16 BRPM | TEMPERATURE: 97.8 F | OXYGEN SATURATION: 97 % | SYSTOLIC BLOOD PRESSURE: 143 MMHG

## 2024-03-29 DIAGNOSIS — M81.0 AGE-RELATED OSTEOPOROSIS WITHOUT CURRENT PATHOLOGICAL FRACTURE: Primary | ICD-10-CM

## 2024-03-29 DIAGNOSIS — N18.31 STAGE 3A CHRONIC KIDNEY DISEASE (H): ICD-10-CM

## 2024-03-29 DIAGNOSIS — M81.0 AGE-RELATED OSTEOPOROSIS WITHOUT CURRENT PATHOLOGICAL FRACTURE: ICD-10-CM

## 2024-03-29 DIAGNOSIS — N18.31 STAGE 3A CHRONIC KIDNEY DISEASE (H): Primary | ICD-10-CM

## 2024-03-29 LAB
ALBUMIN SERPL BCG-MCNC: 4.1 G/DL (ref 3.5–5.2)
CALCIUM SERPL-MCNC: 9.3 MG/DL (ref 8.8–10.2)
CREAT SERPL-MCNC: 1.44 MG/DL (ref 0.67–1.17)
EGFRCR SERPLBLD CKD-EPI 2021: 55 ML/MIN/1.73M2

## 2024-03-29 PROCEDURE — 82565 ASSAY OF CREATININE: CPT | Performed by: INTERNAL MEDICINE

## 2024-03-29 PROCEDURE — 82310 ASSAY OF CALCIUM: CPT | Performed by: INTERNAL MEDICINE

## 2024-03-29 PROCEDURE — 250N000011 HC RX IP 250 OP 636: Mod: JZ | Performed by: INTERNAL MEDICINE

## 2024-03-29 PROCEDURE — 36415 COLL VENOUS BLD VENIPUNCTURE: CPT | Performed by: INTERNAL MEDICINE

## 2024-03-29 PROCEDURE — 96372 THER/PROPH/DIAG INJ SC/IM: CPT | Performed by: INTERNAL MEDICINE

## 2024-03-29 PROCEDURE — 82040 ASSAY OF SERUM ALBUMIN: CPT | Performed by: INTERNAL MEDICINE

## 2024-03-29 RX ORDER — ALBUTEROL SULFATE 90 UG/1
1-2 AEROSOL, METERED RESPIRATORY (INHALATION)
Status: CANCELLED
Start: 2024-03-31

## 2024-03-29 RX ORDER — ALBUTEROL SULFATE 0.83 MG/ML
2.5 SOLUTION RESPIRATORY (INHALATION)
Status: DISCONTINUED | OUTPATIENT
Start: 2024-03-29 | End: 2024-03-29 | Stop reason: HOSPADM

## 2024-03-29 RX ORDER — EPINEPHRINE 1 MG/ML
0.3 INJECTION, SOLUTION INTRAMUSCULAR; SUBCUTANEOUS EVERY 5 MIN PRN
Status: CANCELLED | OUTPATIENT
Start: 2024-03-31

## 2024-03-29 RX ORDER — DIPHENHYDRAMINE HYDROCHLORIDE 50 MG/ML
50 INJECTION INTRAMUSCULAR; INTRAVENOUS
Status: CANCELLED
Start: 2024-03-31

## 2024-03-29 RX ORDER — ALBUTEROL SULFATE 0.83 MG/ML
2.5 SOLUTION RESPIRATORY (INHALATION)
Status: CANCELLED | OUTPATIENT
Start: 2024-03-31

## 2024-03-29 RX ORDER — ALBUTEROL SULFATE 90 UG/1
1-2 AEROSOL, METERED RESPIRATORY (INHALATION)
Status: DISCONTINUED | OUTPATIENT
Start: 2024-03-29 | End: 2024-03-29 | Stop reason: HOSPADM

## 2024-03-29 RX ORDER — METHYLPREDNISOLONE SODIUM SUCCINATE 125 MG/2ML
125 INJECTION, POWDER, LYOPHILIZED, FOR SOLUTION INTRAMUSCULAR; INTRAVENOUS
Status: CANCELLED
Start: 2024-03-31

## 2024-03-29 RX ORDER — DIPHENHYDRAMINE HYDROCHLORIDE 50 MG/ML
50 INJECTION INTRAMUSCULAR; INTRAVENOUS
Status: DISCONTINUED | OUTPATIENT
Start: 2024-03-29 | End: 2024-03-29 | Stop reason: HOSPADM

## 2024-03-29 RX ORDER — EPINEPHRINE 1 MG/ML
0.3 INJECTION, SOLUTION INTRAMUSCULAR; SUBCUTANEOUS EVERY 5 MIN PRN
Status: DISCONTINUED | OUTPATIENT
Start: 2024-03-29 | End: 2024-03-29 | Stop reason: HOSPADM

## 2024-03-29 RX ORDER — METHYLPREDNISOLONE SODIUM SUCCINATE 125 MG/2ML
125 INJECTION, POWDER, LYOPHILIZED, FOR SOLUTION INTRAMUSCULAR; INTRAVENOUS
Status: DISCONTINUED | OUTPATIENT
Start: 2024-03-29 | End: 2024-03-29 | Stop reason: HOSPADM

## 2024-03-29 RX ADMIN — DENOSUMAB 60 MG: 60 INJECTION SUBCUTANEOUS at 09:51

## 2024-03-29 NOTE — PROGRESS NOTES
Infusion Nursing Note:  Cricketlan Mane presents today for injection. Prolia  Patient seen by provider today: No   present during visit today: Not Applicable.    Treatment Conditions:  Results reviewed, labs MET treatment parameters, ok to proceed with treatment.      Post Infusion Assessment:  Patient tolerated injection without incident.       Discharge Plan:   Patient discharged in stable condition accompanied by: self.  Departure Mode: Ambulatory.      Rakesh Boyd LPN

## 2024-04-15 ENCOUNTER — TELEPHONE (OUTPATIENT)
Dept: GASTROENTEROLOGY | Facility: CLINIC | Age: 64
End: 2024-04-15
Payer: COMMERCIAL

## 2024-04-15 DIAGNOSIS — Z94.4 LIVER REPLACED BY TRANSPLANT (H): Primary | ICD-10-CM

## 2024-04-15 DIAGNOSIS — Z13.220 LIPID SCREENING: ICD-10-CM

## 2024-04-15 NOTE — TELEPHONE ENCOUNTER
Pt is requesting new rx for    Omeprazole 20mg caps    Did not see on active med list please verify and send new rx. Thank you!    [facility]  [phone]

## 2024-04-15 NOTE — TELEPHONE ENCOUNTER
Spoke to pt who states that he is still taking omeprazole 20mg prn for foods that are spicy. Should we fill it?

## 2024-04-15 NOTE — LETTER
OUTPATIENT LABORATORY TEST ORDER   Patient copy/reminder    Patient Name: Cricket Mane   YOB: 1960     [facility] MR# [if applicable]: [id number]   Date & Time: April 15, 2024  3:04 PM  Expiration Date: 1 year after date issued     Diagnosis: Liver Transplant (ICD-10 Z94.4)   Aftercare following organ transplant (ICD-10 Z48.288)   Long term use of medications (ICD-10 Z79.899)      We ask your assistance in obtaining the following laboratory tests, which are part of our routine surveillance program for Solid Organ Transplant patients.     Please fax each result to [phone], same day as resulted/available    Critical lab results page [phone]    >Every 6-months post-transplant  Magnesium In October 2024    >1-year post-transplant  Every 2-3 months and as needed  CBC with Platelets   Basic Metabolic Panel   Hepatic panel   Tacrolimus drug level - 12 hour trough, please document time of last dose     Labs annually due April 2024  Phoshatdylethanol (PeTH)    >1-year post-transplant  Labs annually due February 2024  Fasting Lipid Panel  Urine protein/creatinine ratio  UA with reflex to micro  Phoshatdylethanol (PeTH)  Phosphorous    If you have any questions, please call [facility]- [phone] or [phone], Fax- [phone].    .

## 2024-05-17 ENCOUNTER — OFFICE VISIT (OUTPATIENT)
Dept: GASTROENTEROLOGY | Facility: CLINIC | Age: 64
End: 2024-05-17
Attending: INTERNAL MEDICINE
Payer: COMMERCIAL

## 2024-05-17 ENCOUNTER — TELEPHONE (OUTPATIENT)
Dept: TRANSPLANT | Facility: CLINIC | Age: 64
End: 2024-05-17

## 2024-05-17 ENCOUNTER — LAB (OUTPATIENT)
Dept: LAB | Facility: CLINIC | Age: 64
End: 2024-05-17
Payer: COMMERCIAL

## 2024-05-17 VITALS
BODY MASS INDEX: 25.37 KG/M2 | SYSTOLIC BLOOD PRESSURE: 133 MMHG | HEART RATE: 77 BPM | OXYGEN SATURATION: 100 % | TEMPERATURE: 98.4 F | WEIGHT: 162 LBS | DIASTOLIC BLOOD PRESSURE: 88 MMHG

## 2024-05-17 DIAGNOSIS — Z94.4 LIVER REPLACED BY TRANSPLANT (H): Primary | ICD-10-CM

## 2024-05-17 DIAGNOSIS — Z94.4 LIVER REPLACED BY TRANSPLANT (H): ICD-10-CM

## 2024-05-17 LAB
ALBUMIN SERPL BCG-MCNC: 4 G/DL (ref 3.5–5.2)
ALP SERPL-CCNC: 47 U/L (ref 40–150)
ALT SERPL W P-5'-P-CCNC: 13 U/L (ref 0–70)
ANION GAP SERPL CALCULATED.3IONS-SCNC: 11 MMOL/L (ref 7–15)
AST SERPL W P-5'-P-CCNC: 16 U/L (ref 0–45)
BILIRUB DIRECT SERPL-MCNC: 0.25 MG/DL (ref 0–0.3)
BILIRUB SERPL-MCNC: 0.7 MG/DL
BUN SERPL-MCNC: 24.5 MG/DL (ref 8–23)
CALCIUM SERPL-MCNC: 8.9 MG/DL (ref 8.8–10.2)
CHLORIDE SERPL-SCNC: 108 MMOL/L (ref 98–107)
CREAT SERPL-MCNC: 1.59 MG/DL (ref 0.67–1.17)
DEPRECATED HCO3 PLAS-SCNC: 22 MMOL/L (ref 22–29)
EGFRCR SERPLBLD CKD-EPI 2021: 49 ML/MIN/1.73M2
ERYTHROCYTE [DISTWIDTH] IN BLOOD BY AUTOMATED COUNT: 14.3 % (ref 10–15)
GLUCOSE SERPL-MCNC: 111 MG/DL (ref 70–99)
HCT VFR BLD AUTO: 43.1 % (ref 40–53)
HGB BLD-MCNC: 14.1 G/DL (ref 13.3–17.7)
MAGNESIUM SERPL-MCNC: 1.7 MG/DL (ref 1.7–2.3)
MCH RBC QN AUTO: 27.6 PG (ref 26.5–33)
MCHC RBC AUTO-ENTMCNC: 32.7 G/DL (ref 31.5–36.5)
MCV RBC AUTO: 85 FL (ref 78–100)
PLATELET # BLD AUTO: 143 10E3/UL (ref 150–450)
POTASSIUM SERPL-SCNC: 4.8 MMOL/L (ref 3.4–5.3)
PROT SERPL-MCNC: 6.9 G/DL (ref 6.4–8.3)
RBC # BLD AUTO: 5.1 10E6/UL (ref 4.4–5.9)
SODIUM SERPL-SCNC: 141 MMOL/L (ref 135–145)
TACROLIMUS BLD-MCNC: 9.6 UG/L (ref 5–15)
TME LAST DOSE: NORMAL H
TME LAST DOSE: NORMAL H
WBC # BLD AUTO: 5.6 10E3/UL (ref 4–11)

## 2024-05-17 PROCEDURE — 80197 ASSAY OF TACROLIMUS: CPT | Performed by: INTERNAL MEDICINE

## 2024-05-17 PROCEDURE — 36415 COLL VENOUS BLD VENIPUNCTURE: CPT | Performed by: PATHOLOGY

## 2024-05-17 PROCEDURE — 82248 BILIRUBIN DIRECT: CPT | Performed by: PATHOLOGY

## 2024-05-17 PROCEDURE — 80053 COMPREHEN METABOLIC PANEL: CPT | Performed by: PATHOLOGY

## 2024-05-17 PROCEDURE — G0480 DRUG TEST DEF 1-7 CLASSES: HCPCS | Mod: 90 | Performed by: PATHOLOGY

## 2024-05-17 PROCEDURE — 83735 ASSAY OF MAGNESIUM: CPT | Performed by: PATHOLOGY

## 2024-05-17 PROCEDURE — 85027 COMPLETE CBC AUTOMATED: CPT | Performed by: PATHOLOGY

## 2024-05-17 PROCEDURE — 99214 OFFICE O/P EST MOD 30 MIN: CPT | Performed by: INTERNAL MEDICINE

## 2024-05-17 PROCEDURE — 99000 SPECIMEN HANDLING OFFICE-LAB: CPT | Performed by: PATHOLOGY

## 2024-05-17 PROCEDURE — 99213 OFFICE O/P EST LOW 20 MIN: CPT | Performed by: INTERNAL MEDICINE

## 2024-05-17 RX ORDER — TACROLIMUS 1 MG/1
CAPSULE ORAL
Qty: 360 CAPSULE | Refills: 3 | Status: SHIPPED | OUTPATIENT
Start: 2024-05-17 | End: 2024-06-05

## 2024-05-17 ASSESSMENT — PAIN SCALES - GENERAL: PAINLEVEL: NO PAIN (0)

## 2024-05-17 NOTE — TELEPHONE ENCOUNTER
ISSUE:   Tacrolimus IR level 9.6 on 5/17, goal 3-5 (updated per protocol), dose 2 mg BID.    PLAN:   Call Patient and confirm this was an accurate 12-hour trough.   This is closer to a 10 hour trough, but will be higher than goal regardless.  Plan for minor adjustment with recheck in 2 weeks.  Verify Tacrolimus IR dose 2 mg BID.   Confirm no new medications or or missed doses.   Confirm no new illness / infection / diarrhea.   If accurate trough and accurate dose, decrease Tacrolimus IR dose to 2 mg in the am and 1 mg in the pm     Is this more than a 50% increase or decrease in current IS dose: No  If YES, justification: NA    Repeat labs in 2 weeks.  *If > 50% change in immunosuppression dose, repeat labs in 1 week.     OUTCOME:   Spoke with Patient, they confirm accurate trough level and current dose 2 mg BID.   Patient confirmed dose change to 2 mg in the am and 1 mg in th pm.  Patient agreed to repeat labs in 2 weeks.   Orders sent to preferred pharmacy for dose change and lab for repeat labs.   Patient voiced understanding of plan.     Patient was wondering if he needed to be on aspirin any longer- will message Dr. Sloan.     Reyna Leyva, RN on 5/17/2024 at 2:43 PM

## 2024-05-17 NOTE — LETTER
5/17/2024         RE: Cricket Mane  [address] [place]  [facility] [address]        Dear Colleague,    Thank you for referring your patient, Cricket Mane, to the [facility]PATOLOG[facility] [place]. Please see a copy of my visit note below.    Solid Organ Transplant Hepatology Follow-Up Visit:     HISTORY OF PRESENT ILLNESS:   I had the pleasure of seeing Cricket Mane for followup in the Liver Clinic at the [facility] on May 17, 2024. Mr. 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     For the most part, he is doing well.  He does note some superficial abdominal discomfort.  He denies any itching or skin rash.  He denies fatigue.  He denies any increased abdominal girth or lower extremity edema.     He denies any gastrointestinal bleeding.     He denies any fevers or chills, cough or shortness of breath.  He still has some decreased exercise tolerance.  He denies any nausea or vomiting.  He does have some occasional diarrhea.  He denies any constipation.  His appetite is good and his weight is increasing slightly.    There otherwise been no other new events since he was last seen.    Medications:   Current Outpatient Medications   Medication Sig Dispense Refill     aspirin (ASA) 325 MG tablet Take 1 tablet (325 mg) by mouth daily 90 tablet 3     calcium carbonate-vitamin D (CALTRATE) 600-10 MG-MCG per tablet Take 1 tablet by mouth 2 times daily (with meals) 180 tablet 3     folic acid (FOLVITE) 1 MG tablet Take 1 tablet (1,000 mcg) by mouth daily 90 tablet 1     magnesium oxide (MAG-OX) 400 MG tablet Take 1 tablet (400 mg) by mouth 2 times daily 360 tablet 3     metoprolol tartrate (LOPRESSOR) 25 MG tablet TAKE 1 TABLET BY MOUTH TWICE A  tablet 3     Multiple Vitamins-Minerals (SPECTRAVITE) TABS Take 1 tablet by mouth daily       tacrolimus (GENERIC EQUIVALENT) 0.5 MG capsule Take 1 capsule  (0.5 mg) by mouth as needed (for dose changes) 90 capsule 3     tacrolimus (GENERIC) 1 MG capsule Take 2 capsules (2 mg) by mouth every 12 hours 360 capsule 3     No current facility-administered medications for this visit.      Vitals:   /88   Pulse 77   Temp 98.4  F (36.9  C) (Oral)   Wt 73.5 kg (162 lb)   SpO2 100%   BMI 25.37 kg/m      Physical Exam:   In general he looks quite well. HEENT exam shows no scleral icterus or temporal muscle wasting. Chest is clear. Abdominal exam shows no increase in girth. No masses or tenderness to palpation are present. Liver is 10 cm in span without left lobe enlargement. No spleen tip is palpable.  His incision is intact.  Extremity exam shows no edema. Skin exam shows no suspicious lesions and neurologic exam is nonfocal.     Labs:   Recent Results (from the past 168 hour(s))   Hepatic panel    Collection Time: 05/17/24  7:59 AM   Result Value Ref Range    Protein Total 6.9 6.4 - 8.3 g/dL    Albumin 4.0 3.5 - 5.2 g/dL    Bilirubin Total 0.7 <=1.2 mg/dL    Alkaline Phosphatase 47 40 - 150 U/L    AST 16 0 - 45 U/L    ALT 13 0 - 70 U/L    Bilirubin Direct 0.25 0.00 - 0.30 mg/dL   CBC with platelets    Collection Time: 05/17/24  7:59 AM   Result Value Ref Range    WBC Count 5.6 4.0 - 11.0 10e3/uL    RBC Count 5.10 4.40 - 5.90 10e6/uL    Hemoglobin 14.1 13.3 - 17.7 g/dL    Hematocrit 43.1 40.0 - 53.0 %    MCV 85 78 - 100 fL    MCH 27.6 26.5 - 33.0 pg    MCHC 32.7 31.5 - 36.5 g/dL    RDW 14.3 10.0 - 15.0 %    Platelet Count 143 (L) 150 - 450 10e3/uL   Basic metabolic panel    Collection Time: 05/17/24  7:59 AM   Result Value Ref Range    Sodium 141 135 - 145 mmol/L    Potassium 4.8 3.4 - 5.3 mmol/L    Chloride 108 (H) 98 - 107 mmol/L    Carbon Dioxide (CO2) 22 22 - 29 mmol/L    Anion Gap 11 7 - 15 mmol/L    Urea Nitrogen 24.5 (H) 8.0 - 23.0 mg/dL    Creatinine 1.59 (H) 0.67 - 1.17 mg/dL    GFR Estimate 49 (L) >60 mL/min/1.73m2    Calcium 8.9 8.8 - 10.2 mg/dL    Glucose  111 (H) 70 - 99 mg/dL      Imaging:   No images are attached to the encounter.     Assessment/Plan:   IMPRESSION:   My impression is that Mr. Mane is 14 months status post liver transplantation.  His liver function is excellent.  He does have a moderate amount of CKD that we will keep an eye on.  He could be a candidate for tacrolimus withdrawal if his CKD worsens.  He is up-to-date with regard to vaccines and cancer screening.  I will not be making any change to his medical regimen we will see him back in the clinic in 6 months.     I did spend a total of 30 minutes (on the date of the encounter), including 20 minutes of face-to-face clinic time including counseling. The rest of the time was spent in documentation and review of records.    Thank you very much for allowing me to participate in the care of this patient.  If you have any questions regarding my recommendations, please do not hesitate to contact me.         Pancho Sloan MD      Professor of Medicine  [employer]      Executive Medical Director, Solid Organ Transplant Program  [facility]

## 2024-05-17 NOTE — PROGRESS NOTES
Solid Organ Transplant Hepatology Follow-Up Visit:     HISTORY OF PRESENT ILLNESS:   I had the pleasure of seeing Cricket Mane for followup in the Liver Clinic at the [facility] on May 17, 2024. Mr. 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     For the most part, he is doing well.  He does note some superficial abdominal discomfort.  He denies any itching or skin rash.  He denies fatigue.  He denies any increased abdominal girth or lower extremity edema.     He denies any gastrointestinal bleeding.     He denies any fevers or chills, cough or shortness of breath.  He still has some decreased exercise tolerance.  He denies any nausea or vomiting.  He does have some occasional diarrhea.  He denies any constipation.  His appetite is good and his weight is increasing slightly.    There otherwise been no other new events since he was last seen.    Medications:   Current Outpatient Medications   Medication Sig Dispense Refill    aspirin (ASA) 325 MG tablet Take 1 tablet (325 mg) by mouth daily 90 tablet 3    calcium carbonate-vitamin D (CALTRATE) 600-10 MG-MCG per tablet Take 1 tablet by mouth 2 times daily (with meals) 180 tablet 3    folic acid (FOLVITE) 1 MG tablet Take 1 tablet (1,000 mcg) by mouth daily 90 tablet 1    magnesium oxide (MAG-OX) 400 MG tablet Take 1 tablet (400 mg) by mouth 2 times daily 360 tablet 3    metoprolol tartrate (LOPRESSOR) 25 MG tablet TAKE 1 TABLET BY MOUTH TWICE A  tablet 3    Multiple Vitamins-Minerals (SPECTRAVITE) TABS Take 1 tablet by mouth daily      tacrolimus (GENERIC EQUIVALENT) 0.5 MG capsule Take 1 capsule (0.5 mg) by mouth as needed (for dose changes) 90 capsule 3    tacrolimus (GENERIC) 1 MG capsule Take 2 capsules (2 mg) by mouth every 12 hours 360 capsule 3     No current facility-administered medications for this visit.      Vitals:   /88   Pulse 77   Temp 98.4  F  (36.9  C) (Oral)   Wt 73.5 kg (162 lb)   SpO2 100%   BMI 25.37 kg/m      Physical Exam:   In general he looks quite well. HEENT exam shows no scleral icterus or temporal muscle wasting. Chest is clear. Abdominal exam shows no increase in girth. No masses or tenderness to palpation are present. Liver is 10 cm in span without left lobe enlargement. No spleen tip is palpable.  His incision is intact.  Extremity exam shows no edema. Skin exam shows no suspicious lesions and neurologic exam is nonfocal.     Labs:   Recent Results (from the past 168 hour(s))   Hepatic panel    Collection Time: 05/17/24  7:59 AM   Result Value Ref Range    Protein Total 6.9 6.4 - 8.3 g/dL    Albumin 4.0 3.5 - 5.2 g/dL    Bilirubin Total 0.7 <=1.2 mg/dL    Alkaline Phosphatase 47 40 - 150 U/L    AST 16 0 - 45 U/L    ALT 13 0 - 70 U/L    Bilirubin Direct 0.25 0.00 - 0.30 mg/dL   CBC with platelets    Collection Time: 05/17/24  7:59 AM   Result Value Ref Range    WBC Count 5.6 4.0 - 11.0 10e3/uL    RBC Count 5.10 4.40 - 5.90 10e6/uL    Hemoglobin 14.1 13.3 - 17.7 g/dL    Hematocrit 43.1 40.0 - 53.0 %    MCV 85 78 - 100 fL    MCH 27.6 26.5 - 33.0 pg    MCHC 32.7 31.5 - 36.5 g/dL    RDW 14.3 10.0 - 15.0 %    Platelet Count 143 (L) 150 - 450 10e3/uL   Basic metabolic panel    Collection Time: 05/17/24  7:59 AM   Result Value Ref Range    Sodium 141 135 - 145 mmol/L    Potassium 4.8 3.4 - 5.3 mmol/L    Chloride 108 (H) 98 - 107 mmol/L    Carbon Dioxide (CO2) 22 22 - 29 mmol/L    Anion Gap 11 7 - 15 mmol/L    Urea Nitrogen 24.5 (H) 8.0 - 23.0 mg/dL    Creatinine 1.59 (H) 0.67 - 1.17 mg/dL    GFR Estimate 49 (L) >60 mL/min/1.73m2    Calcium 8.9 8.8 - 10.2 mg/dL    Glucose 111 (H) 70 - 99 mg/dL      Imaging:   No images are attached to the encounter.     Assessment/Plan:   IMPRESSION:   My impression is that Mr. Mane is 14 months status post liver transplantation.  His liver function is excellent.  He does have a moderate amount of CKD that we  will keep an eye on.  He could be a candidate for tacrolimus withdrawal if his CKD worsens.  He is up-to-date with regard to vaccines and cancer screening.  I will not be making any change to his medical regimen we will see him back in the clinic in 6 months.     I did spend a total of 30 minutes (on the date of the encounter), including 20 minutes of face-to-face clinic time including counseling. The rest of the time was spent in documentation and review of records.    Thank you very much for allowing me to participate in the care of this patient.  If you have any questions regarding my recommendations, please do not hesitate to contact me.         Pancho Sloan MD      Professor of Medicine  [employer]      Executive Medical Director, Solid Organ Transplant Program  [facility]

## 2024-05-17 NOTE — NURSING NOTE
Chief Complaint   Patient presents with    RECHECK       /88   Pulse 77   Temp 98.4  F (36.9  C) (Oral)   Wt 73.5 kg (162 lb)   SpO2 100%   BMI 25.37 kg/m      Issac Bhakta on 5/17/2024 at 8:13 AM

## 2024-06-04 ENCOUNTER — LAB (OUTPATIENT)
Dept: LAB | Facility: CLINIC | Age: 64
End: 2024-06-04
Payer: COMMERCIAL

## 2024-06-04 ENCOUNTER — TELEPHONE (OUTPATIENT)
Dept: TRANSPLANT | Facility: CLINIC | Age: 64
End: 2024-06-04

## 2024-06-04 DIAGNOSIS — Z94.4 LIVER REPLACED BY TRANSPLANT (H): ICD-10-CM

## 2024-06-04 LAB
ALBUMIN SERPL BCG-MCNC: 4.2 G/DL (ref 3.5–5.2)
ALP SERPL-CCNC: 50 U/L (ref 40–150)
ALT SERPL W P-5'-P-CCNC: 19 U/L (ref 0–70)
ANION GAP SERPL CALCULATED.3IONS-SCNC: 10 MMOL/L (ref 7–15)
AST SERPL W P-5'-P-CCNC: 17 U/L (ref 0–45)
BILIRUB DIRECT SERPL-MCNC: <0.2 MG/DL (ref 0–0.3)
BILIRUB SERPL-MCNC: 0.6 MG/DL
BUN SERPL-MCNC: 29.4 MG/DL (ref 8–23)
CALCIUM SERPL-MCNC: 9.3 MG/DL (ref 8.8–10.2)
CHLORIDE SERPL-SCNC: 106 MMOL/L (ref 98–107)
CREAT SERPL-MCNC: 1.4 MG/DL (ref 0.67–1.17)
DEPRECATED HCO3 PLAS-SCNC: 23 MMOL/L (ref 22–29)
EGFRCR SERPLBLD CKD-EPI 2021: 57 ML/MIN/1.73M2
GLUCOSE SERPL-MCNC: 105 MG/DL (ref 70–99)
POTASSIUM SERPL-SCNC: 4.9 MMOL/L (ref 3.4–5.3)
PROT SERPL-MCNC: 7 G/DL (ref 6.4–8.3)
SODIUM SERPL-SCNC: 139 MMOL/L (ref 135–145)
TACROLIMUS BLD-MCNC: 5.4 UG/L (ref 5–15)
TME LAST DOSE: NORMAL H
TME LAST DOSE: NORMAL H

## 2024-06-04 PROCEDURE — 80197 ASSAY OF TACROLIMUS: CPT

## 2024-06-04 PROCEDURE — 80053 COMPREHEN METABOLIC PANEL: CPT

## 2024-06-04 PROCEDURE — 82248 BILIRUBIN DIRECT: CPT

## 2024-06-04 PROCEDURE — 36415 COLL VENOUS BLD VENIPUNCTURE: CPT

## 2024-06-04 NOTE — TELEPHONE ENCOUNTER
ISSUE:   Tacrolimus IR level 5.4 on 6/4, goal 3-5, dose 2 mg in the am and 1 mg in the pm.    PLAN:   Call Patient and confirm this was an accurate 12-hour trough.   Verify Tacrolimus IR dose 2 mg in the am and 1 mg in the pm.   Confirm no new medications or or missed doses.   Confirm no new illness / infection / diarrhea.   If accurate trough and accurate dose, decrease Tacrolimus IR dose to 1.5 mg in the am and 1 mg in the pm.     Is this more than a 50% increase or decrease in current IS dose: No  If YES, justification: NA    Repeat labs in 2 weeks.  *If > 50% change in immunosuppression dose, repeat labs in 1 week.     OUTCOME:   Spoke with Patient, they confirm accurate trough level and current dose 2 mg am, 1 mg pm .   Patient confirmed dose change to 1.5 mg am, 1 mg pm.  Patient agreed to repeat labs in 2 weeks.   Orders sent to preferred pharmacy for dose change and lab for repeat labs.   Patient voiced understanding of plan.     Ree Burleson LPN

## 2024-06-05 RX ORDER — TACROLIMUS 0.5 MG/1
0.5 CAPSULE ORAL EVERY MORNING
Qty: 90 CAPSULE | Refills: 3 | Status: SHIPPED | OUTPATIENT
Start: 2024-06-05 | End: 2024-09-18

## 2024-06-05 RX ORDER — TACROLIMUS 1 MG/1
1 CAPSULE ORAL EVERY 12 HOURS
Qty: 180 CAPSULE | Refills: 3 | Status: SHIPPED | OUTPATIENT
Start: 2024-06-05

## 2024-06-20 ENCOUNTER — TELEPHONE (OUTPATIENT)
Dept: TRANSPLANT | Facility: CLINIC | Age: 64
End: 2024-06-20
Payer: COMMERCIAL

## 2024-06-25 ENCOUNTER — TELEPHONE (OUTPATIENT)
Dept: TRANSPLANT | Facility: CLINIC | Age: 64
End: 2024-06-25
Payer: COMMERCIAL

## 2024-06-25 NOTE — TELEPHONE ENCOUNTER
Patient Call: General  Route to LPN    Reason for call: Patient returning call back about questions after receiving voice message on 6/20/24. Covering coordinator Brooklynn CARRANZA had called patient that day. More details with call back.     Call back needed? Yes    Return Call Needed  Same as documented in contacts section  When to return call?: Same day: Route High Priority

## 2024-07-11 ENCOUNTER — TELEPHONE (OUTPATIENT)
Dept: NEPHROLOGY | Facility: CLINIC | Age: 64
End: 2024-07-11
Payer: COMMERCIAL

## 2024-07-11 NOTE — TELEPHONE ENCOUNTER
Left Voicemail (1st Attempt) for the patient to call back and schedule the following:    [facility]  Provider: MICHAEL  Return date: 1/2024  Specialty phone number: [phone]  Additional appointment(s) needed: LABS  Additonal Notes: N/A

## 2024-07-21 ENCOUNTER — TELEPHONE (OUTPATIENT)
Dept: NEPHROLOGY | Facility: CLINIC | Age: 64
End: 2024-07-21
Payer: COMMERCIAL

## 2024-07-21 NOTE — TELEPHONE ENCOUNTER
Rescheduled appt from 9.4.24 to 12.20.24 with Brenda Sharma// sent message to neph pool bc pt wants to reschedule earlier but will be out of the state after Sept 30// 7.21.24 KET

## 2024-08-15 DIAGNOSIS — N17.9 AKI (ACUTE KIDNEY INJURY) (H): Primary | ICD-10-CM

## 2024-08-20 DIAGNOSIS — I48.20 CHRONIC ATRIAL FIBRILLATION WITH RAPID VENTRICULAR RESPONSE (H): ICD-10-CM

## 2024-08-22 RX ORDER — METOPROLOL TARTRATE 25 MG/1
25 TABLET, FILM COATED ORAL 2 TIMES DAILY
Qty: 180 TABLET | Refills: 0 | Status: SHIPPED | OUTPATIENT
Start: 2024-08-22

## 2024-08-26 ENCOUNTER — TELEPHONE (OUTPATIENT)
Dept: TRANSPLANT | Facility: CLINIC | Age: 64
End: 2024-08-26

## 2024-08-26 ENCOUNTER — OFFICE VISIT (OUTPATIENT)
Dept: NEPHROLOGY | Facility: CLINIC | Age: 64
End: 2024-08-26
Attending: INTERNAL MEDICINE
Payer: COMMERCIAL

## 2024-08-26 ENCOUNTER — LAB (OUTPATIENT)
Dept: LAB | Facility: CLINIC | Age: 64
End: 2024-08-26
Attending: INTERNAL MEDICINE
Payer: COMMERCIAL

## 2024-08-26 VITALS
TEMPERATURE: 98 F | OXYGEN SATURATION: 96 % | WEIGHT: 157.9 LBS | SYSTOLIC BLOOD PRESSURE: 111 MMHG | BODY MASS INDEX: 24.73 KG/M2 | DIASTOLIC BLOOD PRESSURE: 80 MMHG | HEART RATE: 77 BPM

## 2024-08-26 DIAGNOSIS — N17.9 AKI (ACUTE KIDNEY INJURY) (H): ICD-10-CM

## 2024-08-26 DIAGNOSIS — N18.32 ANEMIA IN STAGE 3B CHRONIC KIDNEY DISEASE (H): ICD-10-CM

## 2024-08-26 DIAGNOSIS — N17.9 AKI (ACUTE KIDNEY INJURY) (H): Primary | ICD-10-CM

## 2024-08-26 DIAGNOSIS — Z94.4 LIVER REPLACED BY TRANSPLANT (H): ICD-10-CM

## 2024-08-26 DIAGNOSIS — D63.1 ANEMIA IN STAGE 3B CHRONIC KIDNEY DISEASE (H): ICD-10-CM

## 2024-08-26 LAB
ALBUMIN MFR UR ELPH: 11.4 MG/DL
ALBUMIN SERPL BCG-MCNC: 4.3 G/DL (ref 3.5–5.2)
ALBUMIN UR-MCNC: 10 MG/DL
ALP SERPL-CCNC: 53 U/L (ref 40–150)
ALT SERPL W P-5'-P-CCNC: 21 U/L (ref 0–70)
ANION GAP SERPL CALCULATED.3IONS-SCNC: 12 MMOL/L (ref 7–15)
APPEARANCE UR: CLEAR
AST SERPL W P-5'-P-CCNC: 19 U/L (ref 0–45)
BILIRUB DIRECT SERPL-MCNC: 0.24 MG/DL (ref 0–0.3)
BILIRUB SERPL-MCNC: 1 MG/DL
BILIRUB UR QL STRIP: NEGATIVE
BUN SERPL-MCNC: 27.1 MG/DL (ref 8–23)
CALCIUM SERPL-MCNC: 9.9 MG/DL (ref 8.8–10.4)
CHLORIDE SERPL-SCNC: 102 MMOL/L (ref 98–107)
COLOR UR AUTO: YELLOW
CREAT SERPL-MCNC: 1.47 MG/DL (ref 0.67–1.17)
CREAT UR-MCNC: 148 MG/DL
CREAT UR-MCNC: 150 MG/DL
EGFRCR SERPLBLD CKD-EPI 2021: 54 ML/MIN/1.73M2
ERYTHROCYTE [DISTWIDTH] IN BLOOD BY AUTOMATED COUNT: 14.1 % (ref 10–15)
GLUCOSE SERPL-MCNC: 102 MG/DL (ref 70–99)
GLUCOSE UR STRIP-MCNC: NEGATIVE MG/DL
HCO3 SERPL-SCNC: 24 MMOL/L (ref 22–29)
HCT VFR BLD AUTO: 44.9 % (ref 40–53)
HGB BLD-MCNC: 14.9 G/DL (ref 13.3–17.7)
HGB UR QL STRIP: NEGATIVE
KETONES UR STRIP-MCNC: NEGATIVE MG/DL
LEUKOCYTE ESTERASE UR QL STRIP: NEGATIVE
MAGNESIUM SERPL-MCNC: 1.7 MG/DL (ref 1.7–2.3)
MCH RBC QN AUTO: 27.5 PG (ref 26.5–33)
MCHC RBC AUTO-ENTMCNC: 33.2 G/DL (ref 31.5–36.5)
MCV RBC AUTO: 83 FL (ref 78–100)
MICROALBUMIN UR-MCNC: <12 MG/L
MICROALBUMIN/CREAT UR: NORMAL MG/G{CREAT}
MUCOUS THREADS #/AREA URNS LPF: PRESENT /LPF
NITRATE UR QL: NEGATIVE
PH UR STRIP: 7 [PH] (ref 5–7)
PHOSPHATE SERPL-MCNC: 2.9 MG/DL (ref 2.5–4.5)
PLATELET # BLD AUTO: 148 10E3/UL (ref 150–450)
POTASSIUM SERPL-SCNC: 4.8 MMOL/L (ref 3.4–5.3)
PROT SERPL-MCNC: 7.5 G/DL (ref 6.4–8.3)
PROT/CREAT 24H UR: 0.08 MG/MG CR (ref 0–0.2)
RBC # BLD AUTO: 5.41 10E6/UL (ref 4.4–5.9)
RBC URINE: 1 /HPF
SODIUM SERPL-SCNC: 138 MMOL/L (ref 135–145)
SP GR UR STRIP: 1.02 (ref 1–1.03)
TACROLIMUS BLD-MCNC: 6.8 UG/L (ref 5–15)
TME LAST DOSE: NORMAL H
TME LAST DOSE: NORMAL H
UROBILINOGEN UR STRIP-MCNC: 2 MG/DL
WBC # BLD AUTO: 6.4 10E3/UL (ref 4–11)
WBC URINE: <1 /HPF

## 2024-08-26 PROCEDURE — 82570 ASSAY OF URINE CREATININE: CPT | Performed by: INTERNAL MEDICINE

## 2024-08-26 PROCEDURE — 83735 ASSAY OF MAGNESIUM: CPT | Performed by: PATHOLOGY

## 2024-08-26 PROCEDURE — 99000 SPECIMEN HANDLING OFFICE-LAB: CPT | Performed by: PATHOLOGY

## 2024-08-26 PROCEDURE — 80053 COMPREHEN METABOLIC PANEL: CPT | Performed by: PATHOLOGY

## 2024-08-26 PROCEDURE — 99215 OFFICE O/P EST HI 40 MIN: CPT | Performed by: INTERNAL MEDICINE

## 2024-08-26 PROCEDURE — 80197 ASSAY OF TACROLIMUS: CPT | Performed by: INTERNAL MEDICINE

## 2024-08-26 PROCEDURE — 85027 COMPLETE CBC AUTOMATED: CPT | Performed by: PATHOLOGY

## 2024-08-26 PROCEDURE — 81001 URINALYSIS AUTO W/SCOPE: CPT | Performed by: PATHOLOGY

## 2024-08-26 PROCEDURE — 99213 OFFICE O/P EST LOW 20 MIN: CPT | Performed by: INTERNAL MEDICINE

## 2024-08-26 PROCEDURE — 84156 ASSAY OF PROTEIN URINE: CPT | Performed by: PATHOLOGY

## 2024-08-26 PROCEDURE — 84100 ASSAY OF PHOSPHORUS: CPT | Performed by: PATHOLOGY

## 2024-08-26 PROCEDURE — 36415 COLL VENOUS BLD VENIPUNCTURE: CPT | Performed by: PATHOLOGY

## 2024-08-26 PROCEDURE — 82248 BILIRUBIN DIRECT: CPT | Performed by: PATHOLOGY

## 2024-08-26 RX ORDER — TACROLIMUS 0.5 MG/1
CAPSULE ORAL
Qty: 90 CAPSULE | Refills: 0 | OUTPATIENT
Start: 2024-08-26

## 2024-08-26 ASSESSMENT — PAIN SCALES - GENERAL: PAINLEVEL: NO PAIN (0)

## 2024-08-26 NOTE — PATIENT INSTRUCTIONS
It was a pleasure taking care of you today.  I've included a brief summary of our discussion and care plan from today's visit below.  Please review this information with your primary care provider.  _______________________________________________________________________    My recommendations are summarized as follows:  -Keep the amount of sodium in your diet at 2.3 g/day (also see instructions attached in that regard)  -Keep a Blood Pressure diary by taking your blood pressure twice a day as instructed (also see instructions attached in that regard). Start now and take it for one week then send me the numbers via [employer]. Please make sure that you are using a validated blood pressure device by checking that it is the case at: https://www.validatebp.org/  -Avoid all NSAID's. Examples include Ibuprofen (Advil, Motrin), naprosyn (Aleve), diclofenac (Voltaren), celebrex among others. Acetaminophen (Tylenol) is ok with maximum dose in 24 hours of 3200 mg.  -Healthy lifestyle measures will keep your kidney's functioning at their current best. This includes regular exercise, maintain a healthy weight and smoking cessation if you smoke.   -For tips on eating healthy:  -[email or web address]://[email or web address]/healthy-eating-pyramid/      To schedule imaging please call [phone]     To schedule your lab appointment at the [facility], please call       Return to Nephrology Clinic in 6 months to review your progress.    _______________________________________________________________________    Who do I call with any questions after my visit?    Please be in touch if there are any further questions that arise following today's visit.  There are multiple ways to contact your nephrology care team.      During business hours, you may reach your Nephrology Care Coordinator, at .      To schedule or reschedule an appointment, please call [phone].    You can always send a secure  message through [employer].  [employer] messages are answered by your nurse or doctor typically within 24 hours.  Please allow extra time on weekends and holidays.      For urgent/emergent questions after business hours, you may reach the on-call Nephrology Fellow by contacting the [facility]  at [phone].     How will I get the results of any tests ordered?    You will receive all of your results.  If you have signed up for [employer], any tests ordered at your visit will be available to you after your physician reviews them.  Typically this takes 1-2 weeks.  If there are urgent results that require a change in your care plan, your physician or nurse will call you to discuss the next steps.      What is [employer]?  [employer] is a secure way for you to access all of your healthcare records from the [facility].  It is a web based computer program, so you can sign on to it from any location.  It also allows you to send secure messages to your care team.  I recommend signing up for [employer] access if you have not already done so and are comfortable with using a computer.      How do I schedule a follow-up visit?  If you did not schedule a follow-up visit today, please call [phone] to schedule a follow-up office visit.        Sincerely,      Dr[facility]  Division of Nephrology and Hypertension

## 2024-08-26 NOTE — NURSING NOTE
Chief Complaint   Patient presents with    RECHECK     RETURN NEPHROLOGY - six month follow up// per Dr. Casimiro Feng schedule with general nephrology// rescheduled from 9.4.24       /80 (BP Location: Right arm, Patient Position: Sitting, Cuff Size: Adult Regular)   Pulse 77   Temp 98  F (36.7  C) (Oral)   Wt 71.6 kg (157 lb 14.4 oz)   SpO2 96%   BMI 24.73 kg/m      Matthew Orozco CMA on 8/26/2024 at 3:25 PM

## 2024-08-26 NOTE — TELEPHONE ENCOUNTER
Returned Torin's call:    He is coming to the [facility] for labs, he has standing orders for hepatology labs every 2 months and he is in the window to have them completed as written.     Informed patient and encouraged him to double check with the  and if there were any issues to call back for new orders from the lab if necessary. Patient verbalized understanding.

## 2024-08-26 NOTE — LETTER
8/26/2024       RE: Cricket Mane  [address] [place]  [facility] [address]     Dear Colleague,    Thank you for referring your patient, Cricket Mane, to the [facility] NEPHROLOG[facility] [place] at [facility]. Please see a copy of my visit note below.    Nephrology Clinic    Cricket Mane MRN:[id number] YOB: 1960  Date of Service: 08/26/2024  Primary care provider: Jer Stanley  Requesting physician:       REASON FOR CONSULT:     HISTORY OF PRESENT ILLNESS:   Cricket Mane is a 64 year old male who presents for evaluation of CKD stage 3a  The past medical history is significant for alcoholic cirrhosis status post liver transplant on 3/23/2023, A-fib, and hyperlipidemia.    Patient has a history of alcoholic cirrhosis and had a liver transplantation on 3/23/2023.  Subsequently was found to have CKD stage 3a with creatinine of 1.5 and EGFR of 51.  Prior to that had normal creatinine and EGFR testing.  It is likely that he had some component of CKD, in association with his cirrhosis\, prior to his formal diagnosis and the creatinine was underestimated due to low nutritional and muscle mass state associated with cirrhosis.  He also likely had some kidney damage from the surgery and subsequent NSAID use, as well as tacrolimus toxicity.  Patient had a tacrolimus level of 5.4 on 6/4, goal of 3-5, dose was 2 mg in a.m. and 1 mg in p.m. subsequently had dose decreased to 1.5 mg in a.m. and 1 mg in p.m. was on aspirin 325 for prophylaxis of hepatic artery thrombosis after his transplant, this was stopped in May 2024.  Patient has continued to take 81 mg aspirin for comfort.    Patient had tacrolimus level drawn today but it is not resulted.  Regardless it will be inaccurate because he took his tacrolimus this morning.    From a renal standpoint, the patient has had CKD since at least March 2023  The baseline creatinine level was  0.9-1.1  until transplant at which point it increased to 1.4-1.6.  No Cystatin C values on record  There has never been evidence of proteinuria with a uPCR of 0.08 today (2024)   A CT scan of the abdomen and pelvis done on       3/22/2023   showed unremarkable kidneys  A kidney ultrasound  done on        10/23/2023   was benign  Patient has no history of hypertension  The blood pressure is normotensive, today is 111/80  Patient has no history of diabetes.  Last A1c from 3/23/2023 was 4.7.  Glucose has persistently been under 120  The patient denies any dysuria, any pollakiuria, any nocturia, any LE edema, any dyspnea on exertion, orthopnea, fatigue, chest pain, abdominal pain, NVD.  The patient denies ever having kidney stones, urinary tract infections, gross hematuria. There is no family history of CKD.  The following portions of the patient's history were reviewed and updated as appropriate: allergies, current medications, past family history, past medical history, past social history, past surgical history and problem list.    PAST MEDICAL HISTORY:  Past Medical History:   Diagnosis Date     Alcoholic cirrhosis of liver with ascites (H)      Atrial fibrillation (H)      Coronary artery disease      History of alcohol use disorder      History of esophageal varices with bleeding      Hyperlipidemia      PAST SURGICAL HISTORY:  Past Surgical History:   Procedure Laterality Date     AS PARTIAL HIP REPLACEMENT       BENCH LIVER N/A 3/23/2023    Procedure: Bench liver;  Surgeon: Aniket Arzate MD;  Location: [facility]     IR PARACENTESIS  2022     TRANSPLANT LIVER RECIPIENT  DONOR N/A 3/23/2023    Procedure: Transplant liver recipient  donor;  Surgeon: Aniket Arzate MD;  Location: [facility]     MEDICATIONS:  Prescription Medications as of 2024         Rx Number Disp Refills Start End Last Dispensed Date Next Fill Date Owning Pharmacy    aspirin (ASA) 325 MG tablet  90 tablet 3 4/3/2023 --   [facility] - [place], [place] - [facility] [address]    Sig: Take 1 tablet (325 mg) by mouth daily    Class: E-Prescribe    Route: Oral    calcium carbonate-vitamin D (CALTRATE) 600-10 MG-MCG per tablet  180 tablet 3 3/15/2024 --   [facility] - [place] [address] [address]    Sig: Take 1 tablet by mouth 2 times daily (with meals)    Class: E-Prescribe    Route: Oral    folic acid (FOLVITE) 1 MG tablet  90 tablet 1 4/3/2023 --   [facility] - [place] [address] [address]    Sig: Take 1 tablet (1,000 mcg) by mouth daily    Class: E-Prescribe    Route: Oral    magnesium oxide (MAG-OX) 400 MG tablet  360 tablet 3 3/28/2024 --   [facility] - [place] [address] [address]    Sig: Take 1 tablet (400 mg) by mouth 2 times daily    Class: E-Prescribe    Route: Oral    metoprolol tartrate (LOPRESSOR) 25 MG tablet  180 tablet 0 8/22/2024 --   [id number] - [place], [place] - [address] AT [facility] [address]    Sig: Take 1 tablet (25 mg) by mouth 2 times daily.    Class: E-Prescribe    Route: Oral    Multiple Vitamins-Minerals (SPECTRAVITE) TABS  -- -- 4/13/2023 --       Sig: Take 1 tablet by mouth daily. Taking a different brand of multi vitamins    Class: Historical    Route: Oral    tacrolimus (GENERIC EQUIVALENT) 0.5 MG capsule  90 capsule 3 6/5/2024 --   [facility] - [place] [address] [address]    Sig: Take 1 capsule (0.5 mg) by mouth every morning Total dose: 1.5mg am, 1 mg pm.    Class: E-Prescribe    Notes to Pharmacy: TXP DT 3/23/2023 (Liver) TXP Dischg DT 3/29/2023 DX Liver replaced by transplant Z94.4 TX Center [facility] ([place], [place])    Route: Oral    tacrolimus (GENERIC EQUIVALENT) 1 MG capsule  180 capsule 3 6/5/2024 --   [facility] - [place] [address] [address]    Sig: Take 1 capsule (1 mg) by  mouth every 12 hours    Class: E-Prescribe    Notes to Pharmacy: TXP DT 3/23/2023 (Liver) TXP Dischg DT 3/29/2023 DX Liver replaced by transplant Z94.4 TX Center [facility] ([place], [place])    Route: Oral           ALLERGIES:    No Known Allergies  REVIEW OF SYSTEMS:    A comprehensive review of systems was performed and found to be negative except as described here or above.  SOCIAL HISTORY:   Social History     Socioeconomic History     Marital status: Single     Spouse name: Not on file     Number of children: Not on file     Years of education: Not on file     Highest education level: Not on file   Occupational History     Not on file   Tobacco Use     Smoking status: Never     Smokeless tobacco: Never   Vaping Use     Vaping status: Never Used   Substance and Sexual Activity     Alcohol use: Not Currently     Comment: last ETOH was 7/28/22.     Drug use: No     Sexual activity: Not on file   Other Topics Concern     Not on file   Social History Narrative    Lives with family.      Social Determinants of Health     Financial Resource Strain: Not At Risk (5/20/2024)    Received from [email or web address]    Financial Resource Strain      Is it hard for you to pay for the very basics like food, housing, medical care or heating?: No   Food Insecurity: Not At Risk (5/20/2024)    Received from [email or web address]    Food Insecurity      Does your food run out before you have the money to buy more?: No   Transportation Needs: Not At Risk (5/20/2024)    Received from [email or web address]    Transportation Needs      Does a lack of transportation keep you from your medical appointments or from getting your medications?: No   Physical Activity: Not on file   Stress: Not on file   Social Connections: Not on file   Interpersonal Safety: Not on file   Housing Stability: Not on file     FAMILY MEDICAL HISTORY:   Family History   Problem Relation Age of Onset     Lung Cancer Mother      Cancer Mother       Hypertension Father      Cancer Brother      No Known Problems Brother      No Known Problems Sister      Cancer Sister      No Known Problems Sister      No Known Problems Sister      Thyroid nodules Son      No Known Problems Daughter      No Known Problems Daughter      PHYSICAL EXAM:   /80 (BP Location: Right arm, Patient Position: Sitting, Cuff Size: Adult Regular)   Pulse 77   Temp 98  F (36.7  C) (Oral)   Wt 71.6 kg (157 lb 14.4 oz)   SpO2 96%   BMI 24.73 kg/m    GENERAL APPEARANCE: alert and no distress  EYES: nonicteric  HENT: mouth without ulcers or lesions  NECK: supple, no adenopathy  RESP: lungs clear to auscultation   CV: Irregularly irregular rhythm, normal rate, no rub  ABDOMEN: soft, nontender, normal bowel sounds, no HSM   Extremities: no clubbing, cyanosis, or edema  MS: no evidence of inflammation in joints, no muscle tenderness  SKIN: no rash  NEURO: mentation intact and speech normal  PSYCH: affect normal/bright   LABS:   Recent Results (from the past 672 hour(s))   CBC with platelets    Collection Time: 08/26/24  2:48 PM   Result Value Ref Range    WBC Count 6.4 4.0 - 11.0 10e3/uL    RBC Count 5.41 4.40 - 5.90 10e6/uL    Hemoglobin 14.9 13.3 - 17.7 g/dL    Hematocrit 44.9 40.0 - 53.0 %    MCV 83 78 - 100 fL    MCH 27.5 26.5 - 33.0 pg    MCHC 33.2 31.5 - 36.5 g/dL    RDW 14.1 10.0 - 15.0 %    Platelet Count 148 (L) 150 - 450 10e3/uL   Magnesium    Collection Time: 08/26/24  2:48 PM   Result Value Ref Range    Magnesium 1.7 1.7 - 2.3 mg/dL   Renal panel    Collection Time: 08/26/24  2:48 PM   Result Value Ref Range    Sodium 138 135 - 145 mmol/L    Potassium 4.8 3.4 - 5.3 mmol/L    Chloride 102 98 - 107 mmol/L    Carbon Dioxide (CO2) 24 22 - 29 mmol/L    Anion Gap 12 7 - 15 mmol/L    Glucose 102 (H) 70 - 99 mg/dL    Urea Nitrogen 27.1 (H) 8.0 - 23.0 mg/dL    Creatinine 1.47 (H) 0.67 - 1.17 mg/dL    GFR Estimate 54 (L) >60 mL/min/1.73m2    Calcium 9.9 8.8 - 10.4 mg/dL    Albumin 4.3  3.5 - 5.2 g/dL    Phosphorus 2.9 2.5 - 4.5 mg/dL   Alkaline phosphatase    Collection Time: 08/26/24  2:48 PM   Result Value Ref Range    Alkaline Phosphatase 53 40 - 150 U/L   ALT    Collection Time: 08/26/24  2:48 PM   Result Value Ref Range    ALT 21 0 - 70 U/L   AST    Collection Time: 08/26/24  2:48 PM   Result Value Ref Range    AST 19 0 - 45 U/L   Bilirubin  total    Collection Time: 08/26/24  2:48 PM   Result Value Ref Range    Bilirubin Total 1.0 <=1.2 mg/dL   Bilirubin direct    Collection Time: 08/26/24  2:48 PM   Result Value Ref Range    Bilirubin Direct 0.24 0.00 - 0.30 mg/dL   Protein total    Collection Time: 08/26/24  2:48 PM   Result Value Ref Range    Protein Total 7.5 6.4 - 8.3 g/dL   Protein  random urine    Collection Time: 08/26/24  2:56 PM   Result Value Ref Range    Total Protein Urine mg/dL 11.4   mg/dL    Total Protein Urine mg/mg Creat 0.08 0.00 - 0.20 mg/mg Cr    Creatinine Urine mg/dL 148.0 mg/dL   UA with Microscopic    Collection Time: 08/26/24  2:56 PM   Result Value Ref Range    Color Urine Yellow Colorless, Straw, Light Yellow, Yellow    Appearance Urine Clear Clear    Glucose Urine Negative Negative mg/dL    Bilirubin Urine Negative Negative    Ketones Urine Negative Negative mg/dL    Specific Gravity Urine 1.021 1.003 - 1.035    Blood Urine Negative Negative    pH Urine 7.0 5.0 - 7.0    Protein Albumin Urine 10 (A) Negative mg/dL    Urobilinogen Urine 2.0 Normal, 2.0 mg/dL    Nitrite Urine Negative Negative    Leukocyte Esterase Urine Negative Negative    Mucus Urine Present (A) None Seen /LPF    RBC Urine 1 <=2 /HPF    WBC Urine <1 <=5 /HPF     CMP  Recent Labs   Lab Test 08/26/24  1448 06/04/24  0854 05/17/24  0759 03/29/24  0828 02/23/24  1127 12/27/23  0713 11/27/23  0901    139 141  --  140 140 139   POTASSIUM 4.8 4.9 4.8  --  4.5 4.8 5.1   CHLORIDE 102 106 108*  --  104 107 104   CO2 24 23 22  --  24 23 24   ANIONGAP 12 10 11  --  12 10 11   * 105* 111*  --   114* 105* 107*   BUN 27.1* 29.4* 24.5*  --  31.1* 21.5 21.8   CR 1.47* 1.40* 1.59* 1.44* 1.47* 1.50* 1.52*   GFRESTIMATED 54* 57* 49* 55* 54* 53* 52*   PEDRO 9.9 9.3 8.9 9.3 9.8 9.2 9.7   MAG 1.7  --  1.7  --  1.8 1.7 1.7   PHOS 2.9  --   --   --  2.7 2.8 3.0   PROTTOTAL 7.5 7.0 6.9  --  7.4 7.0 7.3   ALBUMIN 4.3 4.2 4.0 4.1 4.2 4.0 4.1   BILITOTAL 1.0 0.6 0.7  --  0.7 0.6 0.6   ALKPHOS 53 50 47  --  67 54 56   AST 19 17 16  --  23 22 20   ALT 21 19 13  --  22 23 26     CBC  Recent Labs   Lab Test 08/26/24  1448 05/17/24  0759 02/23/24  1127 12/27/23  0713   HGB 14.9 14.1 14.7 13.4   WBC 6.4 5.6 5.8 5.3   RBC 5.41 5.10 5.25 4.85   HCT 44.9 43.1 43.2 40.8   MCV 83 85 82 84   MCH 27.5 27.6 28.0 27.6   MCHC 33.2 32.7 34.0 32.8   RDW 14.1 14.3 14.4 14.6   * 143* 150 164     INR  Recent Labs   Lab Test 02/23/24  1127 12/27/23  0721 11/27/23  0901 10/27/23  1025 03/25/23  1117 03/24/23  1513 03/24/23  1220 03/24/23  0833 03/24/23  0407   INR 1.13 1.05 1.08 1.02   < > 1.41* 1.49* 1.52* 1.44*   PTT  --   --   --   --   --  27 28 30 32    < > = values in this interval not displayed.     ABG  Recent Labs   Lab Test 03/24/23  0549 03/24/23  0159 03/23/23  2208 03/23/23  1829   PH 7.44 7.41 7.44 7.45   PCO2 41 46* 37 38   PO2 92 89 100 101   HCO3 28 29* 25 26   O2PER 28 28 21 2      URINE STUDIES  Recent Labs   Lab Test 08/26/24  1456 02/23/24  1129 09/20/23  1025 12/20/22  0923   COLOR Yellow Yellow Light Yellow Yellow   APPEARANCE Clear Clear Clear Clear   URINEGLC Negative Negative Negative Negative   URINEBILI Negative Negative Negative Negative   URINEKETONE Negative Negative Negative Negative   SG 1.021 1.024 1.012 1.018   UBLD Negative Negative Negative Negative   URINEPH 7.0 5.0 5.5 6.0   PROTEIN 10* Negative Negative Negative   NITRITE Negative Negative Negative Negative   LEUKEST Negative Negative Negative Negative   RBCU 1  --  <1  --    WBCU <1  --  0  --      No lab results found.    ASSESSMENT AND PLAN:    #CKD stage 3a  eGFR around 55  uPCR is 0.08 on 8/26/2024  The renal ultrasound done on 10/23/2023 was benign  The potential responsible factors include cirrhosis, liver transplant, NSAID use, tacrolimus toxicity.  No documented current intake of NSAIDs or nephrotoxic medications other than his tacrolimus.   The patient was also instructed to maintain a healthy weight,  keep the sodium intake around 2400 mg /day, follow a plant-based diet and to avoid NSAIDs     #Monitoring for HTN  Patient states he has a questionable past history of hypertension.  Only current medication for this is metoprolol 25 mg twice daily which he is primarily on for A-fib.  Does not measure blood pressures at home.  The patient was instructed to keep a BP diary and to communicate the results.  Was given instructions on properly obtaining blood pressure as well as a list of validated blood pressure cuffs    #Monitoring for Type 2 DM   Hba1c 4.7 on 3/23/2023  Blood sugars regularly less than 120 and labs    #CVD/dyslipidemia  Not currently on a statin     #Blood count  Hemoglobin 14.9  No recent iron sat or ferritin.    #Acid-base status  CO2 level 24  No need for sodium bicarbonate supplementation    #Electrolytes  Na 138  K 4.8  Mg 1.7  Would ideally keep potassium >4 and magnesium  >2 due to history of A-fib.  Recommend increasing from 800 mg a day to 1000 mg a day of magnesium supplement.  No acute issues    #BMD  Calcium       9.9    phosphorus 2.9 albumin 4.3  Most recent values from 5/8/2023 PTH 25 Vitamin D level 46    #CKD journey/transplant  Likely not a candidate at this time    The total time of this encounter amounted to 0 minutes on the day of the encounter. This time included time spent with the patient, reviewing records, ordering tests, and performing post visit documentation.   Labs ordered include: CBC with diff, Renal Panel, CMP, UA with microscopy, UPCR, UACR, vitamin D levels, iPTH levels    The patient will return to  follow up in     Esa Urban MD, PGY-1  August 26, 2024  3:29 PM     Attestation signed by Rachel Gaitan MD at 8/27/2024 12:35 PM:  Physician Attestation   Rachel NAVARRETE, saw and evaluated Cricket Mane with Resident Dr Urban. I have reviewed and discussed with the Resident their history, physical and plan.    I personally reviewed the vital signs, medications, labs, and imaging.    In brief, non proteinuric CKD s/p liver transplant likely secondary to pre-op and nicole-op episodes of FERNANDA leading to residual fibrosis along with tacrolimus toxicity and possibly uncontrolled BP.    Rest per the resident's note.     I personally spent 40 minutes on the date of encounter for chart review, history taking, physical exam, labs and notes reviewed, advised and coordinating care.     Rachel Gaitan MD  Division of Nephrology and Hypertension  Pager [phone]  Date of Service (when I saw the patient): August 27, 2024       Again, thank you for allowing me to participate in the care of your patient.      Sincerely,    Rachel Gaitan MD

## 2024-08-26 NOTE — PROGRESS NOTES
Nephrology Clinic    Cricket Mane MRN:[id number] YOB: 1960  Date of Service: 08/26/2024  Primary care provider: Jer Stanley  Requesting physician:       REASON FOR CONSULT:     HISTORY OF PRESENT ILLNESS:   Cricket Mane is a 64 year old male who presents for evaluation of CKD stage 3a  The past medical history is significant for alcoholic cirrhosis status post liver transplant on 3/23/2023, A-fib, and hyperlipidemia.    Patient has a history of alcoholic cirrhosis and had a liver transplantation on 3/23/2023.  Subsequently was found to have CKD stage 3a with creatinine of 1.5 and EGFR of 51.  Prior to that had normal creatinine and EGFR testing.  It is likely that he had some component of CKD, in association with his cirrhosis\, prior to his formal diagnosis and the creatinine was underestimated due to low nutritional and muscle mass state associated with cirrhosis.  He also likely had some kidney damage from the surgery and subsequent NSAID use, as well as tacrolimus toxicity.  Patient had a tacrolimus level of 5.4 on 6/4, goal of 3-5, dose was 2 mg in a.m. and 1 mg in p.m. subsequently had dose decreased to 1.5 mg in a.m. and 1 mg in p.m. was on aspirin 325 for prophylaxis of hepatic artery thrombosis after his transplant, this was stopped in May 2024.  Patient has continued to take 81 mg aspirin for comfort.    Patient had tacrolimus level drawn today but it is not resulted.  Regardless it will be inaccurate because he took his tacrolimus this morning.    From a renal standpoint, the patient has had CKD since at least March 2023  The baseline creatinine level was  0.9-1.1 until transplant at which point it increased to 1.4-1.6.  No Cystatin C values on record  There has never been evidence of proteinuria with a uPCR of 0.08 today (8/26/2024)   A CT scan of the abdomen and pelvis done on       3/22/2023   showed unremarkable kidneys  A kidney ultrasound  done on        10/23/2023   was  benign  Patient has no history of hypertension  The blood pressure is normotensive, today is 111/80  Patient has no history of diabetes.  Last A1c from 3/23/2023 was 4.7.  Glucose has persistently been under 120  The patient denies any dysuria, any pollakiuria, any nocturia, any LE edema, any dyspnea on exertion, orthopnea, fatigue, chest pain, abdominal pain, NVD.  The patient denies ever having kidney stones, urinary tract infections, gross hematuria. There is no family history of CKD.  The following portions of the patient's history were reviewed and updated as appropriate: allergies, current medications, past family history, past medical history, past social history, past surgical history and problem list.    PAST MEDICAL HISTORY:  Past Medical History:   Diagnosis Date    Alcoholic cirrhosis of liver with ascites (H)     Atrial fibrillation (H)     Coronary artery disease     History of alcohol use disorder     History of esophageal varices with bleeding     Hyperlipidemia      PAST SURGICAL HISTORY:  Past Surgical History:   Procedure Laterality Date    AS PARTIAL HIP REPLACEMENT      BENCH LIVER N/A 3/23/2023    Procedure: Bench liver;  Surgeon: Aniket Arzate MD;  Location: [facility] OR    IR PARACENTESIS  2022    TRANSPLANT LIVER RECIPIENT  DONOR N/A 3/23/2023    Procedure: Transplant liver recipient  donor;  Surgeon: Aniket Arzate MD;  Location: [facility] OR     MEDICATIONS:  Prescription Medications as of 2024         Rx Number Disp Refills Start End Last Dispensed Date Next Fill Date Owning Pharmacy    aspirin (ASA) 325 MG tablet  90 tablet 3 4/3/2023 --   [facility] [place] [id number] [address]    Sig: Take 1 tablet (325 mg) by mouth daily    Class: E-Prescribe    Route: Oral    calcium carbonate-vitamin D (CALTRATE) 600-10 MG-MCG per tablet  180 tablet 3 3/15/2024 --   [facility] [place] [id number] [address]    Sig: Take 1  tablet by mouth 2 times daily (with meals)    Class: E-Prescribe    Route: Oral    folic acid (FOLVITE) 1 MG tablet  90 tablet 1 4/3/2023 --   [facility] - [place], [place] - [facility] [address]    Sig: Take 1 tablet (1,000 mcg) by mouth daily    Class: E-Prescribe    Route: Oral    magnesium oxide (MAG-OX) 400 MG tablet  360 tablet 3 3/28/2024 --   [facility] - [place] - [facility] [address]    Sig: Take 1 tablet (400 mg) by mouth 2 times daily    Class: E-Prescribe    Route: Oral    metoprolol tartrate (LOPRESSOR) 25 MG tablet  180 tablet 0 8/22/2024 --   [id number] - [place], [place] - [address] AT [facility] [address]    Sig: Take 1 tablet (25 mg) by mouth 2 times daily.    Class: E-Prescribe    Route: Oral    Multiple Vitamins-Minerals (SPECTRAVITE) TABS  -- -- 4/13/2023 --       Sig: Take 1 tablet by mouth daily. Taking a different brand of multi vitamins    Class: Historical    Route: Oral    tacrolimus (GENERIC EQUIVALENT) 0.5 MG capsule  90 capsule 3 6/5/2024 --   [facility] - [place] - [facility] [address]    Sig: Take 1 capsule (0.5 mg) by mouth every morning Total dose: 1.5mg am, 1 mg pm.    Class: E-Prescribe    Notes to Pharmacy: TXP DT 3/23/2023 (Liver) TXP Dischg DT 3/29/2023 DX Liver replaced by transplant Z94.4 TX [facility] ([place], [place])    Route: Oral    tacrolimus (GENERIC EQUIVALENT) 1 MG capsule  180 capsule 3 6/5/2024 --   [facility] - [place], [place] - [address]    Sig: Take 1 capsule (1 mg) by mouth every 12 hours    Class: E-Prescribe    Notes to Pharmacy: TXP DT 3/23/2023 (Liver) TXP Dischg DT 3/29/2023 DX Liver replaced by transplant Z94.4 TX [facility] ([place], [place])    Route: Oral           ALLERGIES:    No Known Allergies  REVIEW OF SYSTEMS:    A comprehensive  review of systems was performed and found to be negative except as described here or above.  SOCIAL HISTORY:   Social History     Socioeconomic History    Marital status: Single     Spouse name: Not on file    Number of children: Not on file    Years of education: Not on file    Highest education level: Not on file   Occupational History    Not on file   Tobacco Use    Smoking status: Never    Smokeless tobacco: Never   Vaping Use    Vaping status: Never Used   Substance and Sexual Activity    Alcohol use: Not Currently     Comment: last ETOH was 7/28/22.    Drug use: No    Sexual activity: Not on file   Other Topics Concern    Not on file   Social History Narrative    Lives with family.      Social Determinants of Health     Financial Resource Strain: Not At Risk (5/20/2024)    Received from [facility]    Financial Resource Strain     Is it hard for you to pay for the very basics like food, housing, medical care or heating?: No   Food Insecurity: Not At Risk (5/20/2024)    Received from [facility]    Food Insecurity     Does your food run out before you have the money to buy more?: No   Transportation Needs: Not At Risk (5/20/2024)    Received from [facility]    Transportation Needs     Does a lack of transportation keep you from your medical appointments or from getting your medications?: No   Physical Activity: Not on file   Stress: Not on file   Social Connections: Not on file   Interpersonal Safety: Not on file   Housing Stability: Not on file     FAMILY MEDICAL HISTORY:   Family History   Problem Relation Age of Onset    Lung Cancer Mother     Cancer Mother     Hypertension Father     Cancer Brother     No Known Problems Brother     No Known Problems Sister     Cancer Sister     No Known Problems Sister     No Known Problems Sister     Thyroid nodules Son     No Known Problems Daughter     No Known Problems Daughter      PHYSICAL EXAM:   /80 (BP Location: Right arm, Patient Position: Sitting,  Cuff Size: Adult Regular)   Pulse 77   Temp 98  F (36.7  C) (Oral)   Wt 71.6 kg (157 lb 14.4 oz)   SpO2 96%   BMI 24.73 kg/m    GENERAL APPEARANCE: alert and no distress  EYES: nonicteric  HENT: mouth without ulcers or lesions  NECK: supple, no adenopathy  RESP: lungs clear to auscultation   CV: Irregularly irregular rhythm, normal rate, no rub  ABDOMEN: soft, nontender, normal bowel sounds, no HSM   Extremities: no clubbing, cyanosis, or edema  MS: no evidence of inflammation in joints, no muscle tenderness  SKIN: no rash  NEURO: mentation intact and speech normal  PSYCH: affect normal/bright   LABS:   Recent Results (from the past 672 hour(s))   CBC with platelets    Collection Time: 08/26/24  2:48 PM   Result Value Ref Range    WBC Count 6.4 4.0 - 11.0 10e3/uL    RBC Count 5.41 4.40 - 5.90 10e6/uL    Hemoglobin 14.9 13.3 - 17.7 g/dL    Hematocrit 44.9 40.0 - 53.0 %    MCV 83 78 - 100 fL    MCH 27.5 26.5 - 33.0 pg    MCHC 33.2 31.5 - 36.5 g/dL    RDW 14.1 10.0 - 15.0 %    Platelet Count 148 (L) 150 - 450 10e3/uL   Magnesium    Collection Time: 08/26/24  2:48 PM   Result Value Ref Range    Magnesium 1.7 1.7 - 2.3 mg/dL   Renal panel    Collection Time: 08/26/24  2:48 PM   Result Value Ref Range    Sodium 138 135 - 145 mmol/L    Potassium 4.8 3.4 - 5.3 mmol/L    Chloride 102 98 - 107 mmol/L    Carbon Dioxide (CO2) 24 22 - 29 mmol/L    Anion Gap 12 7 - 15 mmol/L    Glucose 102 (H) 70 - 99 mg/dL    Urea Nitrogen 27.1 (H) 8.0 - 23.0 mg/dL    Creatinine 1.47 (H) 0.67 - 1.17 mg/dL    GFR Estimate 54 (L) >60 mL/min/1.73m2    Calcium 9.9 8.8 - 10.4 mg/dL    Albumin 4.3 3.5 - 5.2 g/dL    Phosphorus 2.9 2.5 - 4.5 mg/dL   Alkaline phosphatase    Collection Time: 08/26/24  2:48 PM   Result Value Ref Range    Alkaline Phosphatase 53 40 - 150 U/L   ALT    Collection Time: 08/26/24  2:48 PM   Result Value Ref Range    ALT 21 0 - 70 U/L   AST    Collection Time: 08/26/24  2:48 PM   Result Value Ref Range    AST 19 0 - 45 U/L    Bilirubin  total    Collection Time: 08/26/24  2:48 PM   Result Value Ref Range    Bilirubin Total 1.0 <=1.2 mg/dL   Bilirubin direct    Collection Time: 08/26/24  2:48 PM   Result Value Ref Range    Bilirubin Direct 0.24 0.00 - 0.30 mg/dL   Protein total    Collection Time: 08/26/24  2:48 PM   Result Value Ref Range    Protein Total 7.5 6.4 - 8.3 g/dL   Protein  random urine    Collection Time: 08/26/24  2:56 PM   Result Value Ref Range    Total Protein Urine mg/dL 11.4   mg/dL    Total Protein Urine mg/mg Creat 0.08 0.00 - 0.20 mg/mg Cr    Creatinine Urine mg/dL 148.0 mg/dL   UA with Microscopic    Collection Time: 08/26/24  2:56 PM   Result Value Ref Range    Color Urine Yellow Colorless, Straw, Light Yellow, Yellow    Appearance Urine Clear Clear    Glucose Urine Negative Negative mg/dL    Bilirubin Urine Negative Negative    Ketones Urine Negative Negative mg/dL    Specific Gravity Urine 1.021 1.003 - 1.035    Blood Urine Negative Negative    pH Urine 7.0 5.0 - 7.0    Protein Albumin Urine 10 (A) Negative mg/dL    Urobilinogen Urine 2.0 Normal, 2.0 mg/dL    Nitrite Urine Negative Negative    Leukocyte Esterase Urine Negative Negative    Mucus Urine Present (A) None Seen /LPF    RBC Urine 1 <=2 /HPF    WBC Urine <1 <=5 /HPF     CMP  Recent Labs   Lab Test 08/26/24  1448 06/04/24  0854 05/17/24  0759 03/29/24  0828 02/23/24  1127 12/27/23  0713 11/27/23  0901    139 141  --  140 140 139   POTASSIUM 4.8 4.9 4.8  --  4.5 4.8 5.1   CHLORIDE 102 106 108*  --  104 107 104   CO2 24 23 22  --  24 23 24   ANIONGAP 12 10 11  --  12 10 11   * 105* 111*  --  114* 105* 107*   BUN 27.1* 29.4* 24.5*  --  31.1* 21.5 21.8   CR 1.47* 1.40* 1.59* 1.44* 1.47* 1.50* 1.52*   GFRESTIMATED 54* 57* 49* 55* 54* 53* 52*   PEDRO 9.9 9.3 8.9 9.3 9.8 9.2 9.7   MAG 1.7  --  1.7  --  1.8 1.7 1.7   PHOS 2.9  --   --   --  2.7 2.8 3.0   PROTTOTAL 7.5 7.0 6.9  --  7.4 7.0 7.3   ALBUMIN 4.3 4.2 4.0 4.1 4.2 4.0 4.1   BILITOTAL 1.0 0.6  0.7  --  0.7 0.6 0.6   ALKPHOS 53 50 47  --  67 54 56   AST 19 17 16  --  23 22 20   ALT 21 19 13  --  22 23 26     CBC  Recent Labs   Lab Test 08/26/24  1448 05/17/24  0759 02/23/24  1127 12/27/23  0713   HGB 14.9 14.1 14.7 13.4   WBC 6.4 5.6 5.8 5.3   RBC 5.41 5.10 5.25 4.85   HCT 44.9 43.1 43.2 40.8   MCV 83 85 82 84   MCH 27.5 27.6 28.0 27.6   MCHC 33.2 32.7 34.0 32.8   RDW 14.1 14.3 14.4 14.6   * 143* 150 164     INR  Recent Labs   Lab Test 02/23/24  1127 12/27/23  0721 11/27/23  0901 10/27/23  1025 03/25/23  1117 03/24/23  1513 03/24/23  1220 03/24/23  0833 03/24/23  0407   INR 1.13 1.05 1.08 1.02   < > 1.41* 1.49* 1.52* 1.44*   PTT  --   --   --   --   --  27 28 30 32    < > = values in this interval not displayed.     ABG  Recent Labs   Lab Test 03/24/23  0549 03/24/23  0159 03/23/23  2208 03/23/23  1829   PH 7.44 7.41 7.44 7.45   PCO2 41 46* 37 38   PO2 92 89 100 101   HCO3 28 29* 25 26   O2PER 28 28 21 2      URINE STUDIES  Recent Labs   Lab Test 08/26/24  1456 02/23/24  1129 09/20/23  1025 12/20/22  0923   COLOR Yellow Yellow Light Yellow Yellow   APPEARANCE Clear Clear Clear Clear   URINEGLC Negative Negative Negative Negative   URINEBILI Negative Negative Negative Negative   URINEKETONE Negative Negative Negative Negative   SG 1.021 1.024 1.012 1.018   UBLD Negative Negative Negative Negative   URINEPH 7.0 5.0 5.5 6.0   PROTEIN 10* Negative Negative Negative   NITRITE Negative Negative Negative Negative   LEUKEST Negative Negative Negative Negative   RBCU 1  --  <1  --    WBCU <1  --  0  --      No lab results found.    ASSESSMENT AND PLAN:   #CKD stage 3a  eGFR around 55  uPCR is 0.08 on 8/26/2024  The renal ultrasound done on 10/23/2023 was benign  The potential responsible factors include cirrhosis, liver transplant, NSAID use, tacrolimus toxicity.  No documented current intake of NSAIDs or nephrotoxic medications other than his tacrolimus.   The patient was also instructed to maintain a  healthy weight,  keep the sodium intake around 2400 mg /day, follow a plant-based diet and to avoid NSAIDs     #Monitoring for HTN  Patient states he has a questionable past history of hypertension.  Only current medication for this is metoprolol 25 mg twice daily which he is primarily on for A-fib.  Does not measure blood pressures at home.  The patient was instructed to keep a BP diary and to communicate the results.  Was given instructions on properly obtaining blood pressure as well as a list of validated blood pressure cuffs    #Monitoring for Type 2 DM   Hba1c 4.7 on 3/23/2023  Blood sugars regularly less than 120 and labs    #CVD/dyslipidemia  Not currently on a statin     #Blood count  Hemoglobin 14.9  No recent iron sat or ferritin.    #Acid-base status  CO2 level 24  No need for sodium bicarbonate supplementation    #Electrolytes  Na 138  K 4.8  Mg 1.7  Would ideally keep potassium >4 and magnesium  >2 due to history of A-fib.  Recommend increasing from 800 mg a day to 1000 mg a day of magnesium supplement.  No acute issues    #BMD  Calcium       9.9    phosphorus 2.9 albumin 4.3  Most recent values from 5/8/2023 PTH 25 Vitamin D level 46    #CKD journey/transplant  Likely not a candidate at this time    The total time of this encounter amounted to 0 minutes on the day of the encounter. This time included time spent with the patient, reviewing records, ordering tests, and performing post visit documentation.   Labs ordered include: CBC with diff, Renal Panel, CMP, UA with microscopy, UPCR, UACR, vitamin D levels, iPTH levels    The patient will return to follow up in     Esa Urban MD, PGY-1  August 26, 2024  3:29 PM

## 2024-08-26 NOTE — TELEPHONE ENCOUNTER
Patient Call: General  Route to LPN    Reason for call: Pt called to speak to coordinator. He is coming in for labs today 8/26 and would like his usual transplant labs done as well. Please send orders for tacrolimus and usual transplant labs ASAP    Call back needed? No

## 2024-08-27 DIAGNOSIS — Z94.4 LIVER REPLACED BY TRANSPLANT (H): Primary | ICD-10-CM

## 2024-09-11 DIAGNOSIS — N18.31 STAGE 3A CHRONIC KIDNEY DISEASE (H): ICD-10-CM

## 2024-09-11 DIAGNOSIS — M81.0 AGE-RELATED OSTEOPOROSIS WITHOUT CURRENT PATHOLOGICAL FRACTURE: Primary | ICD-10-CM

## 2024-09-12 ASSESSMENT — ENCOUNTER SYMPTOMS: NEW SYMPTOMS OF CORONARY ARTERY DISEASE: 0

## 2024-09-18 DIAGNOSIS — Z94.4 LIVER REPLACED BY TRANSPLANT (H): ICD-10-CM

## 2024-09-18 RX ORDER — TACROLIMUS 0.5 MG/1
0.5 CAPSULE ORAL EVERY MORNING
Qty: 90 CAPSULE | Refills: 3 | Status: SHIPPED | OUTPATIENT
Start: 2024-09-18

## 2024-09-18 RX ORDER — TACROLIMUS 0.5 MG/1
CAPSULE ORAL
Qty: 90 CAPSULE | Refills: 3 | OUTPATIENT
Start: 2024-09-18

## 2024-09-30 ENCOUNTER — LAB (OUTPATIENT)
Dept: INFUSION THERAPY | Facility: HOSPITAL | Age: 64
End: 2024-09-30
Attending: INTERNAL MEDICINE
Payer: COMMERCIAL

## 2024-09-30 DIAGNOSIS — N18.31 STAGE 3A CHRONIC KIDNEY DISEASE (H): Primary | ICD-10-CM

## 2024-09-30 DIAGNOSIS — M81.0 AGE-RELATED OSTEOPOROSIS WITHOUT CURRENT PATHOLOGICAL FRACTURE: ICD-10-CM

## 2024-09-30 DIAGNOSIS — M81.0 AGE-RELATED OSTEOPOROSIS WITHOUT CURRENT PATHOLOGICAL FRACTURE: Primary | ICD-10-CM

## 2024-09-30 DIAGNOSIS — N18.31 STAGE 3A CHRONIC KIDNEY DISEASE (H): ICD-10-CM

## 2024-09-30 LAB
ALBUMIN SERPL BCG-MCNC: 4.2 G/DL (ref 3.5–5.2)
CALCIUM SERPL-MCNC: 9.7 MG/DL (ref 8.8–10.4)
CREAT SERPL-MCNC: 1.59 MG/DL (ref 0.67–1.17)
EGFRCR SERPLBLD CKD-EPI 2021: 49 ML/MIN/1.73M2

## 2024-09-30 PROCEDURE — 82565 ASSAY OF CREATININE: CPT | Performed by: INTERNAL MEDICINE

## 2024-09-30 PROCEDURE — 82310 ASSAY OF CALCIUM: CPT | Performed by: INTERNAL MEDICINE

## 2024-09-30 PROCEDURE — 36415 COLL VENOUS BLD VENIPUNCTURE: CPT | Performed by: INTERNAL MEDICINE

## 2024-09-30 PROCEDURE — 96372 THER/PROPH/DIAG INJ SC/IM: CPT | Performed by: INTERNAL MEDICINE

## 2024-09-30 PROCEDURE — 82040 ASSAY OF SERUM ALBUMIN: CPT | Performed by: INTERNAL MEDICINE

## 2024-09-30 PROCEDURE — 250N000011 HC RX IP 250 OP 636: Performed by: INTERNAL MEDICINE

## 2024-09-30 RX ORDER — ALBUTEROL SULFATE 90 UG/1
1-2 AEROSOL, METERED RESPIRATORY (INHALATION)
Status: CANCELLED
Start: 2025-03-24

## 2024-09-30 RX ORDER — DIPHENHYDRAMINE HYDROCHLORIDE 50 MG/ML
50 INJECTION INTRAMUSCULAR; INTRAVENOUS
Status: CANCELLED
Start: 2025-03-24

## 2024-09-30 RX ORDER — ALBUTEROL SULFATE 0.83 MG/ML
2.5 SOLUTION RESPIRATORY (INHALATION)
OUTPATIENT
Start: 2025-03-26

## 2024-09-30 RX ORDER — ALBUTEROL SULFATE 90 UG/1
1-2 AEROSOL, METERED RESPIRATORY (INHALATION)
Start: 2025-03-26

## 2024-09-30 RX ORDER — ALBUTEROL SULFATE 0.83 MG/ML
2.5 SOLUTION RESPIRATORY (INHALATION)
Status: CANCELLED | OUTPATIENT
Start: 2025-03-24

## 2024-09-30 RX ORDER — ALBUTEROL SULFATE 0.83 MG/ML
2.5 SOLUTION RESPIRATORY (INHALATION)
Status: DISCONTINUED | OUTPATIENT
Start: 2024-09-30 | End: 2024-09-30 | Stop reason: HOSPADM

## 2024-09-30 RX ORDER — DIPHENHYDRAMINE HYDROCHLORIDE 50 MG/ML
50 INJECTION INTRAMUSCULAR; INTRAVENOUS
Start: 2025-03-26

## 2024-09-30 RX ORDER — EPINEPHRINE 1 MG/ML
0.3 INJECTION, SOLUTION INTRAMUSCULAR; SUBCUTANEOUS EVERY 5 MIN PRN
Status: DISCONTINUED | OUTPATIENT
Start: 2024-09-30 | End: 2024-09-30 | Stop reason: HOSPADM

## 2024-09-30 RX ORDER — METHYLPREDNISOLONE SODIUM SUCCINATE 125 MG/2ML
125 INJECTION, POWDER, LYOPHILIZED, FOR SOLUTION INTRAMUSCULAR; INTRAVENOUS
Status: DISCONTINUED | OUTPATIENT
Start: 2024-09-30 | End: 2024-09-30 | Stop reason: HOSPADM

## 2024-09-30 RX ORDER — METHYLPREDNISOLONE SODIUM SUCCINATE 125 MG/2ML
125 INJECTION, POWDER, LYOPHILIZED, FOR SOLUTION INTRAMUSCULAR; INTRAVENOUS
Start: 2025-03-26

## 2024-09-30 RX ORDER — METHYLPREDNISOLONE SODIUM SUCCINATE 125 MG/2ML
125 INJECTION, POWDER, LYOPHILIZED, FOR SOLUTION INTRAMUSCULAR; INTRAVENOUS
Status: CANCELLED
Start: 2025-03-24

## 2024-09-30 RX ORDER — EPINEPHRINE 1 MG/ML
0.3 INJECTION, SOLUTION INTRAMUSCULAR; SUBCUTANEOUS EVERY 5 MIN PRN
OUTPATIENT
Start: 2025-03-26

## 2024-09-30 RX ORDER — DIPHENHYDRAMINE HYDROCHLORIDE 50 MG/ML
50 INJECTION INTRAMUSCULAR; INTRAVENOUS
Status: DISCONTINUED | OUTPATIENT
Start: 2024-09-30 | End: 2024-09-30 | Stop reason: HOSPADM

## 2024-09-30 RX ORDER — ALBUTEROL SULFATE 90 UG/1
1-2 AEROSOL, METERED RESPIRATORY (INHALATION)
Status: DISCONTINUED | OUTPATIENT
Start: 2024-09-30 | End: 2024-09-30 | Stop reason: HOSPADM

## 2024-09-30 RX ORDER — EPINEPHRINE 1 MG/ML
0.3 INJECTION, SOLUTION INTRAMUSCULAR; SUBCUTANEOUS EVERY 5 MIN PRN
Status: CANCELLED | OUTPATIENT
Start: 2025-03-24

## 2024-09-30 RX ADMIN — DENOSUMAB 60 MG: 60 INJECTION SUBCUTANEOUS at 12:18

## 2024-11-17 DIAGNOSIS — I48.20 CHRONIC ATRIAL FIBRILLATION WITH RAPID VENTRICULAR RESPONSE (H): ICD-10-CM

## 2024-11-21 RX ORDER — METOPROLOL TARTRATE 25 MG/1
25 TABLET, FILM COATED ORAL 2 TIMES DAILY
Qty: 180 TABLET | Refills: 3 | Status: SHIPPED | OUTPATIENT
Start: 2024-11-21

## 2024-11-21 NOTE — TELEPHONE ENCOUNTER
metoprolol tartrate (LOPRESSOR) 25 MG   Last Written Prescription Date:  8/22/24  Last Fill Quantity: 180,   # refills: 0  Last Office Visit : 1/31/23  Future Office visit:  none  RTC  PRN  Routing refill request to provider for review/approval because:  RTC  PRN

## 2024-11-25 ENCOUNTER — LAB (OUTPATIENT)
Dept: LAB | Facility: CLINIC | Age: 64
End: 2024-11-25
Payer: COMMERCIAL

## 2024-11-25 ENCOUNTER — OFFICE VISIT (OUTPATIENT)
Dept: GASTROENTEROLOGY | Facility: CLINIC | Age: 64
End: 2024-11-25
Attending: INTERNAL MEDICINE
Payer: COMMERCIAL

## 2024-11-25 VITALS
OXYGEN SATURATION: 96 % | TEMPERATURE: 97.8 F | WEIGHT: 156.7 LBS | DIASTOLIC BLOOD PRESSURE: 79 MMHG | BODY MASS INDEX: 24.54 KG/M2 | SYSTOLIC BLOOD PRESSURE: 117 MMHG | HEART RATE: 73 BPM

## 2024-11-25 DIAGNOSIS — Z94.4 LIVER REPLACED BY TRANSPLANT (H): ICD-10-CM

## 2024-11-25 DIAGNOSIS — Z94.4 LIVER REPLACED BY TRANSPLANT (H): Primary | ICD-10-CM

## 2024-11-25 LAB
ALBUMIN SERPL BCG-MCNC: 4.2 G/DL (ref 3.5–5.2)
ALP SERPL-CCNC: 52 U/L (ref 40–150)
ALT SERPL W P-5'-P-CCNC: 23 U/L (ref 0–70)
ANION GAP SERPL CALCULATED.3IONS-SCNC: 9 MMOL/L (ref 7–15)
AST SERPL W P-5'-P-CCNC: 20 U/L (ref 0–45)
BILIRUB DIRECT SERPL-MCNC: 0.23 MG/DL (ref 0–0.3)
BILIRUB SERPL-MCNC: 0.8 MG/DL
BUN SERPL-MCNC: 18.5 MG/DL (ref 8–23)
CALCIUM SERPL-MCNC: 9.4 MG/DL (ref 8.8–10.4)
CHLORIDE SERPL-SCNC: 106 MMOL/L (ref 98–107)
CREAT SERPL-MCNC: 1.47 MG/DL (ref 0.67–1.17)
EGFRCR SERPLBLD CKD-EPI 2021: 54 ML/MIN/1.73M2
ERYTHROCYTE [DISTWIDTH] IN BLOOD BY AUTOMATED COUNT: 14.4 % (ref 10–15)
GLUCOSE SERPL-MCNC: 108 MG/DL (ref 70–99)
HCO3 SERPL-SCNC: 24 MMOL/L (ref 22–29)
HCT VFR BLD AUTO: 45.9 % (ref 40–53)
HGB BLD-MCNC: 15.1 G/DL (ref 13.3–17.7)
MAGNESIUM SERPL-MCNC: 2.1 MG/DL (ref 1.7–2.3)
MCH RBC QN AUTO: 28.1 PG (ref 26.5–33)
MCHC RBC AUTO-ENTMCNC: 32.9 G/DL (ref 31.5–36.5)
MCV RBC AUTO: 85 FL (ref 78–100)
PHOSPHATE SERPL-MCNC: 2.7 MG/DL (ref 2.5–4.5)
PLATELET # BLD AUTO: 159 10E3/UL (ref 150–450)
POTASSIUM SERPL-SCNC: 4.5 MMOL/L (ref 3.4–5.3)
PROT SERPL-MCNC: 7.2 G/DL (ref 6.4–8.3)
RBC # BLD AUTO: 5.38 10E6/UL (ref 4.4–5.9)
SODIUM SERPL-SCNC: 139 MMOL/L (ref 135–145)
TACROLIMUS BLD-MCNC: 5.5 UG/L (ref 5–15)
TME LAST DOSE: NORMAL H
TME LAST DOSE: NORMAL H
WBC # BLD AUTO: 5.4 10E3/UL (ref 4–11)

## 2024-11-25 PROCEDURE — 80197 ASSAY OF TACROLIMUS: CPT

## 2024-11-25 PROCEDURE — 82248 BILIRUBIN DIRECT: CPT

## 2024-11-25 PROCEDURE — 80053 COMPREHEN METABOLIC PANEL: CPT

## 2024-11-25 PROCEDURE — 85027 COMPLETE CBC AUTOMATED: CPT

## 2024-11-25 PROCEDURE — 99214 OFFICE O/P EST MOD 30 MIN: CPT | Mod: GC | Performed by: INTERNAL MEDICINE

## 2024-11-25 PROCEDURE — 84100 ASSAY OF PHOSPHORUS: CPT

## 2024-11-25 PROCEDURE — 99213 OFFICE O/P EST LOW 20 MIN: CPT | Performed by: INTERNAL MEDICINE

## 2024-11-25 PROCEDURE — 36415 COLL VENOUS BLD VENIPUNCTURE: CPT

## 2024-11-25 PROCEDURE — 83735 ASSAY OF MAGNESIUM: CPT

## 2024-11-25 RX ORDER — SILDENAFIL 100 MG/1
100 TABLET, FILM COATED ORAL DAILY PRN
COMMUNITY
Start: 2024-09-26

## 2024-11-25 ASSESSMENT — PAIN SCALES - GENERAL: PAINLEVEL_OUTOF10: NO PAIN (0)

## 2024-11-25 NOTE — NURSING NOTE
Chief Complaint   Patient presents with    RECHECK      LIVER POST RETURN TXP HEPT - 6 month follow up with Cullen Antonio with labs prior // scheduled in pod with patient         Vitals:    11/25/24 1531 11/25/24 1537   BP: (!) 129/90 117/79   BP Location: Right arm Right arm   Patient Position: Sitting Sitting   Cuff Size: Adult Regular Adult Regular   Pulse: 73    Temp: 97.8  F (36.6  C)    TempSrc: Oral    SpO2: 96%    Weight: 71.1 kg (156 lb 11.2 oz)        BP Readings from Last 3 Encounters:   11/25/24 117/79   08/26/24 111/80   05/17/24 133/88       /79 (BP Location: Right arm, Patient Position: Sitting, Cuff Size: Adult Regular)   Pulse 73   Temp 97.8  F (36.6  C) (Oral)   Wt 71.1 kg (156 lb 11.2 oz)   SpO2 96%   BMI 24.54 kg/m       Matthew Orozco, CMA

## 2024-11-25 NOTE — PROGRESS NOTES
Hepatology Follow-Up Visit:     HISTORY OF PRESENT ILLNESS:   I had the pleasure of seeing Cricket Mane for followup in the Liver Clinic at the [facility] on November 25, 2024. Mr. Mane returns for follow up now 20 months status post liver transplant for alcohol related cirrhosis.    Last seen in clinic on 5/2024. No hospitalizations since last seen in clinic. Continues to take tacrolimus 1.5 mg morning and 1 mg in the afternoon. No issues getting medications.     Continues to follow with nephrology who plans to continue to observe his renal function.     Diagnosed with osteoporosis in 1/2023 pre transplant. With this has a little pain in his lower back. Is taking prolia.     Denies abdominal discomfort, abdominal distention. No melena or hematochezia. No hematemesis. He has not had diarrhea or constipation. No fever or chills. No swelling in the lower legs.     Is out traveling. Recently returned from [place]. Will return after the holidays. Is active playing [employer] ball and golfing.    Medications:   Current Outpatient Medications   Medication Sig Dispense Refill    aspirin (ASA) 325 MG tablet Take 1 tablet (325 mg) by mouth daily 90 tablet 3    calcium carbonate-vitamin D (CALTRATE) 600-10 MG-MCG per tablet Take 1 tablet by mouth 2 times daily (with meals) 180 tablet 3    folic acid (FOLVITE) 1 MG tablet Take 1 tablet (1,000 mcg) by mouth daily 90 tablet 1    magnesium oxide (MAG-OX) 400 MG tablet Take 1 tablet (400 mg) by mouth 2 times daily 360 tablet 3    metoprolol tartrate (LOPRESSOR) 25 MG tablet TAKE 1 TABLET BY MOUTH TWICE A  tablet 3    Multiple Vitamins-Minerals (SPECTRAVITE) TABS Take 1 tablet by mouth daily. Taking a different brand of multi vitamins      tacrolimus (GENERIC EQUIVALENT) 0.5 MG capsule Take 1 capsule (0.5 mg) by mouth every morning. Total dose: 0.5mg am, 1 mg pm. 90 capsule 3    tacrolimus (GENERIC EQUIVALENT) 1 MG capsule Take 1 capsule (1 mg) by  Nursing notes reviewed and accepted.    SELVIN MIRANDA is a 4 day old male who presents for well child exam.  Patient presents with Mother.    Concerns raised today include: none    Umbilical stump: normal  Urinary stream is strong.  Feeding:  20 minutes every 1-3 hours  Sleeping: own room  Elimination:  Normal wet diapers and bowel movements.    BIRTH HISTORY:  Birth History   • Birth     Length: 20.75\" (52.7 cm)     Weight: 3.37 kg     HC 33 cm (12.99\")   • Apgar     One: 8     Five: 9   • Delivery Method: Vaginal, Spontaneous   • Gestation Age: 41 1/7 wks   • Duration of Labor: 1st: 9h 42m / 2nd: 37m       FAMILY HISTORY:  History reviewed. No pertinent family history.  Review of patient's family status indicates:    Mother                         Alive                       Comment: Copied from mother's family history at                birth      SOCIAL:  : none  How many people live in your household? 3  Do you have any pets? Yes--2 dogs  Do you have any Scientologist/cultural beliefs that could affect your child's medical care?  No  Does your child use a car seat at all times?  Yes  Does your home have a smoke detector? Yes  Does your home contain a gun? No  Does anyone in your home use tobacco? No      DEVELOPMENT:  responds to bright light, responds to voice, moves arms and legs equally, raises head slightly when prone and cries to display discomfort    Birth history, medical history, surgical history, and family history reviewed and updated.    PHYSICAL EXAM:  Pulse 104, height 21\" (53.3 cm), weight 3.195 kg, head circumference 31 cm (12.21\").  -5%  GENERAL:  Well appearing male infant , nontoxic, no acute distress.  Alert and     interactive.  SKIN:  Warm, normal turgor. No cyanosis. No bruises or lesions.  HEAD:  Normocephalic, atraumatic.  Anterior fontanel open, soft and flat.  EYES:  Conjunctivae appear normal with neither icterus nor subconjunctival hemorrhage. Pupils equal, round,  mouth every 12 hours 180 capsule 3     No current facility-administered medications for this visit.        Vitals:   There were no vitals taken for this visit.    Physical Exam:   In general looks quite well. HEENT exam shows no scleral icterus or temporal muscle wasting. Chest is clear. Abdominal exam shows no increase in girth. No masses or tenderness to palpation are present. Extremity exam shows no edema. Skin exam shows no stigmata of chronic liver disease. Neurologic exam shows no asterixis.     Labs:   Lab Results   Component Value Date     11/25/2024    POTASSIUM 4.5 11/25/2024    CHLORIDE 106 11/25/2024    ANIONGAP 9 11/25/2024    CO2 24 11/25/2024    BUN 18.5 11/25/2024    CR 1.47 (H) 11/25/2024    GFRESTIMATED 54 (L) 11/25/2024    PEDRO 9.4 11/25/2024      Lab Results   Component Value Date    WBC 5.4 11/25/2024    HGB 15.1 11/25/2024    HCT 45.9 11/25/2024    MCV 85 11/25/2024    MCH 28.1 11/25/2024    MCHC 32.9 11/25/2024    RDW 14.4 11/25/2024     11/25/2024     Lab Results   Component Value Date    ALBUMIN 4.2 11/25/2024    ALKPHOS 52 11/25/2024    AST 20 11/25/2024     Lab Results   Component Value Date    INR 1.13 02/23/2024       Imaging:   No images are attached to the encounter.     Assessment/Plan:   IMPRESSION:   Mr. Mane is a 64 y.o. M here for follow up and is now slightly over 1.5 years since liver transplantation for alcoholic cirrhosis.  Liver function test continue to look excellent post transplant. Kidney function continues to remain slightly elevated, but stable ~1.47. Since it is stable we will continue to observe, but paying close attention to his tacrolimus levels which are currently in goal range. At this time will plan to monitor, continue tacrolimus at 1.5 mg in the morning and 1 mg in the afternoon based on recent tacrolimus level of 5.5 and follow up in 6 months.     Additionally general screening tests and immunizations are currently up to date, but did strongly  reactive to light; extraocular movements intact, positive red reflex bilaterally.  NOSE:  Appears normal, no flaring.  EARS:  Normal pinnae, no preauricular skin tags or pit.    THROAT:  Oropharynx with moist mucous membranes and no lesions.  NECK:  Supple, no lymphadenopathy or masses.  HEART:  Regular rate and rhythm.  Quiet precordium.  Normal S1, S2.  No murmurs, rubs, gallops. Equal femoral pulses.  LUNGS:  Clear to auscultation bilaterally.  No wheezes, rales, rhonchi.  Normal work of breathing.  ABDOMEN:  Umbilical stump is normal.  Soft, nontender.  No organomegaly or masses.  GENITOURINARY:  testes descended bilaterally and uncircumcised  MUSCULOSKELETAL:  Hips within normal range of motion. Negative Aden, Ortolani. Spine straight.  Normal sacrum.  EXTREMITIES:  Warm, dry, without abnormalities.  NEUROLOGIC:  Normal tone, bulk, strength.    ASSESSMENT:  4 day old male well infant.    PLAN:  All parental concerns and questions discussed.  Anticipatory guidance provided, handout given.              Car seat use              Feeding              Normal  behaviors              Accident prevention              Sudden Infant Death Syndrome prevention              Fever management  Discussed office policies and phone nurse availability.  Immunizations per orders. Counseling given for each component including the risks, benefits and possible side effects.  Return to clinic for 2 week well infant exam.   encourage that he be seen by dermatology for annual skin exam.     Patient seen with and care plan discussed with Dr. Sloan.    Rae Shi MD  Hepatology Service, IM PGY 3

## 2024-11-25 NOTE — LETTER
11/25/2024      Cricket Mane  [address]  [facility] [address]      Dear Colleague,    Thank you for referring your patient, Cricket Mane, to the [facility] [place]. Please see a copy of my visit note below.    Hepatology Follow-Up Visit:     HISTORY OF PRESENT ILLNESS:   I had the pleasure of seeing Cricket Mane for followup in the Liver Clinic at the [facility] on November 25, 2024. Mr. Mane returns for follow up now 20 months status post liver transplant for alcohol related cirrhosis.    Last seen in clinic on 5/2024. No hospitalizations since last seen in clinic. Continues to take tacrolimus 1.5 mg morning and 1 mg in the afternoon. No issues getting medications.     Continues to follow with nephrology who plans to continue to observe his renal function.     Diagnosed with osteoporosis in 1/2023 pre transplant. With this has a little pain in his lower back. Is taking prolia.     Denies abdominal discomfort, abdominal distention. No melena or hematochezia. No hematemesis. He has not had diarrhea or constipation. No fever or chills. No swelling in the lower legs.     Is out traveling. Recently returned from [place]. Will return after the holidays. Is active playing [employer] ball and golfing.    Medications:   Current Outpatient Medications   Medication Sig Dispense Refill     aspirin (ASA) 325 MG tablet Take 1 tablet (325 mg) by mouth daily 90 tablet 3     calcium carbonate-vitamin D (CALTRATE) 600-10 MG-MCG per tablet Take 1 tablet by mouth 2 times daily (with meals) 180 tablet 3     folic acid (FOLVITE) 1 MG tablet Take 1 tablet (1,000 mcg) by mouth daily 90 tablet 1     magnesium oxide (MAG-OX) 400 MG tablet Take 1 tablet (400 mg) by mouth 2 times daily 360 tablet 3     metoprolol tartrate (LOPRESSOR) 25 MG tablet TAKE 1 TABLET BY MOUTH TWICE A  tablet 3     Multiple Vitamins-Minerals (SPECTRAVITE) TABS Take 1 tablet by mouth daily.  Taking a different brand of multi vitamins       tacrolimus (GENERIC EQUIVALENT) 0.5 MG capsule Take 1 capsule (0.5 mg) by mouth every morning. Total dose: 0.5mg am, 1 mg pm. 90 capsule 3     tacrolimus (GENERIC EQUIVALENT) 1 MG capsule Take 1 capsule (1 mg) by mouth every 12 hours 180 capsule 3     No current facility-administered medications for this visit.        Vitals:   There were no vitals taken for this visit.    Physical Exam:   In general looks quite well. HEENT exam shows no scleral icterus or temporal muscle wasting. Chest is clear. Abdominal exam shows no increase in girth. No masses or tenderness to palpation are present. Extremity exam shows no edema. Skin exam shows no stigmata of chronic liver disease. Neurologic exam shows no asterixis.     Labs:   Lab Results   Component Value Date     11/25/2024    POTASSIUM 4.5 11/25/2024    CHLORIDE 106 11/25/2024    ANIONGAP 9 11/25/2024    CO2 24 11/25/2024    BUN 18.5 11/25/2024    CR 1.47 (H) 11/25/2024    GFRESTIMATED 54 (L) 11/25/2024    PEDRO 9.4 11/25/2024      Lab Results   Component Value Date    WBC 5.4 11/25/2024    HGB 15.1 11/25/2024    HCT 45.9 11/25/2024    MCV 85 11/25/2024    MCH 28.1 11/25/2024    MCHC 32.9 11/25/2024    RDW 14.4 11/25/2024     11/25/2024     Lab Results   Component Value Date    ALBUMIN 4.2 11/25/2024    ALKPHOS 52 11/25/2024    AST 20 11/25/2024     Lab Results   Component Value Date    INR 1.13 02/23/2024       Imaging:   No images are attached to the encounter.     Assessment/Plan:   IMPRESSION:   Mr. Mane is a 64 y.o. M here for follow up and is now slightly over 1.5 years since liver transplantation for alcoholic cirrhosis.  Liver function test continue to look excellent post transplant. Kidney function continues to remain slightly elevated, but stable ~1.47. Since it is stable we will continue to observe, but paying close attention to his tacrolimus levels which are currently in goal range. At this time  will plan to monitor, continue tacrolimus at 1.5 mg in the morning and 1 mg in the afternoon based on recent tacrolimus level of 5.5 and follow up in 6 months.     Additionally general screening tests and immunizations are currently up to date, but did strongly encourage that he be seen by dermatology for annual skin exam.     Patient seen with and care plan discussed with Dr. Sloan.    Rae Shi MD  Hepatology Service,  PGY 3        Attestation signed by Pancho Sloan MD at 11/25/2024  4:56 PM:  The patient was seen and examined.  The above assessment and plan was developed jointly with the fellow.      I did spend total of 30 minutes (on the date of the encounter), including 20 minutes of face-to-face clinic time including counseling.  The rest of the time was spent in documentation and review of records.     Thank you very much for allowing me to participate in the care of this patient.  If you have any questions regarding my recommendations, please do not hesitate to contact me.         Pancho Sloan MD      Professor of Medicine  [employer]      Executive Medical Director, Solid Organ Transplant Program  [facility]      Again, thank you for allowing me to participate in the care of your patient.        Sincerely,        Pancho Sloan MD

## 2024-11-26 DIAGNOSIS — Z94.4 LIVER REPLACED BY TRANSPLANT (H): ICD-10-CM

## 2024-11-26 RX ORDER — TACROLIMUS 1 MG/1
1 CAPSULE ORAL EVERY 12 HOURS
Qty: 180 CAPSULE | Refills: 3 | Status: SHIPPED | OUTPATIENT
Start: 2024-11-26

## 2024-11-26 RX ORDER — TACROLIMUS 0.5 MG/1
0.5 CAPSULE ORAL EVERY MORNING
Qty: 90 CAPSULE | Refills: 3 | Status: SHIPPED | OUTPATIENT
Start: 2024-11-26

## 2024-12-08 ENCOUNTER — HEALTH MAINTENANCE LETTER (OUTPATIENT)
Age: 64
End: 2024-12-08

## 2025-01-13 ENCOUNTER — VIRTUAL VISIT (OUTPATIENT)
Dept: ENDOCRINOLOGY | Facility: CLINIC | Age: 65
End: 2025-01-13
Payer: COMMERCIAL

## 2025-01-13 DIAGNOSIS — M54.50 LOW BACK PAIN WITHOUT SCIATICA, UNSPECIFIED BACK PAIN LATERALITY, UNSPECIFIED CHRONICITY: ICD-10-CM

## 2025-01-13 DIAGNOSIS — M81.0 AGE-RELATED OSTEOPOROSIS WITHOUT CURRENT PATHOLOGICAL FRACTURE: Primary | ICD-10-CM

## 2025-01-13 PROCEDURE — 98006 SYNCH AUDIO-VIDEO EST MOD 30: CPT | Performed by: INTERNAL MEDICINE

## 2025-01-13 PROCEDURE — G2211 COMPLEX E/M VISIT ADD ON: HCPCS | Mod: 95 | Performed by: INTERNAL MEDICINE

## 2025-01-13 NOTE — PROGRESS NOTES
Endocrinology Clinic Visit    Chief Complaint: RECHECK     Information obtained from:Patient      Assessment/Treatment Plan:      Cricket Mane is a 64 year old year old male with HX os liver cirrhosis s/p liver transplant (3/23/2023).    Osteoporosis without current fracture   CKD stage III      1/4/2023 lumbar spine L1-L4 T score -3 and at the right femoral neck -1.8 and that the right total hip -2.    Primary risk factors for osteoporosis are age, alcohol use, hx of liver disease, organ transplant & steroid exposure.   In order to assess additional secondary causes,screening for primary hyperparathyroidism and other results were unremarkable except low 24 hour urine calcium level.  He has increased his calcium intake since then.  Testosterone level within the normal range.      Now s/p 3 Denosumab or Prolia. Tolerating well check a follow up DEXA scan.     Plan:  calcium 1200 mg daily from all sources, vitamin D 1000 IU daily, strengthening exercises and fall prevention.  Prolia every 6 months without missing a dose.  If DEXA scan improving on prolia; we will continue for a total of ~ 3 years before consolidative treatment.   At the end of treatment with Prolia we will plan on following up with Reclast infusion which was discussed today.    Hypocalciuria: check follow up 24 hour urine collection.     Low back pain, chronic: follow up with primary care physician. In the meantime, complete plain lumbar x-ray and physical therapy.     Jose Francisco Garcia MD  Staff Endocrinologist    Division of Endocrinology and Diabetes      Subjective:         HPI: Cricket Mane is a 64 year old male with history of alcohol associated liver cirrhosis now s/p Liver transplantation ~2 and half months ago. DEXA scanning was done as part of pre transplant evaluation at which time was incidentally found to have osteoporosis. Denies any history of fractures except a traumatic fracture at age 12. Denies noticing decrease in height.  Never smoker. Quit alcohol use 1 year ago before which patient has significant alcohol intake. Denies chronic opioid use. Reports steroid exposure in the nicole transplant period.    Denies family hx of osteoporosis. Reports consuming 1.2g Ca supplement daily in addition to yogurt and cheese consumption.      CKD   No Known Allergies    Current Outpatient Medications   Medication Sig Dispense Refill    aspirin (ASA) 325 MG tablet Take 1 tablet (325 mg) by mouth daily (Patient not taking: Reported on 11/25/2024) 90 tablet 3    calcium carbonate-vitamin D (CALTRATE) 600-10 MG-MCG per tablet Take 1 tablet by mouth 2 times daily (with meals) 180 tablet 3    folic acid (FOLVITE) 1 MG tablet Take 1 tablet (1,000 mcg) by mouth daily 90 tablet 1    magnesium oxide (MAG-OX) 400 MG tablet Take 1 tablet (400 mg) by mouth 2 times daily 360 tablet 3    metoprolol tartrate (LOPRESSOR) 25 MG tablet TAKE 1 TABLET BY MOUTH TWICE A  tablet 3    Multiple Vitamins-Minerals (SPECTRAVITE) TABS Take 1 tablet by mouth daily. Taking a different brand of multi vitamins      sildenafil (VIAGRA) 100 MG tablet Take 100 mg by mouth daily as needed.      tacrolimus (GENERIC EQUIVALENT) 0.5 MG capsule Take 1 capsule (0.5 mg) by mouth every morning. Total dose: 1.5mg am, 1 mg pm. 90 capsule 3    tacrolimus (GENERIC EQUIVALENT) 1 MG capsule Take 1 capsule (1 mg) by mouth every 12 hours. Total dose 1.5mg AM and 1mg  capsule 3       Review of Systems     ROS negative outside of mentioned above.   Pertinent past medical history, past surgical history, social and family history reviewed.      Objective:   GENERAL: Healthy, alert and no distress  EYES: Eyes grossly normal to inspection.    PSYCH: Mentation appears normal, affect normal/bright, judgement and insight intact, normal speech and appearance well-groomed.  In House Labs:   Lab Results   Component Value Date    A1C 4.7 03/23/2023       TSH   Date Value Ref Range Status   05/11/2023  2.91 0.30 - 4.20 uIU/mL Final   12/20/2022 3.43 0.30 - 4.20 uIU/mL Final       Creatinine   Date Value Ref Range Status   11/25/2024 1.47 (H) 0.67 - 1.17 mg/dL Final     Order: [id number]   Ref Range & Units 7 d ago   Sex Hormone Binding Globulin Adult 13 - 74 nmol/L 53   Testosterone 200 - 745 ng/dL 680   Testosterone, Free 3.1 - 12.8 ng/dL 11.0   Testosterone, Bioavailable 71.7 - 300.0 ng/dL 257.7   Resulting [facility]       01/2023 DEXA  Results   Lumbar spine     L1-4      T-score -3.0                    BMD 0.865 g/cm 2.     Right Femur neck           T-score -1.8                    BMD 0.842 g/cm 2.  Right Total hip                T-score -2.0                    BMD 0.814 g/cm 2     Ref Range & Units 7 d ago   Sex Hormone Binding Globulin Adult 13 - 74 nmol/L 53   Testosterone 200 - 745 ng/dL 680   Testosterone, Free 3.1 - 12.8 ng/dL 11.0   Testosterone, Bioavailable 71.7 - 300.0 ng/dL 257.7   Resulting [facility]       Video-Visit Details    Type of service:  Video Visit  Joined the call at 1/13/2025, 8:51:11 am.  Left the call at 1/13/2025, 9:08:48 am.  You were on the call for 17 minutes 37 seconds .  Platform used for Video Visit: Cesar   The longitudinal plan of care for the diagnosis(es)/condition(s) as documented were addressed during this visit. Due to the added complexity in care, I will continue to support Torin in the subsequent management and with ongoing continuity of care.

## 2025-01-13 NOTE — PROGRESS NOTES
"Virtual Visit Details    Type of service:  Video Visit     Originating Location (pt. Location): {video visit patient location:740500::\"Home\"}  {PROVIDER LOCATION On-site should be selected for visits conducted from your clinic location or adjoining [facility] hospital, academic office, or other nearby [facility] building. Off-site should be selected for all other provider locations, including home:816169}  Distant Location (provider location):  {virtual location provider:477621}  Platform used for Video Visit: {Virtual Visit Platforms:117911::\"PingSome\"}  "

## 2025-01-13 NOTE — PATIENT INSTRUCTIONS
- Calcium intake - you meet your daily Calcium needs through diet and supplement medication. Please continue current calcium diet and take daily supplement.   - Vitamin D : Continue with supplement.  -Fall prevention, strengthening exercises  - Prolia injection every 6 months without missing a dose.    Please complete the following tests  DEXA scan to assess change with the prolia injection.   X-ray and physical therapy for back pain. Please follow up with primary regarding back pain.   Brief description of 24 hour urine collection:  Do the test on a day off, so you can stay home.  Wake up and flush the first urine.  Then collect all the urine for that day, evening and overnight if you wake up to urinate. Plus the first urine of the next morning.    The urine must be kept cool, not frozen.  So, do not put it out on the porch.  Keep it in the fridge.      Orders Placed This Encounter   Procedures    DX Bone Density    X-ray lumbar spine 2-3 views    Vitamin D Deficiency (D3 Only)    Creatinine timed urine    Calcium timed urine    Calcium    Albumin level    Urea nitrogen    Creatinine    Physical Therapy  Referral

## 2025-01-13 NOTE — NURSING NOTE
Patient stated that is having an increase of steady (mild) pain in his low back.       Current patient location: [address] [place] [id number]    Is the patient currently in the state of [place]? YES    Visit mode: VIDEO    If the visit is dropped, the patient can be reconnected by:VIDEO VISIT: Text to cell phone:   Telephone Information:   Mobile [phone]       Will anyone else be joining the visit? NO  (If patient encounters technical issues they should call [phone])    Are changes needed to the allergy or medication list? No and Pt stated no med changes    Are refills needed on medications prescribed by this physician? NO    Rooming Documentation:  Not applicable    Reason for visit: ROSA CASTANEDA

## 2025-01-13 NOTE — LETTER
1/13/2025      Cricket Mane  [address] [place] [facility] [address]      Dear Colleague,    Thank you for referring your patient, Cricket Mane, to the [facility]. Please see a copy of my visit note below.    Endocrinology Clinic Visit    Chief Complaint: RECHECK     Information obtained from:Patient      Assessment/Treatment Plan:      Cricket Mane is a 64 year old year old male with HX os liver cirrhosis s/p liver transplant (3/23/2023).    Osteoporosis without current fracture   CKD stage III      1/4/2023 lumbar spine L1-L4 T score -3 and at the right femoral neck -1.8 and that the right total hip -2.    Primary risk factors for osteoporosis are age, alcohol use, hx of liver disease, organ transplant & steroid exposure.   In order to assess additional secondary causes,screening for primary hyperparathyroidism and other results were unremarkable except low 24 hour urine calcium level.  He has increased his calcium intake since then.  Testosterone level within the normal range.      Now s/p 3 Denosumab or Prolia. Tolerating well check a follow up DEXA scan.     Plan:  calcium 1200 mg daily from all sources, vitamin D 1000 IU daily, strengthening exercises and fall prevention.  Prolia every 6 months without missing a dose.  If DEXA scan improving on prolia; we will continue for a total of ~ 3 years before consolidative treatment.   At the end of treatment with Prolia we will plan on following up with Reclast infusion which was discussed today.    Hypocalciuria: check follow up 24 hour urine collection.     Low back pain, chronic: follow up with primary care physician. In the meantime, complete plain lumbar x-ray and physical therapy.     Jose Francisco Garcia MD  Staff Endocrinologist    Division of Endocrinology and Diabetes      Subjective:         HPI: Cricket Mane is a 64 year old male with history of alcohol associated liver cirrhosis now s/p Liver transplantation ~2 and  half months ago. DEXA scanning was done as part of pre transplant evaluation at which time was incidentally found to have osteoporosis. Denies any history of fractures except a traumatic fracture at age 12. Denies noticing decrease in height. Never smoker. Quit alcohol use 1 year ago before which patient has significant alcohol intake. Denies chronic opioid use. Reports steroid exposure in the nicole transplant period.    Denies family hx of osteoporosis. Reports consuming 1.2g Ca supplement daily in addition to yogurt and cheese consumption.      CKD   No Known Allergies    Current Outpatient Medications   Medication Sig Dispense Refill     aspirin (ASA) 325 MG tablet Take 1 tablet (325 mg) by mouth daily (Patient not taking: Reported on 11/25/2024) 90 tablet 3     calcium carbonate-vitamin D (CALTRATE) 600-10 MG-MCG per tablet Take 1 tablet by mouth 2 times daily (with meals) 180 tablet 3     folic acid (FOLVITE) 1 MG tablet Take 1 tablet (1,000 mcg) by mouth daily 90 tablet 1     magnesium oxide (MAG-OX) 400 MG tablet Take 1 tablet (400 mg) by mouth 2 times daily 360 tablet 3     metoprolol tartrate (LOPRESSOR) 25 MG tablet TAKE 1 TABLET BY MOUTH TWICE A  tablet 3     Multiple Vitamins-Minerals (SPECTRAVITE) TABS Take 1 tablet by mouth daily. Taking a different brand of multi vitamins       sildenafil (VIAGRA) 100 MG tablet Take 100 mg by mouth daily as needed.       tacrolimus (GENERIC EQUIVALENT) 0.5 MG capsule Take 1 capsule (0.5 mg) by mouth every morning. Total dose: 1.5mg am, 1 mg pm. 90 capsule 3     tacrolimus (GENERIC EQUIVALENT) 1 MG capsule Take 1 capsule (1 mg) by mouth every 12 hours. Total dose 1.5mg AM and 1mg  capsule 3       Review of Systems     ROS negative outside of mentioned above.   Pertinent past medical history, past surgical history, social and family history reviewed.      Objective:   GENERAL: Healthy, alert and no distress  EYES: Eyes grossly normal to inspection.    PSYCH:  Mentation appears normal, affect normal/bright, judgement and insight intact, normal speech and appearance well-groomed.  In House Labs:   Lab Results   Component Value Date    A1C 4.7 03/23/2023       TSH   Date Value Ref Range Status   05/11/2023 2.91 0.30 - 4.20 uIU/mL Final   12/20/2022 3.43 0.30 - 4.20 uIU/mL Final       Creatinine   Date Value Ref Range Status   11/25/2024 1.47 (H) 0.67 - 1.17 mg/dL Final     Order: [id number]   Ref Range & Units 7 d ago   Sex Hormone Binding Globulin Adult 13 - 74 nmol/L 53   Testosterone 200 - 745 ng/dL 680   Testosterone, Free 3.1 - 12.8 ng/dL 11.0   Testosterone, Bioavailable 71.7 - 300.0 ng/dL 257.7   Resulting [facility]       01/2023 DEXA  Results   Lumbar spine     L1-4      T-score -3.0                    BMD 0.865 g/cm 2.     Right Femur neck           T-score -1.8                    BMD 0.842 g/cm 2.  Right Total hip                T-score -2.0                    BMD 0.814 g/cm 2     Ref Range & Units 7 d ago   Sex Hormone Binding Globulin Adult 13 - 74 nmol/L 53   Testosterone 200 - 745 ng/dL 680   Testosterone, Free 3.1 - 12.8 ng/dL 11.0   Testosterone, Bioavailable 71.7 - 300.0 ng/dL 257.7   Resulting [facility]       Video-Visit Details    Type of service:  Video Visit  Joined the call at 1/13/2025, 8:51:11 am.  Left the call at 1/13/2025, 9:08:48 am.  You were on the call for 17 minutes 37 seconds .  Platform used for Video Visit: Cesar   The longitudinal plan of care for the diagnosis(es)/condition(s) as documented were addressed during this visit. Due to the added complexity in care, I will continue to support Torin in the subsequent management and with ongoing continuity of care.      Again, thank you for allowing me to participate in the care of your patient.        Sincerely,    Jose Francisco Garcia MD    Electronically signed

## 2025-01-14 ENCOUNTER — PATIENT OUTREACH (OUTPATIENT)
Dept: CARE COORDINATION | Facility: CLINIC | Age: 65
End: 2025-01-14
Payer: COMMERCIAL

## 2025-02-04 ENCOUNTER — TELEPHONE (OUTPATIENT)
Dept: NEPHROLOGY | Facility: CLINIC | Age: 65
End: 2025-02-04
Payer: COMMERCIAL

## 2025-02-13 ENCOUNTER — ANCILLARY PROCEDURE (OUTPATIENT)
Dept: BONE DENSITY | Facility: CLINIC | Age: 65
End: 2025-02-13
Attending: INTERNAL MEDICINE
Payer: COMMERCIAL

## 2025-02-13 DIAGNOSIS — M81.0 AGE-RELATED OSTEOPOROSIS WITHOUT CURRENT PATHOLOGICAL FRACTURE: ICD-10-CM

## 2025-02-13 PROCEDURE — 77080 DXA BONE DENSITY AXIAL: CPT | Performed by: INTERNAL MEDICINE

## 2025-02-17 NOTE — RESULT ENCOUNTER NOTE
Hello -    The bone density findings have improved compared to your previous results at the lumbar spine by 14.6%.  There is slight decrease of bone density at the right total hip by 2.9%.  Please continue the Prolia treatment as you are currently doing without missing doses every 6 months.   Please let us know if you have any questions or concerns.     Regards,  Jose Francisco Garcia MD

## 2025-03-15 ENCOUNTER — HEALTH MAINTENANCE LETTER (OUTPATIENT)
Age: 65
End: 2025-03-15

## 2025-03-17 DIAGNOSIS — I48.20 CHRONIC ATRIAL FIBRILLATION WITH RAPID VENTRICULAR RESPONSE (H): ICD-10-CM

## 2025-03-18 RX ORDER — METOPROLOL TARTRATE 25 MG/1
25 TABLET, FILM COATED ORAL 2 TIMES DAILY
Qty: 180 TABLET | Refills: 3 | OUTPATIENT
Start: 2025-03-18

## 2025-03-19 DIAGNOSIS — I48.20 CHRONIC ATRIAL FIBRILLATION WITH RAPID VENTRICULAR RESPONSE (H): ICD-10-CM

## 2025-03-19 RX ORDER — METOPROLOL TARTRATE 25 MG/1
25 TABLET, FILM COATED ORAL 2 TIMES DAILY
Qty: 180 TABLET | Refills: 3 | OUTPATIENT
Start: 2025-03-19

## 2025-03-19 NOTE — TELEPHONE ENCOUNTER
Hello I just spoke to pt and he stated that the original provider is no longer in network with him for ins. He asked we send to you and he also wants to know if you can recommend a primary care provider with in  health

## 2025-03-21 ENCOUNTER — TELEPHONE (OUTPATIENT)
Dept: TRANSPLANT | Facility: CLINIC | Age: 65
End: 2025-03-21
Payer: COMMERCIAL

## 2025-03-21 NOTE — TELEPHONE ENCOUNTER
[facility] pharmacy tech calls to ask for a refill for metoprolol. Informed [facility] that pt need to get that filled through his PCP. She  states that pt does not have PCP. Reviewed pt's meds and he has enough refills for metoprolol until November of this year a [facility]. [facility] asked for the number to forward the script. Pt does want a recommendation for a PCP.

## 2025-03-31 ENCOUNTER — TELEPHONE (OUTPATIENT)
Dept: PHARMACY | Facility: CLINIC | Age: 65
End: 2025-03-31
Payer: COMMERCIAL

## 2025-03-31 NOTE — TELEPHONE ENCOUNTER
Clinical Pharmacy Consult:                                                      Transplant Specific: 24 Month Post Transplant Call  Date of Transplant: 3/23/2023  Type of Transplant: liver  First Transplant: yes  History of rejection: no    Immunosuppression Regimen   TAC 1.5mg qAM & 1mg qPM   Patient specific goal: 3-5  Most recent level: 5.5, date 11/25/24  Immunosuppressant Levels:  supratherapeutic, borderline  Pt adherent to lab draws: yes  CrCl: no recent labs  Scr:   Lab Results   Component Value Date    CR 1.58 05/01/2023     Side effects: None    Prophylactic Medications  Antibacterial:  Bactrim SS daily  Scheduled Discontinue Date: 6 months - therapy complete    Antifungal: Not needed thus far  Scheduled Discontinue Date: N/A    Antiviral: CrCl 40 to 59 mL/minute: Valcyte 450 mg once daily   Scheduled Discontinue Date: 6 months - therapy complete    Acid Reducer: Prilosec (omeprazole)  Scheduled Reviewed Date:  followed by clinic    Thrombosis Prevention: Aspirin 325 mg PO daily  Scheduled Discontinue Date: 6 months - therapy complete    Blood Pressure Management  Frequency of home Blood Pressure checks: infrequently  Most recent home BP: Last PCP visit was 125/72 in February  Patient Blood pressure goal: <130/80  Patient blood pressure at goal:  Yes  Hospitalizations/ER visits since last assessment: 0        5/1/2023     3:00 PM   Medication adherence flowsheet   Patient medication administration: Responsible for own medications   Patient estimated adherence level: %   Pharmacist assessment of adherence: Good   Patient reported doses missed per week: 0-1   Facilitators to medication adherence  Medication dosing chart;Pill box;Schedule/routine;Caregiver assistance   Patient reported barriers to medication adherence  No issues identified   Adherence intervention(s):  on importance of adherence          5/1/2023     3:00 PM   Medication access flowsheet   Number of pharmacies used: 1   Pharmacy:  [facility]   Enrolled in [facility]? Yes   Patient reported barriers to accessing medications: No issues reported by patient   Medication access interventions: No issues identified      Med rec/DUR performed: yes  Med Rec Discrepancies: no    Spoke to Torin today via phone. He reports he is doing well. Denies any side effects to his medications. He isn't sure why but he reports his tacrolimus level doesn't always get tested when he goes in for labs. His last tacro level was slightly above goal. He also wasn't sure how long he was supposed to go in between tac level draws. Informed him that at this point, its usually around every 2-4 months depending on how stable his dose and labs are.    Torin doesn't check his blood pressure at home. He reports his last BP taken at his PCP appointment was at goal.    No other questions or concerns. Follow up in  12 months.    Time spent: 15 minutes    Kermit Caldwell, PharmD  [facility]  [phone]

## 2025-04-07 ENCOUNTER — TELEPHONE (OUTPATIENT)
Dept: ENDOCRINOLOGY | Facility: CLINIC | Age: 65
End: 2025-04-07

## 2025-04-07 ENCOUNTER — OFFICE VISIT (OUTPATIENT)
Dept: FAMILY MEDICINE | Facility: CLINIC | Age: 65
End: 2025-04-07
Payer: COMMERCIAL

## 2025-04-07 VITALS
OXYGEN SATURATION: 99 % | HEIGHT: 66 IN | SYSTOLIC BLOOD PRESSURE: 122 MMHG | HEART RATE: 87 BPM | DIASTOLIC BLOOD PRESSURE: 85 MMHG | WEIGHT: 164 LBS | BODY MASS INDEX: 26.36 KG/M2 | RESPIRATION RATE: 12 BRPM | TEMPERATURE: 97.5 F

## 2025-04-07 DIAGNOSIS — M54.50 CHRONIC MIDLINE LOW BACK PAIN WITHOUT SCIATICA: ICD-10-CM

## 2025-04-07 DIAGNOSIS — G89.29 CHRONIC MIDLINE LOW BACK PAIN WITHOUT SCIATICA: Primary | ICD-10-CM

## 2025-04-07 DIAGNOSIS — Z94.4 S/P LIVER TRANSPLANT (H): ICD-10-CM

## 2025-04-07 DIAGNOSIS — G89.29 CHRONIC MIDLINE LOW BACK PAIN WITHOUT SCIATICA: ICD-10-CM

## 2025-04-07 DIAGNOSIS — M54.50 CHRONIC MIDLINE LOW BACK PAIN WITHOUT SCIATICA: Primary | ICD-10-CM

## 2025-04-07 PROBLEM — N52.9 MALE ERECTILE DYSFUNCTION, UNSPECIFIED: Status: ACTIVE | Noted: 2025-02-12

## 2025-04-07 PROBLEM — S83.242A TEAR OF MEDIAL MENISCUS OF LEFT KNEE, CURRENT: Status: ACTIVE | Noted: 2024-10-07

## 2025-04-07 PROBLEM — R76.8 HEPATITIS B CORE ANTIBODY POSITIVE: Status: ACTIVE | Noted: 2023-10-18

## 2025-04-07 PROBLEM — G89.18 ACUTE POST-OPERATIVE PAIN: Status: RESOLVED | Noted: 2023-03-27 | Resolved: 2025-04-07

## 2025-04-07 PROBLEM — I48.11 LONGSTANDING PERSISTENT ATRIAL FIBRILLATION (H): Status: ACTIVE | Noted: 2022-11-04

## 2025-04-07 PROBLEM — M81.0 OSTEOPOROSIS: Status: ACTIVE | Noted: 2023-06-06

## 2025-04-07 PROBLEM — M94.262 CHONDROMALACIA, KNEE, LEFT: Status: ACTIVE | Noted: 2024-10-07

## 2025-04-07 PROBLEM — N18.31 STAGE 3A CHRONIC KIDNEY DISEASE (H): Status: ACTIVE | Noted: 2023-06-05

## 2025-04-07 PROCEDURE — 99203 OFFICE O/P NEW LOW 30 MIN: CPT | Performed by: FAMILY MEDICINE

## 2025-04-07 PROCEDURE — 3079F DIAST BP 80-89 MM HG: CPT | Performed by: FAMILY MEDICINE

## 2025-04-07 PROCEDURE — 3074F SYST BP LT 130 MM HG: CPT | Performed by: FAMILY MEDICINE

## 2025-04-07 PROCEDURE — G2211 COMPLEX E/M VISIT ADD ON: HCPCS | Performed by: FAMILY MEDICINE

## 2025-04-07 RX ORDER — OXYCODONE AND ACETAMINOPHEN 5; 325 MG/1; MG/1
1 TABLET ORAL EVERY 6 HOURS PRN
Qty: 15 TABLET | Refills: 0 | Status: SHIPPED | OUTPATIENT
Start: 2025-04-07 | End: 2025-04-07

## 2025-04-07 RX ORDER — OXYCODONE AND ACETAMINOPHEN 5; 325 MG/1; MG/1
1 TABLET ORAL EVERY 6 HOURS PRN
Qty: 15 TABLET | Refills: 0 | Status: SHIPPED | OUTPATIENT
Start: 2025-04-07

## 2025-04-07 ASSESSMENT — ENCOUNTER SYMPTOMS: BACK PAIN: 1

## 2025-04-07 NOTE — PROGRESS NOTES
"  Assessment & Plan     (M54.50,  G89.29) Chronic midline low back pain without sciatica  (primary encounter diagnosis)  Comment: Patient with a longstanding history of chronic low back pain with a recent exacerbation  Plan: oxyCODONE-acetaminophen (PERCOCET) 5-325 MG         tablet             (Z94.4) S/P liver transplant (H)  Comment: Patient has had a liver transplant, and is overall a fairly complicated history medically  Plan:      PLAN:  1.  Percocet 5/325 1 tablet every 6 hours as needed given 15  2.  For now defer physical therapy and/or imaging studies.  3.  Patient will presumably follow-up in the future with low back pain or other medical issues.  4.  The longitudinal plan of care for the diagnosis(es)/condition(s) as documented were addressed during this visit. Due to the added complexity in care, I will continue to support Torin in the subsequent management and with ongoing continuity of care.            BMI  Estimated body mass index is 26.27 kg/m  as calculated from the following:    Height as of this encounter: 1.683 m (5' 6.25\").    Weight as of this encounter: 74.4 kg (164 lb).             Subjective   Torin is a 64 year old, presenting for the following health issues:  Establish Care and Back Pain (Unsure what happen but back pain increased)      4/7/2025    11:12 AM   Additional Questions   Roomed by Neto     Via the Health Maintenance questionnaire, the patient has reported the following services have been completed -Colonscopy: [facility] 2023-01-25, this information has been sent to the abstraction team.  Back Pain     History of Present Illness       Back Pain:  He presents for follow up of back pain. Patient's back pain is a recurring problem.  Location of back pain:  Right lower back and left lower back  Description of back pain: sharp, shooting and stabbing  Back pain spreads: nowhere    Since patient first noticed back pain, pain is: always present, but gets better and " "worse  Does back pain interfere with his job:  Yes      He is taking medications regularly.      The patient has a longstanding history of chronic intermittent low back pain going back quite a number of decades, he gets some occasional exacerbation, had 1 in February of this year he actually had a lumbar x-ray which showed degenerative changes though no other significant abnormality.  His most recent episode started after he started sneezing actually and he has a lot of low back pain it is more comfortable for him to stand and to sit    The patient had some lidocaine patches leftover from a prior surgical procedure of note he has a very complex medical history including a history of a liver transplant though he has been told he can take some quantity of Tylenol.    The patient is worried about the possibility of a fracture, I told him I think that is unlikely given that he has had numerous x-rays the most recent in February been essentially negative for this, he is reluctant to try physical therapy at this time, he has in the past had occasional brief treatment with either tramadol or Percocet, I would be comfortable giving him a small quantity of Percocet, working to defer imaging studies at this time though he would like to get an x-ray in the future if his back pain persist                            Objective    /85   Pulse 87   Temp 97.5  F (36.4  C) (Oral)   Resp 12   Ht 1.683 m (5' 6.25\")   Wt 74.4 kg (164 lb)   SpO2 99%   BMI 26.27 kg/m    Body mass index is 26.27 kg/m .  Physical Exam               Signed Electronically by: Kris Jeffers MD    "

## 2025-04-07 NOTE — TELEPHONE ENCOUNTER
Per chart review patient saw primary care provider today.    Writer called patient and left voicemail stating to call back if anything further needed from Endocrine Clinic.      Gina Garcia, RN  Endocrine Care Coordinator  [facility]

## 2025-04-07 NOTE — TELEPHONE ENCOUNTER
"Patient calling regarding Percocet prescription from today's visit.   He was informed by [facility] that they are not \"in network\" for his insurance. Needs to have prescription sent to [facility] on [address].     Patient also wondering if prescription for muscle relaxer could also be sent in as discussed during OV this AM.     Routing to provider to advise.   "

## 2025-04-07 NOTE — TELEPHONE ENCOUNTER
[facility] Call Center    Phone Message    May a detailed message be left on voicemail: yes     Reason for Call:     The patient said he got an injury on Saturday 4/5/25 and thinks he needs X-Rays done asap and needed a prescription for pain due to the injury, patient doesn't have a primary at this time, he was given the [place] PCP scheduling number just in case, please review and follow up to address any questions or concerns thank you.     Action Taken: Message routed to:  [facility] ([facility] Endo    Travel Screening: Not Applicable     Date of Service:

## 2025-04-08 PROBLEM — D12.6 ADENOMATOUS COLON POLYP: Status: ACTIVE | Noted: 2025-04-08

## 2025-04-09 ENCOUNTER — VIRTUAL VISIT (OUTPATIENT)
Dept: URGENT CARE | Facility: CLINIC | Age: 65
End: 2025-04-09
Payer: COMMERCIAL

## 2025-04-09 DIAGNOSIS — M54.50 ACUTE BILATERAL LOW BACK PAIN WITHOUT SCIATICA: Primary | ICD-10-CM

## 2025-04-09 PROCEDURE — 98001 SYNCH AUDIO-VIDEO NEW LOW 30: CPT

## 2025-04-09 RX ORDER — CYCLOBENZAPRINE HCL 5 MG
5 TABLET ORAL 3 TIMES DAILY PRN
Qty: 10 TABLET | Refills: 0 | Status: SHIPPED | OUTPATIENT
Start: 2025-04-09

## 2025-04-09 NOTE — PROGRESS NOTES
Cricket is a 64 year old male who presents for a billable video visit.    ASSESSMENT/PLAN:  Diagnoses and all orders for this visit:    Acute bilateral low back pain without sciatica  -     Spine  Referral; Future  -     cyclobenzaprine (FLEXERIL) 5 MG tablet; Take 1 tablet (5 mg) by mouth 3 times daily as needed for muscle spasms.    Pt presents with ongoing lower back pain. No red flag symptoms of loss of bowel or bladder control, pelvic numbness or fever. Will trial patient on flexeril for next few days. Discussed risk and benefits, especially of fatigue/dizziness that can result if taking both percocet and flexeril at the same time. Recommended follow up with spinal specialist as reports this is not the first time he's had ongoing back issues. Referral placed. If anything worsens should be seen in person.    SUBJECTIVE: Pt reports ongoing back pain since Saturday (4 days). Was seen on Monday in clinic and given percocet to try which he reports has helped a little but would like to try muscle relaxer. Denies any weakness, numbness or loss of bowel or bladder control.       ROS: Pertinent ROS neg other than the symptoms noted above in the HPI.     OBJECTIVE:  Vitals not done due to this being a virtual visit    GENERAL: healthy, alert and no distress  EYES: Eyes grossly normal to inspection,conjunctivae and sclerae normal  RESP: Able to speak in complete sentences, no audible wheeze or cough  SKIN: no suspicious lesions or rashes  NEURO: mentation intact and speech normal  PSYCH: mentation appears normal, affect normal/bright    Video-Visit Details    Type of service:  Video Visit  Video Start Time: 1400  Video End Time: 1424    Originating Location: Home    Distant Location:  [facility] VIRTUAL URGENT CARE     Platform used for Video Visit: TRENA Martinez CNP

## 2025-04-10 ENCOUNTER — DOCUMENTATION ONLY (OUTPATIENT)
Dept: TRANSPLANT | Facility: CLINIC | Age: 65
End: 2025-04-10
Payer: COMMERCIAL

## 2025-04-10 NOTE — PROGRESS NOTES
Annual chart review  Labs 2/12/25  Appt 11/25/24, next scheduled May 2025   DEXA 2/12/25  Colonoscopy 2023

## 2025-04-14 ENCOUNTER — TELEPHONE (OUTPATIENT)
Dept: FAMILY MEDICINE | Facility: CLINIC | Age: 65
End: 2025-04-14
Payer: COMMERCIAL

## 2025-04-14 DIAGNOSIS — M54.50 ACUTE BILATERAL LOW BACK PAIN WITHOUT SCIATICA: ICD-10-CM

## 2025-04-14 DIAGNOSIS — M54.50 CHRONIC MIDLINE LOW BACK PAIN WITHOUT SCIATICA: Primary | ICD-10-CM

## 2025-04-14 DIAGNOSIS — G89.29 CHRONIC MIDLINE LOW BACK PAIN WITHOUT SCIATICA: Primary | ICD-10-CM

## 2025-04-14 RX ORDER — CYCLOBENZAPRINE HCL 5 MG
5 TABLET ORAL 3 TIMES DAILY PRN
Qty: 10 TABLET | Refills: 0 | Status: SHIPPED | OUTPATIENT
Start: 2025-04-14

## 2025-04-14 NOTE — TELEPHONE ENCOUNTER
FYI - Status Update    Who is Calling: patient    Update: Really wants xray today and would like a refill for 10mg.    Does caller want a call/response back: Yes     Could we send this information to you in [place] or would you prefer to receive a phone call?:   Patient would prefer a phone call   Okay to leave a detailed message?: Yes at Cell number on file:    Telephone Information:   Mobile [phone]

## 2025-04-14 NOTE — TELEPHONE ENCOUNTER
Order/Referral Request    Who is requesting: Patient    Orders being requested: X-ray     Reason service is needed/diagnosis: Low back pain     When are orders needed by: ASAP    Has this been discussed with Provider: Yes, patient states imaging was discussed at OV 04/07/2025 and is looking to have x-ray today (04/14).     Does patient have a preference on a Group/Provider/Facility? [facility]    Does patient have an appointment scheduled?: No, unable to schedule imaging without appointment    Where to send orders: Place orders within Epic    Could we send this information to you in [facility] or would you prefer to receive a phone call?:   Patient would prefer a phone call   Okay to leave a detailed message?: No at Cell number on file:    Telephone Information:   Mobile [phone]

## 2025-04-14 NOTE — TELEPHONE ENCOUNTER
General Call    Contacts       Contact Date/Time Type Contact Phone/Fax    04/14/2025 12:22 PM CDT Phone (Incoming) Torin Mane (Self) [phone] (M)          Reason for Call:  XR order    What are your questions or concerns:  Patient was seen in clinic 4/7/25 for chronic low back pain.   He reports that imaging was discussed and pt and provider planned to complete imaging if pain worsened.   Patient reports pain seems to be intensifying.     He would like to start with XR and have this done today if possible at 3pm.      Patient also reporting that he was seen virtually for UC due to increased pain - He said that the Cyclobenzaprine prescribed seemed to help with the pain.   Requesting refill and wondering if dose can be increased.      Routing to provider to review and advise.       Could we send this information to you in [facility] or would you prefer to receive a phone call?:   Patient would prefer a phone call   Okay to leave a detailed message?: Yes at Cell number on file:    Telephone Information:   Mobile [phone]

## 2025-04-15 ENCOUNTER — ANCILLARY PROCEDURE (OUTPATIENT)
Dept: GENERAL RADIOLOGY | Facility: CLINIC | Age: 65
End: 2025-04-15
Attending: FAMILY MEDICINE
Payer: COMMERCIAL

## 2025-04-15 DIAGNOSIS — M25.551 HIP PAIN, RIGHT: ICD-10-CM

## 2025-04-15 DIAGNOSIS — M25.551 HIP PAIN, RIGHT: Primary | ICD-10-CM

## 2025-04-15 DIAGNOSIS — G89.29 CHRONIC MIDLINE LOW BACK PAIN WITHOUT SCIATICA: ICD-10-CM

## 2025-04-15 DIAGNOSIS — M54.50 CHRONIC MIDLINE LOW BACK PAIN WITHOUT SCIATICA: ICD-10-CM

## 2025-04-15 PROCEDURE — 73522 X-RAY EXAM HIPS BI 3-4 VIEWS: CPT | Mod: TC | Performed by: RADIOLOGY

## 2025-04-15 PROCEDURE — 72100 X-RAY EXAM L-S SPINE 2/3 VWS: CPT | Mod: TC | Performed by: INTERNAL MEDICINE

## 2025-04-16 NOTE — CONFIDENTIAL NOTE
NEUROSURGERY - NEW PREVISIT PLANNING    Referring Provider: Jacque Butcher APRN CNP    OVN 4/9/2025   Reason For Visit: M54.50 (ICD-10-CM) - Acute bilateral low back pain without sciatica        IMAGING STATUS/LOCATION DATE/TYPE   MRI N/A    CT N/A    XRAY PACS 04/15/2025  Lumbar  [facility]   NOTES STATUS/LOCATION DATE/TYPE   Other specialist OVN: N/A    EMG N/A    INJECTION N/A    PHYSICAL THERAPY N/A    SURGERY N/A      Does patient have C2C?  Year last updated Action     YES   [x]   2022   Please update at appointment if outdated more than 5 years       NO     []   N/A   Please complete C2C at appointment

## 2025-04-17 DIAGNOSIS — M25.551 HIP PAIN, RIGHT: Primary | ICD-10-CM

## 2025-04-18 ENCOUNTER — INFUSION THERAPY VISIT (OUTPATIENT)
Dept: INFUSION THERAPY | Facility: HOSPITAL | Age: 65
End: 2025-04-18
Attending: INTERNAL MEDICINE
Payer: COMMERCIAL

## 2025-04-18 VITALS
RESPIRATION RATE: 18 BRPM | OXYGEN SATURATION: 99 % | TEMPERATURE: 97.9 F | HEART RATE: 86 BPM | DIASTOLIC BLOOD PRESSURE: 78 MMHG | SYSTOLIC BLOOD PRESSURE: 123 MMHG

## 2025-04-18 DIAGNOSIS — M81.0 OSTEOPOROSIS: ICD-10-CM

## 2025-04-18 DIAGNOSIS — N18.31 STAGE 3A CHRONIC KIDNEY DISEASE (H): Primary | ICD-10-CM

## 2025-04-18 PROCEDURE — 250N000011 HC RX IP 250 OP 636: Mod: JZ | Performed by: INTERNAL MEDICINE

## 2025-04-18 PROCEDURE — 96372 THER/PROPH/DIAG INJ SC/IM: CPT | Performed by: INTERNAL MEDICINE

## 2025-04-18 RX ORDER — METHYLPREDNISOLONE SODIUM SUCCINATE 125 MG/2ML
125 INJECTION INTRAMUSCULAR; INTRAVENOUS
Start: 2025-09-25

## 2025-04-18 RX ORDER — ALBUTEROL SULFATE 90 UG/1
1-2 INHALANT RESPIRATORY (INHALATION)
Start: 2025-09-25

## 2025-04-18 RX ORDER — EPINEPHRINE 1 MG/ML
0.3 INJECTION, SOLUTION INTRAMUSCULAR; SUBCUTANEOUS EVERY 5 MIN PRN
OUTPATIENT
Start: 2025-09-25

## 2025-04-18 RX ORDER — ALBUTEROL SULFATE 0.83 MG/ML
2.5 SOLUTION RESPIRATORY (INHALATION)
OUTPATIENT
Start: 2025-09-25

## 2025-04-18 RX ORDER — DIPHENHYDRAMINE HYDROCHLORIDE 50 MG/ML
50 INJECTION, SOLUTION INTRAMUSCULAR; INTRAVENOUS
Start: 2025-09-25

## 2025-04-18 RX ADMIN — DENOSUMAB 60 MG: 60 INJECTION SUBCUTANEOUS at 09:49

## 2025-04-21 ENCOUNTER — TELEPHONE (OUTPATIENT)
Dept: NEUROSURGERY | Facility: CLINIC | Age: 65
End: 2025-04-21
Payer: COMMERCIAL

## 2025-04-21 DIAGNOSIS — Z94.4 S/P LIVER TRANSPLANT (H): ICD-10-CM

## 2025-04-21 NOTE — TELEPHONE ENCOUNTER
Attempted to reach patient to remind them about appointment scheduled with Cadence Staton NP on 4/22/25 in our [place] clinic.    A voicemail was left with a call back number if the patient has questions or would like to reschedule.

## 2025-04-22 ENCOUNTER — PRE VISIT (OUTPATIENT)
Dept: NEUROSURGERY | Facility: CLINIC | Age: 65
End: 2025-04-22

## 2025-04-22 ENCOUNTER — ANCILLARY PROCEDURE (OUTPATIENT)
Dept: GENERAL RADIOLOGY | Facility: CLINIC | Age: 65
End: 2025-04-22
Attending: NURSE PRACTITIONER
Payer: COMMERCIAL

## 2025-04-22 ENCOUNTER — OFFICE VISIT (OUTPATIENT)
Dept: NEUROSURGERY | Facility: CLINIC | Age: 65
End: 2025-04-22
Attending: NURSE PRACTITIONER
Payer: COMMERCIAL

## 2025-04-22 VITALS
WEIGHT: 165.4 LBS | HEIGHT: 66 IN | SYSTOLIC BLOOD PRESSURE: 126 MMHG | DIASTOLIC BLOOD PRESSURE: 80 MMHG | BODY MASS INDEX: 26.58 KG/M2 | HEART RATE: 85 BPM | OXYGEN SATURATION: 96 %

## 2025-04-22 DIAGNOSIS — M54.50 CHRONIC MIDLINE LOW BACK PAIN WITHOUT SCIATICA: ICD-10-CM

## 2025-04-22 DIAGNOSIS — M54.50 ACUTE BILATERAL LOW BACK PAIN WITHOUT SCIATICA: ICD-10-CM

## 2025-04-22 DIAGNOSIS — M43.17 SPONDYLOLISTHESIS OF LUMBOSACRAL REGION: ICD-10-CM

## 2025-04-22 DIAGNOSIS — G89.29 CHRONIC MIDLINE LOW BACK PAIN WITHOUT SCIATICA: Primary | ICD-10-CM

## 2025-04-22 DIAGNOSIS — K70.30 ALCOHOLIC CIRRHOSIS (H): ICD-10-CM

## 2025-04-22 DIAGNOSIS — M43.06 PARS DEFECT OF LUMBAR SPINE: ICD-10-CM

## 2025-04-22 DIAGNOSIS — G89.29 CHRONIC MIDLINE LOW BACK PAIN WITHOUT SCIATICA: ICD-10-CM

## 2025-04-22 DIAGNOSIS — Z94.4 LIVER REPLACED BY TRANSPLANT (H): Primary | ICD-10-CM

## 2025-04-22 DIAGNOSIS — M54.50 CHRONIC MIDLINE LOW BACK PAIN WITHOUT SCIATICA: Primary | ICD-10-CM

## 2025-04-22 PROCEDURE — 99243 OFF/OP CNSLTJ NEW/EST LOW 30: CPT | Performed by: NURSE PRACTITIONER

## 2025-04-22 PROCEDURE — 1125F AMNT PAIN NOTED PAIN PRSNT: CPT | Performed by: NURSE PRACTITIONER

## 2025-04-22 PROCEDURE — 3079F DIAST BP 80-89 MM HG: CPT | Performed by: NURSE PRACTITIONER

## 2025-04-22 PROCEDURE — 3074F SYST BP LT 130 MM HG: CPT | Performed by: NURSE PRACTITIONER

## 2025-04-22 PROCEDURE — 72110 X-RAY EXAM L-2 SPINE 4/>VWS: CPT | Mod: TC | Performed by: RADIOLOGY

## 2025-04-22 RX ORDER — MAGNESIUM OXIDE 400 MG/1
400 TABLET ORAL 2 TIMES DAILY
Qty: 180 TABLET | Refills: 0 | Status: SHIPPED | OUTPATIENT
Start: 2025-04-22

## 2025-04-22 ASSESSMENT — PAIN SCALES - GENERAL: PAINLEVEL_OUTOF10: MODERATE PAIN (5)

## 2025-04-22 NOTE — NURSING NOTE
"Cricket Mane is a 64 year old male who presents for:  Chief Complaint   Patient presents with    Neurologic Problem     Middle LB, can go to the right and left of spine. Onset: Chronic but most recent: 4/5/25. Pain gets serious every 1-2 years. He thought he would be sore for a couple of weeks and would go away but this time it is worse than it ever has been. Denies any radiation into the legs or N/T. If any radiation of pain, it will go to the right side of back. Xray 4/15/25. Tx: flexeril, hard to say if it helped. He was taking percocet as well because he was going on a trip.         Vitals:    Vitals:    04/22/25 1121   BP: 126/80   Pulse: 85   SpO2: 96%   Weight: 165 lb 6.4 oz (75 kg)   Height: 5' 6.25\" (1.683 m)       BMI:  Estimated body mass index is 26.5 kg/m  as calculated from the following:    Height as of this encounter: 5' 6.25\" (1.683 m).    Weight as of this encounter: 165 lb 6.4 oz (75 kg).    Pain Score:  Moderate Pain (5)        Shalini Denton CMA      "

## 2025-04-22 NOTE — PROGRESS NOTES
[facility]  Neurosurgery Clinic Visit      CC: back pain    Primary care Provider: Kris Jeffers    Reason For Visit:   I was asked by Jacque Butcher CNP to consult on the patient for chronic mildine low back pain without sciatica.    HPI: Cricket Mane is a 64 year old male with a history of chronic back pain since he was in high school. He reports worsening over the past few years with increased frequency and intensity of flares of pain. He states the most recent flare occurred over the past few weeks when he was in [place]. He states the symptoms were so bad he had to travel back due to severe pain. He describes midline low back pain without radicular symptoms. No paresthesias or overt weakness. No bowel/bladder complaints.     No past medical history on file.    Past Medical History reviewed with patient during visit.    Past Surgical History:   Procedure Laterality Date    AS PARTIAL HIP REPLACEMENT Left     BENCH LIVER N/A 2023    Procedure: Bench liver;  Surgeon: Aniket Arzate MD;  Location: [facility] OR    IR PARACENTESIS  2022    TRANSPLANT LIVER RECIPIENT  DONOR N/A 2023    Procedure: Transplant liver recipient  donor;  Surgeon: Aniket Arzate MD;  Location: [facility] OR     Past Surgical History reviewed with patient during visit.    Current Outpatient Medications   Medication Sig Dispense Refill    folic acid (FOLVITE) 1 MG tablet Take 1 tablet (1,000 mcg) by mouth daily 90 tablet 1    magnesium oxide (MAG-OX) 400 MG tablet TAKE 1 TABLET (400 MG) BY MOUTH 2 TIMES DAILY 180 tablet 0    metoprolol tartrate (LOPRESSOR) 25 MG tablet TAKE 1 TABLET BY MOUTH TWICE A  tablet 3    Multiple Vitamins-Minerals (SPECTRAVITE) TABS Take 1 tablet by mouth daily. Taking a different brand of multi vitamins      tacrolimus (GENERIC EQUIVALENT) 0.5 MG capsule Take 1 capsule (0.5 mg) by mouth every morning. Total dose: 1.5mg am, 1 mg pm. 90 capsule 3    tacrolimus  (GENERIC EQUIVALENT) 1 MG capsule Take 1 capsule (1 mg) by mouth every 12 hours. Total dose 1.5mg AM and 1mg  capsule 3    aspirin (ASA) 325 MG tablet Take 1 tablet (325 mg) by mouth daily (Patient not taking: Reported on 11/25/2024) 90 tablet 3    calcium carbonate-vitamin D (CALTRATE) 600-10 MG-MCG per tablet Take 1 tablet by mouth 2 times daily (with meals) 180 tablet 3    cyclobenzaprine (FLEXERIL) 5 MG tablet Take 1 tablet (5 mg) by mouth 3 times daily as needed for muscle spasms. (Patient not taking: Reported on 4/22/2025) 10 tablet 0    oxyCODONE-acetaminophen (PERCOCET) 5-325 MG tablet Take 1 tablet by mouth every 6 hours as needed for pain. (Patient not taking: Reported on 4/22/2025) 15 tablet 0    sildenafil (VIAGRA) 100 MG tablet Take 100 mg by mouth daily as needed.       No current facility-administered medications for this visit.       No Known Allergies    Social History     Socioeconomic History    Marital status: Single   Tobacco Use    Smoking status: Never    Smokeless tobacco: Never   Vaping Use    Vaping status: Never Used   Substance and Sexual Activity    Alcohol use: Not Currently     Comment: last ETOH was 7/28/22.    Drug use: No   Social History Narrative    Lives with family.      Social Drivers of Health     Financial Resource Strain: Not At Risk (5/20/2024)    Received from [employer]    Financial Resource Strain     Is it hard for you to pay for the very basics like food, housing, medical care or heating?: No   Food Insecurity: Not At Risk (5/20/2024)    Received from [employer]    Food Insecurity     Does your food run out before you have the money to buy more?: No   Transportation Needs: Not At Risk (5/20/2024)    Received from [employer]    Transportation Needs     Does a lack of transportation keep you from your medical appointments or from getting your medications?: No   Interpersonal Safety: Low Risk  (4/7/2025)    Interpersonal Safety     Do you feel physically  "and emotionally safe where you currently live?: Yes     Within the past 12 months, have you been hit, slapped, kicked or otherwise physically hurt by someone?: No     Within the past 12 months, have you been humiliated or emotionally abused in other ways by your partner or ex-partner?: No       Family History   Problem Relation Age of Onset    Lung Cancer Mother     Cancer Mother     Hypertension Father     Cancer Brother     No Known Problems Brother     No Known Problems Sister     Cancer Sister     No Known Problems Sister     No Known Problems Sister     Thyroid nodules Son     No Known Problems Daughter     No Known Problems Daughter          ROS: 10 point ROS neg other than the symptoms noted above in the HPI.    Vital Signs:   /80   Pulse 85   Ht 5' 6.25\" (1.683 m)   Wt 165 lb 6.4 oz (75 kg)   SpO2 96%   BMI 26.50 kg/m        Examination:  Constitutional:  Alert, well nourished, NAD.  Memory: recent and remote memory   HEENT: Normocephalic, atraumatic.   Pulm:  Without shortness of breath   CV:  No pitting edema of BLE.      Neurological:  Awake  Alert  Oriented x 3  Speech clear    Motor exam:   Hip Flexion:                 Right: 5/5  Left:  5/5  Hip Abduction:             Right:  5/5  Left:  5/5  Hip Adduction:             Right:  5/5  Left:  5/5  Plantar Flexion:           Right:  5/5  Left:  5/5  Dorsal Flexion:            Right:  5/5  Left:  5/5  EHL:                            Right:  5/5  Left:  5/5     Sensation normal to bilateral upper and lower extremities  Muscle tone to bilateral upper and lower extremities   Gait: Able to stand from a seated position. Normal non-antalgic, non-myelopathic gait.  Able to heel/toe walk without loss of balance    Lumbar examination reveals no tenderness of the spine or paraspinous muscles.  Hip height is symmetrical. Negative SI joint, sciatic notch or greater trochanteric tenderness to palpation bilaterally.  Straight leg raise is negative bilaterally.  "     Imaging:   Narrative & Impression   EXAM: XR LUMBAR SPINE 2/3 VIEWS  LOCATION: [facility]  DATE: 4/15/2025     INDICATION:  Chronic midline low back pain without sciatica, Chronic midline low back pain without sciatica  COMPARISON: Lumbar spine radiograph 02/13/2025.                                                                      IMPRESSION: 5 lumbar type vertebral bodies. Grade 2 anterolisthesis of L5 on S1, secondary to chronic bilateral L5 pars defects, similar to the prior radiograph. Lumbar vertebral body heights appear maintained. Multilevel lumbar spondylosis, similar to   the prior exam. Moderate disc height loss at L5-S1. Facet hypertrophy in the lower lumbar spine. Surgical clips in the right upper quadrant. Partially visualized left hip arthroplasty.     Assessment/Plan:   Chronic midline low back pain without sciatica  Lumbar spondylolisthesis  Pars defect    Reviewed prior lumbar XR in clinic. Will obtain lumbar flexion/extension XR today to look for any instability. If stable, ok to begin PT at that time. Will obtain lumbar MRI as well to discuss next steps as well. He verbalized understanding and agreement.     Patient Instructions   -Lumbar xray to be obtained today to look for instability in your spine. If no instability, will plan to begin physical therapy.   -Lumbar MRI ordered. They will contact you to schedule.   -Please contact our clinic with questions or concerns at [phone].    Cadence Staton, JO  [facility]  [address] [place] [id number]  Tel [phone]  Fax [phone]

## 2025-04-22 NOTE — LETTER
2025      Cricket Mane  [address] [facility] [address]      Dear Colleague,    Thank you for referring your patient, Cricket Mane, to the [facility]. Please see a copy of my visit note below.    [facility]  Neurosurgery Clinic Visit      CC: back pain    Primary care Provider: Kris Jeffers    Reason For Visit:   I was asked by Jacque Butcher CNP to consult on the patient for chronic mildine low back pain without sciatica.    HPI: Cricket Mane is a 64 year old male with a history of chronic back pain since he was in high school. He reports worsening over the past few years with increased frequency and intensity of flares of pain. He states the most recent flare occurred over the past few weeks when he was in [place]. He states the symptoms were so bad he had to travel back due to severe pain. He describes midline low back pain without radicular symptoms. No paresthesias or overt weakness. No bowel/bladder complaints.     No past medical history on file.    Past Medical History reviewed with patient during visit.    Past Surgical History:   Procedure Laterality Date     AS PARTIAL HIP REPLACEMENT Left      BENCH LIVER N/A 2023    Procedure: Bench liver;  Surgeon: Aniket Arzate MD;  Location: [facility] OR     IR PARACENTESIS  2022     TRANSPLANT LIVER RECIPIENT  DONOR N/A 2023    Procedure: Transplant liver recipient  donor;  Surgeon: Aniket Arzate MD;  Location: [facility] OR     Past Surgical History reviewed with patient during visit.    Current Outpatient Medications   Medication Sig Dispense Refill     folic acid (FOLVITE) 1 MG tablet Take 1 tablet (1,000 mcg) by mouth daily 90 tablet 1     magnesium oxide (MAG-OX) 400 MG tablet TAKE 1 TABLET (400 MG) BY MOUTH 2 TIMES DAILY 180 tablet 0     metoprolol tartrate (LOPRESSOR) 25 MG tablet TAKE 1 TABLET BY MOUTH TWICE A  tablet 3     Multiple  Vitamins-Minerals (SPECTRAVITE) TABS Take 1 tablet by mouth daily. Taking a different brand of multi vitamins       tacrolimus (GENERIC EQUIVALENT) 0.5 MG capsule Take 1 capsule (0.5 mg) by mouth every morning. Total dose: 1.5mg am, 1 mg pm. 90 capsule 3     tacrolimus (GENERIC EQUIVALENT) 1 MG capsule Take 1 capsule (1 mg) by mouth every 12 hours. Total dose 1.5mg AM and 1mg  capsule 3     aspirin (ASA) 325 MG tablet Take 1 tablet (325 mg) by mouth daily (Patient not taking: Reported on 11/25/2024) 90 tablet 3     calcium carbonate-vitamin D (CALTRATE) 600-10 MG-MCG per tablet Take 1 tablet by mouth 2 times daily (with meals) 180 tablet 3     cyclobenzaprine (FLEXERIL) 5 MG tablet Take 1 tablet (5 mg) by mouth 3 times daily as needed for muscle spasms. (Patient not taking: Reported on 4/22/2025) 10 tablet 0     oxyCODONE-acetaminophen (PERCOCET) 5-325 MG tablet Take 1 tablet by mouth every 6 hours as needed for pain. (Patient not taking: Reported on 4/22/2025) 15 tablet 0     sildenafil (VIAGRA) 100 MG tablet Take 100 mg by mouth daily as needed.       No current facility-administered medications for this visit.       No Known Allergies    Social History     Socioeconomic History     Marital status: Single   Tobacco Use     Smoking status: Never     Smokeless tobacco: Never   Vaping Use     Vaping status: Never Used   Substance and Sexual Activity     Alcohol use: Not Currently     Comment: last ETOH was 7/28/22.     Drug use: No   Social History Narrative    Lives with family.      Social Drivers of Health     Financial Resource Strain: Not At Risk (5/20/2024)    Received from [employer]    Financial Resource Strain      Is it hard for you to pay for the very basics like food, housing, medical care or heating?: No   Food Insecurity: Not At Risk (5/20/2024)    Received from [employer]    Food Insecurity      Does your food run out before you have the money to buy more?: No   Transportation Needs: Not  "At Risk (5/20/2024)    Received from [facility]    Transportation Needs      Does a lack of transportation keep you from your medical appointments or from getting your medications?: No   Interpersonal Safety: Low Risk  (4/7/2025)    Interpersonal Safety      Do you feel physically and emotionally safe where you currently live?: Yes      Within the past 12 months, have you been hit, slapped, kicked or otherwise physically hurt by someone?: No      Within the past 12 months, have you been humiliated or emotionally abused in other ways by your partner or ex-partner?: No       Family History   Problem Relation Age of Onset     Lung Cancer Mother      Cancer Mother      Hypertension Father      Cancer Brother      No Known Problems Brother      No Known Problems Sister      Cancer Sister      No Known Problems Sister      No Known Problems Sister      Thyroid nodules Son      No Known Problems Daughter      No Known Problems Daughter          ROS: 10 point ROS neg other than the symptoms noted above in the HPI.    Vital Signs:   /80   Pulse 85   Ht 5' 6.25\" (1.683 m)   Wt 165 lb 6.4 oz (75 kg)   SpO2 96%   BMI 26.50 kg/m        Examination:  Constitutional:  Alert, well nourished, NAD.  Memory: recent and remote memory   HEENT: Normocephalic, atraumatic.   Pulm:  Without shortness of breath   CV:  No pitting edema of BLE.      Neurological:  Awake  Alert  Oriented x 3  Speech clear    Motor exam:   Hip Flexion:                 Right: 5/5  Left:  5/5  Hip Abduction:             Right:  5/5  Left:  5/5  Hip Adduction:             Right:  5/5  Left:  5/5  Plantar Flexion:           Right:  5/5  Left:  5/5  Dorsal Flexion:            Right:  5/5  Left:  5/5  EHL:                            Right:  5/5  Left:  5/5     Sensation normal to bilateral upper and lower extremities  Muscle tone to bilateral upper and lower extremities   Gait: Able to stand from a seated position. Normal non-antalgic, non-myelopathic " gait.  Able to heel/toe walk without loss of balance    Lumbar examination reveals no tenderness of the spine or paraspinous muscles.  Hip height is symmetrical. Negative SI joint, sciatic notch or greater trochanteric tenderness to palpation bilaterally.  Straight leg raise is negative bilaterally.      Imaging:   Narrative & Impression   EXAM: XR LUMBAR SPINE 2/3 VIEWS  LOCATION: [facility]  DATE: 4/15/2025     INDICATION:  Chronic midline low back pain without sciatica, Chronic midline low back pain without sciatica  COMPARISON: Lumbar spine radiograph 02/13/2025.                                                                      IMPRESSION: 5 lumbar type vertebral bodies. Grade 2 anterolisthesis of L5 on S1, secondary to chronic bilateral L5 pars defects, similar to the prior radiograph. Lumbar vertebral body heights appear maintained. Multilevel lumbar spondylosis, similar to   the prior exam. Moderate disc height loss at L5-S1. Facet hypertrophy in the lower lumbar spine. Surgical clips in the right upper quadrant. Partially visualized left hip arthroplasty.     Assessment/Plan:   Chronic midline low back pain without sciatica  Lumbar spondylolisthesis  Pars defect    Reviewed prior lumbar XR in clinic. Will obtain lumbar flexion/extension XR today to look for any instability. If stable, ok to begin PT at that time. Will obtain lumbar MRI as well to discuss next steps as well. He verbalized understanding and agreement.     Patient Instructions   -Lumbar xray to be obtained today to look for instability in your spine. If no instability, will plan to begin physical therapy.   -Lumbar MRI ordered. They will contact you to schedule.   -Please contact our clinic with questions or concerns at [phone].    Cadence Staton, JO  [facility]  [address] [place] [id number]  Tel [phone]  Fax [phone]      Again, thank you for  allowing me to participate in the care of your patient.        Sincerely,        Cadence Staton NP    Electronically signed

## 2025-04-22 NOTE — PATIENT INSTRUCTIONS
-Lumbar xray to be obtained today to look for instability in your spine. If no instability, will plan to begin physical therapy.   -Lumbar MRI ordered. They will contact you to schedule.   -Please contact our clinic with questions or concerns at [phone].

## 2025-04-23 DIAGNOSIS — M54.50 ACUTE BILATERAL LOW BACK PAIN WITHOUT SCIATICA: ICD-10-CM

## 2025-04-23 DIAGNOSIS — M43.06 PARS DEFECT OF LUMBAR SPINE: ICD-10-CM

## 2025-04-23 DIAGNOSIS — M43.17 SPONDYLOLISTHESIS OF LUMBOSACRAL REGION: Primary | ICD-10-CM

## 2025-04-24 NOTE — PROGRESS NOTES
Infusion Nursing Note:  Cricket Mane presents today for prolia.    Patient seen by provider today: No   present during visit today: Not Applicable.    Note: /78   Pulse 86   Temp 97.9  F (36.6  C)   Resp 18   SpO2 99%   .    Premeds Given: none    Intravenous Access:  No Intravenous access/labs at this visit.    Treatment Conditions:  Lab Results   Component Value Date    HGB 15.1 11/25/2024    WBC 5.4 11/25/2024    ANEU 0.8 (L) 06/30/2023     11/25/2024        Lab Results   Component Value Date     11/25/2024    POTASSIUM 4.5 11/25/2024    MAG 2.1 11/25/2024    CR 1.56 (H) 04/18/2025    PEDRO 9.5 04/18/2025    BILITOTAL 0.8 11/25/2024    ALBUMIN 4.1 04/18/2025    ALT 23 11/25/2024    AST 20 11/25/2024       Results reviewed, labs MET treatment parameters, ok to proceed with treatment.      Post Infusion Assessment:  Patient tolerated injection without incident.       Discharge Plan:   Patient discharged in stable condition accompanied by: self.  Departure Mode: Ambulatory.      Rita Olivo RN

## 2025-04-28 DIAGNOSIS — G89.29 CHRONIC MIDLINE LOW BACK PAIN WITHOUT SCIATICA: Primary | ICD-10-CM

## 2025-04-28 DIAGNOSIS — M54.50 CHRONIC MIDLINE LOW BACK PAIN WITHOUT SCIATICA: Primary | ICD-10-CM

## 2025-04-28 RX ORDER — CYCLOBENZAPRINE HCL 10 MG
10 TABLET ORAL 3 TIMES DAILY PRN
Qty: 30 TABLET | Refills: 0 | Status: SHIPPED | OUTPATIENT
Start: 2025-04-28

## 2025-05-09 ENCOUNTER — HOSPITAL ENCOUNTER (OUTPATIENT)
Dept: MRI IMAGING | Facility: CLINIC | Age: 65
Discharge: HOME | End: 2025-05-09
Attending: NURSE PRACTITIONER | Admitting: NURSE PRACTITIONER
Payer: COMMERCIAL

## 2025-05-09 DIAGNOSIS — M43.06 PARS DEFECT OF LUMBAR SPINE: ICD-10-CM

## 2025-05-09 DIAGNOSIS — G89.29 CHRONIC MIDLINE LOW BACK PAIN WITHOUT SCIATICA: ICD-10-CM

## 2025-05-09 DIAGNOSIS — M54.50 CHRONIC MIDLINE LOW BACK PAIN WITHOUT SCIATICA: ICD-10-CM

## 2025-05-09 DIAGNOSIS — M43.17 SPONDYLOLISTHESIS OF LUMBOSACRAL REGION: ICD-10-CM

## 2025-05-09 PROCEDURE — 72148 MRI LUMBAR SPINE W/O DYE: CPT

## 2025-05-12 ENCOUNTER — TELEPHONE (OUTPATIENT)
Dept: NEUROSURGERY | Facility: CLINIC | Age: 65
End: 2025-05-12
Payer: COMMERCIAL

## 2025-05-12 NOTE — TELEPHONE ENCOUNTER
Attempted to contact patient to discuss lumbar MRI results. Left message to return call. Lumbar MRI reveals chronic L5 pars defects with grade 1 L5-S1 spondylolisthesis as previously discussed. SevereL5-S1 foraminal stenosis. L3-4 modic type I endplate changes as well with disc extrusion. Would recommend PT as previously recommended/ordered and could proceed with injection therapy at this time.     IMPRESSION:  1.  Chronic L5 pars defects bilaterally with grade 1 L5-S1 spondylolisthesis. Severe L5-S1 foraminal compromise is noted with no spinal canal narrowing at this level. No disc herniation at L5-S1 is evident. Lower cord, conus medullaris and roots of the   cauda equina are normal. No additional marrow or ligamentous edema.  2.  Otherwise satisfactory lumbar vertebral body height/alignment     3.  At L3-4 Modic type I endplate signal changes are present posteriorly just superior endplate of L4 which can serve as a source of independent pain generation. L3-4 disc extrusion with inferior migration at the right paracentral zone extends to narrow   the right L4 lateral recess and proximity of annular tear/disruption or mass effect at the right L4 nerve root within the lateral recess series 6 image 36 could correlate to right L4 radiculopathy.     4.  Scattered degenerative changes elsewhere in the lumbar spine are less pronounced/prominent.. Please see above for details and description.

## 2025-05-13 NOTE — TELEPHONE ENCOUNTER
[facility] Call Center    Phone Message    May a detailed message be left on voicemail: yes     Reason for Call: Other: Patient is returning call for results. Please call back.      Action Taken: Message routed to:  Other: [facility]    Travel Screening: Not Applicable     Date of Service:

## 2025-05-14 NOTE — TELEPHONE ENCOUNTER
[facility] Call Center    Phone Message    May a detailed message be left on voicemail: yes     Reason for Call: Other: Patient is calling again for results. Please call back.      Action Taken: Message routed to:  Other: [facility]    Travel Screening: Not Applicable

## 2025-05-15 NOTE — TELEPHONE ENCOUNTER
Contacted patient to discuss lumbar MRI results. He will begin physical therapy next week. Will discuss injection therapy with his transplant provider next week. He will reach out to me if he would like to proceed with injection therapy. He verbalized understanding and agreement with the plan.

## 2025-05-20 ENCOUNTER — RESULTS FOLLOW-UP (OUTPATIENT)
Dept: TRANSPLANT | Facility: CLINIC | Age: 65
End: 2025-05-20

## 2025-05-20 ENCOUNTER — OFFICE VISIT (OUTPATIENT)
Dept: GASTROENTEROLOGY | Facility: CLINIC | Age: 65
End: 2025-05-20
Attending: INTERNAL MEDICINE
Payer: COMMERCIAL

## 2025-05-20 ENCOUNTER — LAB (OUTPATIENT)
Dept: LAB | Facility: CLINIC | Age: 65
End: 2025-05-20
Payer: COMMERCIAL

## 2025-05-20 VITALS
SYSTOLIC BLOOD PRESSURE: 138 MMHG | HEART RATE: 87 BPM | OXYGEN SATURATION: 98 % | WEIGHT: 160.3 LBS | DIASTOLIC BLOOD PRESSURE: 94 MMHG | HEIGHT: 66 IN | RESPIRATION RATE: 18 BRPM | BODY MASS INDEX: 25.76 KG/M2

## 2025-05-20 DIAGNOSIS — K70.30 ALCOHOLIC CIRRHOSIS (H): ICD-10-CM

## 2025-05-20 DIAGNOSIS — D63.1 ANEMIA IN STAGE 3B CHRONIC KIDNEY DISEASE (H): ICD-10-CM

## 2025-05-20 DIAGNOSIS — M81.0 AGE-RELATED OSTEOPOROSIS WITHOUT CURRENT PATHOLOGICAL FRACTURE: ICD-10-CM

## 2025-05-20 DIAGNOSIS — Z94.4 S/P LIVER TRANSPLANT (H): Primary | ICD-10-CM

## 2025-05-20 DIAGNOSIS — Z94.4 LIVER REPLACED BY TRANSPLANT (H): ICD-10-CM

## 2025-05-20 DIAGNOSIS — N18.32 ANEMIA IN STAGE 3B CHRONIC KIDNEY DISEASE (H): ICD-10-CM

## 2025-05-20 DIAGNOSIS — Z94.4 LIVER REPLACED BY TRANSPLANT (H): Primary | ICD-10-CM

## 2025-05-20 LAB
ALBUMIN MFR UR ELPH: 12.9 MG/DL
ALBUMIN SERPL BCG-MCNC: 4.4 G/DL (ref 3.5–5.2)
ALBUMIN UR-MCNC: NEGATIVE MG/DL
ALP SERPL-CCNC: 53 U/L (ref 40–150)
ALT SERPL W P-5'-P-CCNC: 21 U/L (ref 0–70)
ANION GAP SERPL CALCULATED.3IONS-SCNC: 11 MMOL/L (ref 7–15)
APPEARANCE UR: CLEAR
AST SERPL W P-5'-P-CCNC: 22 U/L (ref 0–45)
BILIRUB SERPL-MCNC: 0.7 MG/DL
BILIRUB UR QL STRIP: NEGATIVE
BILIRUBIN DIRECT (ROCHE PRO & PURE): 0.28 MG/DL (ref 0–0.45)
BUN SERPL-MCNC: 23.3 MG/DL (ref 8–23)
CALCIUM SERPL-MCNC: 9.7 MG/DL (ref 8.8–10.4)
CALCIUM SERPL-MCNC: 9.7 MG/DL (ref 8.8–10.4)
CHLORIDE SERPL-SCNC: 104 MMOL/L (ref 98–107)
CHOLEST SERPL-MCNC: 176 MG/DL
COLOR UR AUTO: YELLOW
CREAT SERPL-MCNC: 1.47 MG/DL (ref 0.67–1.17)
CREAT UR-MCNC: 183 MG/DL
EGFRCR SERPLBLD CKD-EPI 2021: 54 ML/MIN/1.73M2
ERYTHROCYTE [DISTWIDTH] IN BLOOD BY AUTOMATED COUNT: 14.5 % (ref 10–15)
FASTING STATUS PATIENT QL REPORTED: ABNORMAL
FASTING STATUS PATIENT QL REPORTED: NORMAL
GLUCOSE SERPL-MCNC: 107 MG/DL (ref 70–99)
GLUCOSE UR STRIP-MCNC: NEGATIVE MG/DL
HCO3 SERPL-SCNC: 23 MMOL/L (ref 22–29)
HCT VFR BLD AUTO: 45.5 % (ref 40–53)
HDLC SERPL-MCNC: 78 MG/DL
HGB BLD-MCNC: 15.1 G/DL (ref 13.3–17.7)
HGB UR QL STRIP: NEGATIVE
HYALINE CASTS: 1 /LPF
KETONES UR STRIP-MCNC: NEGATIVE MG/DL
LDLC SERPL CALC-MCNC: 85 MG/DL
LEUKOCYTE ESTERASE UR QL STRIP: NEGATIVE
MAGNESIUM SERPL-MCNC: 1.7 MG/DL (ref 1.7–2.3)
MCH RBC QN AUTO: 28 PG (ref 26.5–33)
MCHC RBC AUTO-ENTMCNC: 33.2 G/DL (ref 31.5–36.5)
MCV RBC AUTO: 84 FL (ref 78–100)
MUCOUS THREADS #/AREA URNS LPF: PRESENT /LPF
NITRATE UR QL: NEGATIVE
NONHDLC SERPL-MCNC: 98 MG/DL
PH UR STRIP: 5.5 [PH] (ref 5–7)
PHOSPHATE SERPL-MCNC: 2 MG/DL (ref 2.5–4.5)
PLATELET # BLD AUTO: 146 10E3/UL (ref 150–450)
POTASSIUM SERPL-SCNC: 4.4 MMOL/L (ref 3.4–5.3)
PROT SERPL-MCNC: 7.6 G/DL (ref 6.4–8.3)
PROT/CREAT 24H UR: 0.07 MG/MG CR (ref 0–0.2)
RBC # BLD AUTO: 5.39 10E6/UL (ref 4.4–5.9)
RBC URINE: 2 /HPF
SODIUM SERPL-SCNC: 138 MMOL/L (ref 135–145)
SP GR UR STRIP: 1.02 (ref 1–1.03)
TACROLIMUS BLD-MCNC: 6.7 UG/L (ref 5–15)
TME LAST DOSE: NORMAL H
TME LAST DOSE: NORMAL H
TRIGL SERPL-MCNC: 66 MG/DL
UROBILINOGEN UR STRIP-MCNC: NORMAL MG/DL
VIT D+METAB SERPL-MCNC: 52 NG/ML (ref 20–50)
WBC # BLD AUTO: 5.9 10E3/UL (ref 4–11)
WBC URINE: <1 /HPF

## 2025-05-20 PROCEDURE — 85027 COMPLETE CBC AUTOMATED: CPT | Performed by: PATHOLOGY

## 2025-05-20 PROCEDURE — G0480 DRUG TEST DEF 1-7 CLASSES: HCPCS | Mod: 90 | Performed by: PATHOLOGY

## 2025-05-20 PROCEDURE — 99214 OFFICE O/P EST MOD 30 MIN: CPT | Performed by: INTERNAL MEDICINE

## 2025-05-20 PROCEDURE — 1126F AMNT PAIN NOTED NONE PRSNT: CPT | Performed by: INTERNAL MEDICINE

## 2025-05-20 PROCEDURE — 80053 COMPREHEN METABOLIC PANEL: CPT | Performed by: PATHOLOGY

## 2025-05-20 PROCEDURE — 3080F DIAST BP >= 90 MM HG: CPT | Performed by: INTERNAL MEDICINE

## 2025-05-20 PROCEDURE — 99000 SPECIMEN HANDLING OFFICE-LAB: CPT | Performed by: PATHOLOGY

## 2025-05-20 PROCEDURE — 82306 VITAMIN D 25 HYDROXY: CPT | Performed by: INTERNAL MEDICINE

## 2025-05-20 PROCEDURE — 80197 ASSAY OF TACROLIMUS: CPT | Performed by: INTERNAL MEDICINE

## 2025-05-20 PROCEDURE — 80061 LIPID PANEL: CPT | Performed by: PATHOLOGY

## 2025-05-20 PROCEDURE — 82248 BILIRUBIN DIRECT: CPT | Performed by: PATHOLOGY

## 2025-05-20 PROCEDURE — 99213 OFFICE O/P EST LOW 20 MIN: CPT | Performed by: INTERNAL MEDICINE

## 2025-05-20 PROCEDURE — 3075F SYST BP GE 130 - 139MM HG: CPT | Performed by: INTERNAL MEDICINE

## 2025-05-20 PROCEDURE — 83735 ASSAY OF MAGNESIUM: CPT | Performed by: PATHOLOGY

## 2025-05-20 PROCEDURE — 84100 ASSAY OF PHOSPHORUS: CPT | Performed by: PATHOLOGY

## 2025-05-20 PROCEDURE — 36415 COLL VENOUS BLD VENIPUNCTURE: CPT | Performed by: PATHOLOGY

## 2025-05-20 RX ORDER — TACROLIMUS 1 MG/1
1 CAPSULE ORAL EVERY 12 HOURS
Qty: 180 CAPSULE | Refills: 3 | Status: SHIPPED | OUTPATIENT
Start: 2025-05-20

## 2025-05-20 RX ORDER — TACROLIMUS 0.5 MG/1
0.5 CAPSULE ORAL PRN
Status: SHIPPED
Start: 2025-05-20

## 2025-05-20 ASSESSMENT — PAIN SCALES - GENERAL: PAINLEVEL_OUTOF10: NO PAIN (0)

## 2025-05-20 NOTE — TELEPHONE ENCOUNTER
ISSUE:   Tacrolimus IR level 6.7 on 5/20, goal 3-5, dose 1.5 mg AM and 1 mg pm.    PLAN:   Call Patient and confirm this was an accurate 12-hour trough.   Verify Tacrolimus IR dose.  Confirm no new medications or or missed doses.   Confirm no new illness / infection / diarrhea.   If accurate trough and accurate dose, decrease Tacrolimus IR dose to 1 mg BID     Repeat tac in 1-2 weeks. Creatinine is up, may need to decrease dose more.   *If > 50% change in immunosuppression dose, repeat labs in 1 week.   Scarlett Michelle RN     OUTCOME:   Spoke with Patient, they confirm accurate trough level and current dose 1.5 mg am, 1 mg pm .   Patient confirmed dose change to 1 mg BID.  Patient agreed to repeat labs in 1-2 weeks.   Orders sent to preferred pharmacy for dose change and lab for repeat labs.   Patient voiced understanding of plan.     Ree Burleson LPN

## 2025-05-20 NOTE — LETTER
5/20/2025      Cricket Mane  [address] [place] [facility] [address]      Dear Colleague,    Thank you for referring your patient, Cricket Mane, to the [facility] [place]. Please see a copy of my visit note below.    Solid Organ Transplant Hepatology Follow-Up Visit:     HISTORY OF PRESENT ILLNESS:   I had the pleasure of seeing Cricket Mane for followup in the Liver Clinic at the [facility] on May 20, 2025. Mr. 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     For the most part, he is doing well.  He denies any abdominal pain.  He denies any itching or skin rash.  He denies fatigue.  He denies any increased abdominal girth or lower extremity edema.     He denies any gastrointestinal bleeding.     He denies any fevers or chills, cough or shortness of breath. He denies any nausea or vomiting.  He does have some occasional diarrhea.  He denies any constipation.  His appetite is good and his weight is increasing slightly.     There otherwise been no other new events since he was last seen.    Medications:   Current Outpatient Medications   Medication Sig Dispense Refill     aspirin (ASA) 325 MG tablet Take 1 tablet (325 mg) by mouth daily 90 tablet 3     calcium carbonate-vitamin D (CALTRATE) 600-10 MG-MCG per tablet Take 1 tablet by mouth 2 times daily (with meals) 180 tablet 3     cyclobenzaprine (FLEXERIL) 10 MG tablet Take 1 tablet (10 mg) by mouth 3 times daily as needed for muscle spasms. 30 tablet 0     folic acid (FOLVITE) 1 MG tablet Take 1 tablet (1,000 mcg) by mouth daily 90 tablet 1     magnesium oxide (MAG-OX) 400 MG tablet TAKE 1 TABLET (400 MG) BY MOUTH 2 TIMES DAILY 180 tablet 0     metoprolol tartrate (LOPRESSOR) 25 MG tablet TAKE 1 TABLET BY MOUTH TWICE A  tablet 3     Multiple Vitamins-Minerals (SPECTRAVITE) TABS Take 1 tablet by mouth daily. Taking a different  "brand of multi vitamins       oxyCODONE-acetaminophen (PERCOCET) 5-325 MG tablet Take 1 tablet by mouth every 6 hours as needed for pain. 15 tablet 0     sildenafil (VIAGRA) 100 MG tablet Take 100 mg by mouth daily as needed.       tacrolimus (GENERIC EQUIVALENT) 0.5 MG capsule Take 1 capsule (0.5 mg) by mouth every morning. Total dose: 1.5mg am, 1 mg pm. 90 capsule 3     tacrolimus (GENERIC EQUIVALENT) 1 MG capsule Take 1 capsule (1 mg) by mouth every 12 hours. Total dose 1.5mg AM and 1mg  capsule 3     No current facility-administered medications for this visit.      Vitals:   BP (!) 138/94   Pulse 87   Resp 18   Ht 1.683 m (5' 6.25\")   Wt 72.7 kg (160 lb 4.8 oz)   SpO2 98%   BMI 25.68 kg/m      Physical Exam:   In general he looks quite well. HEENT exam shows no scleral icterus or temporal muscle wasting. Chest is clear. Abdominal exam shows no increase in girth. No masses or tenderness to palpation are present. Liver is 10 cm in span without left lobe enlargement. No spleen tip is palpable.  His incision is intact.  Extremity exam shows no edema. Skin exam shows no suspicious lesions and neurologic exam is nonfocal.    Labs:   Lab Results   Component Value Date     05/20/2025    POTASSIUM 4.4 05/20/2025    CHLORIDE 104 05/20/2025    ANIONGAP 11 05/20/2025    CO2 23 05/20/2025    BUN 23.3 (H) 05/20/2025    CR 1.47 (H) 05/20/2025    GFRESTIMATED 54 (L) 05/20/2025    PEDRO 9.7 05/20/2025    PEDRO 9.7 05/20/2025      Lab Results   Component Value Date    WBC 5.9 05/20/2025    HGB 15.1 05/20/2025    HCT 45.5 05/20/2025    MCV 84 05/20/2025    MCH 28.0 05/20/2025    MCHC 33.2 05/20/2025    RDW 14.5 05/20/2025     (L) 05/20/2025     Lab Results   Component Value Date    ALBUMIN 4.4 05/20/2025    ALKPHOS 53 05/20/2025    AST 22 05/20/2025     Lab Results   Component Value Date    INR 1.13 02/23/2024     Recent Labs   Lab Test 05/20/25  0827 11/25/24  0721 08/26/24  1448 06/04/24  0854 05/17/24  0759 " 02/23/24  1127 12/27/23  0713 11/27/23  0901 10/27/23  1025 10/03/23  0727   ALKPHOS 53 52 53 50 47 67 54 56 70 58   ALT 21 23 21 19 13 22 23 26 25 25   AST 22 20 19 17 16 23 22 20 24 24      Imaging:   No images are attached to the encounter.     Assessment/Plan:   IMPRESSION:   My impression is that Mr. Mane is 2 years status post liver transplantation.  His liver function is excellent.  He does have a mild amount of CKD that we will keep an eye on. He is up-to-date with regard to vaccines and cancer screening.  I have made him a dermatology appointment.  He does now qualify for an RSV vaccine which I recommend that he get in the fall.  He does have some low back pain and has spine disease based on an MRI.  He asked about a steroid injection which I think would be just fine.  I will not be making any change to his medical regimen we will see him back in the clinic in 6 months.     I did spend a total of 30 minutes (on the date of the encounter), including 20 minutes of face-to-face clinic time including counseling. The rest of the time was spent in documentation and review of records.    Thank you very much for allowing me to participate in the care of this patient.  If you have any questions regarding my recommendations, please do not hesitate to contact me.         Pancho Sloan MD      Professor of Medicine  [employer]      Executive Medical Director, Solid Organ Transplant Program  [facility]    Again, thank you for allowing me to participate in the care of your patient.        Sincerely,        Pancho Sloan MD    Electronically signed

## 2025-05-20 NOTE — NURSING NOTE
"Chief Complaint   Patient presents with    RECHECK     Follow up visit     Ilya Rockwell, CMA CMA at 8:51 AM on 5/20/2025     BP (!) 138/94   Pulse 87   Resp 18   Ht 1.683 m (5' 6.25\")   Wt 72.7 kg (160 lb 4.8 oz)   SpO2 98%   BMI 25.68 kg/m      "

## 2025-05-20 NOTE — PROGRESS NOTES
Solid Organ Transplant Hepatology Follow-Up Visit:     HISTORY OF PRESENT ILLNESS:   I had the pleasure of seeing Cricket Mane for followup in the Liver Clinic at the [facility] on May 20, 2025. Mr. 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     For the most part, he is doing well.  He denies any abdominal pain.  He denies any itching or skin rash.  He denies fatigue.  He denies any increased abdominal girth or lower extremity edema.     He denies any gastrointestinal bleeding.     He denies any fevers or chills, cough or shortness of breath. He denies any nausea or vomiting.  He does have some occasional diarrhea.  He denies any constipation.  His appetite is good and his weight is increasing slightly.     There otherwise been no other new events since he was last seen.    Medications:   Current Outpatient Medications   Medication Sig Dispense Refill    aspirin (ASA) 325 MG tablet Take 1 tablet (325 mg) by mouth daily 90 tablet 3    calcium carbonate-vitamin D (CALTRATE) 600-10 MG-MCG per tablet Take 1 tablet by mouth 2 times daily (with meals) 180 tablet 3    cyclobenzaprine (FLEXERIL) 10 MG tablet Take 1 tablet (10 mg) by mouth 3 times daily as needed for muscle spasms. 30 tablet 0    folic acid (FOLVITE) 1 MG tablet Take 1 tablet (1,000 mcg) by mouth daily 90 tablet 1    magnesium oxide (MAG-OX) 400 MG tablet TAKE 1 TABLET (400 MG) BY MOUTH 2 TIMES DAILY 180 tablet 0    metoprolol tartrate (LOPRESSOR) 25 MG tablet TAKE 1 TABLET BY MOUTH TWICE A  tablet 3    Multiple Vitamins-Minerals (SPECTRAVITE) TABS Take 1 tablet by mouth daily. Taking a different brand of multi vitamins      oxyCODONE-acetaminophen (PERCOCET) 5-325 MG tablet Take 1 tablet by mouth every 6 hours as needed for pain. 15 tablet 0    sildenafil (VIAGRA) 100 MG tablet Take 100 mg by mouth daily as needed.      tacrolimus (GENERIC EQUIVALENT)  "0.5 MG capsule Take 1 capsule (0.5 mg) by mouth every morning. Total dose: 1.5mg am, 1 mg pm. 90 capsule 3    tacrolimus (GENERIC EQUIVALENT) 1 MG capsule Take 1 capsule (1 mg) by mouth every 12 hours. Total dose 1.5mg AM and 1mg  capsule 3     No current facility-administered medications for this visit.      Vitals:   BP (!) 138/94   Pulse 87   Resp 18   Ht 1.683 m (5' 6.25\")   Wt 72.7 kg (160 lb 4.8 oz)   SpO2 98%   BMI 25.68 kg/m      Physical Exam:   In general he looks quite well. HEENT exam shows no scleral icterus or temporal muscle wasting. Chest is clear. Abdominal exam shows no increase in girth. No masses or tenderness to palpation are present. Liver is 10 cm in span without left lobe enlargement. No spleen tip is palpable.  His incision is intact.  Extremity exam shows no edema. Skin exam shows no suspicious lesions and neurologic exam is nonfocal.    Labs:   Lab Results   Component Value Date     05/20/2025    POTASSIUM 4.4 05/20/2025    CHLORIDE 104 05/20/2025    ANIONGAP 11 05/20/2025    CO2 23 05/20/2025    BUN 23.3 (H) 05/20/2025    CR 1.47 (H) 05/20/2025    GFRESTIMATED 54 (L) 05/20/2025    PEDRO 9.7 05/20/2025    PEDRO 9.7 05/20/2025      Lab Results   Component Value Date    WBC 5.9 05/20/2025    HGB 15.1 05/20/2025    HCT 45.5 05/20/2025    MCV 84 05/20/2025    MCH 28.0 05/20/2025    MCHC 33.2 05/20/2025    RDW 14.5 05/20/2025     (L) 05/20/2025     Lab Results   Component Value Date    ALBUMIN 4.4 05/20/2025    ALKPHOS 53 05/20/2025    AST 22 05/20/2025     Lab Results   Component Value Date    INR 1.13 02/23/2024     Recent Labs   Lab Test 05/20/25  0827 11/25/24  0721 08/26/24  1448 06/04/24  0854 05/17/24  0759 02/23/24  1127 12/27/23  0713 11/27/23  0901 10/27/23  1025 10/03/23  0727   ALKPHOS 53 52 53 50 47 67 54 56 70 58   ALT 21 23 21 19 13 22 23 26 25 25   AST 22 20 19 17 16 23 22 20 24 24      Imaging:   No images are attached to the encounter.     Assessment/Plan: "   IMPRESSION:   My impression is that Mr. Mane is 2 years status post liver transplantation.  His liver function is excellent.  He does have a mild amount of CKD that we will keep an eye on. He is up-to-date with regard to vaccines and cancer screening.  I have made him a dermatology appointment.  He does now qualify for an RSV vaccine which I recommend that he get in the fall.  He does have some low back pain and has spine disease based on an MRI.  He asked about a steroid injection which I think would be just fine.  I will not be making any change to his medical regimen we will see him back in the clinic in 6 months.     I did spend a total of 30 minutes (on the date of the encounter), including 20 minutes of face-to-face clinic time including counseling. The rest of the time was spent in documentation and review of records.    Thank you very much for allowing me to participate in the care of this patient.  If you have any questions regarding my recommendations, please do not hesitate to contact me.         Pancho Sloan MD      Professor of Medicine  [employer]      Executive Medical Director, Solid Organ Transplant Program  [facility]

## 2025-05-22 ENCOUNTER — THERAPY VISIT (OUTPATIENT)
Dept: PHYSICAL THERAPY | Facility: REHABILITATION | Age: 65
End: 2025-05-22
Attending: NURSE PRACTITIONER
Payer: COMMERCIAL

## 2025-05-22 DIAGNOSIS — M43.17 SPONDYLOLISTHESIS OF LUMBOSACRAL REGION: ICD-10-CM

## 2025-05-22 DIAGNOSIS — M43.06 PARS DEFECT OF LUMBAR SPINE: ICD-10-CM

## 2025-05-22 DIAGNOSIS — M54.50 ACUTE BILATERAL LOW BACK PAIN WITHOUT SCIATICA: ICD-10-CM

## 2025-05-22 NOTE — PROGRESS NOTES
PHYSICAL THERAPY EVALUATION  Type of Visit: Evaluation       Fall Risk Screen:  Have you fallen 2 or more times in the past year?: No  Have you fallen and had an injury in the past year?: No    Subjective         Presenting condition or subjective complaint: lower back pain caused by a spinal stress fracture    The patient presents to therapy with a chief complaint of low back pain. Chronic in nature but most recently flared up around 4/5/25. No radiation into the legs. Flare up while traveling in [place] and had to come back due to the pain. 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     1.  Chronic L5 pars defects bilaterally with grade 1 L5-S1 spondylolisthesis. Severe L5-S1 foraminal compromise is noted with no spinal canal narrowing at this level. No disc herniation at L5-S1 is evident. Lower cord, conus medullaris and roots of the   cauda equina are normal. No additional marrow or ligamentous edema.  2.  Otherwise satisfactory lumbar vertebral body height/alignment     3.  At L3-4 Modic type I endplate signal changes are present posteriorly just superior endplate of L4 which can serve as a source of independent pain generation. L3-4 disc extrusion with inferior migration at the right paracentral zone extends to narrow   the right L4 lateral recess and proximity of annular tear/disruption or mass effect at the right L4 nerve root within the lateral recess series 6 image 36 could correlate to right L4 radiculopathy.     4.  Scattered degenerative changes elsewhere in the lumbar spine are less pronounced/prominent.. Please see above for details and description.    Xray:    Chronic bilateral L5 pars defects. 10 mm anterolisthesis of L5 on S1. Additional grade 1 anterolisthesis of L1 on L2 and L2 on L3 and L3 on L4. Alignment is preserved throughout range of motion.   Moderate multilevel disc height loss. Mild multilevel facet hypertrophy. There is  aortic atherosclerosis.    Hip xray:    Irregular sclerosis at the right femoral head is favored to represent avascular necrosis. Mild right hip joint degenerative changes. Status post left total hip arthroplasty. No hardware complication. Mild to   moderate degenerative changes of the sacroiliac joints and lower lumbar spine. Atherosclerosis.    Date of onset:      Relevant medical history:     Dates & types of surgery: in medical record    Prior diagnostic imaging/testing results: MRI; X-ray; Bone scan     Prior therapy history for the same diagnosis, illness or injury: No      Living Environment  Social support: With family members   Type of home: House   Stairs to enter the home: Yes 3 Is there a railing: No     Ramp: No   Stairs inside the home: Yes 15 Is there a railing: Yes     Help at home: Home and Yard maintenance tasks  Equipment owned: Straight Cane     Employment: No    Hobbies/Interests: golf pickleball biking walking    Patient goals for therapy: various athletic activities and basic  tasks like bending over    Pain assessment: See objective evaluation for additional pain details     Objective     LUMBAR SPINE EVALUATION  PAIN: Pain Level at Rest: 3/10  Pain Level with Use: 8/10  Pain Location: lumbar spine  POSTURE: kyphosis, decreased lumbar lordosis   GAIT:   Gait Deviations: Antalgic  ROM: Lumbar flexion: reaches mid tibia Stiffness, lumbar extension: max limited stiffness   STRENGTH: difficulty activating lower abdominals, but able to improve with repeated reps     MYOTOMES: 5/5 all LE   DERMATOMES: WNL  NEURAL TENSION: negative slump   PALPATION: non tender to palpation     Assessment & Plan   CLINICAL IMPRESSIONS  Medical Diagnosis: Spondylolisthesis of lumbosacral region  Pars defect of lumbar spine  Acute bilateral low back pain without sciatica    Treatment Diagnosis: Low Back Pain/Pars Defect   Impression/Assessment: Patient is a 64 year old male with low back complaints.  The following  significant findings have been identified: Pain, Decreased ROM/flexibility, Decreased joint mobility, Decreased strength, Inflammation, Impaired gait, Impaired muscle performance, Decreased activity tolerance, and Impaired posture. These impairments interfere with their ability to perform self care tasks, work tasks, recreational activities, household chores, driving , household mobility, and community mobility as compared to previous level of function.     Clinical Decision Making (Complexity):  Clinical Presentation: Stable/Uncomplicated  Clinical Presentation Rationale: based on medical and personal factors listed in PT evaluation  Clinical Decision Making (Complexity): Low complexity    PLAN OF CARE  Treatment Interventions:  Modalities: Cryotherapy, Cupping, Dry Needling, E-stim, Mechanical Traction  Interventions: Gait Training, Manual Therapy, Neuromuscular Re-education, Therapeutic Activity, Therapeutic Exercise, Self-Care/Home Management    Long Term Goals     PT Goal 1  Goal Identifier: Prolonged Positioning.  Goal Description: The patient will be able to sustain a position, either sitting or standing x30 minutes with appropriate posture and pain <3/10.  Rationale: to maximize safety and independence with performance of ADLs and functional tasks;to maximize safety and independence with self cares  Target Date: 07/17/25  PT Goal 2  Goal Identifier: Ambulation  Goal Description: The patient will be able to ambulate x30 minutes with pain <3/10.  Rationale: to maximize safety and independence with performance of ADLs and functional tasks;to maximize safety and independence within the home;to maximize safety and independence with self cares  Target Date: 07/17/25      Frequency of Treatment: 1x/week  Duration of Treatment: 8 weeks    Recommended Referrals to Other Professionals:   Education Assessment:   Learner/Method: Patient  Education Comments: Eager to participate in therapy. Patient demonstrates  understanding of plan of care and consents to treatment.    Risks and benefits of evaluation/treatment have been explained.   Patient/Family/caregiver agrees with Plan of Care.     Evaluation Time:     PT Eval, Low Complexity Minutes (23544): 20       Signing Clinician: Sharron Quigley, PT

## 2025-05-23 PROCEDURE — 81050 URINALYSIS VOLUME MEASURE: CPT | Performed by: INTERNAL MEDICINE

## 2025-05-23 PROCEDURE — 82570 ASSAY OF URINE CREATININE: CPT | Performed by: INTERNAL MEDICINE

## 2025-05-23 PROCEDURE — 99000 SPECIMEN HANDLING OFFICE-LAB: CPT | Performed by: PATHOLOGY

## 2025-05-24 LAB
CALCIUM 24H UR-MRATE: 0.28 G/SPEC (ref 0.1–0.3)
CALCIUM UR-MCNC: 10.1 MG/DL
COLLECT DURATION TIME UR: 24 H
SPECIMEN VOL UR: 2800 ML

## 2025-05-29 NOTE — RESULT ENCOUNTER NOTE
Hello -    Your vitamin D level is slightly higher than desired.  You can reduce your vitamin D supplement by 10% also.  This might mean taking multivitamin [vitamin D] 6 days a week instead of 7 days a week.  The urine calcium result is within the normal range.  Please let us know if you have any questions or concerns.    If you have a question about the results, please use the ? (question [place]) option at the upper right corner.     Regards,  Jose Francisco Garcia MD

## 2025-06-05 ENCOUNTER — THERAPY VISIT (OUTPATIENT)
Dept: PHYSICAL THERAPY | Facility: REHABILITATION | Age: 65
End: 2025-06-05
Attending: NURSE PRACTITIONER
Payer: COMMERCIAL

## 2025-06-05 DIAGNOSIS — M54.50 CHRONIC MIDLINE LOW BACK PAIN WITHOUT SCIATICA: Primary | ICD-10-CM

## 2025-06-05 DIAGNOSIS — G89.29 CHRONIC MIDLINE LOW BACK PAIN WITHOUT SCIATICA: Primary | ICD-10-CM

## 2025-06-17 ENCOUNTER — THERAPY VISIT (OUTPATIENT)
Dept: PHYSICAL THERAPY | Facility: REHABILITATION | Age: 65
End: 2025-06-17
Payer: COMMERCIAL

## 2025-06-17 DIAGNOSIS — M54.50 CHRONIC MIDLINE LOW BACK PAIN WITHOUT SCIATICA: Primary | ICD-10-CM

## 2025-06-17 DIAGNOSIS — G89.29 CHRONIC MIDLINE LOW BACK PAIN WITHOUT SCIATICA: Primary | ICD-10-CM

## 2025-06-17 PROCEDURE — 97110 THERAPEUTIC EXERCISES: CPT | Mod: GP | Performed by: PHYSICAL THERAPIST

## 2025-06-18 NOTE — PROGRESS NOTES
"   06/17/25 0500   Appointment Info   Signing clinician's name / credentials Sharron Quigley, PT DPT   Total/Authorized Visits E&T   Visits Used 3   Medical Diagnosis Spondylolisthesis of lumbosacral region  Pars defect of lumbar spine  Acute bilateral low back pain without sciatica   PT Tx Diagnosis Low Back Pain/Pars Defect   Precautions/Limitations pt arrived 7 min late   Other pertinent information osteoporosis, hx of liver transplant   Progress Note/Certification   Therapy Frequency 1x/week   Predicted Duration 8 weeks   Progress Note Completed Date 05/22/25   GOALS   PT Goals 2   PT Goal 1   Goal Identifier Prolonged Positioning.   Goal Description The patient will be able to sustain a position, either sitting or standing x30 minutes with appropriate posture and pain <3/10.   Rationale to maximize safety and independence with performance of ADLs and functional tasks;to maximize safety and independence with self cares   Goal Progress goal progressing   Target Date 07/17/25   PT Goal 2   Goal Identifier Ambulation   Goal Description The patient will be able to ambulate x30 minutes with pain <3/10.   Rationale to maximize safety and independence with performance of ADLs and functional tasks;to maximize safety and independence within the home;to maximize safety and independence with self cares   Goal Progress goal progressing   Target Date 07/17/25   Subjective Report   Subjective Report The patient reports that he feels the same and knows that his back is \"going to go out at some point.\" He reports few incidences of pain, but reports that he always is ready for it to come. He reports that he would like to continue with exercises on his own, knowing that he will likely need more PT if he ends up having surgery.   Objective Measures   Objective Measures Objective Measure 1;Objective Measure 2   Treatment Interventions (PT)   Interventions Therapeutic Procedure/Exercise   Therapeutic Procedure/Exercise "   Therapeutic Procedures: strength, endurance, ROM, flexibility minutes (01916) 30   Therapeutic Procedures Ther Proc 2   Ther Proc 1 rowing/pulldowns GTB x20   Ther Proc 1 - Details pallof press GTB x20   Ther Proc 2 sit to stands x15 discussed adding weight   PTRx Ther Proc 1 Supine Lumbar Hip Roll   PTRx Ther Proc 1 - Details HEP   PTRx Ther Proc 2 Single Knee to Chest   PTRx Ther Proc 2 - Details HEP   PTRx Ther Proc 3 Bridging #1   PTRx Ther Proc 3 - Details HEP   PTRx Ther Proc 4 Supine Abdominal Exercise #3 (Marching)   PTRx Ther Proc 4 - Details HEP   PTRx Ther Proc 5 Supine Hamstring Stretch   PTRx Ther Proc 5 - Details HEP   Skilled Intervention reviewed and progressed exercises   Patient Response/Progress good tolerance, painfree   Neuromuscular Re-education   PTRx Neuro Re-ed 1 Abdominal Brace Transverse Abdominis   PTRx Neuro Re-ed 1 - Details No Notes   Education   Learner/Method Patient   Education Comments Eager to participate in therapy. Patient demonstrates understanding of plan of care and consents to treatment.   Plan   Home program see PTRX printed   Plan for next session follow up as needed, chart open x30 days then d/c   Comments   Comments The patient has attended 3 visits of PT and reports no change overall. Upon further questioning he does report a big improvement since pain initially came on. He feels that at this time he would like to continue with exercises independently. Encouraged patient to reach out to this therapist and/or referring provider for further intervention as indicated. Answered patient questions.         DISCHARGE  Reason for Discharge: Patient chooses to continue with HEP independently due to potentially needing therapy later on and wanting to do things independently at this time.     Discharge Plan: Patient to continue home program.    Referring Provider:  Cadence Staton

## (undated) DEVICE — DRSG TEGADERM 8X12" 1629

## (undated) DEVICE — Device

## (undated) DEVICE — TUBING IRRIG CYSTO/BLADDER SET 81" LF 2C4040

## (undated) DEVICE — NDL COUNTER 20CT 31142493

## (undated) DEVICE — SU PROLENE 5-0 C-1DA 36" 8720H

## (undated) DEVICE — SU PROLENE 3-0 SHDA 36" 8522H

## (undated) DEVICE — ESU HANDPIECE ABC BEND-A-BEAM 6" 134006

## (undated) DEVICE — DRAPE STERI FLUOROSCOPE 35X43"1012 LATEX FREE

## (undated) DEVICE — SUTURE PDS2 6-0 C-1 30IN 2 ARM MFL VIOL ABS

## (undated) DEVICE — CLIP APPLIER 13" LG LIGACLIP MCL20

## (undated) DEVICE — SYR 01ML 27GA 0.5" NDL TBC 309623

## (undated) DEVICE — DRAPE ISOLATION BAG 1003

## (undated) DEVICE — SOL NACL 0.9% IRRIG 1000ML BOTTLE 2F7124

## (undated) DEVICE — DRAPE IOBAN INCISE LARGE 6658

## (undated) DEVICE — SU PDS II 0 CTX 36" Z370T

## (undated) DEVICE — CLAMP BULLDOG VASCUSTATT II MINI STR YELLOW 1001-572

## (undated) DEVICE — GLOVE BIOGEL PI MICRO INDICATOR UNDERGLOVE SZ 8.0 48980

## (undated) DEVICE — DRAIN JACKSON PRATT RESERVOIR 400ML SU130-1000

## (undated) DEVICE — CATH TRAY FOLEY SURESTEP 16FR W/TMP PRB STLK LATEX A319416AM

## (undated) DEVICE — ESU GROUND PAD ADULT W/CORD E7507

## (undated) DEVICE — SU SILK 3-0 TIE 12X30" A304H

## (undated) DEVICE — LINEN TOWEL PACK X30 5481

## (undated) DEVICE — SURGICEL ABSORBABLE HEMOSTAT SNOW 4"X4" 2083

## (undated) DEVICE — SU SILK 0 TIE 6X30" A306H

## (undated) DEVICE — CLIP APPLIER 09 3/8" SM LIGACLIP MCS20

## (undated) DEVICE — SU SILK 1 TIE 6X30" A307H

## (undated) DEVICE — DRAPE BACK TABLE  44X90" 8377

## (undated) DEVICE — SURGICEL HEMOSTAT 4X8" 1952

## (undated) DEVICE — DRSG MEDIPORE 3 1/2X13 3/4" 3573

## (undated) DEVICE — ESU LIGASURE IMPACT OPEN SEALER/DVDR CVD LG JAW LF4418

## (undated) DEVICE — SUCTION MANIFOLD NEPTUNE 2 SYS 4 PORT 0702-020-000

## (undated) DEVICE — SU PROLENE 6-0 C-1DA 30" 8706H

## (undated) DEVICE — ESU PENCIL SMOKE EVAC W/ROCKER SWITCH 0703-047-000

## (undated) DEVICE — GLOVE BIOGEL PI MICRO SZ 7.5 48575

## (undated) DEVICE — STPL POWERED ECHELON 45MM PSEE45A

## (undated) DEVICE — WIPES FOLEY CARE SURESTEP PROVON DFC100

## (undated) DEVICE — SILICONE ROUND DRAIN

## (undated) DEVICE — SPONGE LAP 18X18" X8435

## (undated) DEVICE — SU ETHILON 3-0 PS-1 18" 1663H

## (undated) DEVICE — DRSG PRIMAPORE 02X3" 7133

## (undated) DEVICE — SU SILK 2-0 TIE 12X30" A305H

## (undated) DEVICE — CLIP APPLIER 11" MED LIGACLIP MCM30

## (undated) DEVICE — SUCTION TIP YANKAUER W/O VENT K86

## (undated) DEVICE — DRAPE SHEET REV FOLD 3/4 9349

## (undated) DEVICE — SU PROLENE 7-0 BV-1DA 30" 8703H

## (undated) DEVICE — SU SILK 4-0 TIE 12X30" A303H

## (undated) DEVICE — STPL SKIN 35W ROTATING HEAD PRW35

## (undated) DEVICE — SU VICRYL 3-0 SH 27" UND J416H

## (undated) DEVICE — CELL SAVER

## (undated) DEVICE — NDL COUNTER 40CT  31142311

## (undated) DEVICE — DEFIB PRO-PADZ LVP LQD GEL ADULT 8900-2105-01

## (undated) DEVICE — SU PROLENE 4-0 SHDA 36" 8521H

## (undated) DEVICE — ESU GROUND PAD THERMOGUARD PLUS ABC PEDS 7-382

## (undated) DEVICE — DRAPE POUCH INSTRUMENT 1018

## (undated) DEVICE — DRAPE FLUID WARMING 52 X 60" ORS-321

## (undated) DEVICE — ESU LIGASURE TRIVERSE 3MM FT3000

## (undated) DEVICE — SU SILK 3-0 SH CR 8X18" C013D

## (undated) DEVICE — SU PDS II 1 TP-1 48" Z880G

## (undated) DEVICE — LINEN TOWEL PACK X6 WHITE 5487

## (undated) RX ORDER — METOPROLOL TARTRATE 1 MG/ML
INJECTION, SOLUTION INTRAVENOUS
Status: DISPENSED
Start: 2022-12-21

## (undated) RX ORDER — VERAPAMIL HYDROCHLORIDE 2.5 MG/ML
INJECTION, SOLUTION INTRAVENOUS
Status: DISPENSED
Start: 2023-03-23

## (undated) RX ORDER — NITROGLYCERIN 0.4 MG/1
TABLET SUBLINGUAL
Status: DISPENSED
Start: 2022-12-21

## (undated) RX ORDER — PAPAVERINE HYDROCHLORIDE 30 MG/ML
INJECTION INTRAMUSCULAR; INTRAVENOUS
Status: DISPENSED
Start: 2023-03-23

## (undated) RX ORDER — IVABRADINE 5 MG/1
TABLET, FILM COATED ORAL
Status: DISPENSED
Start: 2022-12-21

## (undated) RX ORDER — HEPARIN SODIUM 1000 [USP'U]/ML
INJECTION, SOLUTION INTRAVENOUS; SUBCUTANEOUS
Status: DISPENSED
Start: 2023-03-23

## (undated) RX ORDER — METOPROLOL TARTRATE 100 MG
TABLET ORAL
Status: DISPENSED
Start: 2022-12-21